# Patient Record
Sex: FEMALE | Race: WHITE | NOT HISPANIC OR LATINO | URBAN - METROPOLITAN AREA
[De-identification: names, ages, dates, MRNs, and addresses within clinical notes are randomized per-mention and may not be internally consistent; named-entity substitution may affect disease eponyms.]

---

## 2017-01-16 ENCOUNTER — IMPORTED ENCOUNTER (OUTPATIENT)
Dept: URBAN - METROPOLITAN AREA CLINIC 1 | Facility: CLINIC | Age: 72
End: 2017-01-16

## 2017-01-16 PROBLEM — H47.20: Noted: 2017-01-16

## 2017-01-16 PROBLEM — H04.123: Noted: 2017-01-16

## 2017-01-16 PROBLEM — H40.053: Noted: 2017-01-16

## 2017-01-16 PROBLEM — Z96.1: Noted: 2017-01-16

## 2017-01-16 PROBLEM — H16.143: Noted: 2017-01-16

## 2017-01-16 PROBLEM — H25.811: Noted: 2017-01-16

## 2017-01-16 PROCEDURE — 92015 DETERMINE REFRACTIVE STATE: CPT

## 2017-01-16 PROCEDURE — 92012 INTRM OPH EXAM EST PATIENT: CPT

## 2017-01-16 NOTE — PATIENT DISCUSSION
1.  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 Recommend very slow wean in future while following CRP. Cont Lumigan QHS OS. Steroids followed by Dr. Scott Gray currently on Prednisone 50mg PO QD. Monocular safety precautions. Patient should continue to f/u as scheduled with Dr. Scott Gray. 2.  Cataract OD: Observe for now without intervention. The patient was advised to contact us if any change or worsening of vision3. OHTN OU (0.8/0.75)- Slight increased in IOP OU but remains within normal Limits. Start Combigan OS only BID given recent [facility] changes (sample given.)  Patient to continue with Latanoprost OS QHS. 4.  MARYJO w/ PEK OU- (H/o Plugs LLs OU) Cont ATs TID OU Routinely. 5.  H/o PVD OD 6. Pseudophakia OS- H/o Yag OS7. Return for an appointment for a 10 in 1 month with Dr. Anderson Fernandez.

## 2017-02-06 ENCOUNTER — APPOINTMENT (OUTPATIENT)
Dept: MRI IMAGING | Age: 72
End: 2017-02-06
Attending: EMERGENCY MEDICINE
Payer: COMMERCIAL

## 2017-02-06 ENCOUNTER — APPOINTMENT (OUTPATIENT)
Dept: CT IMAGING | Age: 72
End: 2017-02-06
Attending: EMERGENCY MEDICINE
Payer: COMMERCIAL

## 2017-02-06 ENCOUNTER — HOSPITAL ENCOUNTER (EMERGENCY)
Age: 72
Discharge: HOME | End: 2017-02-06
Attending: EMERGENCY MEDICINE
Payer: COMMERCIAL

## 2017-02-06 ENCOUNTER — APPOINTMENT (OUTPATIENT)
Dept: GENERAL RADIOLOGY | Age: 72
End: 2017-02-06
Attending: EMERGENCY MEDICINE
Payer: COMMERCIAL

## 2017-02-06 VITALS
WEIGHT: 130 LBS | SYSTOLIC BLOOD PRESSURE: 131 MMHG | BODY MASS INDEX: 25.52 KG/M2 | OXYGEN SATURATION: 99 % | HEIGHT: 60 IN | TEMPERATURE: 97.7 F | DIASTOLIC BLOOD PRESSURE: 70 MMHG | HEART RATE: 97 BPM | RESPIRATION RATE: 24 BRPM

## 2017-02-06 DIAGNOSIS — R47.81 SLURRED SPEECH: Primary | ICD-10-CM

## 2017-02-06 LAB
ABO + RH BLD: NORMAL
ALBUMIN SERPL BCP-MCNC: 3.3 G/DL (ref 3.4–5)
ALBUMIN/GLOB SERPL: 1.2 {RATIO} (ref 0.8–1.7)
ALP SERPL-CCNC: 55 U/L (ref 45–117)
ALT SERPL-CCNC: 38 U/L (ref 13–56)
AMPHET UR QL SCN: NEGATIVE
ANION GAP BLD CALC-SCNC: 8 MMOL/L (ref 3–18)
APPEARANCE UR: CLEAR
ASPIRIN TEST, ASPIRN: 350 ARU
AST SERPL W P-5'-P-CCNC: 27 U/L (ref 15–37)
ATRIAL RATE: 75 BPM
BARBITURATES UR QL SCN: NEGATIVE
BENZODIAZ UR QL: NEGATIVE
BILIRUB SERPL-MCNC: 0.4 MG/DL (ref 0.2–1)
BILIRUB UR QL: NEGATIVE
BLOOD GROUP ANTIBODIES SERPL: NORMAL
BUN SERPL-MCNC: 16 MG/DL (ref 7–18)
BUN/CREAT SERPL: 28 (ref 12–20)
CALCIUM SERPL-MCNC: 8.6 MG/DL (ref 8.5–10.1)
CALCULATED P AXIS, ECG09: 77 DEGREES
CALCULATED R AXIS, ECG10: 38 DEGREES
CALCULATED T AXIS, ECG11: 86 DEGREES
CANNABINOIDS UR QL SCN: POSITIVE
CHLORIDE SERPL-SCNC: 100 MMOL/L (ref 100–108)
CK MB CFR SERPL CALC: 9.6 % (ref 0–4)
CK MB SERPL-MCNC: 4.3 NG/ML (ref 0.5–3.6)
CK SERPL-CCNC: 45 U/L (ref 26–192)
CO2 SERPL-SCNC: 29 MMOL/L (ref 21–32)
COCAINE UR QL SCN: NEGATIVE
COLOR UR: YELLOW
CREAT SERPL-MCNC: 0.58 MG/DL (ref 0.6–1.3)
DIAGNOSIS, 93000: NORMAL
ERYTHROCYTE [DISTWIDTH] IN BLOOD BY AUTOMATED COUNT: 18.3 % (ref 11.6–14.5)
FIBRINOGEN PPP-MCNC: 274 MG/DL (ref 210–451)
GLOBULIN SER CALC-MCNC: 2.8 G/DL (ref 2–4)
GLUCOSE BLD STRIP.AUTO-MCNC: 103 MG/DL (ref 70–110)
GLUCOSE SERPL-MCNC: 91 MG/DL (ref 74–99)
GLUCOSE UR STRIP.AUTO-MCNC: 100 MG/DL
HCT VFR BLD AUTO: 42.3 % (ref 35–45)
HDSCOM,HDSCOM: ABNORMAL
HGB BLD-MCNC: 13.9 G/DL (ref 12–16)
HGB UR QL STRIP: NEGATIVE
INR BLD: 1.1 (ref 0.9–1.2)
INR PPP: 1 (ref 0.8–1.2)
KETONES UR QL STRIP.AUTO: NEGATIVE MG/DL
LACTATE BLD-SCNC: 1.1 MMOL/L (ref 0.4–2)
LEUKOCYTE ESTERASE UR QL STRIP.AUTO: NEGATIVE
MCH RBC QN AUTO: 32.4 PG (ref 24–34)
MCHC RBC AUTO-ENTMCNC: 32.9 G/DL (ref 31–37)
MCV RBC AUTO: 98.6 FL (ref 74–97)
METHADONE UR QL: NEGATIVE
NITRITE UR QL STRIP.AUTO: NEGATIVE
OPIATES UR QL: NEGATIVE
P-R INTERVAL, ECG05: 120 MS
PCP UR QL: NEGATIVE
PH UR STRIP: 7 [PH] (ref 5–8)
PLATELET # BLD AUTO: 275 K/UL (ref 135–420)
PMV BLD AUTO: 11.1 FL (ref 9.2–11.8)
POTASSIUM SERPL-SCNC: 4 MMOL/L (ref 3.5–5.5)
PROT SERPL-MCNC: 6.1 G/DL (ref 6.4–8.2)
PROT UR STRIP-MCNC: NEGATIVE MG/DL
PROTHROMBIN TIME: 12.6 SEC (ref 11.5–15.2)
Q-T INTERVAL, ECG07: 422 MS
QRS DURATION, ECG06: 74 MS
QTC CALCULATION (BEZET), ECG08: 471 MS
RBC # BLD AUTO: 4.29 M/UL (ref 4.2–5.3)
SODIUM SERPL-SCNC: 137 MMOL/L (ref 136–145)
SP GR UR REFRACTOMETRY: 1.01 (ref 1–1.03)
SPECIMEN EXP DATE BLD: NORMAL
THROMBIN TIME: 16.8 SECS (ref 13.8–18.2)
TROPONIN I SERPL-MCNC: 0.03 NG/ML (ref 0–0.04)
UROBILINOGEN UR QL STRIP.AUTO: 0.2 EU/DL (ref 0.2–1)
VENTRICULAR RATE, ECG03: 75 BPM
WBC # BLD AUTO: 21.3 K/UL (ref 4.6–13.2)

## 2017-02-06 PROCEDURE — 99285 EMERGENCY DEPT VISIT HI MDM: CPT

## 2017-02-06 PROCEDURE — 83605 ASSAY OF LACTIC ACID: CPT

## 2017-02-06 PROCEDURE — 85670 THROMBIN TIME PLASMA: CPT | Performed by: EMERGENCY MEDICINE

## 2017-02-06 PROCEDURE — 74011250636 HC RX REV CODE- 250/636: Performed by: EMERGENCY MEDICINE

## 2017-02-06 PROCEDURE — 85610 PROTHROMBIN TIME: CPT

## 2017-02-06 PROCEDURE — 81003 URINALYSIS AUTO W/O SCOPE: CPT | Performed by: EMERGENCY MEDICINE

## 2017-02-06 PROCEDURE — 80307 DRUG TEST PRSMV CHEM ANLYZR: CPT | Performed by: EMERGENCY MEDICINE

## 2017-02-06 PROCEDURE — 70553 MRI BRAIN STEM W/O & W/DYE: CPT

## 2017-02-06 PROCEDURE — 77030011943

## 2017-02-06 PROCEDURE — 85576 BLOOD PLATELET AGGREGATION: CPT | Performed by: EMERGENCY MEDICINE

## 2017-02-06 PROCEDURE — 82962 GLUCOSE BLOOD TEST: CPT

## 2017-02-06 PROCEDURE — 74011636320 HC RX REV CODE- 636/320: Performed by: EMERGENCY MEDICINE

## 2017-02-06 PROCEDURE — 93005 ELECTROCARDIOGRAM TRACING: CPT

## 2017-02-06 PROCEDURE — 94762 N-INVAS EAR/PLS OXIMTRY CONT: CPT

## 2017-02-06 PROCEDURE — 70450 CT HEAD/BRAIN W/O DYE: CPT

## 2017-02-06 PROCEDURE — 74177 CT ABD & PELVIS W/CONTRAST: CPT

## 2017-02-06 PROCEDURE — 86900 BLOOD TYPING SEROLOGIC ABO: CPT | Performed by: EMERGENCY MEDICINE

## 2017-02-06 PROCEDURE — 80053 COMPREHEN METABOLIC PANEL: CPT | Performed by: EMERGENCY MEDICINE

## 2017-02-06 PROCEDURE — 82550 ASSAY OF CK (CPK): CPT | Performed by: EMERGENCY MEDICINE

## 2017-02-06 PROCEDURE — A9585 GADOBUTROL INJECTION: HCPCS | Performed by: EMERGENCY MEDICINE

## 2017-02-06 PROCEDURE — 85610 PROTHROMBIN TIME: CPT | Performed by: EMERGENCY MEDICINE

## 2017-02-06 PROCEDURE — 71010 XR CHEST PORT: CPT

## 2017-02-06 PROCEDURE — 85027 COMPLETE CBC AUTOMATED: CPT | Performed by: EMERGENCY MEDICINE

## 2017-02-06 PROCEDURE — 85384 FIBRINOGEN ACTIVITY: CPT | Performed by: EMERGENCY MEDICINE

## 2017-02-06 RX ADMIN — SODIUM CHLORIDE 1000 ML: 900 INJECTION, SOLUTION INTRAVENOUS at 12:47

## 2017-02-06 RX ADMIN — GADOBUTROL 7.5 ML: 604.72 INJECTION INTRAVENOUS at 18:25

## 2017-02-06 RX ADMIN — IOPAMIDOL 94 ML: 612 INJECTION, SOLUTION INTRAVENOUS at 13:34

## 2017-02-06 NOTE — ED NOTES
Lab called and states special blue top needs to be redrawn, per Dr Juan Coley, ok with lab not resulting athis time. Do not need to redraw.

## 2017-02-06 NOTE — ED TRIAGE NOTES
Pt arrives by EMS as a Code S Level 1 for slurred speech, right sided facial droop, and weakness. Pt states she became confused at 1000 today then fell. Last known normal time unknown. Pt with previous stroke and unknown deficits from prior stroke.

## 2017-02-06 NOTE — ED PROVIDER NOTES
HPI Comments: 11:30 AM Dian Berry is a 66 y.o. female with h/o HTN, COPD, CAD, and cerebral aneurysm who presents to ED via EMS for evaluation of AMS onset 1 hour ago. EMS report right sided facial droop, slurred speech, and no arm weakness. EMS also report hx of stroke early last year, but unsure of when exactly. Pt states she walks around with a walker and fell this morning. Pt currently states she feels, \"confused. \" When asked what is different, pt replied \"everything. \" No other concerns or symptoms at this time. PCP: Joss Mccarthy MD    The history is provided by the EMS personnel. 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      Social History Main Topics    Smoking status: Former Smoker     Types: Cigarettes     Quit date: 4/28/2013    Smokeless tobacco: Not on file    Alcohol use 1.0 oz/week     2 Shots of liquor per week    Drug use: No    Sexual activity: Yes     Partners: Male     Other Topics Concern    Not on file     Social History Narrative ALLERGIES: Sulfa (sulfonamide antibiotics) and Sulfamethoxazole-trimethoprim    Review of Systems   Unable to perform ROS: Mental status change       Vitals:    02/06/17 1300 02/06/17 1430 02/06/17 1500 02/06/17 1630   BP: 179/83 165/70 167/79 169/74   Pulse:  71 70 72   Resp:  18 17 23   Temp:       SpO2:  98% 98% 99%   Weight:       Height:                Physical Exam   Constitutional: She appears well-developed. No distress. Chronically ill-appearing, nad. Examination slightly limited due to patient effort. HENT:   Head: Normocephalic and atraumatic. Eyes: EOM are normal.   Neck: Normal range of motion. Cardiovascular: Normal rate. Pulmonary/Chest: Effort normal and breath sounds normal. No respiratory distress. Abdominal: Soft. There is no tenderness. Musculoskeletal: Normal range of motion. Mechanically stable   Neurological: She is alert. Potentially right sided facial droop. Generalized weakness noted. Slurred speech noted. Psychiatric: Her behavior is normal.   Nursing note and vitals reviewed. MDM  Number of Diagnoses or Management Options  Slurred speech:   Diagnosis management comments: 67 yo CF with PMHx HTN, CVA presents by EMS after 1-hour onset of confusion, right side facial droop. Pt difficult historian, also states she had fall from her walker. Examination significant for apparent right facial droop and slurred speech with no other apparent focal deficit, though examination somewhat limited by pt effort. Will initiate code S, evaluate for acute process. 1.  Code S order set  2. Symptom management  3. Re-evaluate    7:58 PM  Wbc is elevated, but pt also on high dose steroids for temporal arteritis/PMR. CT negative for acute process and work-up otherwise unremarkable. I spoke with Dr. oIna Fontenot previously about admission, and he recommended getting MRI, discharge if negative, states pt has tendency to have similar episodes.   Pt now does seem more awake, slurred speech resolved.  at bedside. Care transferred to Dr. Nam Vera at end of shift, MRI results pending.        Amount and/or Complexity of Data Reviewed  Clinical lab tests: ordered and reviewed  Tests in the radiology section of CPT®: ordered and reviewed  Discussion of test results with the performing providers: yes  Obtain history from someone other than the patient: yes  Review and summarize past medical records: yes  Discuss the patient with other providers: yes  Independent visualization of images, tracings, or specimens: yes    Risk of Complications, Morbidity, and/or Mortality  Presenting problems: high  Diagnostic procedures: high  Management options: high    Patient Progress  Patient progress: improved    ED Course       CRITICAL CARE (ASAP ONLY)  Performed by: Nitin Wilkes  Authorized by: Nitin Wilkes     Critical care provider statement:     Critical care time was exclusive of:  Separately billable procedures and treating other patients    Critical care was necessary to treat or prevent imminent or life-threatening deterioration of the following conditions:  CNS failure or compromise    Critical care was time spent personally by me on the following activities:  Ordering and performing treatments and interventions, ordering and review of laboratory studies, ordering and review of radiographic studies, pulse oximetry, re-evaluation of patient's condition, review of old charts, obtaining history from patient or surrogate, examination of patient, evaluation of patient's response to treatment, discussions with consultants and development of treatment plan with patient or surrogate  EKG  Date/Time: 2/6/2017 11:51 AM  Performed by: Rajendra Phillips by: Nitin Wilkes     ECG reviewed by ED Physician in the absence of a cardiologist: yes    Previous ECG:     Previous ECG:  Compared to current    Comparison ECG info:  11/15/14    Similarity:  No change  Interpretation: Interpretation: normal    Quality:     Tracing quality:  Limited by artifact  Rate:     ECG rate:  75    ECG rate assessment: normal    Rhythm:     Rhythm: sinus rhythm    Ectopy:     Ectopy: none    QRS:     QRS axis:  Normal    QRS intervals:  Normal  Conduction:     Conduction: normal    ST segments:     ST segments:  Normal  T waves:     T waves: normal          Vitals:  Patient Vitals for the past 12 hrs:   Temp Pulse Resp BP SpO2   02/06/17 1630 - 72 23 169/74 99 %   02/06/17 1500 - 70 17 167/79 98 %   02/06/17 1430 - 71 18 165/70 98 %   02/06/17 1300 - - - 179/83 -   02/06/17 1256 - 73 19 - 96 %   02/06/17 1240 - 73 19 161/79 99 %   02/06/17 1230 - 72 20 156/73 99 %   02/06/17 1220 - 72 21 163/76 99 %   02/06/17 1210 - 74 19 158/75 99 %   02/06/17 1200 - 72 23 155/80 99 %   02/06/17 1150 - 72 21 161/75 99 %   02/06/17 1146 97.7 °F (36.5 °C) 73 21 - 98 %   99% on RA, indicating adequate oxygenation. Medications ordered:   Medications   sodium chloride 0.9 % bolus infusion 1,000 mL (0 mL IntraVENous IV Completed 2/6/17 1317)   iopamidol (ISOVUE 300) 61 % contrast injection 100 mL (94 mL IntraVENous Given 2/6/17 1334)   gadobutrol (Gadavist) contrast solution 7.5 mL (7.5 mL IntraVENous Given 2/6/17 2325)         Lab findings:  Recent Results (from the past 12 hour(s))   CBC W/O DIFF    Collection Time: 02/06/17 11:30 AM   Result Value Ref Range    WBC 21.3 (H) 4.6 - 13.2 K/uL    RBC 4.29 4. 20 - 5.30 M/uL    HGB 13.9 12.0 - 16.0 g/dL    HCT 42.3 35.0 - 45.0 %    MCV 98.6 (H) 74.0 - 97.0 FL    MCH 32.4 24.0 - 34.0 PG    MCHC 32.9 31.0 - 37.0 g/dL    RDW 18.3 (H) 11.6 - 14.5 %    PLATELET 796 431 - 484 K/uL    MPV 11.1 9.2 - 11.6 FL   METABOLIC PANEL, COMPREHENSIVE    Collection Time: 02/06/17 11:30 AM   Result Value Ref Range    Sodium 137 136 - 145 mmol/L    Potassium 4.0 3.5 - 5.5 mmol/L    Chloride 100 100 - 108 mmol/L    CO2 29 21 - 32 mmol/L    Anion gap 8 3.0 - 18 mmol/L    Glucose 91 74 - 99 mg/dL BUN 16 7.0 - 18 MG/DL    Creatinine 0.58 (L) 0.6 - 1.3 MG/DL    BUN/Creatinine ratio 28 (H) 12 - 20      GFR est AA >60 >60 ml/min/1.73m2    GFR est non-AA >60 >60 ml/min/1.73m2    Calcium 8.6 8.5 - 10.1 MG/DL    Bilirubin, total 0.4 0.2 - 1.0 MG/DL    ALT (SGPT) 38 13 - 56 U/L    AST (SGOT) 27 15 - 37 U/L    Alk.  phosphatase 55 45 - 117 U/L    Protein, total 6.1 (L) 6.4 - 8.2 g/dL    Albumin 3.3 (L) 3.4 - 5.0 g/dL    Globulin 2.8 2.0 - 4.0 g/dL    A-G Ratio 1.2 0.8 - 1.7     PROTHROMBIN TIME + INR    Collection Time: 02/06/17 11:30 AM   Result Value Ref Range    Prothrombin time 12.6 11.5 - 15.2 sec    INR 1.0 0.8 - 1.2     THROMBIN TIME    Collection Time: 02/06/17 11:30 AM   Result Value Ref Range    Thrombin time 16.8 13.8 - 18.2 SECS   CARDIAC PANEL,(CK, CKMB & TROPONIN)    Collection Time: 02/06/17 11:30 AM   Result Value Ref Range    CK 45 26 - 192 U/L    CK - MB 4.3 (H) 0.5 - 3.6 ng/ml    CK-MB Index 9.6 (H) 0.0 - 4.0 %    Troponin-I, Qt. 0.03 0.0 - 0.045 NG/ML   FIBRINOGEN    Collection Time: 02/06/17 11:30 AM   Result Value Ref Range    Fibrinogen 274 210 - 451 mg/dL   TYPE & SCREEN    Collection Time: 02/06/17 11:30 AM   Result Value Ref Range    Crossmatch Expiration 02/09/2017     ABO/Rh(D) [place] POSITIVE     Antibody screen NEG    ASPIRIN TEST    Collection Time: 02/06/17 11:30 AM   Result Value Ref Range    Aspirin test 350 ARU   POC INR (AMB)    Collection Time: 02/06/17 11:31 AM   Result Value Ref Range    INR (POC) 1.1 <1.2     EKG, 12 LEAD, INITIAL    Collection Time: 02/06/17 11:47 AM   Result Value Ref Range    Ventricular Rate 75 BPM    Atrial Rate 75 BPM    P-R Interval 120 ms    QRS Duration 74 ms    Q-T Interval 422 ms    QTC Calculation (Bezet) 471 ms    Calculated P Axis 77 degrees    Calculated R Axis 38 degrees    Calculated T Axis 86 degrees    Diagnosis       Normal sinus rhythm with sinus arrhythmia  Nonspecific ST and T wave abnormality  Prolonged QT  Abnormal ECG  When compared with ECG of 15-NOV-2014 16:27,  premature atrial complexes are no longer present  Confirmed by Traci Amaro ([id number]) on 2/6/2017 7:29:19 PM     GLUCOSE, POC    Collection Time: 02/06/17 11:56 AM   Result Value Ref Range    Glucose (POC) 103 70 - 110 mg/dL   URINALYSIS W/ RFLX MICROSCOPIC    Collection Time: 02/06/17 12:03 PM   Result Value Ref Range    Color YELLOW      Appearance CLEAR      Specific gravity 1.014 1.005 - 1.030      pH (UA) 7.0 5.0 - 8.0      Protein NEGATIVE  NEG mg/dL    Glucose 100 (A) NEG mg/dL    Ketone NEGATIVE  NEG mg/dL    Bilirubin NEGATIVE  NEG      Blood NEGATIVE  NEG      Urobilinogen 0.2 0.2 - 1.0 EU/dL    Nitrites NEGATIVE  NEG      Leukocyte Esterase NEGATIVE  NEG     DRUG SCREEN, URINE    Collection Time: 02/06/17 12:03 PM   Result Value Ref Range    BENZODIAZEPINE NEGATIVE  NEG      BARBITURATES NEGATIVE  NEG      THC (TH-CANNABINOL) POSITIVE (A) NEG      OPIATES NEGATIVE  NEG      PCP(PHENCYCLIDINE) NEGATIVE  NEG      COCAINE NEGATIVE  NEG      AMPHETAMINE NEGATIVE  NEG      METHADONE NEGATIVE       HDSCOM (NOTE)    POC LACTIC ACID    Collection Time: 02/06/17  1:27 PM   Result Value Ref Range    Lactic Acid (POC) 1.1 0.4 - 2.0 mmol/L       X-Ray, CT or other radiology findings or impressions:  CT ABD PELV W CONT   Final Result   IMPRESSION:     1. No acute intra-abdominal process is identified. No free fluid or fluid  collection. 2. Stable small bilateral adrenal hypodensities too small accurately  characterize, likely adenomas given the stability. 3. Stable small renal hypodensities. The largest is located along the right  renal lower pole, with intermediate Hounsfield units, nonspecific and likely  complex cyst given stability. 4. Chronic healed right 10th rib fracture. L1 superior endplate compression  fracture likely chronic healed fracture although with slight progression in  height loss since 3/8/2016.      Interpreted by radiologist 14:10       XR CHEST PORT   Final Result IMPRESSION:     No acute pulmonary process identified. Interpreted by radiologist 13:50   CT HEAD WO CONT   Final Result   IMPRESSION:     1. No acute intracranial hemorrhage, mass effect, midline shift, or herniation.        2. Stable areas of encephalomalacia likely from chronic infarcts involving the  bilateral cerebellar hemispheres as well as bilateral occipital lobes medial  left parietal lobe. Stable irregular dystrophic appearing calcifications,  nonspecific and perhaps sequela of associated hemorrhage. Stable chronic basal  ganglia and left corona radiata infarcts.     3. Stable, moderate nonspecific white matter disease likely representing chronic  small vessel changes.     4. No definite CT evidence of acute cortical infarct is seen. Please note that  noncontrast head CT may be normal in early acute infarct.      These critical results on this CODE S patient were directly communicated to Dr. Vianney Brown at 11:45 AM 2/6/2017. Read back was performed. Orders:  Orders Placed This Encounter    XR CHEST PORT     Standing Status:   Standing     Number of Occurrences:   1     Order Specific Question:   Reason for Exam     Answer: Suspected Stroke    CT HEAD WO CONT     Standing Status:   Standing     Number of Occurrences:   1     Order Specific Question:   Transport     Answer:   BED [2]     Order Specific Question:   Reason for Exam     Answer: Suspected Stroke     Order Specific Question:   Is Patient Allergic to Contrast Dye? Answer:   Unknown    CT ABD PELV W CONT     Standing Status:   Standing     Number of Occurrences:   1     Order Specific Question:   Transport     Answer:   BED [2]     Order Specific Question:   Is Patient Allergic to Contrast Dye? Answer:   No     Order Specific Question:   Type of Contrast     Answer:   IV     Order Specific Question:   Does patient have history of Renal Disease?      Answer:   No     Order Specific Question:   Oral Contrast     Answer:   Per Radiologist Protocol    MRI BRAIN W WO CONT     Standing Status:   Standing     Number of Occurrences:   1     Order Specific Question:   Transport     Answer:   BED [2]     Order Specific Question:   Is Patient Allergic to Contrast Dye? Answer:   No     Order Specific Question:   Does patient have history of Renal Disease? Answer:   No    CBC W/O DIFF     Standing Status:   Standing     Number of Occurrences:   1    METABOLIC PANEL, COMPREHENSIVE     Standing Status:   Standing     Number of Occurrences:   1    PROTHROMBIN TIME     Standing Status:   Standing     Number of Occurrences:   1    THROMBIN TIME     Standing Status:   Standing     Number of Occurrences:   1    CARDIAC PANEL,(CK, CKMB & TROPONIN)     Standing Status:   Standing     Number of Occurrences:   1    FIBRINOGEN     Standing Status:   Standing     Number of Occurrences:   1    ASPIRIN TEST     Standing Status:   Standing     Number of Occurrences:   1    URINALYSIS W/ RFLX MICROSCOPIC     Standing Status:   Standing     Number of Occurrences:   1    DRUG SCREEN, URINE     Standing Status:   Standing     Number of Occurrences:   1    DIET NPO     Standing Status:   Standing     Number of Occurrences:   1    NURSING-MISCELLANEOUS: Obtain last known well or time of onset ONE TIME     Standing Status:   Standing     Number of Occurrences:   1     Order Specific Question:   Description of Order:     Answer:   Obtain last known well or time of onset    NURSING-MISCELLANEOUS: If unable to place IV with 2 tries in 5 minutes, notify MD immediately ONE TIME     Standing Status:   Standing     Number of Occurrences:   1     Order Specific Question:   Description of Order:     Answer: If unable to place IV with 2 tries in 5 minutes, notifpatel LERNER immediately   [facility] [address]     Standing Status:   Standing     Number of Occurrences:   1     Order Specific Question:   Type:      Answer:   Bedside    PULSE OXIMETRY CONTINUOUS Standing Status:   Standing     Number of Occurrences:   1    OXYGEN CANNULA     Titrate to keep oxygen saturation greater than 95%. Standing Status:   Standing     Number of Occurrences:   1     Order Specific Question:   Liters per minute:      Answer:   2     Order Specific Question:   Indications for O2 therapy     Answer:   OTHER     Comments:   Stroke    VITAL SIGNS     Every 5 minutes X 15 minutes, then every 15 minutes X 1 hour, then hourly and PRN     Standing Status:   Standing     Number of Occurrences:   1    MEASURE HEIGHT     Standing Status:   Standing     Number of Occurrences:   1    WEIGH PATIENT     Standing Status:   Standing     Number of Occurrences:   1    NEUROLOGIC STATUS ASSESSMENT     Every 15 minutes X 1 hour, then hourly and PRN     Standing Status:   Standing     Number of Occurrences:   1    NIH STROKE SCALE ASSESSMENT     Standing Status:   Standing     Number of Occurrences:   1    CRITICAL CARE (ASAP ONLY)     This order was created via procedure documentation     Standing Status:   Standing     Number of Occurrences:   1    EKG NOTEWRITER(ASAP ONLY)     This order was created via procedure documentation     Standing Status:   Standing     Number of Occurrences:   1    POC LACTIC ACID     Standing Status:   Standing     Number of Occurrences:   1    POC PT/INR     Standing Status:   Standing     Number of Occurrences:   1    POC GLUCOSE BEDSIDE     Standing Status:   Standing     Number of Occurrences:   1    POC URINE PREGNANCY TEST - FOR WOMEN OF CHILDBEARING AGE ONLY     For women of childbearing age only     Standing Status:   Standing     Number of Occurrences:   1    POC INR (AMB)     Standing Status:   Standing     Number of Occurrences:   1    GLUCOSE, POC     Standing Status:   Standing     Number of Occurrences:   1    POC LACTIC ACID     Standing Status:   Standing     Number of Occurrences:   1    EKG, 12 LEAD, INITIAL     Standing Status:   Standing Number of Occurrences:   1     Order Specific Question:   Reason for Exam:     Answer: Suspected Stroke    TYPE & SCREEN     ENTER SURGERY DATE IF FOR PRE-OP TESTING. Standing Status:   Standing     Number of Occurrences:   1     Order Specific Question:   Has patient been transfused or pregnant in the last 3 mos. ? Answer:   Unknown    DYSPHAGIA SCREENING     For Category C ONLY. Category A & B deferred until intervention has been determined. Standing Status:   Standing     Number of Occurrences:   1    INSERT PERIPHERAL IV 18 gauge. Right arm preferred. ONE TIME STAT     18 gauge. Right arm preferred. Standing Status:   Standing     Number of Occurrences:   1    sodium chloride 0.9 % bolus infusion 1,000 mL    iopamidol (ISOVUE 300) 61 % contrast injection 100 mL    gadobutrol (Gadavist) contrast solution 7.5 mL    IP CONSULT TO HOSPITALIST     Standing Status:   Standing     Number of Occurrences:   1     Order Specific Question:   Reason for Consult: Answer:   ALTERED MENTAL STATUS     Order Specific Question:   Did you call or speak to the consulting provider? Answer:   No     Order Specific Question:   Consult To     Answer: Gus       Progress notes, Consult notes, or additional Procedure notes:   11:20 AM Code S level I called. ETA 5 minutes. 11:31 AM Code S level II called. 11:45 AM Consult: I discussed care with Dr. Daniel Sandifer ([email or web address]). It was a standard discussion including patient history, chief complaint, available diagnostic results, and predicted treatment course. He will evaluate the patient. 12:03 PM I spoke with Dr. Daniel Sandifer. After evaluating the patient, he states pt is not a tPA candidate. He recommends considering infectious or metabolic process. 2:25 PM Consult: I discussed care with Dr. Librado Oro (PCP).  It was a standard discussion including patient history, chief complaint, available diagnostic results, and predicted treatment course. He recommends getting an MRI and, if the MRI is negative, to discharge patient home. He also states patient tends to have episodes similar to what she presented with today. 7:00 PM Care of the patient transferred to Dr. Edison Ocampo due to shift change. Patient is pending MRI. Disposition:  Diagnosis:   1. Slurred speech        Disposition: Pending    Follow-up Information     None           Patient's Medications   Start Taking    No medications on file   Continue Taking    AMLODIPINE (NORVASC) 2.5 MG TABLET    Take 1 tablet by mouth daily. ASPIRIN (ASPIRIN) 325 MG TABLET    Take 1 Tab by mouth daily. BIMATOPROST (LUMIGAN) 0.01 % OPHTHALMIC DROPS    Administer 1 Drop to left eye every evening. CLOPIDOGREL (PLAVIX) 75 MG TABLET    Take 1 Tab by mouth daily. FLUOXETINE (PROZAC) 20 MG CAPSULE    20 mg. FOLIC ACID PO    Take 1 Tab by mouth. LEVOTHYROXINE (SYNTHROID) 50 MCG TABLET    Take 1 Tab by mouth Daily (before breakfast). METHOTREXATE (RHEUMATREX) 2.5 MG TABLET    Take 5 mg by mouth every Wednesday. ONDANSETRON (ZOFRAN ODT) 4 MG DISINTEGRATING TABLET    Take 1 Tab by mouth every eight (8) hours as needed for Nausea. OXYBUTYNIN CHLORIDE XL (DITROPAN XL) 5 MG CR TABLET    Take 5 mg by mouth daily. PREDNISONE (DELTASONE) 1 MG TABLET    Take 4 mg by mouth daily (with breakfast). ROPINIROLE (REQUIP) 1 MG TABLET    Take 1 Tab by mouth two (2) times a day. SIMVASTATIN (ZOCOR) 20 MG TABLET    Take 1 Tab by mouth nightly. TRAMADOL (ULTRAM) 50 MG TABLET    Take 50 mg by mouth two (2) times a day. TRAMADOL (ULTRAM) 50 MG TABLET    Take 2 Tabs by mouth every six (6) hours as needed for Pain. Max Daily Amount: 400 mg.    These Medications have changed    No medications on file   Stop Taking    No medications on file       [id number] [facility] for and in the presence of Melina Avendano MD (02/06/17)      Physician Attestation  I personally performed the services described in this documentation, reviewed and edited the documentation which was dictated to the scribe in my presence, and it accurately records my words and actions.     Raoul Barbosa MD (02/06/17)      Signed by: Belkys Mendoza, February 06, 2017 at 7:59 PM                            3

## 2017-02-06 NOTE — ED NOTES
Dr Ruslan Shaffer met pt at door and after initial assessment, Code S level 2 upgrade called. Pt to CT scan.

## 2017-02-06 NOTE — ED NOTES
Per Dr Harmony Schafer, pt is not a TPA candidate and needs to be worked up for infection due to white count and no last known normal time.

## 2017-02-06 NOTE — ED NOTES
Pt sleeping on stretcher in position of comfort with easy non-labored respirations in NAD.   Pt waiting MRI

## 2017-02-07 NOTE — PROGRESS NOTES
Imaging results abnormal, but with no acute changes. No change in treatment plan necessary.       River Levin PA-C

## 2017-02-07 NOTE — ED NOTES
Purposeful rounding completed:    Side rails up x 2:  YES  Bed low and wheels and locked: YES  Call bell in reach: YES  Comfort addressed: YES    Toileting needs addressed: YES  Plan of care reviewed/updated with patient and or family members: YES  IV site assessed: YES  Pain assessed and addressed: YES, 0.

## 2017-02-07 NOTE — ED NOTES
Bedside shift change report given to Gertrude Guerra (oncoming nurse) by Roshni Sotomayor RN (offgoing nurse). Report included the following information SBAR, Kardex, ED Summary and MAR.

## 2017-02-07 NOTE — ED NOTES
Vitals:  Patient Vitals for the past 12 hrs:   Temp Pulse Resp BP SpO2   02/06/17 2046 - 80 22 - 96 %   02/06/17 2044 - - - 153/75 -   02/06/17 1630 - 72 23 169/74 99 %   02/06/17 1500 - 70 17 167/79 98 %   02/06/17 1430 - 71 18 165/70 98 %   02/06/17 1300 - - - 179/83 -   02/06/17 1256 - 73 19 - 96 %   02/06/17 1240 - 73 19 161/79 99 %   02/06/17 1230 - 72 20 156/73 99 %   02/06/17 1220 - 72 21 163/76 99 %   02/06/17 1210 - 74 19 158/75 99 %   02/06/17 1200 - 72 23 155/80 99 %   02/06/17 1150 - 72 21 161/75 99 %   02/06/17 1146 97.7 °F (36.5 °C) 73 21 - 98 %         Medications ordered:   Medications   sodium chloride 0.9 % bolus infusion 1,000 mL (0 mL IntraVENous IV Completed 2/6/17 1317)   iopamidol (ISOVUE 300) 61 % contrast injection 100 mL (94 mL IntraVENous Given 2/6/17 1334)   gadobutrol (Gadavist) contrast solution 7.5 mL (7.5 mL IntraVENous Given 2/6/17 1825)         Lab findings:  Recent Results (from the past 12 hour(s))   CBC W/O DIFF    Collection Time: 02/06/17 11:30 AM   Result Value Ref Range    WBC 21.3 (H) 4.6 - 13.2 K/uL    RBC 4.29 4. 20 - 5.30 M/uL    HGB 13.9 12.0 - 16.0 g/dL    HCT 42.3 35.0 - 45.0 %    MCV 98.6 (H) 74.0 - 97.0 FL    MCH 32.4 24.0 - 34.0 PG    MCHC 32.9 31.0 - 37.0 g/dL    RDW 18.3 (H) 11.6 - 14.5 %    PLATELET 156 951 - 885 K/uL    MPV 11.1 9.2 - 32.8 FL   METABOLIC PANEL, COMPREHENSIVE    Collection Time: 02/06/17 11:30 AM   Result Value Ref Range    Sodium 137 136 - 145 mmol/L    Potassium 4.0 3.5 - 5.5 mmol/L    Chloride 100 100 - 108 mmol/L    CO2 29 21 - 32 mmol/L    Anion gap 8 3.0 - 18 mmol/L    Glucose 91 74 - 99 mg/dL    BUN 16 7.0 - 18 MG/DL    Creatinine 0.58 (L) 0.6 - 1.3 MG/DL    BUN/Creatinine ratio 28 (H) 12 - 20      GFR est AA >60 >60 ml/min/1.73m2    GFR est non-AA >60 >60 ml/min/1.73m2    Calcium 8.6 8.5 - 10.1 MG/DL    Bilirubin, total 0.4 0.2 - 1.0 MG/DL    ALT (SGPT) 38 13 - 56 U/L    AST (SGOT) 27 15 - 37 U/L    Alk.  phosphatase 55 45 - 117 U/L Protein, total 6.1 (L) 6.4 - 8.2 g/dL    Albumin 3.3 (L) 3.4 - 5.0 g/dL    Globulin 2.8 2.0 - 4.0 g/dL    A-G Ratio 1.2 0.8 - 1.7     PROTHROMBIN TIME + INR    Collection Time: 02/06/17 11:30 AM   Result Value Ref Range    Prothrombin time 12.6 11.5 - 15.2 sec    INR 1.0 0.8 - 1.2     THROMBIN TIME    Collection Time: 02/06/17 11:30 AM   Result Value Ref Range    Thrombin time 16.8 13.8 - 18.2 SECS   CARDIAC PANEL,(CK, CKMB & TROPONIN)    Collection Time: 02/06/17 11:30 AM   Result Value Ref Range    CK 45 26 - 192 U/L    CK - MB 4.3 (H) 0.5 - 3.6 ng/ml    CK-MB Index 9.6 (H) 0.0 - 4.0 %    Troponin-I, Qt. 0.03 0.0 - 0.045 NG/ML   FIBRINOGEN    Collection Time: 02/06/17 11:30 AM   Result Value Ref Range    Fibrinogen 274 210 - 451 mg/dL   TYPE & SCREEN    Collection Time: 02/06/17 11:30 AM   Result Value Ref Range    Crossmatch Expiration 02/09/2017     ABO/Rh(D) Hannah Stai POSITIVE     Antibody screen NEG    ASPIRIN TEST    Collection Time: 02/06/17 11:30 AM   Result Value Ref Range    Aspirin test 350 ARU   POC INR (AMB)    Collection Time: 02/06/17 11:31 AM   Result Value Ref Range    INR (POC) 1.1 <1.2     EKG, 12 LEAD, INITIAL    Collection Time: 02/06/17 11:47 AM   Result Value Ref Range    Ventricular Rate 75 BPM    Atrial Rate 75 BPM    P-R Interval 120 ms    QRS Duration 74 ms    Q-T Interval 422 ms    QTC Calculation (Bezet) 471 ms    Calculated P Axis 77 degrees    Calculated R Axis 38 degrees    Calculated T Axis 86 degrees    Diagnosis       Normal sinus rhythm with sinus arrhythmia  Nonspecific ST and T wave abnormality  Prolonged QT  Abnormal ECG  When compared with ECG of 15-NOV-2014 16:27,  premature atrial complexes are no longer present  Confirmed by Miguel Hogue ([id number]) on 2/6/2017 7:29:19 PM     GLUCOSE, POC    Collection Time: 02/06/17 11:56 AM   Result Value Ref Range    Glucose (POC) 103 70 - 110 mg/dL   URINALYSIS W/ RFLX MICROSCOPIC    Collection Time: 02/06/17 12:03 PM   Result Value Ref Range Color YELLOW      Appearance CLEAR      Specific gravity 1.014 1.005 - 1.030      pH (UA) 7.0 5.0 - 8.0      Protein NEGATIVE  NEG mg/dL    Glucose 100 (A) NEG mg/dL    Ketone NEGATIVE  NEG mg/dL    Bilirubin NEGATIVE  NEG      Blood NEGATIVE  NEG      Urobilinogen 0.2 0.2 - 1.0 EU/dL    Nitrites NEGATIVE  NEG      Leukocyte Esterase NEGATIVE  NEG     DRUG SCREEN, URINE    Collection Time: 02/06/17 12:03 PM   Result Value Ref Range    BENZODIAZEPINE NEGATIVE  NEG      BARBITURATES NEGATIVE  NEG      THC (TH-CANNABINOL) POSITIVE (A) NEG      OPIATES NEGATIVE  NEG      PCP(PHENCYCLIDINE) NEGATIVE  NEG      COCAINE NEGATIVE  NEG      AMPHETAMINE NEGATIVE  NEG      METHADONE NEGATIVE       HDSCOM (NOTE)    POC LACTIC ACID    Collection Time: 02/06/17  1:27 PM   Result Value Ref Range    Lactic Acid (POC) 1.1 0.4 - 2.0 mmol/L       EKG interpretation by ED Physician:      X-Ray, CT or other radiology findings or impressions:  CT ABD PELV W CONT   Final Result      XR CHEST PORT   Final Result      CT HEAD WO CONT   Final Result      MRI BRAIN W WO CONT    (Results Pending)     There was a prelim report in pacs that the previous doctor was unaware of that posted about 1930  This is usually not how mri reports are generated after hours  No acute cva, mass, shift  Progress notes, Consult notes or additional Procedure notes:   T/o from Dr Derrick Etienne to f/u on mri results, and if neg can be d/c home per previous conversation he had with dr Penelope Menard  D/w  results and apologized for the delay and attempted to explain what happened  Reevaluation of patient:   stable    Disposition:  Diagnosis:   1. Slurred speech        Disposition: home      Follow-up Information     None            Patient's Medications   Start Taking    No medications on file   Continue Taking    AMLODIPINE (NORVASC) 2.5 MG TABLET    Take 1 tablet by mouth daily. ASPIRIN (ASPIRIN) 325 MG TABLET    Take 1 Tab by mouth daily.     BIMATOPROST (LUMIGAN) 0.01 % OPHTHALMIC DROPS    Administer 1 Drop to left eye every evening. CLOPIDOGREL (PLAVIX) 75 MG TABLET    Take 1 Tab by mouth daily. FLUOXETINE (PROZAC) 20 MG CAPSULE    20 mg. FOLIC ACID PO    Take 1 Tab by mouth. LEVOTHYROXINE (SYNTHROID) 50 MCG TABLET    Take 1 Tab by mouth Daily (before breakfast). METHOTREXATE (RHEUMATREX) 2.5 MG TABLET    Take 5 mg by mouth every Wednesday. ONDANSETRON (ZOFRAN ODT) 4 MG DISINTEGRATING TABLET    Take 1 Tab by mouth every eight (8) hours as needed for Nausea. OXYBUTYNIN CHLORIDE XL (DITROPAN XL) 5 MG CR TABLET    Take 5 mg by mouth daily. PREDNISONE (DELTASONE) 1 MG TABLET    Take 4 mg by mouth daily (with breakfast). ROPINIROLE (REQUIP) 1 MG TABLET    Take 1 Tab by mouth two (2) times a day. SIMVASTATIN (ZOCOR) 20 MG TABLET    Take 1 Tab by mouth nightly. TRAMADOL (ULTRAM) 50 MG TABLET    Take 50 mg by mouth two (2) times a day. TRAMADOL (ULTRAM) 50 MG TABLET    Take 2 Tabs by mouth every six (6) hours as needed for Pain. Max Daily Amount: 400 mg.    These Medications have changed    No medications on file   Stop Taking    No medications on file

## 2017-02-07 NOTE — ED NOTES
In report, patients code S had been cancelled per Cecille Walter. Verified with Dr. Nam Vera that patient no longer need stroke scales Q1h. Dr. Nam Vera verified no additional stroke scales done. I have reviewed discharge instructions with the patient and caregiver. The patient and caregiver verbalized understanding.

## 2017-02-15 ENCOUNTER — IMPORTED ENCOUNTER (OUTPATIENT)
Dept: URBAN - METROPOLITAN AREA CLINIC 1 | Facility: CLINIC | Age: 72
End: 2017-02-15

## 2017-02-15 PROBLEM — H47.20: Noted: 2017-02-15

## 2017-02-15 PROBLEM — H40.052: Noted: 2017-02-15

## 2017-02-15 PROBLEM — H25.11: Noted: 2017-02-15

## 2017-02-15 PROCEDURE — 92012 INTRM OPH EXAM EST PATIENT: CPT

## 2017-02-15 NOTE — PATIENT DISCUSSION
1.  Cataract OD: Observe for now without intervention. The patient was advised to contact us if any change or worsening of vision2. OHTN-Observe for change. TA better on combigan and lumign. Cont both for now. 3. Optic Atrophy OU  -- The patient has optic atrophy of both eyes which is the likely cause of his/her decreased vision. The condition was explained to the patient. Thought to be due to GCA. On prednisone 50mgm qd and followed by Dr Yousif Stein. Vision unchanged. 4. Return for an appointment in 1 month  with PMG for 10. with Dr. Twin Bañuelos.

## 2017-02-27 ENCOUNTER — HOSPITAL ENCOUNTER (OUTPATIENT)
Dept: GENERAL RADIOLOGY | Age: 72
Discharge: HOME | End: 2017-02-27
Payer: COMMERCIAL

## 2017-02-27 DIAGNOSIS — M13.0 UNSPECIFIED POLYARTHROPATHY OR POLYARTHRITIS, SITE UNSPECIFIED: ICD-10-CM

## 2017-02-27 PROCEDURE — 73552 X-RAY EXAM OF FEMUR 2/>: CPT

## 2017-02-27 PROCEDURE — 73502 X-RAY EXAM HIP UNI 2-3 VIEWS: CPT

## 2017-03-02 ENCOUNTER — APPOINTMENT (OUTPATIENT)
Dept: PHYSICAL THERAPY | Age: 72
End: 2017-03-02
Payer: COMMERCIAL

## 2017-03-02 ENCOUNTER — HOSPITAL ENCOUNTER (OUTPATIENT)
Dept: PHYSICAL THERAPY | Age: 72
Discharge: HOME | End: 2017-03-02
Payer: COMMERCIAL

## 2017-03-02 PROCEDURE — G8979 MOBILITY GOAL STATUS: HCPCS

## 2017-03-02 PROCEDURE — G8978 MOBILITY CURRENT STATUS: HCPCS

## 2017-03-02 PROCEDURE — 97162 PT EVAL MOD COMPLEX 30 MIN: CPT

## 2017-03-02 NOTE — PROGRESS NOTES
[facility] PHYSICAL THERAPY  [address] [place], [place] [address]darlene [facility], [address] - Phone: [phone]  Fax: [phone] / [address] [address] PHYSICAL THERAPY SERVICES  Patient Name: Hemalatha Leblanc : 1945   Medical   Diagnosis: Generalized muscle weakness [M62.81] Treatment Diagnosis: Generalized muscle weakness [M62.81]   Gait instability [R26.81]   Onset Date:      Referral Source: Tete Rainey MD Start of Care [facility]): 3/2/2017   Prior Hospitalization: See medical history Provider #: [id number]   Prior Level of Function: Independent with ADLs, ambulation prior to CVA   Comorbidities: Multiple CVA, depression, arthritis, thyroid disorder, HTN, visual impairment, hearing impairment, neck pain, back pain, remote C/S fracture, urinary urgency, restless leg syndrome, temporal arteritis, B LE vascular stents, hysterectomy, L RCR   Medications: Verified on Patient Summary List   The Plan of Care and following information is based on the information from the initial evaluation.   ===========================================================================================  Assessment / key information:  Patient is a 66 y.o. female who presents with complaints of weakness, imbalance and difficulty with ambulation, which began ~ 3 years ago s/p CVA and became worse ~ 1 month ago. Patient demonstrates decreased L UE strength and decreased B LE strength (L LE weaker than R LE). Additionally, patient demonstrates impaired vision, impaired balance and impaired functional mobility/gait. Tinetti = 6/28 with rolling walker, indicating high risk of falling. FOTO = 45/100, indicating severe functional limitation. Patient would benefit from skilled PT services to address these issues and improve function.   Thank you for this referral.  ==========================================================================================  Eval Complexity: History: HIGH Complexity :3+ comorbidities / personal factors will impact the outcome/ POC Exam:HIGH Complexity : 4+ Standardized tests and measures addressing body structure, function, activity limitation and / or participation in recreation  Presentation: MEDIUM Complexity : Evolving with changing characteristics  Clinical Decision Making:MEDIUM Complexity : FOTO score of 26-74Overall Complexity:MEDIUM      Problem List: pain affecting function, decrease strength, edema affecting function, impaired gait/ balance, decrease ADL/ functional abilitiies, decrease activity tolerance, decrease flexibility/ joint mobility and decrease transfer abilities   Treatment Plan may include any combination of the following: Therapeutic exercise, Therapeutic activities, Neuromuscular re-education, Physical agent/modality, Gait/balance training, Manual therapy, Patient education, Functional mobility training, Home safety training and Stair training  Patient / Family readiness to learn indicated by: asking questions, trying to perform skills and interest  Persons(s) to be included in education: patient (P) and family support person (FSP);list spouse  Barriers to Learning/Limitations: yes;  cognitive, sensory deficits-vision/hearing/speech, financial and other transportation  Measures taken:    Patient Goal (s): \"Regain strength and increase mobility. \"   Patient self reported health status: poor  Rehabilitation Potential: fair   Short Term Goals: To be accomplished in  2-3  weeks:  1. Patient will demonstrate compliance with HEP. 2. Patient will score greater than or equal to 8/28 on Tinetti to indicate increased balance. 3. Patient will demonstrate 3/5 L hip flexion strength to facilitate gait.  Long Term Goals: To be accomplished in  4-6  weeks:  1. Patient will demonstrate independence with HEP. 2. Patient will score greater than or equal to 10/28 on Tinetti to indicate increased balance.   3. Patient will score greater than or equal to 57/100 on FOTO to indicate increased function/decreased functional limitation. Frequency / Duration:   Patient to be seen  2-3  times per week for 4-6  weeks:  Patient / Caregiver education and instruction: self care and activity modification    Therapist Signature: Malu Wray PT Date: 9/9/4418   Certification Period: NA Time: 3:00 PM   ===========================================================================================  I certify that the above Physical Therapy Services are being furnished while the patient is under my care. I agree with the treatment plan and certify that this therapy is necessary. Physician Signature:        Date:       Time:     Please sign and return to [employer] or you may fax the signed copy to [phone]. Thank you.

## 2017-03-02 NOTE — PROGRESS NOTES
PHYSICAL THERAPY - DAILY TREATMENT NOTE    Patient Name: Micki Lobato        Date: 3/2/2017  : 1945   YES Patient  Verified  Visit #:   1     Insurance: Payor: Candy Diaz / Plan: [employer] HMO / Product Type: HMO /      In time: 1:45 Out time: 2:30   Total Treatment Time: 45     Medicare Time Tracking (below)   Total Timed Codes (min):  NA 1:1 Treatment Time:  NA     TREATMENT AREA =  CVA    SUBJECTIVE    Pain Level (on 0 to 10 scale):  5  / 10 L hip/thigh, L shoulder   Medication Changes/New allergies or changes in medical history, any new surgeries or procedures? NO    If yes, update Summary List   Subjective Functional Status/Changes:  []  No changes reported     Pt states that she would like to be able to walk again. Pt's spouse reports that she had a series of strokes beginning ~ 3 years ago. Pt's spouse reports that she had an episode ~ 1 month ago during which she almost passed out. Pt's spouse reports that she was taken to ER, and tests were inconclusive (CT/MRI head, CT abdomen/hip, bloodwork, urine test). Pt's spouse reports that she has been weaker since that time, and has had a couple of falls. Pt's spouse reports that she has bruises on L hip/leg. Pt's spouse reports that she was walking with RW prior to trip to ER, but has had to use w/c more since that time due to pain. Pt's spouse reports that pt had x-ray of hip and femur last week, which were negative. Pt reports that she has intermittent L hip/buttock/thigh pain and L shoulder/arm. Pt reports that strokes affected L side of body and vision. Pt's spouse reports that pt has no vision in R eye and limited vision L eye. Pt denies numbness/tingling. Pt reports intermittent dizziness/lightheadedness. Pt has RW, rollator walker, w/c, tub transfer bench, BSC over toilet. Pt lives with spouse in [facility] with 1st floor arrangements with 2 [address] with HR.          OBJECTIVE    Physical Therapy Evaluation - Neurologic    Posture: [] Poor    [x] Fair    [] Good    Describe: Protracted head/shoulders, increased thoracic kyphosis, decreased lumbar lordosis      Gait: [] Normal    [x] Abnormal    Device:   RW   Describe: Decreased step length/rodo, difficulty maneuvering RW; amb 30' with CG    ROM:                             AROM    PROM   Shoulder Left Right Left Right   Flex [facility] [place] WFL     Ext [facility] WFL     ABD [facility] [place]     [facility]     IR WFL WFL              AROM    PROM   Knee Left Right Left Right   Ext [facility] [place]     Flex [facility] WFL               AROM                           PROM  Hip Left Right Left Right   Flex [facility] WFL     Ext [facility] WFL     ABD [facility]     ER WFL WFL     IR WFL WFL                                              AROM      PROM   Ankle Left Right Left Right   [facility]     Flex [facility] WFL       Strength (MMT):  Shoulder L (1-5) R (1-5)   Shoulder Flexion 5 5   Shoulder Ext NT NT   Shoulder ABD 4 5   Shoulder ADD NT NT   Shoulder IR 4 4   Shoulder ER 4 4                                             Hip L (1-5) R (1-5)   Hip Flexion 3- 4   Hip Ext NT NT   Hip ABD NT NT   Hip ADD NT NT   Hip ER NT NT   Hip IR NT NT     Knee L (1-5) R (1-5)   Knee Flexion 4- 5   Knee Extension 4- 5   Ankle PF 4- 5   Ankle DF 4- 5   Other       Tone: WNL    Motor Control: Decreased speed and accuracy of L UE/LE movements    Sensation: Altered L UE/LE    Reflexes: [x] Not Tested   Left Right   Biceps (C5)     Brachioradiais (C6)     Triceps (C7)     Knee Jerk (L4)     Ankle Jerk (SI)       Balance/ Equilibrium:              Left            Right  Tracks Across Midline yes  yes   Reaches Across Midline yes yes         Sitting Balance: Static:  [] Good    [x] Fair    [] Poor     Dynamic:   [] Good    [x] Fair    [] Poor        Standing Balance: Static:   [] Good    [] Fair    [x] Poor     Dynamic:   [] Good    [] Fair    [x] Poor        Protective Extension:  [] Present    [] Delayed    [] Absent        Single Leg Stance:   NT (Unable to stand without UE support)      Functional Mobility      Bed Mobility:      Scooting: NT       Rolling: NT       Sit-Supine: NT      Transfers:       Sit-Stand: CG       Floor-Stand: NT      Gait:       Tandem: NT       Backwards: NT       Braiding: NT      Elevations:       Curbs: NT       Ramps: NT       Stairs: NT    Behavior: [x] Cooperative    [] Impulsive    [] Agitated    [] Perseverative    [] Confused   Oriented x: 3    Cognition: [x] One Step Commands   [] Multiple Commands   [] Displays Neglect [] R  [] L    Other:       Impaired Judgement: [] Y    [x] N      Impaired Vision:  [x] Y    [] N      Safety Awareness Deficits  [x] Y    [] N      Impaired Hearing  [x] Y    [] N      Able to Express Needs [x] Y    [] N    Optional Tests:       Dynamic Gait Index (24pt scale): Functional Gait Assessment (30pt scale):       Casiano Balance Scale (56pt scale):     Other test /comments: Tinetti = 6/28    5 min Patient Education:  NO  Reviewed HEP   []  Progressed/Changed HEP based on:   Discussed POC     Other Objective/Functional Measures:    See eval     Post Treatment Pain Level (on 0 to 10) scale:   5  / 10     ASSESSMENT    Assessment/Changes in Function:     Justification for Eval Code Complexity:  Patient History : HIGH - Multiple CVA, depression, arthritis, thyroid disorder, HTN, visual impairment, hearing impairment, neck pain, back pain, remote C/S fracture  Examination HIGH - See objective  Clinical Presentation: MODERATE  Clinical Decision Making : MODERATE FOTO 45/100    See plan of care     []  See Progress Note/Recertification   Patient will continue to benefit from skilled PT services: see plan of care   Progress toward goals / Updated goals:    See plan of care     PLAN    [x]  Upgrade activities as tolerated YES Continue plan of care   []  Discharge due to :    []  Other:      Therapist: Gabriele Aguila PT    Date: 3/2/2017 Time: 1:49 PM

## 2017-03-06 ENCOUNTER — HOSPITAL ENCOUNTER (OUTPATIENT)
Dept: PHYSICAL THERAPY | Age: 72
Discharge: HOME | End: 2017-03-06
Payer: COMMERCIAL

## 2017-03-06 PROCEDURE — 97110 THERAPEUTIC EXERCISES: CPT

## 2017-03-06 NOTE — PROGRESS NOTES
PHYSICAL THERAPY - DAILY TREATMENT NOTE    Patient Name: Izzy Simmons        Date: 3/6/2017  : 1945   YES Patient  Verified  Visit #:   2     Insurance: Payor: Zuly Truman / Plan: [employer] HMO / Product Type: HMO /      In time: 1:30 Out time: 2:00   Total Treatment Time: 30     Medicare Time Tracking (below)   Total Timed Codes (min):  NA 1:1 Treatment Time:  NA     TREATMENT AREA =  CVA    SUBJECTIVE    Pain Level (on 0 to 10 scale):  8  / 10   Medication Changes/New allergies or changes in medical history, any new surgeries or procedures? NO    If yes, update Summary List   Subjective Functional Status/Changes:  []  No changes reported     \"I overdid it this morning. \"          OBJECTIVE    30 min Therapeutic Exercise:  [x]  See flow sheet   Rationale:      increase ROM, increase strength, improve coordination, improve balance and increase proprioception to improve the patients ability to perform ADL's, gait, and functional mobility with increased safety and independence. Other Objective/Functional Measures:  Initiated several exercises per flowsheet. Patient requested to perform slide board transfer for [facility] to [facility] and performed standing transfer with RW from [facility] to [facility] at end of treatment. Post Treatment Pain Level (on 0 to 10) scale:    10     ASSESSMENT    Assessment/Changes in Function:     Patient required lying rest break after 2 seated (unsupported) exercises. Patient also reports discomfort in a hook lying position (on wedge) after 2 exercises.      []  See Progress Note/Recertification   Patient will continue to benefit from skilled PT services to modify and progress therapeutic interventions, address functional mobility deficits, address ROM deficits, address strength deficits, analyze and address soft tissue restrictions, analyze and cue movement patterns, analyze and modify body mechanics/ergonomics, assess and modify postural abnormalities, address imbalance/dizziness and instruct in home and community integration to attain remaining goals. Progress toward goals / Updated goals: · Short Term Goals: To be accomplished in 2-3 weeks:  1. Patient will demonstrate compliance with HEP. No HEP yet  2. Patient will score greater than or equal to 8/28 on Tinetti to indicate increased balance. Initiated several exercises per flowsheet   3. Patient will demonstrate 3/5 L hip flexion strength to facilitate gait. Began LE strengthening exercises.      PLAN    [x]  Upgrade activities as tolerated YES Continue plan of care   []  Discharge due to :    []  Other:      Therapist: Anthony Hugo PT    Date: 3/6/2017 Time: 3:49 PM     Future Appointments  Date Time Provider Melly Boothe   3/9/2017 11:30 AM Anthony Hugo PT [facility]   3/14/2017 4:30 PM [address] [place] [address]   3/16/2017 3:00 PM [address] [place] [address]   3/21/2017 1:00 PM Irene Quiroz [facility]   3/23/2017 11:00 AM Anthony Hugo PT [facility]   3/28/2017 1:00 PM Irene Quiroz [facility]   3/31/2017 1:30 PM [address] [place] [address]

## 2017-03-08 ENCOUNTER — APPOINTMENT (OUTPATIENT)
Dept: PHYSICAL THERAPY | Age: 72
End: 2017-03-08
Payer: COMMERCIAL

## 2017-03-09 ENCOUNTER — HOSPITAL ENCOUNTER (OUTPATIENT)
Dept: PHYSICAL THERAPY | Age: 72
Discharge: HOME | End: 2017-03-09
Payer: COMMERCIAL

## 2017-03-09 PROCEDURE — 97116 GAIT TRAINING THERAPY: CPT

## 2017-03-09 PROCEDURE — 97110 THERAPEUTIC EXERCISES: CPT

## 2017-03-09 NOTE — PROGRESS NOTES
PHYSICAL THERAPY - DAILY TREATMENT NOTE    Patient Name: Luis Fernando Santoro        Date: 3/9/2017  : 1945   YES Patient  Verified  Visit #:   3     Insurance: Payor: Nanda Roberts / Plan: [employer] HMO / Product Type: HMO /      In time: 11:35 Out time: 12:15   Total Treatment Time: 40     Medicare Time Tracking (below)   Total Timed Codes (min):  NA 1:1 Treatment Time:  NA     TREATMENT AREA =  CVA    SUBJECTIVE    Pain Level (on 0 to 10 scale):  9  / 10   Medication Changes/New allergies or changes in medical history, any new surgeries or procedures? NO    If yes, update Summary List   Subjective Functional Status/Changes:  []  No changes reported     \"Not too bad today. \"          OBJECTIVE    30 min Therapeutic Exercise:  [x]  See flow sheet   Rationale:      increase ROM and increase strength to improve the patients ability to perform ADL's, gait, and functional mobility with increased safety and independence. 10 min Gait Training: Ambulated with RW with CGA X 35'   Rationale:    Increase safety with household ambulation   min Patient Education:  YES  Reviewed HEP   []  Progressed/Changed HEP based on: Added HEP per handout in paper chart. Other Objective/Functional Measures:    EO stance (without UE support) 25\"     Post Treatment Pain Level (on 0 to 10) scale:   0  / 10     ASSESSMENT    Assessment/Changes in Function:     Tolerated exercises and gait with no c/o's pain.      []  See Progress Note/Recertification   Patient will continue to benefit from skilled PT services to modify and progress therapeutic interventions, address functional mobility deficits, address ROM deficits, address strength deficits, analyze and address soft tissue restrictions, analyze and cue movement patterns, analyze and modify body mechanics/ergonomics, assess and modify postural abnormalities, address imbalance/dizziness and instruct in home and community integration to attain remaining goals. Progress toward goals / Updated goals: · Short Term Goals: To be accomplished in 2-3 weeks:  1. Patient will demonstrate compliance with HEP. Initiated HEP  2. Patient will score greater than or equal to 8/28 on Tinetti to indicate increased balance. Initiated gait with RW   3. Patient will demonstrate 3/5 L hip flexion strength to facilitate gait. Continued LE strengthening exercises.      PLAN    [x]  Upgrade activities as tolerated YES Continue plan of care   []  Discharge due to :    []  Other:      Therapist: Hood Valderrama PT    Date: 3/9/2017 Time: 12:46 PM     Future Appointments  Date Time Provider Melly Boothe   3/14/2017 4:30 PM [address] [place] [address]   3/16/2017 3:00 PM [address] [place] [address]   3/21/2017 1:00 PM [facility]   3/23/2017 11:00 AM Hood Valderrama PT [facility]   3/28/2017 1:00 PM [facility]   3/31/2017 1:30 PM [address] [place] [address]

## 2017-03-14 ENCOUNTER — HOSPITAL ENCOUNTER (OUTPATIENT)
Dept: PHYSICAL THERAPY | Age: 72
Discharge: HOME | End: 2017-03-14
Payer: COMMERCIAL

## 2017-03-14 PROCEDURE — 97110 THERAPEUTIC EXERCISES: CPT

## 2017-03-14 PROCEDURE — 97116 GAIT TRAINING THERAPY: CPT

## 2017-03-14 NOTE — PROGRESS NOTES
PHYSICAL THERAPY - DAILY TREATMENT NOTE    Patient Name: Edgar Merino        Date: 3/14/2017  : 1945   YES Patient  Verified  Visit #:     Insurance: Payor: Adolfo Jeffrey / Plan: [employer] HMO / Product Type: HMO /      In time: 4:20 Out time: 5:00   Total Treatment Time: 40     Medicare Time Tracking (below)   Total Timed Codes (min):  na 1:1 Treatment Time:  na     TREATMENT AREA =  CVA    SUBJECTIVE    Pain Level (on 0 to 10 scale):  2-3  / 10   Medication Changes/New allergies or changes in medical history, any new surgeries or procedures? NO    If yes, update Summary List   Subjective Functional Status/Changes:  []  No changes reported     Pt c/o soreness in her legs. Pt states that she is more tired than usual today and pt's  reports that she had another appt earlier today. Pt's  reports that she new hearing aids. so she is hearing \"pretty good today. \"     OBJECTIVE    30 min Therapeutic Exercise:  [x]  See flow sheet   Rationale:     increase ROM, increase strength, improve coordination, improve balance and increase proprioception to promote increased functional mobility and increased activity tolerance with ADL's. 10 min Gait Trainin' X 2 with RW and CGA   Rationale:    improve coordination, improve balance, increase proprioception and improve gait pattern to increase safety and [place] with amb to promote increased functional mobility. min Patient Education:  YES  Reviewed HEP   []  Progressed/Changed HEP based on: Other Objective/Functional Measures:    VCing for stepping pattern and management of AD during gait training. Standing balance activity:   EO Stance 20\"   EO stance with horz HT's  EO stance with trunk rotations  Pt with difficulty dissociating head and trunk movements with standing balance ex. Added tap ups, pt required 1-2 UE A for balance.       Post Treatment Pain Level (on 0 to 10) scale:   2-3  / 10     ASSESSMENT    Assessment/Changes in Function:     Pt demo's imbalance with static and dynamic therapeutic activity. []  See Progress Note/Recertification   Patient will continue to benefit from skilled PT services to modify and progress therapeutic interventions, address functional mobility deficits, address ROM deficits, address strength deficits, analyze and address soft tissue restrictions, analyze and cue movement patterns, analyze and modify body mechanics/ergonomics, assess and modify postural abnormalities, address imbalance/dizziness and instruct in home and community integration to attain remaining goals. Progress toward goals / Updated goals: · Short Term Goals: To be accomplished in 2-3 weeks:  1. Patient will demonstrate compliance with HEP  Pt reports compliance with HEP  2. Patient will score greater than or equal to 8/28 on Tinetti to indicate increased balance. Pt amb 60' X 2 with RW and CGA  3. Patient will demonstrate 3/5 L hip flexion strength to facilitate gait.  Added tap ups      PLAN    []  Upgrade activities as tolerated YES Continue plan of care   []  Discharge due to :    []  Other:      Therapist: Caitlin Cochran PTA    Date: 3/14/2017 Time: 4:17 PM     Future Appointments  Date Time Provider Melly Boothe   3/14/2017 4:30 PM [address] [place] [address]   3/16/2017 3:00 PM [address] [place] [address]   3/21/2017 1:00 PM [facility]   3/23/2017 11:00 AM Cyn Eller, PT [facility]   3/28/2017 1:00 PM [facility]   3/31/2017 1:30 PM [address] [place] [address]

## 2017-03-16 ENCOUNTER — HOSPITAL ENCOUNTER (OUTPATIENT)
Dept: PHYSICAL THERAPY | Age: 72
Discharge: HOME | End: 2017-03-16
Payer: COMMERCIAL

## 2017-03-16 PROCEDURE — 97116 GAIT TRAINING THERAPY: CPT

## 2017-03-16 PROCEDURE — 97110 THERAPEUTIC EXERCISES: CPT

## 2017-03-16 NOTE — PROGRESS NOTES
PHYSICAL THERAPY - DAILY TREATMENT NOTE    Patient Name: Tino Varghese        Date: 3/16/2017  : 1945   YES Patient  Verified  Visit #:   Insurance: Payor: Jason Kendall / Plan: [employer] HMO / Product Type: HMO /      In time: 3:05 Out time: 3:35   Total Treatment Time: 30     Medicare Time Tracking (below)   Total Timed Codes (min):  na 1:1 Treatment Time:  na     TREATMENT AREA =  CVA    SUBJECTIVE    Pain Level (on 0 to 10 scale):  3  / 10   Medication Changes/New allergies or changes in medical history, any new surgeries or procedures? NO    If yes, update Summary List   Subjective Functional Status/Changes:  []  No changes reported     Pt states that she doesn't think she's getting sore from the exercises. OBJECTIVE    15 min Therapeutic Exercise:  [x]  See flow sheet   Rationale:   increase ROM, increase strength, improve coordination, improve balance and increase proprioception to promote increased functional mobility and increased activity tolerance with ADL's. 15 min Gait Trainin' X 2 with RW and CGA   30' with RW stepping over obstacles and CGA    Rationale:  improve coordination, improve balance, increase proprioception and improve gait pattern to increase safety and [place] with amb to promote increased functional mobility. min Patient Education:  YES  Reviewed HEP   []  Progressed/Changed HEP based on: Other Objective/Functional Measures:    Had pt perform sit<>stand from elevated mat, intermittent CGA for balance. Pt unsteady with stepping over obstacles (4\" blocks) requiring CGA for balance and VCing for safety awareness (getting close to obstacle and managing RW). Pt required frequent VCing for hand placement (W/C and RW) for transferring sit<>stand.       Post Treatment Pain Level (on 0 to 10) scale:   3   10     ASSESSMENT    Assessment/Changes in Function:     Pt demo's decreased safety awareness with obstacle negotiation and transfers. []  See Progress Note/Recertification   Patient will continue to benefit from skilled PT services to modify and progress therapeutic interventions, address functional mobility deficits, address ROM deficits, address strength deficits, analyze and address soft tissue restrictions, analyze and cue movement patterns, analyze and modify body mechanics/ergonomics, assess and modify postural abnormalities, address imbalance/dizziness and instruct in home and community integration to attain remaining goals. Progress toward goals / Updated goals:  Short Term Goals: To be accomplished in 2-3 weeks:  1. Patient will demonstrate compliance with HEP  Pt reports compliance with HEP  2. Patient will score greater than or equal to 8/28 on Tinetti to indicate increased balance. Pt amb 60' X 2 with RW and CGA  3. Patient will demonstrate 3/5 L hip flexion strength to facilitate gait.  Added tap ups      PLAN    []  Upgrade activities as tolerated YES Continue plan of care   []  Discharge due to :    []  Other:      Therapist: Leonel Bray PTA    Date: 3/16/2017 Time: 4:23 PM     Future Appointments  Date Time Provider Melly Boothe   3/21/2017 1:00 PM Malgorzata Dior [facility]   3/23/2017 11:00 AM Coleen Antony PT [facility]   3/28/2017 1:00 PM [address] [facility]   3/31/2017 1:30  [facility]

## 2017-03-17 ENCOUNTER — IMPORTED ENCOUNTER (OUTPATIENT)
Dept: URBAN - METROPOLITAN AREA CLINIC 1 | Facility: CLINIC | Age: 72
End: 2017-03-17

## 2017-03-17 PROBLEM — H47.20: Noted: 2017-03-17

## 2017-03-17 PROCEDURE — 92012 INTRM OPH EXAM EST PATIENT: CPT

## 2017-03-17 NOTE — PATIENT DISCUSSION
1.  Optic Atrophy OU secondary to [place] to wean po Prednisone and consider other meds in place of po Pred but discussed with patient's  to watch the CRP carefully with each wean in dosage to maintain it remains in normal range. Cont Combigan BID OS Cont Lumigan QHS OS. Steroids followed by Dr. Chato White currently on Prednisone 50mg PO QD. Monocular safety precautions. Patient should continue to f/u as scheduled with Dr. Chato White. 2.  Cataract OD: Observe for now without intervention. The patient was advised to contact us if any change or worsening of vision3. OHTN OU (0.8/0.75)- Slight increased in IOP OU despite compliance with meds per . Cont Combigan OS only BID Continue with Latanoprost OS QHS. 4.  MARYJO w/ PEK OU- (H/o Plugs LLs OU) Cont ATs TID OU Routinely. 5.  H/o PVD OD 6. Pseudophakia OS- H/o YAG Cap OSLetter to Dr. Chato Torres Return for an appointment in 3-4 months 10 with Dr. Gloria Nicholson.

## 2017-03-20 ENCOUNTER — HOSPITAL ENCOUNTER (OUTPATIENT)
Dept: GENERAL RADIOLOGY | Age: 72
Discharge: HOME | End: 2017-03-20
Payer: COMMERCIAL

## 2017-03-20 DIAGNOSIS — R05.9 COUGH: ICD-10-CM

## 2017-03-20 PROCEDURE — 71020 XR CHEST PA LAT: CPT

## 2017-03-21 ENCOUNTER — HOSPITAL ENCOUNTER (OUTPATIENT)
Dept: PHYSICAL THERAPY | Age: 72
End: 2017-03-21
Payer: COMMERCIAL

## 2017-03-21 ENCOUNTER — HOSPITAL ENCOUNTER (EMERGENCY)
Age: 72
Discharge: HOME | End: 2017-03-21
Attending: EMERGENCY MEDICINE | Admitting: EMERGENCY MEDICINE
Payer: COMMERCIAL

## 2017-03-21 VITALS
OXYGEN SATURATION: 97 % | TEMPERATURE: 99.3 F | DIASTOLIC BLOOD PRESSURE: 72 MMHG | HEART RATE: 81 BPM | BODY MASS INDEX: 21.48 KG/M2 | RESPIRATION RATE: 20 BRPM | WEIGHT: 110 LBS | SYSTOLIC BLOOD PRESSURE: 128 MMHG

## 2017-03-21 DIAGNOSIS — L03.113 CELLULITIS OF RIGHT ARM: Primary | ICD-10-CM

## 2017-03-21 LAB
ANION GAP BLD CALC-SCNC: 8 MMOL/L (ref 3–18)
APTT PPP: 26.5 SEC (ref 23–36.4)
BASOPHILS # BLD AUTO: 0 K/UL (ref 0–0.1)
BASOPHILS # BLD: 0 % (ref 0–3)
BUN SERPL-MCNC: 20 MG/DL (ref 7–18)
BUN/CREAT SERPL: 33 (ref 12–20)
CALCIUM SERPL-MCNC: 8.3 MG/DL (ref 8.5–10.1)
CHLORIDE SERPL-SCNC: 100 MMOL/L (ref 100–108)
CO2 SERPL-SCNC: 32 MMOL/L (ref 21–32)
CREAT SERPL-MCNC: 0.61 MG/DL (ref 0.6–1.3)
CRP SERPL-MCNC: 36.6 MG/DL (ref 0–0.3)
DIFFERENTIAL METHOD BLD: ABNORMAL
EOSINOPHIL # BLD: 0 K/UL (ref 0–0.4)
EOSINOPHIL NFR BLD: 0 % (ref 0–5)
ERYTHROCYTE [DISTWIDTH] IN BLOOD BY AUTOMATED COUNT: 18.4 % (ref 11.6–14.5)
GLUCOSE SERPL-MCNC: 108 MG/DL (ref 74–99)
HCT VFR BLD AUTO: 40.2 % (ref 35–45)
HGB BLD-MCNC: 12.6 G/DL (ref 12–16)
INR PPP: 1.1 (ref 0.8–1.2)
LACTATE BLD-SCNC: 1.5 MMOL/L (ref 0.4–2)
LYMPHOCYTES # BLD AUTO: 9 % (ref 20–51)
LYMPHOCYTES # BLD: 0.5 K/UL (ref 0.8–3.5)
MCH RBC QN AUTO: 33.3 PG (ref 24–34)
MCHC RBC AUTO-ENTMCNC: 31.3 G/DL (ref 31–37)
MCV RBC AUTO: 106.3 FL (ref 74–97)
MONOCYTES # BLD: 0.1 K/UL (ref 0–1)
MONOCYTES NFR BLD AUTO: 1 % (ref 2–9)
NEUTS BAND NFR BLD MANUAL: 17 % (ref 0–5)
NEUTS SEG # BLD: 3.7 K/UL (ref 1.8–8)
NEUTS SEG NFR BLD AUTO: 73 % (ref 42–75)
NRBC BLD-RTO: 2 PER 100 WBC
PLATELET # BLD AUTO: 283 K/UL (ref 135–420)
PMV BLD AUTO: 11.6 FL (ref 9.2–11.8)
POTASSIUM SERPL-SCNC: 3.7 MMOL/L (ref 3.5–5.5)
PROTHROMBIN TIME: 13.7 SEC (ref 11.5–15.2)
RBC # BLD AUTO: 3.78 M/UL (ref 4.2–5.3)
RBC MORPH BLD: ABNORMAL
SODIUM SERPL-SCNC: 140 MMOL/L (ref 136–145)
WBC # BLD AUTO: 5 K/UL (ref 4.6–13.2)
WBC MORPH BLD: ABNORMAL

## 2017-03-21 PROCEDURE — 86140 C-REACTIVE PROTEIN: CPT | Performed by: EMERGENCY MEDICINE

## 2017-03-21 PROCEDURE — 99285 EMERGENCY DEPT VISIT HI MDM: CPT

## 2017-03-21 PROCEDURE — 74011000258 HC RX REV CODE- 258: Performed by: EMERGENCY MEDICINE

## 2017-03-21 PROCEDURE — 80048 BASIC METABOLIC PNL TOTAL CA: CPT | Performed by: EMERGENCY MEDICINE

## 2017-03-21 PROCEDURE — 93931 UPPER EXTREMITY STUDY: CPT

## 2017-03-21 PROCEDURE — 74011250637 HC RX REV CODE- 250/637: Performed by: EMERGENCY MEDICINE

## 2017-03-21 PROCEDURE — 87040 BLOOD CULTURE FOR BACTERIA: CPT | Performed by: EMERGENCY MEDICINE

## 2017-03-21 PROCEDURE — 83605 ASSAY OF LACTIC ACID: CPT

## 2017-03-21 PROCEDURE — 85610 PROTHROMBIN TIME: CPT | Performed by: EMERGENCY MEDICINE

## 2017-03-21 PROCEDURE — 85025 COMPLETE CBC W/AUTO DIFF WBC: CPT | Performed by: EMERGENCY MEDICINE

## 2017-03-21 PROCEDURE — 85730 THROMBOPLASTIN TIME PARTIAL: CPT | Performed by: EMERGENCY MEDICINE

## 2017-03-21 PROCEDURE — 96365 THER/PROPH/DIAG IV INF INIT: CPT

## 2017-03-21 PROCEDURE — 93971 EXTREMITY STUDY: CPT

## 2017-03-21 PROCEDURE — 93005 ELECTROCARDIOGRAM TRACING: CPT

## 2017-03-21 PROCEDURE — 74011000250 HC RX REV CODE- 250: Performed by: EMERGENCY MEDICINE

## 2017-03-21 RX ORDER — MELOXICAM 7.5 MG/1
TABLET ORAL DAILY
COMMUNITY
End: 2017-08-30

## 2017-03-21 RX ORDER — LEVOFLOXACIN 500 MG/1
500 TABLET, FILM COATED ORAL
Status: COMPLETED | OUTPATIENT
Start: 2017-03-21 | End: 2017-03-21

## 2017-03-21 RX ORDER — LORAZEPAM 1 MG/1
1 TABLET ORAL
Status: COMPLETED | OUTPATIENT
Start: 2017-03-21 | End: 2017-03-21

## 2017-03-21 RX ORDER — CLONAZEPAM 1 MG/1
0.25 TABLET ORAL
COMMUNITY
End: 2017-08-30

## 2017-03-21 RX ORDER — CLINDAMYCIN HYDROCHLORIDE 150 MG/1
300 CAPSULE ORAL 3 TIMES DAILY
Qty: 42 CAP | Refills: 0 | Status: SHIPPED | OUTPATIENT
Start: 2017-03-21 | End: 2017-03-31

## 2017-03-21 RX ORDER — HYDROCODONE BITARTRATE AND ACETAMINOPHEN 5; 325 MG/1; MG/1
1 TABLET ORAL
Status: COMPLETED | OUTPATIENT
Start: 2017-03-21 | End: 2017-03-21

## 2017-03-21 RX ORDER — LEVOFLOXACIN 500 MG/1
TABLET, FILM COATED ORAL DAILY
COMMUNITY
End: 2017-03-31

## 2017-03-21 RX ORDER — CLINDAMYCIN HYDROCHLORIDE 150 MG/1
300 CAPSULE ORAL
Status: DISCONTINUED | OUTPATIENT
Start: 2017-03-21 | End: 2017-03-21

## 2017-03-21 RX ADMIN — HYDROCODONE BITARTRATE AND ACETAMINOPHEN 1 TABLET: 5; 325 TABLET ORAL at 12:52

## 2017-03-21 RX ADMIN — CLINDAMYCIN PHOSPHATE 600 MG: 150 INJECTION, SOLUTION, CONCENTRATE INTRAVENOUS at 15:45

## 2017-03-21 RX ADMIN — LORAZEPAM 1 MG: 1 TABLET ORAL at 16:13

## 2017-03-21 RX ADMIN — LEVOFLOXACIN 500 MG: 500 TABLET, FILM COATED ORAL at 15:44

## 2017-03-21 NOTE — ED NOTES
I performed a brief evaluation, including history and physical, of the patient here in triage and I have determined that pt will need further treatment and evaluation from the main side ER physician. I have placed initial orders to help in expediting patients care.      March 21, 2017 at 11:30 AM - Kate Sherman, DO

## 2017-03-21 NOTE — DISCHARGE INSTRUCTIONS

## 2017-03-21 NOTE — PROCEDURES
[facility]  *** FINAL REPORT ***    Name: Nicola Zhang  MRN: [id number]    Outpatient  : 28 Aug 1945  HIS Order #: [id number]  [id number] [facility] Visit #: [id number]  Date: 21 Mar 2017    TYPE OF TEST: Peripheral Venous Testing    REASON FOR TEST  Pain in limb, Limb swelling    Right Arm:-  Deep venous thrombosis:           No  Superficial venous thrombosis:    No      INTERPRETATION/FINDINGS  Duplex images were obtained using 2-D gray scale, color flow, and  spectral Doppler analysis. Right arm :  1. Deep vein(s) visualized include the internal jugular, subclavian,  axillary, brachial, radial and ulnar veins. 2. No evidence of deep venous thrombosis detected in the veins  visualized. 3. No evidence of deep vein thrombosis in the contralateral subclavian   vein. 4. Superficial vein(s) visualized include the basilic (upper arm),  basilic (forearm), cephalic (upper arm) and cephalic (forearm) veins. 5. No evidence of superficial thrombosis detected. ADDITIONAL COMMENTS  Wet reading given to Dr. Nazanin Lorenzo at 1:45 p.m. I have personally reviewed the data relevant to the interpretation of  this  study. TECHNOLOGIST: Toma Carias  Signed: 2017 01:55 PM    PHYSICIAN: Katie Garcia MD  Signed: 2017 04:34 PM

## 2017-03-21 NOTE — PROCEDURES
[facility]  *** FINAL REPORT ***    Name: Noemi Barbour  MRN: [id number]    Outpatient  : 28 Aug 1945  HIS Order #: [id number]  [id number] [facility] Visit #: [id number]  Date: 21 Mar 2017    TYPE OF TEST: Extremity Arterial Duplex    REASON FOR TEST  Limb pain                            Right                     Left  Artery               PSV   Finding             PSV   Finding  ------------------  -----  ---------------    -----  ---------------  Subclavian:          87.0  Normal  Axillary:            83.0  Normal  Brachial:           149.0  Normal  Radial:              83.0  Normal  Ulnar:               51.0  Normal    Digit pressures:      R:        L:  Wrist/brachial index: R:        L:      INTERPRETATION/FINDINGS  Duplex images were obtained using 2-D gray scale, color flow, and  spectral Doppler analysis. Right arm :  1. Arteries visualized include subclavian, axillary, brachial, radial  and ulnar arteries. 2. No significant occlusive disease or stenosis in the arteries  examined. 3. Patient would not tolerate blood pressures due to pain,  wrist/brachial index was not obtained. 4. Left arm asymptomatic. ADDITIONAL COMMENTS  Wet reading given to Dr. Sandy Wall at 1:45 p.m. I have personally reviewed the data relevant to the interpretation of  this  study. TECHNOLOGIST: Sheri Torres.  Nathalie Peterson  Signed: 2017 02:12 PM    PHYSICIAN: Estee Queen MD  Signed: 2017 04:33 PM

## 2017-03-21 NOTE — ED PROVIDER NOTES
HPI Comments: 11:51 AM Kassie Castellon is a 66 y.o. female with a history of HTN, COPD, giant cell arteritis, CAD, and stroke with residual R-sided weakness, who presents to the ED for evaluation of R arm swelling and redness. The patient's  states that she has also had a fever of 99. He states that the patient used to smoke, but quit 3 years ago. He states that she doesn't use her R arm very much. He notes that she takes plavix once daily and aspirin once nightly. The patient denies any CP. There are no other concerns at this time. Patient is a 66 y.o. female presenting with arm pain. The history is provided by the patient. Arm Pain           Past Medical History:   Diagnosis Date    Bipolar 1 disorder ([facility]     Cerebral aneurysm 12/11/2015    RPCoA, RAChA,, RMCA bifurcation, and RM2      Cervical spine fracture ([facility] [address] 08/20/2014    C2 and C3    COPD     Coronary artery disease     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      Social History Main Topics    Smoking status: Former Smoker     Types: Cigarettes     Quit date: 4/28/2013    Smokeless tobacco: Not on file    Alcohol use 1.0 oz/week     2 Shots of liquor per week    Drug use: No    Sexual activity: Yes     Partners: Male     Other Topics Concern    Not on file     Social History Narrative         ALLERGIES: Sulfa (sulfonamide antibiotics) and Sulfamethoxazole-trimethoprim    Review of Systems   Constitutional: Positive for fever (99). Negative for chills and fatigue. HENT: Negative. Negative for sore throat. Eyes: Negative. Respiratory: Negative for cough and shortness of breath. Cardiovascular: Negative for chest pain and palpitations. Gastrointestinal: Negative for abdominal pain, nausea and vomiting. Genitourinary: Negative for dysuria. Musculoskeletal: Positive for joint swelling (R arm swelling). Skin: Positive for color change (R arm redness). Neurological: Positive for weakness (chronic, right-sided). Negative for dizziness, light-headedness and headaches. Psychiatric/Behavioral: Negative. All other systems reviewed and are negative. Vitals:    03/21/17 1246 03/21/17 1249 03/21/17 1351 03/21/17 1405   BP: 138/76  128/72    Pulse:    81   Resp:    20   Temp:       SpO2:  (!) 85%  97%   Weight:                Physical Exam   Constitutional: She is oriented to person, place, and time. She appears well-developed and well-nourished. No distress. HENT:   Head: Normocephalic and atraumatic. Mouth/Throat: Oropharynx is clear and moist.   Eyes: Conjunctivae and EOM are normal. Pupils are equal, round, and reactive to light. No scleral icterus. Neck: Normal range of motion. Neck supple. Cardiovascular: Normal rate, regular rhythm, normal heart sounds and intact distal pulses. No murmur heard. Pulmonary/Chest: Effort normal and breath sounds normal. No respiratory distress. Mild increase in respiratory rate. Abdominal: Soft. Bowel sounds are normal. She exhibits no distension. There is no tenderness. Musculoskeletal: She exhibits no edema. Lymphadenopathy:     She has no cervical adenopathy. Neurological: She is alert and oriented to person, place, and time.  Coordination normal.   Patient has expressive aphasia. Skin: Skin is warm and dry. No rash noted. Poor capillary refill bilateral fingers. Fingers of the bilateral hands are cyanotic. Lower extremities are cyanotic. There is significant swelling of the R arm from the elbow down. Psychiatric: She has a normal mood and affect. Her behavior is normal.   Nursing note and vitals reviewed. MDM  Number of Diagnoses or Management Options  Cellulitis of right arm:   Diagnosis management comments: Steroid dependent giant cell arteritis and peripheral vasc disease CVa with R arm weakness now increased edema and cyanosis to R arm with increased redness along medial aspect R forearm pt able to flex and extend along wrist and elbow. on asa at night plavix in am took regular doses     Labs reviewed lact neg wbc normal PVL neg for occlusion    Ekg; nsr rate 85 no acute st changes            Amount and/or Complexity of Data Reviewed  Clinical lab tests: ordered and reviewed  Tests in the radiology section of CPT®: ordered and reviewed  Independent visualization of images, tracings, or specimens: yes    Risk of Complications, Morbidity, and/or Mortality  Presenting problems: high  Diagnostic procedures: moderate  Management options: moderate      ED Course       Procedures        Lab findings:  Recent Results (from the past 12 hour(s))   CBC WITH AUTOMATED DIFF    Collection Time: 03/21/17 11:45 AM   Result Value Ref Range    WBC 5.0 4.6 - 13.2 K/uL    RBC 3.78 (L) 4.20 - 5.30 M/uL    HGB 12.6 12.0 - 16.0 g/dL    HCT 40.2 35.0 - 45.0 %    .3 (H) 74.0 - 97.0 FL    MCH 33.3 24.0 - 34.0 PG    MCHC 31.3 31.0 - 37.0 g/dL    RDW 18.4 (H) 11.6 - 14.5 %    PLATELET 993 220 - 455 K/uL    MPV 11.6 9.2 - 11.8 FL    NEUTROPHILS 73 42 - 75 %    BAND NEUTROPHILS 17 (H) 0 - 5 %    LYMPHOCYTES 9 (L) 20 - 51 %    MONOCYTES 1 (L) 2 - 9 %    EOSINOPHILS 0 0 - 5 %    BASOPHILS 0 0 - 3 %    NRBC 2.0 (H) 0  WBC    ABS. NEUTROPHILS 3.7 1.8 - 8.0 K/UL    ABS. LYMPHOCYTES 0.5 (L) 0.8 - 3.5 K/UL    ABS. MONOCYTES 0.1 0 - 1.0 K/UL    ABS. EOSINOPHILS 0.0 0.0 - 0.4 K/UL    ABS.  BASOPHILS 0.0 0.0 - 0.1 K/UL    RBC COMMENTS ANISOCYTOSIS  1+        RBC COMMENTS POLYCHROMASIA  1+        RBC COMMENTS MACROCYTOSIS  2+        WBC COMMENTS TOXIC GRANULATION      DF MANUAL     METABOLIC PANEL, BASIC    Collection Time: 03/21/17 11:45 AM   Result Value Ref Range    Sodium 140 136 - 145 mmol/L    Potassium 3.7 3.5 - 5.5 mmol/L    Chloride 100 100 - 108 mmol/L    CO2 32 21 - 32 mmol/L    Anion gap 8 3.0 - 18 mmol/L    Glucose 108 (H) 74 - 99 mg/dL    BUN 20 (H) 7.0 - 18 MG/DL    Creatinine 0.61 0.6 - 1.3 MG/DL    BUN/Creatinine ratio 33 (H) 12 - 20      GFR est AA >60 >60 ml/min/1.73m2    GFR est non-AA >60 >60 ml/min/1.73m2    Calcium 8.3 (L) 8.5 - 10.1 MG/DL   C REACTIVE PROTEIN, QT    Collection Time: 03/21/17 11:45 AM   Result Value Ref Range    C-Reactive protein 36.6 (H) 0 - 0.3 mg/dL   PROTHROMBIN TIME + INR    Collection Time: 03/21/17 11:45 AM   Result Value Ref Range    Prothrombin time 13.7 11.5 - 15.2 sec    INR 1.1 0.8 - 1.2     PTT    Collection Time: 03/21/17 11:45 AM   Result Value Ref Range    aPTT 26.5 23.0 - 36.4 SEC   EKG, 12 LEAD, INITIAL    Collection Time: 03/21/17 12:51 PM   Result Value Ref Range    Ventricular Rate 85 BPM    Atrial Rate 85 BPM    P-R Interval 116 ms    QRS Duration 78 ms    Q-T Interval 430 ms    QTC Calculation (Bezet) 511 ms    Calculated P Axis 73 degrees    Calculated R Axis 57 degrees    Calculated T Axis 91 degrees    Diagnosis       Normal sinus rhythm  Nonspecific ST and T wave abnormality  Abnormal ECG  When compared with ECG of 06-FEB-2017 11:47,  ST now depressed in Inferior leads  T wave inversion more evident in Anterior leads     POC LACTIC ACID    Collection Time: 03/21/17  2:05 PM   Result Value Ref Range    Lactic Acid (POC) 1.5 0.4 - 2.0 mmol/L       X-Ray, CT or other radiology findings or impressions:  DUPLEX UPPER EXT ARTERY RIGHT   INTERPRETATION/FINDINGS  Duplex images were obtained using 2-D gray scale, color flow, and  spectral Doppler analysis. Right arm :  1. Arteries visualized include subclavian, axillary, brachial, radial  and ulnar arteries. 2. No significant occlusive disease or stenosis in the arteries  examined. 3. Patient would not tolerate blood pressures due to pain,  wrist/brachial index was not obtained. 4. Left arm asymptomatic. DUPLEX UPPER EXT VENOUS RIGHT   INTERPRETATION/FINDINGS  Duplex images were obtained using 2-D gray scale, color flow, and  spectral Doppler analysis. Right arm :  1. Deep vein(s) visualized include the internal jugular, subclavian,  axillary, brachial, radial and ulnar veins. 2. No evidence of deep venous thrombosis detected in the veins  visualized. 3. No evidence of deep vein thrombosis in the contralateral subclavian  vein. 4. Superficial vein(s) visualized include the basilic (upper arm),  basilic (forearm), cephalic (upper arm) and cephalic (forearm) veins. 5. No evidence of superficial thrombosis detected. Orders  Orders Placed This Encounter    CULTURE, BLOOD     Standing Status:   Standing     Number of Occurrences:   1    CULTURE, BLOOD     Standing Status:   Standing     Number of Occurrences:   1    DUPLEX UPPER EXT VENOUS RIGHT     Standing Status:   Standing     Number of Occurrences:   1     Order Specific Question:   Transport     Answer:   Stretcher [[id number]]     Order Specific Question:   Where do you want this procedure performed? Answer:   [facility]ascular Lab    DUPLEX UPPER EXT ARTERY RIGHT     Standing Status:   Standing     Number of Occurrences:   1     Order Specific Question:   Transport     Answer:   Stretcher [[id number]]     Order Specific Question:   Where do you want this procedure performed?      Answer:   Vascular Lab    CBC WITH AUTOMATED DIFF     Standing Status:   Standing     Number of Occurrences:   1    METABOLIC PANEL, BASIC Standing Status:   Standing     Number of Occurrences:   1    C REACTIVE PROTEIN, QT     Standing Status:   Standing     Number of Occurrences:   1    PT     Standing Status:   Standing     Number of Occurrences:   1    PTT     Standing Status:   Standing     Number of Occurrences:   1    POC LACTIC ACID     Standing Status:   Standing     Number of Occurrences:   1    Consult PCP     Standing Status:   Standing     Number of Occurrences:   1     Order Specific Question:   Reason for Consult: Answer:   possible cellulitis r arm     Order Specific Question:   Did you call or speak to the consulting provider? Answer:   No     Order Specific Question:   Consult To     Answer:   Dr Dominic Ridley:   Schedule When? Answer:   TODAY    POC LACTIC ACID     Standing Status:   Standing     Number of Occurrences:   1    EKG, 12 LEAD, INITIAL     Standing Status:   Standing     Number of Occurrences:   1     Order Specific Question:   Reason for Exam:     Answer:   cyanotic R arm    levoFLOXacin (LEVAQUIN) 500 mg tablet     Sig: Take  by mouth daily.  meloxicam (MOBIC) 7.5 mg tablet     Sig: Take  by mouth daily.  clonazePAM (KLONOPIN) 1 mg tablet     Sig: Take  by mouth two (2) times a day.     HYDROcodone-acetaminophen (NORCO) 5-325 mg per tablet 1 Tab    DISCONTD: piperacillin-tazobactam (ZOSYN) 3.375 g in 0.9% sodium chloride (MBP/ADV) 100 mL MBP     Order Specific Question:   Antibiotic Indications     Answer:   Skin and Soft Tissue Infection    DISCONTD: vancomycin (VANCOCIN) 1,000 mg in 0.9% sodium chloride (MBP/ADV) 250 mL adv     Order Specific Question:   Antibiotic Indications     Answer:   Skin and Soft Tissue Infection    DISCONTD: clindamycin (CLEOCIN) capsule 300 mg     Order Specific Question:   Antibiotic Indications     Answer:   Skin and Soft Tissue Infection    clindamycin phosphate (CLEOCIN) 600 mg in 0.9% sodium chloride (MBP/ADV) 100 mL ADV     Order Specific Question:   Antibiotic Indications     Answer:   Skin and Soft Tissue Infection    levoFLOXacin (LEVAQUIN) tablet 500 mg     Order Specific Question:   Antibiotic Indications     Answer:   Skin and Soft Tissue Infection             Disposition:  Diagnosis:   1. Cellulitis of right arm        Disposition: home    Follow-up Information     None           Patient's Medications   Start Taking    No medications on file   Continue Taking    AMLODIPINE (NORVASC) 2.5 MG TABLET    Take 1 tablet by mouth daily. ASPIRIN (ASPIRIN) 325 MG TABLET    Take 1 Tab by mouth daily. BIMATOPROST (LUMIGAN) 0.01 % OPHTHALMIC DROPS    Administer 1 Drop to left eye every evening. CLONAZEPAM (KLONOPIN) 1 MG TABLET    Take  by mouth two (2) times a day. CLOPIDOGREL (PLAVIX) 75 MG TABLET    Take 1 Tab by mouth daily. FLUOXETINE (PROZAC) 20 MG CAPSULE    20 mg. FOLIC ACID PO    Take 1 Tab by mouth. LEVOFLOXACIN (LEVAQUIN) 500 MG TABLET    Take  by mouth daily. LEVOTHYROXINE (SYNTHROID) 50 MCG TABLET    Take 1 Tab by mouth Daily (before breakfast). MELOXICAM (MOBIC) 7.5 MG TABLET    Take  by mouth daily. METHOTREXATE (RHEUMATREX) 2.5 MG TABLET    Take 5 mg by mouth every Wednesday. ONDANSETRON (ZOFRAN ODT) 4 MG DISINTEGRATING TABLET    Take 1 Tab by mouth every eight (8) hours as needed for Nausea. PREDNISONE (DELTASONE) 1 MG TABLET    Take 4 mg by mouth daily (with breakfast). ROPINIROLE (REQUIP) 1 MG TABLET    Take 1 Tab by mouth two (2) times a day. SIMVASTATIN (ZOCOR) 20 MG TABLET    Take 1 Tab by mouth nightly. TRAMADOL (ULTRAM) 50 MG TABLET    Take 50 mg by mouth two (2) times a day. TRAMADOL (ULTRAM) 50 MG TABLET    Take 2 Tabs by mouth every six (6) hours as needed for Pain. Max Daily Amount: 400 mg. These Medications have changed    No medications on file   Stop Taking    OXYBUTYNIN CHLORIDE XL (DITROPAN XL) 5 MG CR TABLET    Take 5 mg by mouth daily.          Scribe Attestation: Gael Bowers acting as a scribe for and in the presence of Dr. Annel Devine MD     Signed by: Belkys Jimenez, March 21, 2017 at 2:43 PM     Provider Attestation:   I personally performed the services described in the documentation, reviewed the documentation, as recorded by the scribe in my presence, and it accurately and completely records my words and actions.      Reviewed and signed by:  Dr. Annel Devine MD

## 2017-03-22 LAB
ATRIAL RATE: 85 BPM
CALCULATED P AXIS, ECG09: 73 DEGREES
CALCULATED R AXIS, ECG10: 57 DEGREES
CALCULATED T AXIS, ECG11: 91 DEGREES
DIAGNOSIS, 93000: NORMAL
P-R INTERVAL, ECG05: 116 MS
Q-T INTERVAL, ECG07: 430 MS
QRS DURATION, ECG06: 78 MS
QTC CALCULATION (BEZET), ECG08: 511 MS
VENTRICULAR RATE, ECG03: 85 BPM

## 2017-03-23 ENCOUNTER — APPOINTMENT (OUTPATIENT)
Dept: PHYSICAL THERAPY | Age: 72
End: 2017-03-23
Payer: COMMERCIAL

## 2017-03-24 ENCOUNTER — HOSPITAL ENCOUNTER (OUTPATIENT)
Dept: GENERAL RADIOLOGY | Age: 72
Discharge: HOME | End: 2017-03-24
Payer: COMMERCIAL
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
## 2017-03-24 PROCEDURE — 73080 X-RAY EXAM OF ELBOW: CPT

## 2017-03-26 ENCOUNTER — APPOINTMENT (OUTPATIENT)
Dept: ULTRASOUND IMAGING | Age: 72
DRG: 602 | End: 2017-03-26
Attending: INTERNAL MEDICINE
Payer: COMMERCIAL

## 2017-03-26 ENCOUNTER — APPOINTMENT (OUTPATIENT)
Dept: GENERAL RADIOLOGY | Age: 72
DRG: 602 | End: 2017-03-26
Attending: EMERGENCY MEDICINE
Payer: COMMERCIAL

## 2017-03-26 ENCOUNTER — HOSPITAL ENCOUNTER (INPATIENT)
Age: 72
LOS: 5 days | Discharge: NURSING, REHAB OR LONG-TERM CARE | DRG: 602 | End: 2017-03-31
Attending: EMERGENCY MEDICINE | Admitting: INTERNAL MEDICINE
Payer: COMMERCIAL

## 2017-03-26 ENCOUNTER — APPOINTMENT (OUTPATIENT)
Dept: CT IMAGING | Age: 72
DRG: 602 | End: 2017-03-26
Attending: EMERGENCY MEDICINE
Payer: COMMERCIAL

## 2017-03-26 DIAGNOSIS — E86.0 DEHYDRATION: ICD-10-CM

## 2017-03-26 DIAGNOSIS — G93.40 ENCEPHALOPATHY ACUTE: ICD-10-CM

## 2017-03-26 DIAGNOSIS — R04.2 HEMOPTYSIS: ICD-10-CM

## 2017-03-26 DIAGNOSIS — J44.1 ACUTE EXACERBATION OF CHRONIC OBSTRUCTIVE PULMONARY DISEASE (COPD) (HCC): ICD-10-CM

## 2017-03-26 DIAGNOSIS — L03.113 RIGHT ARM CELLULITIS: Primary | ICD-10-CM

## 2017-03-26 PROBLEM — J44.9 COPD (CHRONIC OBSTRUCTIVE PULMONARY DISEASE) (HCC): Status: ACTIVE | Noted: 2017-03-26

## 2017-03-26 PROBLEM — R65.10 SIRS (SYSTEMIC INFLAMMATORY RESPONSE SYNDROME) (HCC): Status: ACTIVE | Noted: 2017-03-26

## 2017-03-26 LAB
ANION GAP BLD CALC-SCNC: 8 MMOL/L (ref 3–18)
APPEARANCE UR: CLEAR
APTT PPP: 25.4 SEC (ref 23–36.4)
BASOPHILS # BLD AUTO: 0 K/UL (ref 0–0.06)
BASOPHILS # BLD: 0 % (ref 0–2)
BILIRUB UR QL: NEGATIVE
BNP SERPL-MCNC: 1978 PG/ML (ref 0–900)
BUN SERPL-MCNC: 21 MG/DL (ref 7–18)
BUN/CREAT SERPL: 28 (ref 12–20)
CALCIUM SERPL-MCNC: 8.1 MG/DL (ref 8.5–10.1)
CHLORIDE SERPL-SCNC: 104 MMOL/L (ref 100–108)
CO2 SERPL-SCNC: 28 MMOL/L (ref 21–32)
COLOR UR: YELLOW
CREAT SERPL-MCNC: 0.75 MG/DL (ref 0.6–1.3)
DIFFERENTIAL METHOD BLD: ABNORMAL
EOSINOPHIL # BLD: 0 K/UL (ref 0–0.4)
EOSINOPHIL NFR BLD: 0 % (ref 0–5)
ERYTHROCYTE [DISTWIDTH] IN BLOOD BY AUTOMATED COUNT: 18.4 % (ref 11.6–14.5)
GLUCOSE SERPL-MCNC: 187 MG/DL (ref 74–99)
GLUCOSE UR STRIP.AUTO-MCNC: NEGATIVE MG/DL
HCT VFR BLD AUTO: 34.6 % (ref 35–45)
HGB BLD-MCNC: 11.2 G/DL (ref 12–16)
HGB UR QL STRIP: NEGATIVE
INR PPP: 1.1 (ref 0.8–1.2)
KETONES UR QL STRIP.AUTO: NEGATIVE MG/DL
LACTATE BLD-SCNC: 1.4 MMOL/L (ref 0.4–2)
LACTATE BLD-SCNC: 2.1 MMOL/L (ref 0.4–2)
LEUKOCYTE ESTERASE UR QL STRIP.AUTO: NEGATIVE
LYMPHOCYTES # BLD AUTO: 9 % (ref 21–52)
LYMPHOCYTES # BLD: 0.5 K/UL (ref 0.9–3.6)
MCH RBC QN AUTO: 33.1 PG (ref 24–34)
MCHC RBC AUTO-ENTMCNC: 32.4 G/DL (ref 31–37)
MCV RBC AUTO: 102.4 FL (ref 74–97)
MONOCYTES # BLD: 0 K/UL (ref 0.05–1.2)
MONOCYTES NFR BLD AUTO: 0 % (ref 3–10)
NEUTS SEG # BLD: 5.2 K/UL (ref 1.8–8)
NEUTS SEG NFR BLD AUTO: 91 % (ref 40–73)
NITRITE UR QL STRIP.AUTO: NEGATIVE
PH UR STRIP: 7 [PH] (ref 5–8)
PLATELET # BLD AUTO: 401 K/UL (ref 135–420)
PMV BLD AUTO: 11 FL (ref 9.2–11.8)
POTASSIUM SERPL-SCNC: 3.5 MMOL/L (ref 3.5–5.5)
PROT UR STRIP-MCNC: NEGATIVE MG/DL
PROTHROMBIN TIME: 13.7 SEC (ref 11.5–15.2)
RBC # BLD AUTO: 3.38 M/UL (ref 4.2–5.3)
SODIUM SERPL-SCNC: 140 MMOL/L (ref 136–145)
SP GR UR REFRACTOMETRY: 1.02 (ref 1–1.03)
UROBILINOGEN UR QL STRIP.AUTO: 0.2 EU/DL (ref 0.2–1)
WBC # BLD AUTO: 5.7 K/UL (ref 4.6–13.2)

## 2017-03-26 PROCEDURE — 74011000250 HC RX REV CODE- 250: Performed by: EMERGENCY MEDICINE

## 2017-03-26 PROCEDURE — 99285 EMERGENCY DEPT VISIT HI MDM: CPT

## 2017-03-26 PROCEDURE — 74011250637 HC RX REV CODE- 250/637: Performed by: INTERNAL MEDICINE

## 2017-03-26 PROCEDURE — 93005 ELECTROCARDIOGRAM TRACING: CPT

## 2017-03-26 PROCEDURE — 71010 XR CHEST PORT: CPT

## 2017-03-26 PROCEDURE — 65270000029 HC RM PRIVATE

## 2017-03-26 PROCEDURE — 85025 COMPLETE CBC W/AUTO DIFF WBC: CPT | Performed by: EMERGENCY MEDICINE

## 2017-03-26 PROCEDURE — 83605 ASSAY OF LACTIC ACID: CPT

## 2017-03-26 PROCEDURE — 96361 HYDRATE IV INFUSION ADD-ON: CPT

## 2017-03-26 PROCEDURE — 87086 URINE CULTURE/COLONY COUNT: CPT | Performed by: EMERGENCY MEDICINE

## 2017-03-26 PROCEDURE — 74011000250 HC RX REV CODE- 250: Performed by: INTERNAL MEDICINE

## 2017-03-26 PROCEDURE — 81003 URINALYSIS AUTO W/O SCOPE: CPT | Performed by: EMERGENCY MEDICINE

## 2017-03-26 PROCEDURE — 85610 PROTHROMBIN TIME: CPT | Performed by: EMERGENCY MEDICINE

## 2017-03-26 PROCEDURE — 83880 ASSAY OF NATRIURETIC PEPTIDE: CPT | Performed by: EMERGENCY MEDICINE

## 2017-03-26 PROCEDURE — 77030013140 HC MSK NEB VYRM -A

## 2017-03-26 PROCEDURE — 77030011943

## 2017-03-26 PROCEDURE — 51701 INSERT BLADDER CATHETER: CPT

## 2017-03-26 PROCEDURE — 80048 BASIC METABOLIC PNL TOTAL CA: CPT | Performed by: EMERGENCY MEDICINE

## 2017-03-26 PROCEDURE — 70450 CT HEAD/BRAIN W/O DYE: CPT

## 2017-03-26 PROCEDURE — 77010033678 HC OXYGEN DAILY

## 2017-03-26 PROCEDURE — 85730 THROMBOPLASTIN TIME PARTIAL: CPT | Performed by: EMERGENCY MEDICINE

## 2017-03-26 PROCEDURE — 74011000258 HC RX REV CODE- 258: Performed by: EMERGENCY MEDICINE

## 2017-03-26 PROCEDURE — 96365 THER/PROPH/DIAG IV INF INIT: CPT

## 2017-03-26 PROCEDURE — 94640 AIRWAY INHALATION TREATMENT: CPT

## 2017-03-26 PROCEDURE — 74011250636 HC RX REV CODE- 250/636: Performed by: INTERNAL MEDICINE

## 2017-03-26 PROCEDURE — 74011000258 HC RX REV CODE- 258: Performed by: INTERNAL MEDICINE

## 2017-03-26 PROCEDURE — 74011250636 HC RX REV CODE- 250/636: Performed by: EMERGENCY MEDICINE

## 2017-03-26 PROCEDURE — 76882 US LMTD JT/FCL EVL NVASC XTR: CPT

## 2017-03-26 RX ORDER — TRAMADOL HYDROCHLORIDE 50 MG/1
100 TABLET ORAL
Status: DISCONTINUED | OUTPATIENT
Start: 2017-03-26 | End: 2017-03-31 | Stop reason: HOSPADM

## 2017-03-26 RX ORDER — ASPIRIN 325 MG
325 TABLET ORAL DAILY
Status: DISCONTINUED | OUTPATIENT
Start: 2017-03-27 | End: 2017-03-31 | Stop reason: HOSPADM

## 2017-03-26 RX ORDER — CLONAZEPAM 0.5 MG/1
0.5 TABLET ORAL 2 TIMES DAILY
Status: DISCONTINUED | OUTPATIENT
Start: 2017-03-26 | End: 2017-03-31 | Stop reason: HOSPADM

## 2017-03-26 RX ORDER — SODIUM CHLORIDE 9 MG/ML
75 INJECTION, SOLUTION INTRAVENOUS CONTINUOUS
Status: DISCONTINUED | OUTPATIENT
Start: 2017-03-26 | End: 2017-03-27

## 2017-03-26 RX ORDER — GUAIFENESIN 600 MG/1
600 TABLET, EXTENDED RELEASE ORAL EVERY 12 HOURS
Status: DISCONTINUED | OUTPATIENT
Start: 2017-03-26 | End: 2017-03-31 | Stop reason: HOSPADM

## 2017-03-26 RX ORDER — PANTOPRAZOLE SODIUM 40 MG/1
40 TABLET, DELAYED RELEASE ORAL
Status: DISCONTINUED | OUTPATIENT
Start: 2017-03-26 | End: 2017-03-31 | Stop reason: HOSPADM

## 2017-03-26 RX ORDER — HEPARIN SODIUM 5000 [USP'U]/ML
5000 INJECTION, SOLUTION INTRAVENOUS; SUBCUTANEOUS EVERY 12 HOURS
Status: DISCONTINUED | OUTPATIENT
Start: 2017-03-26 | End: 2017-03-31 | Stop reason: HOSPADM

## 2017-03-26 RX ORDER — ROPINIROLE 0.25 MG/1
1 TABLET, FILM COATED ORAL 2 TIMES DAILY
Status: DISCONTINUED | OUTPATIENT
Start: 2017-03-26 | End: 2017-03-28

## 2017-03-26 RX ORDER — IPRATROPIUM BROMIDE AND ALBUTEROL SULFATE 2.5; .5 MG/3ML; MG/3ML
3 SOLUTION RESPIRATORY (INHALATION)
Status: DISCONTINUED | OUTPATIENT
Start: 2017-03-26 | End: 2017-03-31 | Stop reason: HOSPADM

## 2017-03-26 RX ORDER — LEVOFLOXACIN 5 MG/ML
500 INJECTION, SOLUTION INTRAVENOUS EVERY 24 HOURS
Status: DISCONTINUED | OUTPATIENT
Start: 2017-03-26 | End: 2017-03-28 | Stop reason: CLARIF

## 2017-03-26 RX ORDER — FLUOXETINE HYDROCHLORIDE 20 MG/1
20 CAPSULE ORAL DAILY
Status: DISCONTINUED | OUTPATIENT
Start: 2017-03-27 | End: 2017-03-31 | Stop reason: HOSPADM

## 2017-03-26 RX ORDER — AMLODIPINE BESYLATE 5 MG/1
2.5 TABLET ORAL DAILY
Status: DISCONTINUED | OUTPATIENT
Start: 2017-03-27 | End: 2017-03-31 | Stop reason: HOSPADM

## 2017-03-26 RX ORDER — PREDNISONE 20 MG/1
40 TABLET ORAL
Status: DISCONTINUED | OUTPATIENT
Start: 2017-03-27 | End: 2017-03-31 | Stop reason: HOSPADM

## 2017-03-26 RX ORDER — FLUCONAZOLE 100 MG/1
200 TABLET ORAL DAILY
Status: DISCONTINUED | OUTPATIENT
Start: 2017-03-27 | End: 2017-03-31 | Stop reason: HOSPADM

## 2017-03-26 RX ORDER — ONDANSETRON 4 MG/1
4 TABLET, ORALLY DISINTEGRATING ORAL
Status: DISCONTINUED | OUTPATIENT
Start: 2017-03-26 | End: 2017-03-31 | Stop reason: HOSPADM

## 2017-03-26 RX ORDER — LEVOTHYROXINE SODIUM 50 UG/1
50 TABLET ORAL
Status: DISCONTINUED | OUTPATIENT
Start: 2017-03-27 | End: 2017-03-31 | Stop reason: HOSPADM

## 2017-03-26 RX ORDER — IPRATROPIUM BROMIDE AND ALBUTEROL SULFATE 2.5; .5 MG/3ML; MG/3ML
3 SOLUTION RESPIRATORY (INHALATION)
Status: COMPLETED | OUTPATIENT
Start: 2017-03-26 | End: 2017-03-26

## 2017-03-26 RX ORDER — FLUCONAZOLE 200 MG/1
200 TABLET ORAL DAILY
COMMUNITY
End: 2017-04-26

## 2017-03-26 RX ORDER — SIMVASTATIN 20 MG/1
20 TABLET, FILM COATED ORAL
Status: DISCONTINUED | OUTPATIENT
Start: 2017-03-26 | End: 2017-03-31 | Stop reason: HOSPADM

## 2017-03-26 RX ORDER — FOLIC ACID 1 MG/1
TABLET ORAL DAILY
Status: DISCONTINUED | OUTPATIENT
Start: 2017-03-27 | End: 2017-03-31 | Stop reason: HOSPADM

## 2017-03-26 RX ORDER — SODIUM CHLORIDE 0.9 % (FLUSH) 0.9 %
5-10 SYRINGE (ML) INJECTION AS NEEDED
Status: DISCONTINUED | OUTPATIENT
Start: 2017-03-26 | End: 2017-03-31 | Stop reason: HOSPADM

## 2017-03-26 RX ORDER — CLOPIDOGREL BISULFATE 75 MG/1
75 TABLET ORAL DAILY
Status: DISCONTINUED | OUTPATIENT
Start: 2017-03-27 | End: 2017-03-31 | Stop reason: HOSPADM

## 2017-03-26 RX ORDER — MIRTAZAPINE 15 MG/1
15 TABLET, FILM COATED ORAL
Status: DISCONTINUED | OUTPATIENT
Start: 2017-03-26 | End: 2017-03-31 | Stop reason: HOSPADM

## 2017-03-26 RX ORDER — MIRTAZAPINE 15 MG/1
30 TABLET, FILM COATED ORAL
COMMUNITY
End: 2017-05-26

## 2017-03-26 RX ADMIN — HEPARIN SODIUM 5000 UNITS: 5000 INJECTION, SOLUTION INTRAVENOUS; SUBCUTANEOUS at 20:04

## 2017-03-26 RX ADMIN — PANTOPRAZOLE SODIUM 40 MG: 40 TABLET, DELAYED RELEASE ORAL at 08:35

## 2017-03-26 RX ADMIN — IPRATROPIUM BROMIDE AND ALBUTEROL SULFATE 3 ML: .5; 3 SOLUTION RESPIRATORY (INHALATION) at 16:00

## 2017-03-26 RX ADMIN — SODIUM CHLORIDE 1000 MG: 900 INJECTION, SOLUTION INTRAVENOUS at 05:57

## 2017-03-26 RX ADMIN — SIMVASTATIN 20 MG: 10 TABLET, FILM COATED ORAL at 21:35

## 2017-03-26 RX ADMIN — MIRTAZAPINE 15 MG: 15 TABLET, FILM COATED ORAL at 21:35

## 2017-03-26 RX ADMIN — TRAMADOL HYDROCHLORIDE 100 MG: 50 TABLET, FILM COATED ORAL at 21:52

## 2017-03-26 RX ADMIN — SODIUM CHLORIDE 75 ML/HR: 900 INJECTION, SOLUTION INTRAVENOUS at 08:24

## 2017-03-26 RX ADMIN — IPRATROPIUM BROMIDE AND ALBUTEROL SULFATE 3 ML: .5; 3 SOLUTION RESPIRATORY (INHALATION) at 20:05

## 2017-03-26 RX ADMIN — CEFTRIAXONE SODIUM 1 G: 1 INJECTION, POWDER, FOR SOLUTION INTRAMUSCULAR; INTRAVENOUS at 05:09

## 2017-03-26 RX ADMIN — ROPINIROLE 1 MG: 0.25 TABLET, FILM COATED ORAL at 21:35

## 2017-03-26 RX ADMIN — LEVOFLOXACIN 500 MG: 5 INJECTION, SOLUTION INTRAVENOUS at 08:39

## 2017-03-26 RX ADMIN — CLONAZEPAM 0.5 MG: 0.5 TABLET ORAL at 21:36

## 2017-03-26 RX ADMIN — HEPARIN SODIUM 5000 UNITS: 5000 INJECTION, SOLUTION INTRAVENOUS; SUBCUTANEOUS at 08:35

## 2017-03-26 RX ADMIN — IPRATROPIUM BROMIDE AND ALBUTEROL SULFATE 3 ML: .5; 3 SOLUTION RESPIRATORY (INHALATION) at 04:30

## 2017-03-26 RX ADMIN — SODIUM CHLORIDE 500 MG: 900 INJECTION, SOLUTION INTRAVENOUS at 13:13

## 2017-03-26 RX ADMIN — SODIUM CHLORIDE 75 ML/HR: 900 INJECTION, SOLUTION INTRAVENOUS at 20:00

## 2017-03-26 RX ADMIN — BIMATOPROST 1 DROP: 0.1 SOLUTION/ DROPS OPHTHALMIC at 20:05

## 2017-03-26 RX ADMIN — SODIUM CHLORIDE 1497 ML: 900 INJECTION, SOLUTION INTRAVENOUS at 04:40

## 2017-03-26 NOTE — ED NOTES
Assume care of patient, patient alert but confused at times,  at bedside, patient on monitor, ST noted.   Purposeful rounding completed:    Side rails up x 2:  YES  Bed in low position and wheels locked: YES  Call bell within reach: YES  Comfort addressed: YES    Toileting needs addressed: YES  Plan of care reviewed/updated with patient and or family members: YES  IV site assessed: YES  Pain assessed and addressed: YES, 0

## 2017-03-26 NOTE — ED TRIAGE NOTES
According to patients  the patient has had increased confusion all day today and a harsh cough.   Patient started sneezing and coughing blood tonight

## 2017-03-26 NOTE — ED NOTES
Side rails up x 2:  YES  Bed in low position and wheels locked: YES  Call bell within reach: YES  Comfort addressed: YES    Toileting needs addressed: YES  Plan of care reviewed/updated with patient and or family members: YES  IV site assessed: YES  Pain assessed and addressed: YES, 0

## 2017-03-26 NOTE — PROGRESS NOTES
[facility] Dosing Services     Pharmacy dosing Vancomycin IV empirically for treatment of Upper Respiratory infection      Load 1.5 gm x 1     Day of Therapy: 0    Other Antibiotics: ceftriaxone, Levaquin   Labs:   Bun/Serum Creatinine - 21 mg/dl / 0.75 mg/dl  CrCl - 48 ml/min  WBC - 5.7    Cultures:    Pending    Plan: Goal trough 15-20. Load 1.5 gm x 1, continue 1 gm IV q 24h. Ordered Vanco-trough on 3/29. Pharmacy to follow and will make changes to dose and/or frequency based on clinical status. Thanks for the consult.

## 2017-03-26 NOTE — ED PROVIDER NOTES
HPI Comments: 4:34 AM Catha Babinski is a 66 y.o. female w/ hx of HTN, COPD, and CAD who presents to the ED, via EMS, for evaluation of hemoptysis. Pt has had confusion all day onset this AM. Pt was recently diagnosed w/ pneumonia. Pt was initially stating in the low 80s on room air, per EMS. Pt was not responding to questions and did not know her name, per EMS; but, has improved upon ED arrival. Pt was afebrile upon ED arrival. Pt has a hx of 3 strokes and is currently taking Plavix. Pt c/o R arm pain. Pt was seen by Dr. Caitlyn Menendez for arm and had an XR. Pt is currently taking Levaquin for Bronchitis. Pt's  denies hematochezia. Pt denies fall, and diarrhea. Pt has no other sx or complaints. 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      Social History Main Topics    Smoking status: Former Smoker     Types: Cigarettes     Quit date: 4/28/2013    Smokeless tobacco: Not on file    Alcohol use 1.0 oz/week     2 Shots of liquor per week  Drug use: No    Sexual activity: Yes     Partners: Male     Other Topics Concern    Not on file     Social History Narrative         ALLERGIES: Sulfa (sulfonamide antibiotics) and Sulfamethoxazole-trimethoprim    Review of Systems   Unable to perform ROS: Acuity of condition       Vitals:    03/26/17 0515 03/26/17 0530 03/26/17 0548 03/26/17 0600   BP: (!) 171/101 185/78 185/79 (!) 167/99   Pulse: 97 98 91 92   Resp: 20 15 22 22   Temp:       SpO2: 99% 100% 100% 99%   Weight:                Physical Exam   Constitutional: She appears well-developed and well-nourished. No distress. HENT:   Head: Normocephalic and atraumatic. Mouth/Throat: Oropharynx is clear and moist.   Dry blood around mouth   Eyes: Conjunctivae are normal. Pupils are equal, round, and reactive to light. No scleral icterus. Neck: Normal range of motion. Neck supple. Cardiovascular: Intact distal pulses. Capillary refill < 3 seconds  Symmetric DPs   Pulmonary/Chest: Effort normal and breath sounds normal. No respiratory distress. She has no wheezes. Active raspy cough  Fine crackles     Abdominal: Soft. Bowel sounds are normal. She exhibits no distension. There is no tenderness. Musculoskeletal: Normal range of motion. She exhibits no edema. Lymphadenopathy:     She has no cervical adenopathy. Neurological:   Pt is following commands  Pt is oriented to name and place; does not know date or year   Skin: Skin is warm and dry. She is not diaphoretic. There is erythema. Poor skin turgor  Bruising for bilateral extremities  Multiple bandaids on shins  Heeling scabs on lower extremities  Multiple bruises on upper extremities   Has circumferential redness of right forearm consistent with cellulitis     Psychiatric:   confusion   Nursing note and vitals reviewed.        MDM  Number of Diagnoses or Management Options  Acute exacerbation of chronic obstructive pulmonary disease (COPD) ([facility] [address]):   Dehydration:   Encephalopathy acute: Hemoptysis:   Right arm cellulitis:   Diagnosis management comments: ddx copd, PNA, other infectious, cardiac, metabolic, dehydration, encephalopathy, CVA    Labs, neb, CT head, UA, IVF    LA 2.1  WBC wnl  Bun 20 Cr wnl    Worsening right arm cellulitis, encephalopathy    Admit    Discussed results and plan with pt and  in agreement.             Amount and/or Complexity of Data Reviewed  Clinical lab tests: ordered and reviewed  Tests in the radiology section of CPT®: ordered and reviewed  Tests in the medicine section of CPT®: ordered and reviewed  Discussion of test results with the performing providers: yes  Review and summarize past medical records: yes  Discuss the patient with other providers: yes  Independent visualization of images, tracings, or specimens: yes    Risk of Complications, Morbidity, and/or Mortality  Presenting problems: high    Patient Progress  Patient progress: stable    ED Course       Procedures        Vitals:  Patient Vitals for the past 12 hrs:   Temp Pulse Resp BP SpO2   03/26/17 0600 - 92 22 (!) 167/99 99 %   03/26/17 0548 - 91 22 185/79 100 %   03/26/17 0530 - 98 15 185/78 100 %   03/26/17 0515 - 97 20 (!) 171/101 99 %   03/26/17 0445 - - - (!) 158/96 -   03/26/17 0440 - 91 24 - 100 %   03/26/17 0430 - 90 18 157/85 100 %   03/26/17 0417 98.7 °F (37.1 °C) - - - -   03/26/17 0400 - 94 22 (!) 177/94 100 %       Medications ordered:   Medications   sodium chloride (NS) flush 5-10 mL (not administered)   vancomycin (VANCOCIN) 1,000 mg in 0.9% sodium chloride (MBP/ADV) 250 mL adv (1,000 mg IntraVENous New Bag 3/26/17 0557)   albuterol-ipratropium (DUO-NEB) 2.5 MG-0.5 MG/3 ML (3 mL Nebulization Given 3/26/17 0430)   sodium chloride 0.9 % bolus infusion 1,497 mL (0 mL/kg × 49.9 kg IntraVENous IV Completed 3/26/17 0540)   cefTRIAXone (ROCEPHIN) 1 g in 0.9% sodium chloride (MBP/ADV) 50 mL MBP (0 g IntraVENous IV Completed 3/26/17 2119)         Lab findings:  Recent Results (from the past 12 hour(s))   CBC WITH AUTOMATED DIFF    Collection Time: 03/26/17  4:05 AM   Result Value Ref Range    WBC 5.7 4.6 - 13.2 K/uL    RBC 3.38 (L) 4.20 - 5.30 M/uL    HGB 11.2 (L) 12.0 - 16.0 g/dL    HCT 34.6 (L) 35.0 - 45.0 %    .4 (H) 74.0 - 97.0 FL    MCH 33.1 24.0 - 34.0 PG    MCHC 32.4 31.0 - 37.0 g/dL    RDW 18.4 (H) 11.6 - 14.5 %    PLATELET 301 001 - 881 K/uL    MPV 11.0 9.2 - 11.8 FL    NEUTROPHILS 91 (H) 40 - 73 %    LYMPHOCYTES 9 (L) 21 - 52 %    MONOCYTES 0 (L) 3 - 10 %    EOSINOPHILS 0 0 - 5 %    BASOPHILS 0 0 - 2 %    ABS. NEUTROPHILS 5.2 1.8 - 8.0 K/UL    ABS. LYMPHOCYTES 0.5 (L) 0.9 - 3.6 K/UL    ABS. MONOCYTES 0.0 (L) 0.05 - 1.2 K/UL    ABS. EOSINOPHILS 0.0 0.0 - 0.4 K/UL    ABS.  BASOPHILS 0.0 0.0 - 0.06 K/UL    DF AUTOMATED     METABOLIC PANEL, BASIC    Collection Time: 03/26/17  4:05 AM   Result Value Ref Range    Sodium 140 136 - 145 mmol/L    Potassium 3.5 3.5 - 5.5 mmol/L    Chloride 104 100 - 108 mmol/L    CO2 28 21 - 32 mmol/L    Anion gap 8 3.0 - 18 mmol/L    Glucose 187 (H) 74 - 99 mg/dL    BUN 21 (H) 7.0 - 18 MG/DL    Creatinine 0.75 0.6 - 1.3 MG/DL    BUN/Creatinine ratio 28 (H) 12 - 20      GFR est AA >60 >60 ml/min/1.73m2    GFR est non-AA >60 >60 ml/min/1.73m2    Calcium 8.1 (L) 8.5 - 10.1 MG/DL   PRO-BNP    Collection Time: 03/26/17  4:05 AM   Result Value Ref Range    NT pro-BNP 1978 (H) 0 - 900 PG/ML   PROTHROMBIN TIME + INR    Collection Time: 03/26/17  4:05 AM   Result Value Ref Range    Prothrombin time 13.7 11.5 - 15.2 sec    INR 1.1 0.8 - 1.2     PTT    Collection Time: 03/26/17  4:05 AM   Result Value Ref Range    aPTT 25.4 23.0 - 36.4 SEC   POC LACTIC ACID    Collection Time: 03/26/17  4:13 AM   Result Value Ref Range    Lactic Acid (POC) 2.1 (HH) 0.4 - 2.0 mmol/L   EKG, 12 LEAD, INITIAL    Collection Time: 03/26/17  5:17 AM   Result Value Ref Range    Ventricular Rate 94 BPM    Atrial Rate 94 BPM    P-R Interval 122 ms    QRS Duration 76 ms    Q-T Interval 386 ms QTC Calculation (Bezet) 482 ms    Calculated P Axis 73 degrees    Calculated R Axis 35 degrees    Calculated T Axis 91 degrees    Diagnosis       Sinus rhythm with premature atrial complexes  Minimal voltage criteria for LVH, may be normal variant  Nonspecific T wave abnormality  Abnormal ECG  When compared with ECG of 21-MAR-2017 12:51,  premature atrial complexes are now present  ST no longer depressed in Inferior leads  T wave inversion no longer evident in Anterior leads     URINALYSIS W/ RFLX MICROSCOPIC    Collection Time: 03/26/17  5:35 AM   Result Value Ref Range    Color YELLOW      Appearance CLEAR      Specific gravity 1.020 1.005 - 1.030      pH (UA) 7.0 5.0 - 8.0      Protein NEGATIVE  NEG mg/dL    Glucose NEGATIVE  NEG mg/dL    Ketone NEGATIVE  NEG mg/dL    Bilirubin NEGATIVE  NEG      Blood NEGATIVE  NEG      Urobilinogen 0.2 0.2 - 1.0 EU/dL    Nitrites NEGATIVE  NEG      Leukocyte Esterase NEGATIVE  NEG         EKG interpretation by ED Physician:  5:17 AM sinus rythm at rate of 94; few PACs; normal axis; normal QRS duration; some artifact; no ST elevation; nonspecific T wave change      X-Ray, CT or other radiology findings or impressions:  XR CHEST PORT     No acute process; similar to 3/20/2017  \As interpreted by Roman Hdz DO    CT HEAD WO CONT    No acute intracranial abnormalities; no hemorrhage; no midline shift; no hydrocephalus  Encephalomalacia and dystrophic calcifications involving the bilateral occipital lobes, cerebellar hemispheres, and medial left parietal lobe  Moderate burden of ischemic white matter disease and cerebral atrophy   As interpreted by RAD       Progress notes, Consult notes or additional Procedure notes:   5:24 AM, 3/26/2017   Consult:  Discussed care with Dr. Thomasina Sacks. Standard discussion; including history of patients chief complaint, available diagnostic results, and treatment course.  Accepts admission; requests admission to medical floor     Reevaluation of patient:   I have reassessed the patient. No new complaint. Still not oriented to place or time. Disposition:  Diagnosis:   1. Right arm cellulitis    2. Hemoptysis    3. Encephalopathy acute    4. Dehydration    5. Acute exacerbation of chronic obstructive pulmonary disease (COPD) ([facility])        Disposition:    Follow-up Information     None           Patient's Medications   Start Taking    No medications on file   Continue Taking    AMLODIPINE (NORVASC) 2.5 MG TABLET    Take 1 tablet by mouth daily. ASPIRIN (ASPIRIN) 325 MG TABLET    Take 1 Tab by mouth daily. BIMATOPROST (LUMIGAN) 0.01 % OPHTHALMIC DROPS    Administer 1 Drop to left eye every evening. CLINDAMYCIN (CLEOCIN) 150 MG CAPSULE    Take 2 Caps by mouth three (3) times daily for 7 days. CLONAZEPAM (KLONOPIN) 1 MG TABLET    Take  by mouth two (2) times a day. CLOPIDOGREL (PLAVIX) 75 MG TABLET    Take 1 Tab by mouth daily. FLUCONAZOLE (DIFLUCAN) 200 MG TABLET    Take 200 mg by mouth daily. FDA advises cautious prescribing of oral fluconazole in pregnancy. FLUOXETINE (PROZAC) 20 MG CAPSULE    20 mg. FOLIC ACID PO    Take 1 Tab by mouth. LEVOFLOXACIN (LEVAQUIN) 500 MG TABLET    Take  by mouth daily. LEVOTHYROXINE (SYNTHROID) 50 MCG TABLET    Take 1 Tab by mouth Daily (before breakfast). MELOXICAM (MOBIC) 7.5 MG TABLET    Take  by mouth daily. METHOTREXATE (RHEUMATREX) 2.5 MG TABLET    Take 5 mg by mouth every Wednesday. MIRTAZAPINE (REMERON) 15 MG TABLET    Take 15 mg by mouth nightly. ONDANSETRON (ZOFRAN ODT) 4 MG DISINTEGRATING TABLET    Take 1 Tab by mouth every eight (8) hours as needed for Nausea. PREDNISONE (DELTASONE) 1 MG TABLET    Take 40 mg by mouth daily. ROPINIROLE (REQUIP) 1 MG TABLET    Take 1 Tab by mouth two (2) times a day. SIMVASTATIN (ZOCOR) 20 MG TABLET    Take 1 Tab by mouth nightly. TRAMADOL (ULTRAM) 50 MG TABLET    Take 50 mg by mouth two (2) times a day.     TRAMADOL (ULTRAM) 50 MG TABLET    Take 2 Tabs by mouth every six (6) hours as needed for Pain. Max Daily Amount: 400 mg. These Medications have changed    No medications on file   Stop Taking    No medications on file       Scribe Attestation:   I, Gabriel Lei am scribing for and in the presence of Yoli Glass DO on this day 03/26/17 at 4:34 AM   jorge Rogers    Provider Attestation:  I personally performed the services described in the documentation, reviewed the documentation, as recorded by the scribe in my presence, and it accurately and completely records my words and actions.   Yoli Glass DO. 4:34 AM      Signed by: Jorge Rogers, 4:34 AM

## 2017-03-26 NOTE — ED NOTES
Bedside shift change report given to Jagjit Blackmon RN (oncoming nurse) by Kait Alexandra RN (offgoing nurse). Report included the following information SBAR.

## 2017-03-26 NOTE — Clinical Note
Status[de-identified] Inpatient [101] Type of Bed: Medical [8] Inpatient Hospitalization Certified Necessary for the Following Reasons: 3. Patient receiving treatment that can only be provided in an inpatient setting (further clarification in H&P documentation) Admitting Diagnosis: Right arm cellulitis [0250908] Admitting Diagnosis: Acute encephalopathy [0355848] Admitting Diagnosis: Hemoptysis [0660087] Admitting Diagnosis: COPD (chronic obstructive pulmonary disease) ([facility] [[id number]] Admitting Diagnosis: Dehydration [276.51. ICD-9-CM] Admitting Diagnosis: SIRS (systemic inflammatory response syndrome) ([facility] [7495564] Admitting Physician: Claire Hasnen Attending Physician: Claire Hansen Estimated Length of Stay: > or = to 2 Midnights Discharge Plan[de-identified] Home with Office Follow-up

## 2017-03-26 NOTE — ED NOTES
The Sepsis Screening has been completed on arrival in the Emergency Department. Vital signs:  Patient Vitals for the past 4 hrs:   Temp Pulse Resp BP SpO2   03/26/17 0417 98.7 °F (37.1 °C) - - - -   03/26/17 0400 - 94 22 (!) 177/94 100 %       MAP (Monitor): 114    =monitored (data validate)       =calculated (manual entry)    Patient meets 2 or more SIRS criteria with suspected infection? YES    IF ANSWER IS YES, INITIATE NURSE DRIVEN SEPSIS ORDERS OR REQUEST SEPSIS BUNDLE ORDER BY PROVIDER.      SIRS Criteria is achieved when two or more of the following are present:    Temperature < 96.8°F (36°C) or > 100.9°F (38.3°C)   Heart Rate > 90 beats per minute   Respiratory Rate > 20 beats per minute

## 2017-03-27 ENCOUNTER — APPOINTMENT (OUTPATIENT)
Dept: ULTRASOUND IMAGING | Age: 72
DRG: 602 | End: 2017-03-27
Attending: INTERNAL MEDICINE
Payer: COMMERCIAL

## 2017-03-27 PROBLEM — M70.21 OLECRANON BURSITIS OF RIGHT ELBOW: Status: ACTIVE | Noted: 2017-03-27

## 2017-03-27 PROBLEM — R65.10 SIRS (SYSTEMIC INFLAMMATORY RESPONSE SYNDROME) (HCC): Status: RESOLVED | Noted: 2017-03-26 | Resolved: 2017-03-27

## 2017-03-27 PROBLEM — E87.5 HYPERKALEMIA: Status: ACTIVE | Noted: 2017-03-27

## 2017-03-27 PROBLEM — J20.9 ACUTE BRONCHITIS: Status: ACTIVE | Noted: 2017-03-27

## 2017-03-27 LAB
ANION GAP BLD CALC-SCNC: 12 MMOL/L (ref 3–18)
ATRIAL RATE: 94 BPM
BACTERIA SPEC CULT: NORMAL
BACTERIA SPEC CULT: NORMAL
BUN SERPL-MCNC: 10 MG/DL (ref 7–18)
BUN/CREAT SERPL: 28 (ref 12–20)
CALCIUM SERPL-MCNC: 7.3 MG/DL (ref 8.5–10.1)
CALCULATED P AXIS, ECG09: 73 DEGREES
CALCULATED R AXIS, ECG10: 35 DEGREES
CALCULATED T AXIS, ECG11: 91 DEGREES
CHLORIDE SERPL-SCNC: 110 MMOL/L (ref 100–108)
CO2 SERPL-SCNC: 20 MMOL/L (ref 21–32)
CREAT SERPL-MCNC: 0.36 MG/DL (ref 0.6–1.3)
DIAGNOSIS, 93000: NORMAL
GLUCOSE SERPL-MCNC: 73 MG/DL (ref 74–99)
P-R INTERVAL, ECG05: 122 MS
POTASSIUM SERPL-SCNC: 3.1 MMOL/L (ref 3.5–5.5)
Q-T INTERVAL, ECG07: 386 MS
QRS DURATION, ECG06: 76 MS
QTC CALCULATION (BEZET), ECG08: 482 MS
SERVICE CMNT-IMP: NORMAL
SERVICE CMNT-IMP: NORMAL
SODIUM SERPL-SCNC: 142 MMOL/L (ref 136–145)
VENTRICULAR RATE, ECG03: 94 BPM

## 2017-03-27 PROCEDURE — 87077 CULTURE AEROBIC IDENTIFY: CPT | Performed by: INTERNAL MEDICINE

## 2017-03-27 PROCEDURE — 65270000029 HC RM PRIVATE

## 2017-03-27 PROCEDURE — 0M933ZX DRAINAGE OF RIGHT ELBOW BURSA AND LIGAMENT, PERCUTANEOUS APPROACH, DIAGNOSTIC: ICD-10-PCS | Performed by: RADIOLOGY

## 2017-03-27 PROCEDURE — 74011000250 HC RX REV CODE- 250: Performed by: INTERNAL MEDICINE

## 2017-03-27 PROCEDURE — 87186 SC STD MICRODIL/AGAR DIL: CPT | Performed by: INTERNAL MEDICINE

## 2017-03-27 PROCEDURE — 74011636637 HC RX REV CODE- 636/637: Performed by: INTERNAL MEDICINE

## 2017-03-27 PROCEDURE — 74011000250 HC RX REV CODE- 250: Performed by: RADIOLOGY

## 2017-03-27 PROCEDURE — 87070 CULTURE OTHR SPECIMN AEROBIC: CPT | Performed by: INTERNAL MEDICINE

## 2017-03-27 PROCEDURE — 76942 ECHO GUIDE FOR BIOPSY: CPT

## 2017-03-27 PROCEDURE — 94640 AIRWAY INHALATION TREATMENT: CPT

## 2017-03-27 PROCEDURE — 74011250637 HC RX REV CODE- 250/637: Performed by: INTERNAL MEDICINE

## 2017-03-27 PROCEDURE — 36415 COLL VENOUS BLD VENIPUNCTURE: CPT | Performed by: INTERNAL MEDICINE

## 2017-03-27 PROCEDURE — 77010033678 HC OXYGEN DAILY

## 2017-03-27 PROCEDURE — 74011250636 HC RX REV CODE- 250/636: Performed by: INTERNAL MEDICINE

## 2017-03-27 PROCEDURE — 80048 BASIC METABOLIC PNL TOTAL CA: CPT | Performed by: INTERNAL MEDICINE

## 2017-03-27 RX ORDER — HYDRALAZINE HYDROCHLORIDE 20 MG/ML
10 INJECTION INTRAMUSCULAR; INTRAVENOUS
Status: DISCONTINUED | OUTPATIENT
Start: 2017-03-27 | End: 2017-03-31 | Stop reason: HOSPADM

## 2017-03-27 RX ORDER — POTASSIUM CHLORIDE 20MEQ/15ML
40 LIQUID (ML) ORAL
Status: DISPENSED | OUTPATIENT
Start: 2017-03-27 | End: 2017-03-28

## 2017-03-27 RX ORDER — AMLODIPINE BESYLATE 2.5 MG/1
2.5 TABLET ORAL DAILY
Status: COMPLETED | OUTPATIENT
Start: 2017-03-27 | End: 2017-03-27

## 2017-03-27 RX ORDER — LIDOCAINE HYDROCHLORIDE 10 MG/ML
9 INJECTION INFILTRATION; PERINEURAL
Status: COMPLETED | OUTPATIENT
Start: 2017-03-27 | End: 2017-03-27

## 2017-03-27 RX ADMIN — HEPARIN SODIUM 5000 UNITS: 5000 INJECTION, SOLUTION INTRAVENOUS; SUBCUTANEOUS at 21:20

## 2017-03-27 RX ADMIN — CLONAZEPAM 0.5 MG: 0.5 TABLET ORAL at 13:18

## 2017-03-27 RX ADMIN — POTASSIUM CHLORIDE 40 MEQ: 1.5 SOLUTION ORAL at 13:16

## 2017-03-27 RX ADMIN — FLUCONAZOLE 200 MG: 100 TABLET ORAL at 13:18

## 2017-03-27 RX ADMIN — LEVOFLOXACIN 500 MG: 5 INJECTION, SOLUTION INTRAVENOUS at 13:16

## 2017-03-27 RX ADMIN — LIDOCAINE HYDROCHLORIDE 1 ML: 10 INJECTION, SOLUTION INFILTRATION; PERINEURAL at 14:18

## 2017-03-27 RX ADMIN — ROPINIROLE 1 MG: 0.25 TABLET, FILM COATED ORAL at 21:20

## 2017-03-27 RX ADMIN — BIMATOPROST 1 DROP: 0.1 SOLUTION/ DROPS OPHTHALMIC at 18:00

## 2017-03-27 RX ADMIN — SIMVASTATIN 20 MG: 10 TABLET, FILM COATED ORAL at 21:20

## 2017-03-27 RX ADMIN — AMLODIPINE BESYLATE 2.5 MG: 2.5 TABLET ORAL at 04:00

## 2017-03-27 RX ADMIN — MIRTAZAPINE 15 MG: 15 TABLET, FILM COATED ORAL at 21:20

## 2017-03-27 RX ADMIN — POTASSIUM CHLORIDE 40 MEQ: 1.5 SOLUTION ORAL at 18:38

## 2017-03-27 RX ADMIN — FOLIC ACID 1 MG: 1 TABLET ORAL at 13:17

## 2017-03-27 RX ADMIN — AMLODIPINE BESYLATE 2.5 MG: 2.5 TABLET ORAL at 13:17

## 2017-03-27 RX ADMIN — TRAMADOL HYDROCHLORIDE 100 MG: 50 TABLET, FILM COATED ORAL at 15:13

## 2017-03-27 RX ADMIN — GUAIFENESIN 600 MG: 600 TABLET, EXTENDED RELEASE ORAL at 21:20

## 2017-03-27 RX ADMIN — PREDNISONE 40 MG: 20 TABLET ORAL at 13:17

## 2017-03-27 RX ADMIN — SODIUM CHLORIDE 1000 MG: 900 INJECTION, SOLUTION INTRAVENOUS at 04:30

## 2017-03-27 RX ADMIN — PANTOPRAZOLE SODIUM 40 MG: 40 TABLET, DELAYED RELEASE ORAL at 08:07

## 2017-03-27 RX ADMIN — IPRATROPIUM BROMIDE AND ALBUTEROL SULFATE 3 ML: .5; 3 SOLUTION RESPIRATORY (INHALATION) at 12:05

## 2017-03-27 RX ADMIN — ROPINIROLE 1 MG: 0.25 TABLET, FILM COATED ORAL at 13:17

## 2017-03-27 RX ADMIN — IPRATROPIUM BROMIDE AND ALBUTEROL SULFATE 3 ML: .5; 3 SOLUTION RESPIRATORY (INHALATION) at 20:55

## 2017-03-27 RX ADMIN — LEVOTHYROXINE SODIUM 50 MCG: 50 TABLET ORAL at 08:06

## 2017-03-27 RX ADMIN — CLONAZEPAM 0.5 MG: 0.5 TABLET ORAL at 18:38

## 2017-03-27 NOTE — PROGRESS NOTES
conducted an initial consultation and Spiritual Assessment for [facility], who is a 66 y.o.,female. Patients Primary Language is: [place]. According to the patients EMR Adventism Affiliation is: Lexi Grace. 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 12/11/2015    Trauma, blunt 12/11/2015    Platelet dysfunction due to drugs 12/11/2015    Weakness 11/17/2014    Stroke ([facility] [address] 11/15/2014    Giant cell arteritis (HCC) 11/15/2014    RLS (restless legs syndrome) 11/15/2014    Hypothyroid 11/15/2014    Drug-induced hepatic toxicity 04/24/2014    CVA (cerebrovascular accident) ([facility] [address] 04/23/2014    HTN (hypertension) 04/23/2014    Hypercholesteremia 04/23/2014    Thyroid nodule 04/23/2014    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 02/17/2014    Disorder of arteries and arterioles ([facility] [address] 01/28/2010        The  provided the following Interventions:  Initiated a relationship of care and support with patient in room 2222 at around 1035 this morning. Listened empathically as patient though very weak and in pain talked about being here. Family added to her conversation in that she has been here many times in the past.  Patient was not feeling well at this visit so I did not stay. Provided information about Spiritual Care Services. Offered prayer and assurance of continued prayers on patients behalf.        The following outcomes were achieved:  Patient shared limited information about both their medical narrative and spiritual journey/beliefs. Patient processed feeling about current hospitalization. Patient expressed gratitude for pastoral care visit. Assessment:  Patient does not have any Yazidi/cultural needs that will affect patients preferences in health care. There are no further spiritual or Yazidi issues which require Spiritual Care Services interventions at this time. Plan:  Chaplains will continue to follow and will provide pastoral care on an as needed/requested basis    . Jannetta Seip   Spiritual Care   [phone]

## 2017-03-27 NOTE — PROGRESS NOTES
Received pt via stretcher no report received. Pt is alert to self a dn place. She has mumbled speech and is incoherent at times. She is not able at his time to follow verbal commands. .  Noted hematoma to left side of her head. multiple bruises in various stages of healing. Pt has fragile thin skin and no open areas on her sacrum or coccyx. Pt has redness to right arm and IV # 20 NS @ 75 ml/hr infusing into left AC. She is incontinent bladder and bowel. Monitoring ongoing. 0330 Pt VS  continue to elevate. Noted hematoma to left forehead. Dr. Alpesh Taylor called he stated he is aware of her hematoma. He is aware of her MEWS score of 3. Order received for pt to receive now dose of Norvasc.      0400 Called ICU nurse Lisa Carreon and [phone] [phone] for further assessment of pt. Her oxygen saturation 84%  On room air  Her lung sounds bilaterally rales and wet. Placed on Oxygen at 2. Litters and her sat continues to be  84 %. Pt labs and BNP were reviewed 1900 on 3/26 Call to MD to obtain additional orders.  stated to Hep lock IV and hang the vancomycin and hung up the phone before  This writer was unable to explain the X-ray and BNP results to physician. 0500 PT O2 sat 100% on 2L via NC. Pt incontinent of urine X 1 Monitoring onoiing.

## 2017-03-27 NOTE — PROGRESS NOTES
Attempted to aspirate fluid from right olecranon bursa under ultrasound. Today the material in the bursa appears solid. Only a scant, if any, fluid recovered and sent to lab.

## 2017-03-27 NOTE — H&P
[place]    Name:  Vivek Mtz  MR#:  [id number]  :  1945  Account #:  [de-identified]  Date of Adm:  2017      CHIEF COMPLAINT: Altered mental status and right arm redness and  swelling and pain. HISTORY OF PRESENT ILLNESS: This is an unfortunate, 68-year-old  white female with history of severe atherosclerotic cerebrovascular  disease with multiple prior cerebrovascular accidents with residual left-  sided weakness, gait disorder, ataxia. The patient also has temporal  arteritis, biopsy-proven, on high-dose steroids per Dr. Trevor Abreu. 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 The  patient has had multiple falls over time. She is currently mostly in a  wheelchair, able to transfer and take a few steps with assistance. The  patient was seen as an outpatient a few days ago with acute  bronchitis, treated with Levaquin, etc. Also, she was seen a day or 2  later with right elbow and forearm swelling, felt to be possibly cellulitis,  and clindamycin was added to Levaquin. The patient was seen in the  office the day after her ER visit for her forearm abnormality, and she  was progressing satisfactorily. The patient's  brought her to the ER late last night because of  alteration of mental status. He says she has been agitated, at times  hallucinating. She sometimes gets disoriented. They also noted a lack  of significant improvement of her right elbow and forearm problem. He  reports no fever or chills. He is unaware of any falls. The patient has  had x-ray of the right elbow recently, 2 days prior to admission, and  that showed no skeletal abnormalities. The patient is up in bed, verbal, oriented to place and person, not to  date. She is coughing, no significant sputum production. She does have  some wheezing audible. PAST MEDICAL HISTORY  1. Hypertensive cardiovascular disease. 2. Hypercholesterolemia.   3. Hypothyroidism. 4. Severe atherosclerotic cerebrovascular disease with multiple CVAs  and residual left-sided weakness, gait disorder, ataxia. 5. Right eye blindness. 6. Temporal arteritis. 7. Old cervical spine fracture. 8. Restless leg syndrome. 9. Thyroid nodule, equivocal biopsy in the past, followed by Dr. Po Butler. 10. Osteoporosis. 11. Osteoarthritis. 12. Depression. PAST SURGICAL HISTORY: Hysterectomy, left breast biopsy, thyroid  biopsy, temporal artery biopsy, and lower extremity revascularization. ALLERGIES: SHE IS ALLERGIC SULFA. CURRENT MEDICATIONS: As provided by the . 1. Norvasc 2.5 mg daily. 2. Aspirin 325 mg daily. 3. Lumigan eyedrops 1 drop left eye every evening. 4. Clindamycin 300 mg 3 times daily. 5. Klonopin 1 mg twice a day. 6. Plavix 75 mg daily. 7. Diflucan 200 mg daily for oral thrush. 8. Prozac 20 mg daily. 9. Folic acid 1 daily. 10. Levaquin 500 mg daily. 11. Synthroid 50 mcg daily. 12. Mobic 7.5 mg daily. 13. Methotrexate 5 mg every Wednesday with Dr. Anthony Kang. 14. Remeron 15 mg at bedtime. 15. Zofran 4 mg every 8 hours as needed. 16. Prednisone 40 mg daily. 17. Requip 1 mg twice a day. 18. Zocor 20 mg daily. 19. Tramadol 50 mg every 6 hours as needed for pain. SOCIAL HISTORY: The patient is , lives with her . She  is a former smoker and social drinker. They have 2 children. CODE STATUS: SHE IS A FULL CODE. REVIEW OF SYSTEMS: No fever, no chills. Again, no documented  history of fall or trauma to the right elbow or forearm. The patient denies abdominal pain, nausea, vomiting, or diarrhea. No  dysuria, polyuria, or hematuria. No focal neurologic symptoms new  from her baseline. No chest pain. She does have some blood-tinged  sputum at times, according to her . PHYSICAL EXAMINATION  GENERAL: Chronically ill-appearing white female in no acute distress.   VITAL SIGNS: Temperature 98.7, pulse currently 89, blood pressure  169/80, respirations 21. HEENT: Atraumatic, normocephalic. Sclerae are anicteric. Mucous  membranes slightly dry. NECK: No venous distention, adenopathy or thyromegaly. LUNGS: Diffuse bilateral inspiratory and expiratory rhonchi. HEART: Regular rhythm. No rubs or gallops appreciated. ABDOMEN: Soft, nontender, nondistended. Bowel sounds present. EXTREMITIES: Lower extremity with multiple scattered ecchymotic  areas. Left upper extremity again with ecchymotic area on the forearm. On the right elbow, there is some fluctuance at the olecranon area. There is swelling circumferentially around the elbow and the medial  aspect. There is no definite fluctuance otherwise. There is erythema  and mild tenderness to the forearm. ANCILLARY STUDIES: CBC: WBC 5.7, hemoglobin 11.2, hematocrit  34.6, .4. Urinalysis negative. Chemistry: BMP with slightly  elevated blood sugar 187, BUN 21, creatinine 0.75. BNP 1978. Elbow  x-ray negative as above. Ultrasound of the elbow and forearm area  showing fluid collection of the olecranon process, and another fluid  collection almost the same size distally, reported to be along the  lateral forearm, but clinically maybe medial question-christina. Distal  vascular intact. IMPRESSION  1. Altered mental status. Doubt sepsis. 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 Effect of steroids is a possibility as  well. Acute illness could have aggravated all of the above. 2. Acute bronchitis with bronchospasm. 3. Acute cellulitis, right elbow and forearm. 4. Olecranon bursitis, right elbow, with fluid collection, hematoma  versus infected fluid. 5. Fluid collection, right forearm. 6. Temporal arteritis, on high-dose steroids. 7. As per past medical history. PLAN: Will admit the patient. Hold Methotrexate. Continue Levaquin  and add vancomycin.  Fluid aspiration by IR to rule out abscess or  infection. Inhaled bronchodilator, oxygen, etc. Resume usual steroid  dose, consult with Rheumatology, Dr. Whit Kumar, to see if this could be  decreased further, etc. Resume usual medications. DVT prophylaxis. Add inhaled bronchodilator, as well as Mucinex. Further management  accordingly.         Palmira Shirley MD    NT / FS  D:  03/26/2017   15:19  T:  03/26/2017   21:23  Job #:  [id number]

## 2017-03-27 NOTE — ED NOTES
Received care of patient from [address]. Pt resting comfortably on the stretcher in no apparent distress. Denies any needs at this time.

## 2017-03-27 NOTE — PROGRESS NOTES
General Internal Medicine/Geriatrics    Patient: Laurence Garcia MRN: [id number]  CSN: [id number]    YOB: 1945  Age: 66 y.o. Sex: female    DOA: 3/26/2017 LOS:  LOS: 1 day                    Subjective:     Agitated during nite, better now  No CP  Still coughing  Rt.  Arm swelling same    Review of Systems:  Eyes: negative  Ears, Nose, Mouth, Throat, and Face: negative  Respiratory: negative for dyspnea on exertion, + cough  Cardiovascular: negative for chest pain  Gastrointestinal: negative for nausea, vomiting and diarrhea  Genitourinary:negative for dysuria and hematuria  Integument/Breast: negative  Hematologic/Lymphatic: negative for bleeding  Musculoskeletal:negative for arthralgias  Neurological: negative for weakness  Behavioral/Psychiatric: negative for behavior problems  Endocrine: negative for temperature intolerance  Allergic/Immunologic: negative for hay fever    Objective:     Physical Exam:  Patient Vitals for the past 24 hrs:   Temp Pulse Resp BP SpO2   03/27/17 0514 98 °F (36.7 °C) 81 18 (!) 180/100 100 %   03/27/17 0454 99 °F (37.2 °C) 88 18 180/84 (!) 84 %   03/27/17 0406 98.2 °F (36.8 °C) (!) 102 18 172/70 (!) 84 %   03/27/17 0401 - - - - (!) 84 %   03/27/17 0400 - 88 - 169/84 -   03/27/17 0126 98.4 °F (36.9 °C) 86 18 169/87 99 %   03/27/17 0040 - 88 30 (!) 144/125 99 %   03/27/17 0030 - 87 21 (!) 148/99 100 %   03/27/17 0001 - 88 26 (!) 140/123 98 %   03/26/17 2330 - 84 20 143/71 100 %   03/26/17 2200 - 91 22 139/68 99 %   03/26/17 2150 - 92 23 - 100 %   03/26/17 2148 - - - 146/71 -   03/26/17 1830 - (!) 102 22 - 99 %   03/26/17 1829 - (!) 102 25 - 100 %   03/26/17 1828 - (!) 104 22 - 100 %   03/26/17 1827 - 98 21 - 100 %   03/26/17 1826 - 98 21 - 99 %   03/26/17 1825 - (!) 102 23 - 100 %   03/26/17 1824 - 99 25 - 99 %   03/26/17 1438 - 89 21 - 100 %   03/26/17 1437 - 93 22 - 100 %   03/26/17 1436 - 92 27 - 100 %   03/26/17 1158 - 93 23 - 100 %   03/26/17 1015 - (!) 112 21 169/80 99 %   03/26/17 0945 - (!) 102 24 (!) 148/96 100 %   03/26/17 0933 - 93 17 - 100 %   03/26/17 0930 - 96 21 159/76 -   03/26/17 0915 - 90 22 131/81 100 %   03/26/17 0900 - 95 19 150/75 100 %     AF, BP elevated  HEENT: wnl  NECK:  No venous distention or adenopathy   HEART: RRR, no rubs or gallops  LUNGS: shannan. Rhonchi, coarse rales  ABDOMEN: soft with active BS. No tenderness, no distention, no organomegaly  EXT: Rt elbow and forearm swelling, erythema, and tenderness same  SKIN: Normal turgor, no breaks, multiple ecchymotic areas  NEURO: Awake, alert, non focal    Intake and Output:  Current Shift:     Last three shifts:  03/25 1901 - 03/27 0700  In: 192.5 [I.V.:192.5]  Out: 400 [Urine:400]    Recent Results (from the past 24 hour(s))   METABOLIC PANEL, BASIC    Collection Time: 03/27/17  3:35 AM   Result Value Ref Range    Sodium 142 136 - 145 mmol/L    Potassium 3.1 (L) 3.5 - 5.5 mmol/L    Chloride 110 (H) 100 - 108 mmol/L    CO2 20 (L) 21 - 32 mmol/L    Anion gap 12 3.0 - 18 mmol/L    Glucose 73 (L) 74 - 99 mg/dL    BUN 10 7.0 - 18 MG/DL    Creatinine 0.36 (L) 0.6 - 1.3 MG/DL    BUN/Creatinine ratio 28 (H) 12 - 20      GFR est AA >60 >60 ml/min/1.73m2    GFR est non-AA >60 >60 ml/min/1.73m2    Calcium 7.3 (L) 8.5 - 10.1 MG/DL       Us Ext 5656 [facility]    Result Date: 3/26/2017  EXAM: Ultrasound right upper extremity, nonvascular INDICATION: Right arm cellulitis, evaluate for abscess COMPARISON: None. _______________ FINDINGS: Targeted grayscale and color Doppler imaging was obtained through the right elbow and forearm, at the patient's area of pain and swelling. Images demonstrate an enlarged olecranon bursa measuring 3.1 x 2.4 x 0.7 cm with low-level homogeneous echogenic debris. Adjacent to this, and extending along the lateral forearm, is a similar appearing fluid collection measuring 3.3 x 1.3 x 1.7 cm.  The homogeneous appearance of the fluid collection as well as the lack of thick peripheral wall is more suggestive of a hematoma as opposed to an abscess. There is surrounding edema throughout the soft tissues. _______________     IMPRESSION: Sonographic findings are most suggestive of hemorrhagic olecranon bursitis with soft tissue hematoma extending along the lateral forearm. No definite organized heterogeneous fluid collection that would suggest an abscess. Correlate for any history of recent trauma.        Current Facility-Administered Medications   Medication Dose Route Frequency    potassium chloride (KAON 10%) 20 mEq/15 mL oral liquid 40 mEq  40 mEq Oral TID WITH MEALS    sodium chloride (NS) flush 5-10 mL  5-10 mL IntraVENous PRN    predniSONE (DELTASONE) tablet 40 mg  40 mg Oral DAILY WITH BREAKFAST    pantoprazole (PROTONIX) tablet 40 mg  40 mg Oral ACB    heparin (porcine) injection 5,000 Units  5,000 Units SubCUTAneous Q12H    amLODIPine (NORVASC) tablet 2.5 mg  2.5 mg Oral DAILY    aspirin (ASPIRIN) tablet 325 mg  325 mg Oral DAILY    bimatoprost (LUMIGAN) 0.01 % ophthalmic drops 1 Drop  1 Drop Left Eye QPM    clopidogrel (PLAVIX) tablet 75 mg  75 mg Oral DAILY    fluconazole (DIFLUCAN) tablet 200 mg  200 mg Oral DAILY    FLUoxetine (PROzac) capsule 20 mg  20 mg Oral DAILY    folic acid (FOLVITE) tablet   Oral DAILY    levothyroxine (SYNTHROID) tablet 50 mcg  50 mcg Oral ACB    mirtazapine (REMERON) tablet 15 mg  15 mg Oral QHS    ondansetron (ZOFRAN ODT) tablet 4 mg  4 mg Oral Q8H PRN    rOPINIRole (REQUIP) tablet 1 mg  1 mg Oral BID    simvastatin (ZOCOR) tablet 20 mg  20 mg Oral QHS    traMADol (ULTRAM) tablet 100 mg  100 mg Oral Q6H PRN    levoFLOXacin (LEVAQUIN) 500 mg in D5W IVPB  500 mg IntraVENous Q24H    vancomycin (VANCOCIN) 1,000 mg in 0.9% sodium chloride (MBP/ADV) 250 mL adv  1,000 mg IntraVENous Q24H    [START ON 3/29/2017] VANCOMYCIN INFORMATION NOTE   Other ONCE    albuterol-ipratropium (DUO-NEB) 2.5 MG-0.5 MG/3 ML  3 mL Nebulization QID RT    guaiFENesin SR (MUCINEX) tablet 600 mg  600 mg Oral Q12H    clonazePAM (KlonoPIN) tablet 0.5 mg  0.5 mg Oral BID       Lab Results   Component Value Date/Time    Glucose 73 03/27/2017 03:35 AM    Glucose 187 03/26/2017 04:05 AM    Glucose 108 03/21/2017 11:45 AM    Glucose 91 02/06/2017 11:30 AM    Glucose 93 09/26/2016 03:33 PM        Assessment     Active Problems:    Dehydration (3/26/2017)      COPD (chronic obstructive pulmonary disease) (HCC) (3/26/2017)      Hemoptysis (3/26/2017)      Right arm cellulitis (3/26/2017)      Acute encephalopathy (3/26/2017)      Olecranon bursitis of right elbow (3/27/2017)      Acute bronchitis (3/27/2017)      Hyperkalemia (3/27/2017)        Plan       Aspiration of olecranon bursa fluid  Continue Abx  Replace K  Monitor labs  BP control( permissive per prior Neurosurgery rec.   Continue other meds      Siomara White MD  3/27/2017, 8:06 AM

## 2017-03-27 NOTE — PROGRESS NOTES
Patient has designated her  to participate in his/her discharge plan and to receive any needed information.      Name:   Obed April  spouse      [address], [address] [place] [address]   March 27, 2017     Address:  Phone number:

## 2017-03-27 NOTE — PROGRESS NOTES
[facility]   Discharge Planning/ Assessment    Reasons for Intervention: Interviewed patient, she agrees to share her discharge information with her , see below. She uses a wheel chair to admission and see Dr Emily Caldwell for her primary care needs. She states she has oxygen for home use but cannot recall the supplier. She has [employer] coverage. Her discharge plan is to return home with home care as indicated.      High Risk Criteria  [x] Yes  []No   Physician Referral  [] Yes  [x]No        Date    Nursing Referral  [] Yes  [x]No        Date    Patient/Family Request  [] Yes  [x]No        Date       Resources:    Medicare  [] Yes  [x]No   Medicaid  [] Yes  [x]No   No Resources  [] Yes  [x]No   Private Insurance  [x] Yes  []No    Name/Phone Number    Other  [] Yes  [x]No        (i.e. Workman's Comp)         Prior Services:    Prior Services  [] Yes  [x]No   Home Health  [] Yes  [x]No   [address]  [] Yes  [x]No        Number of [address] Program  [] Yes  [x]No       Meals on Wheels  [] Yes  [x]No   Office on Aging  [] Yes  [x]No   Transportation Services  [] Yes  [x]No   Nursing Home  [] Yes  [x]No        Nursing Home Name    [address]  [] Yes  [x]No        [address] Name    Other       Information Source:      Information obtained from  [x] Patient  [] Parent   [] [address]  [] Child  [] Spouse   [] Significant Other/Partner   [] Friend      [] EMS    [] Nursing Home Chart          [x] Other: chart   Chart Review  [x] Yes  []No     Family/Support System:    Patient lives with  [] Alone    [x] Spouse   [] Significant Other  [] Children  [] Caretaker   [] Parent  [] Sibling     [] Other       Other Support System:    Is the patient responsible for care of others  [] Yes  [x]No   Information of person caring for patient on  discharge spouse   Managers financial affairs independently  [x] Yes  []No   If no, explain: Status Prior to Admission:    Mental Status  [x] Awake  [x] Alert  [] Oriented  [] Quiet/Calm [] Lethargic/Sedated   [] Disoriented  [] Restless/Anxious  [] Combative   Personal Care  [] Dependent  [] Independent Personal Care  [] Requires Assistance   Meal Preparation Ability  [] Independent   [] Standby Assistance   [] Minimal Assistance   [x] Moderate Assistance  [] Maximum Assistance     [] Total Assistance   Chores  [] Independent with Chores   [] N/A Nursing Home Resident   [x] Requires Assistance   Bowel/Bladder  [] Continent  [] Catheter  [x] Incontinent  [] Ostomy Self-Care    [] Urine Diversion Self-Care  [] Maximum Assistance     [] Total Assistance   Number of Persons needed for assistance    DME at home  [] Yusef Monsalve  [] Carson Monsalve   [] Commode    [] Bathroom/Grab Bars  [] Hospital Bed  [] Nebulizer  [] Oxygen           [] Raised Toilet Seat  [] Shower Chair  [] Side Rails for Bed   [] Tub Transfer Bench   [x] Walker, Rolling  [] Vick Gilliam      [x] Other:wheelchair   Vendor      Treatment Presently Receiving:    Current Treatments  [] Chemotherapy  [] Dialysis  [] Insulin  [x] IVAB [x] IVF   [] O2  [] PCA   [] PT   [] RT   [] Tube Feedings   [] Wound Care     Psychosocial Evaluation:    Verbalized Knowledge of Disease Process  [x] Patient  [x]Family   Coping with Disease Process  [x] Patient  [x]Family   Requires Further Counseling Coping with Disease Process  [] Patient  []Family     Identified Projected Needs:    Home Health Aid  [] Yes  [x]No   Transportation  [] Yes  [x]No   Education  [] Yes  [x]No        Specific Education     Financial Counseling  [] Yes  [x]No   Inability to Care for Self/Will Require 24 hour care  [] Yes  [x]No   Pain Management  [] Yes  [x]No   Home Infusion Therapy  [] Yes  [x]No   Oxygen Therapy  [] Yes  [x]No   DME  [] Yes  [x]No   Long Term Care Placement  [] Yes  [x]No   Rehab  [] Yes  [x]No   Physical Therapy  [x] Yes  []No   Needs Anticipated At This Time  [x] Yes  []No     Intra-Hospital Referral:    [address]  [x] Yes  []No     [] Yes  [x]No   Patient Representative  [] Yes  [x]No   Staff for Teaching Needs  [] Yes  [x]No   Specialty Teaching Needs     Diabetic Educator  [] Yes  [x]No   Referral for Diabetic Educator Needed  [] Yes  [x]No  If Yes, place order for Nutritionist or Diabetic Consult     Tentative Discharge Plan:    Home with No Services  [] Yes  [x]No   Home with Home Health Follow-up  [x] Yes  [x]No        If Yes, specify type Nursing/therapy   [address] [place] [address]  [] Yes  [x]No        If Yes, specify type    Meals on Wheels  [] Yes  [x]No   Office of Aging  [] Yes  [x]No   NHP  [] Yes  [x]No   Return to the Nursing Home  [] Yes  [x]No   Rehab Therapy  [] Yes  [x]No   Acute Rehab  [] Yes  [x]No   Subacute Rehab  [] Yes  [x]No   Private Care  [] Yes  [x]No   Substance Abuse Referral  [] Yes  [x]No   Transportation  [] Yes  [x]No   Chore Service  [] Yes  [x]No   Inpatient Hospice  [] Yes  [x]No   OP RT  [] Yes  [x] No   OP Hemo  [] Yes  [x] No   OP PT  [] Yes  [x]No   Support Group  [] Yes  [x]No   Reach to Recovery  [] Yes  [x]No   OP Oncology Clinic  [] Yes  [x]No   Clinic Appointment  [] Yes  [x]No   DME  [] Yes  [x]No   Comments    Name of D/C Planner or  Given to Patient or Family Mukesh Stafford RN   Phone Number [phone]   Date March 27, 2017   Time 810 am   If you are discharged home, whom do you designate to participate in your discharge plan and receive any information needed?      Enter name of [id number]  spouse        Phone # of pablito         Address of pablito [address]        Updated March 27, 2017        Patient refused to designate any           individual

## 2017-03-27 NOTE — ROUTINE PROCESS
Bedside and Verbal shift change report given to [address] (oncoming nurse) by Sandip Porter RN (offgoing nurse). Report included the following information SBAR, Kardex, Intake/Output, MAR and Recent Results.

## 2017-03-27 NOTE — PROGRESS NOTES
Radiology procedure order noted, approved by radiologist for [address] [facility] [address]. Primary RN aware. Attempted to obtain informed consent from spouse, left message for him to call radiology nursing.

## 2017-03-28 ENCOUNTER — HOSPITAL ENCOUNTER (OUTPATIENT)
Dept: PHYSICAL THERAPY | Age: 72
End: 2017-03-28
Payer: COMMERCIAL

## 2017-03-28 LAB
ANION GAP BLD CALC-SCNC: 9 MMOL/L (ref 3–18)
BACTERIA SPEC CULT: NORMAL
BASOPHILS # BLD AUTO: 0 K/UL (ref 0–0.1)
BASOPHILS # BLD: 0 % (ref 0–3)
BUN SERPL-MCNC: 10 MG/DL (ref 7–18)
BUN/CREAT SERPL: 21 (ref 12–20)
CALCIUM SERPL-MCNC: 7.5 MG/DL (ref 8.5–10.1)
CHLORIDE SERPL-SCNC: 109 MMOL/L (ref 100–108)
CO2 SERPL-SCNC: 21 MMOL/L (ref 21–32)
CREAT SERPL-MCNC: 0.47 MG/DL (ref 0.6–1.3)
DIFFERENTIAL METHOD BLD: ABNORMAL
EOSINOPHIL # BLD: 0 K/UL (ref 0–0.4)
EOSINOPHIL NFR BLD: 0 % (ref 0–5)
ERYTHROCYTE [DISTWIDTH] IN BLOOD BY AUTOMATED COUNT: 18.8 % (ref 11.6–14.5)
GLUCOSE SERPL-MCNC: 211 MG/DL (ref 74–99)
HCT VFR BLD AUTO: 33.1 % (ref 35–45)
HGB BLD-MCNC: 10.6 G/DL (ref 12–16)
LYMPHOCYTES # BLD AUTO: 5 % (ref 20–51)
LYMPHOCYTES # BLD: 0.2 K/UL (ref 0.8–3.5)
MCH RBC QN AUTO: 32.7 PG (ref 24–34)
MCHC RBC AUTO-ENTMCNC: 32 G/DL (ref 31–37)
MCV RBC AUTO: 102.2 FL (ref 74–97)
METAMYELOCYTES NFR BLD MANUAL: 1 %
MONOCYTES # BLD: 0 K/UL (ref 0–1)
MONOCYTES NFR BLD AUTO: 1 % (ref 2–9)
NEUTS SEG # BLD: 3.8 K/UL (ref 1.8–8)
NEUTS SEG NFR BLD AUTO: 93 % (ref 42–75)
PLATELET # BLD AUTO: 262 K/UL (ref 135–420)
PMV BLD AUTO: 10.5 FL (ref 9.2–11.8)
POTASSIUM SERPL-SCNC: 4.7 MMOL/L (ref 3.5–5.5)
RBC # BLD AUTO: 3.24 M/UL (ref 4.2–5.3)
RBC MORPH BLD: ABNORMAL
SERVICE CMNT-IMP: NORMAL
SODIUM SERPL-SCNC: 139 MMOL/L (ref 136–145)
WBC # BLD AUTO: 4.1 K/UL (ref 4.6–13.2)

## 2017-03-28 PROCEDURE — 65270000029 HC RM PRIVATE

## 2017-03-28 PROCEDURE — 36415 COLL VENOUS BLD VENIPUNCTURE: CPT | Performed by: INTERNAL MEDICINE

## 2017-03-28 PROCEDURE — 94760 N-INVAS EAR/PLS OXIMETRY 1: CPT

## 2017-03-28 PROCEDURE — 94640 AIRWAY INHALATION TREATMENT: CPT

## 2017-03-28 PROCEDURE — 74011636637 HC RX REV CODE- 636/637: Performed by: INTERNAL MEDICINE

## 2017-03-28 PROCEDURE — 80048 BASIC METABOLIC PNL TOTAL CA: CPT | Performed by: INTERNAL MEDICINE

## 2017-03-28 PROCEDURE — 77010033678 HC OXYGEN DAILY

## 2017-03-28 PROCEDURE — 85025 COMPLETE CBC W/AUTO DIFF WBC: CPT | Performed by: INTERNAL MEDICINE

## 2017-03-28 PROCEDURE — 74011000250 HC RX REV CODE- 250: Performed by: INTERNAL MEDICINE

## 2017-03-28 PROCEDURE — 74011250636 HC RX REV CODE- 250/636: Performed by: INTERNAL MEDICINE

## 2017-03-28 PROCEDURE — 74011250637 HC RX REV CODE- 250/637: Performed by: INTERNAL MEDICINE

## 2017-03-28 RX ORDER — ROPINIROLE 1 MG/1
1 TABLET, FILM COATED ORAL DAILY
Status: DISCONTINUED | OUTPATIENT
Start: 2017-03-29 | End: 2017-03-31 | Stop reason: HOSPADM

## 2017-03-28 RX ORDER — ROPINIROLE 1 MG/1
2 TABLET, FILM COATED ORAL
Status: DISCONTINUED | OUTPATIENT
Start: 2017-03-28 | End: 2017-03-31 | Stop reason: HOSPADM

## 2017-03-28 RX ORDER — LEVOFLOXACIN 250 MG/1
500 TABLET ORAL EVERY 24 HOURS
Status: DISCONTINUED | OUTPATIENT
Start: 2017-03-29 | End: 2017-03-29

## 2017-03-28 RX ADMIN — PREDNISONE 40 MG: 20 TABLET ORAL at 10:49

## 2017-03-28 RX ADMIN — IPRATROPIUM BROMIDE AND ALBUTEROL SULFATE 3 ML: .5; 3 SOLUTION RESPIRATORY (INHALATION) at 07:33

## 2017-03-28 RX ADMIN — CLONAZEPAM 0.5 MG: 0.5 TABLET ORAL at 17:32

## 2017-03-28 RX ADMIN — TRAMADOL HYDROCHLORIDE 100 MG: 50 TABLET, FILM COATED ORAL at 23:39

## 2017-03-28 RX ADMIN — HEPARIN SODIUM 5000 UNITS: 5000 INJECTION, SOLUTION INTRAVENOUS; SUBCUTANEOUS at 10:50

## 2017-03-28 RX ADMIN — GUAIFENESIN 600 MG: 600 TABLET, EXTENDED RELEASE ORAL at 21:54

## 2017-03-28 RX ADMIN — CLOPIDOGREL BISULFATE 75 MG: 75 TABLET, FILM COATED ORAL at 10:50

## 2017-03-28 RX ADMIN — FLUOXETINE 20 MG: 20 CAPSULE ORAL at 10:49

## 2017-03-28 RX ADMIN — CLONAZEPAM 0.5 MG: 0.5 TABLET ORAL at 10:49

## 2017-03-28 RX ADMIN — FLUCONAZOLE 200 MG: 100 TABLET ORAL at 10:50

## 2017-03-28 RX ADMIN — SODIUM CHLORIDE 1000 MG: 900 INJECTION, SOLUTION INTRAVENOUS at 03:00

## 2017-03-28 RX ADMIN — ASPIRIN 325 MG ORAL TABLET 325 MG: 325 PILL ORAL at 10:49

## 2017-03-28 RX ADMIN — LEVOFLOXACIN 500 MG: 5 INJECTION, SOLUTION INTRAVENOUS at 10:49

## 2017-03-28 RX ADMIN — ROPINIROLE HYDROCHLORIDE 2 MG: 1 TABLET, FILM COATED ORAL at 21:55

## 2017-03-28 RX ADMIN — IPRATROPIUM BROMIDE AND ALBUTEROL SULFATE 3 ML: .5; 3 SOLUTION RESPIRATORY (INHALATION) at 16:20

## 2017-03-28 RX ADMIN — IPRATROPIUM BROMIDE AND ALBUTEROL SULFATE 3 ML: .5; 3 SOLUTION RESPIRATORY (INHALATION) at 11:45

## 2017-03-28 RX ADMIN — LEVOTHYROXINE SODIUM 50 MCG: 50 TABLET ORAL at 06:38

## 2017-03-28 RX ADMIN — HEPARIN SODIUM 5000 UNITS: 5000 INJECTION, SOLUTION INTRAVENOUS; SUBCUTANEOUS at 21:55

## 2017-03-28 RX ADMIN — TRAMADOL HYDROCHLORIDE 100 MG: 50 TABLET, FILM COATED ORAL at 17:32

## 2017-03-28 RX ADMIN — SIMVASTATIN 20 MG: 10 TABLET, FILM COATED ORAL at 21:54

## 2017-03-28 RX ADMIN — AMLODIPINE BESYLATE 2.5 MG: 2.5 TABLET ORAL at 10:49

## 2017-03-28 RX ADMIN — FOLIC ACID 1 MG: 1 TABLET ORAL at 10:50

## 2017-03-28 RX ADMIN — MIRTAZAPINE 15 MG: 15 TABLET, FILM COATED ORAL at 21:54

## 2017-03-28 RX ADMIN — PANTOPRAZOLE SODIUM 40 MG: 40 TABLET, DELAYED RELEASE ORAL at 06:38

## 2017-03-28 NOTE — PROGRESS NOTES
[facility]: This patient meets P & T approved criteria for conversion from IV to oral therapy for the following medication:     Levofloxacin 500 mg IV q24h was discontinued and Levofloxacin 500 mg PO q24h was ordered. If the patient no longer meets all criteria for oral therapy, the pharmacist will switch back to IV therapy. Thanks.

## 2017-03-28 NOTE — PROGRESS NOTES
1920 Received shift report from Xavier Pope RN. Pt alert and oriented x2 (person, place). Pt has bruising bilaterally (LE & UE). Pt's skin is very thin. Pt is [place] (hearing aides at home) and blind. Pt has no complaints at this time. Bed in lowest position, call bell within reach, will continue to monitor. 2045 Pt's sister, Miya Acosta, called to talk about her concerns . States the pt is home all day by herself with no help. She constantly falls out of bed and is both [facility] and blind. Sister would like to be involved in discharge consultation for the pt.     2130 Pt's IV is infiltrated. Discontinued IV. Did not attempt new IV due to condition of pt's skin and unable to locate vein. Will let Nurse Supervisor know. Pt's  at bedside. He provided incontinent briefs that he would like us to use. Pt is confused, but states she has no pain. Crackles heard in all lung fields. Bed in lowest position, call bell within reach, will continue to monitor. 0000 New IV started on pt by Sarah Garcia, Nurse Supervisor. 0400 Pt sleeping with no complaints. Bed in lowest position, call bell within reach, will continue to monitor. 0630 Pt sleeping with no complaints. Bed in lowest position, call bell within reach, will continue to monitor. Bedside and Verbal shift change report given to Xavier Pope RN (oncoming nurse) by Jorge Ospina RN (offgoing nurse). Report included the following information SBAR, Kardex, Intake/Output, MAR and Recent Results.

## 2017-03-28 NOTE — ROUTINE PROCESS
Bedside and Verbal shift change report given to Ernestine Hoff (oncoming nurse) by Robert Mclean (offgoing nurse). Report included the following information SBAR, Kardex, Intake/Output and MAR.

## 2017-03-28 NOTE — ROUTINE PROCESS
Bedside and Verbal shift change report given to Anastasia Carnes RN (oncoming nurse) by Jigna Kate RN (offgoing nurse). Report included the following information SBAR, Kardex, Intake/Output and MAR.     [id number]: Assessment completed. Patient has periods of confusion, patient trying to get out bed, this nurse assessed patient altered mental status, patient alert and orient to person and situation only. Patient has bruising to upper and lower extremities. Patient is a high fall risk. Spoke to patients , he will be coming to sit with patient  momentarily. 1317: Patient tolerated all medications with out difficulty. Patient off unit to radiology. 1430: Patient returned to unit, has no complaints of pain or discomfort. 1513: Patient has complaints of pain and discomfort. administered PRN tramadol for pain relief.

## 2017-03-28 NOTE — CONSULTS
Nuha Brambila. 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 Pt,  and MDs are considering alternative immune suppression such as Actemra or Cytoxan to reduce the need for prednisone. She has become confused and developed a right arm cellulitis the latter of which is resolving. She remains somewhat confused this morning, though she seems oriented to person and place. No other pain. 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      Social History Main Topics    Smoking status: Former Smoker     Types: Cigarettes     Quit date: 4/28/2013    Smokeless tobacco: Not on file    Alcohol use 1.0 oz/week     2 Shots of liquor per week    Drug use: No    Sexual activity: Yes     Partners: Male     Other Topics Concern    Not on file     Social History Narrative       The patient does not complain of Raynaud's, alopecia, new change in vision, jaw claudication, scalp sensitivity, headache, heartburn, palpitation, diarrhea, dysuria, urgency, frequency, urethral discharge, constipation, fever, chills, sweats, cough, rash, or mucosal redness, dryness, or soreness. ALLERGIES:    Allergies   Allergen Reactions    Sulfa (Sulfonamide Antibiotics) Hives    Sulfamethoxazole-Trimethoprim Rash       MEDICATIONS:    Prior to Admission medications    Medication Sig Start Date End Date Taking? Authorizing Provider   fluconazole (DIFLUCAN) 200 mg tablet Take 200 mg by mouth daily. FDA advises cautious prescribing of oral fluconazole in pregnancy. Yes Rosalva Powell MD   mirtazapine (REMERON) 15 mg tablet Take 15 mg by mouth nightly. Yes Rosalva Powell MD   levoFLOXacin (LEVAQUIN) 500 mg tablet Take  by mouth daily. Yes Rosalva Powell MD   meloxicam (MOBIC) 7.5 mg tablet Take  by mouth daily. Yes Rosalva Powell MD   clonazePAM (KLONOPIN) 1 mg tablet Take  by mouth two (2) times a day. Yes Rosalva Powell MD   clindamycin (CLEOCIN) 150 mg capsule Take 2 Caps by mouth three (3) times daily for 7 days. 3/21/17 3/28/17 Yes Susana Millard MD   traMADol [facility]) 50 mg tablet Take 2 Tabs by mouth every six (6) hours as needed for Pain. Max Daily Amount: 400 mg. 9/18/16  Yes Deana Brand MD   methotrexate [facility]) 2.5 mg tablet Take 5 mg by mouth every Wednesday. Yes    predniSONE (DELTASONE) 1 mg tablet Take 40 mg by mouth daily. Yes Historical Provider   FOLIC ACID PO Take 1 Tab by mouth. Yes Historical Provider   amLODIPine (NORVASC) 2.5 mg tablet Take 1 tablet by mouth daily.  11/19/14  Yes Marielena Kelly MD   FLUoxetine (PROZAC) 20 mg capsule 20 mg. Yes Historical Provider   simvastatin (ZOCOR) 20 mg tablet Take 1 Tab by mouth nightly. 5/21/14  Yes Carlee Coelho MD   clopidogrel (PLAVIX) 75 mg tablet Take 1 Tab by mouth daily. 3/27/14  Yes Carlee Coelho MD   aspirin (ASPIRIN) 325 mg tablet Take 1 Tab by mouth daily. 2/24/14  Yes Carlee Coelho MD   rOPINIRole (REQUIP) 1 mg tablet Take 1 Tab by mouth two (2) times a day. Patient taking differently: Take 2 mg by mouth two (2) times a day. 2/24/14  Yes Carlee Coelho MD   ondansetron (ZOFRAN ODT) 4 mg disintegrating tablet Take 1 Tab by mouth every eight (8) hours as needed for Nausea. 9/26/16   Pooja Castillo, DO   bimatoprost (LUMIGAN) 0.01 % ophthalmic drops Administer 1 Drop to left eye every evening. Historical Provider   traMADol (ULTRAM) 50 mg tablet Take 50 mg by mouth two (2) times a day. 4/6/09   Historical Provider   levothyroxine (SYNTHROID) 50 mcg tablet Take 1 Tab by mouth Daily (before breakfast). 3/27/14   Carlee Coelho MD     @[facility]@    Labs: Results:   Chemistry Recent Labs      03/28/17 0335 03/27/17 0335  03/26/17   0405   GLU  211*  73*  187*   NA  139  142  140   K  4.7  3.1*  3.5   CL  109*  110*  104   CO2  21  20*  28   BUN  10  10  21*   CREA  0.47*  0.36*  0.75   CA  7.5*  7.3*  8.1*   AGAP  9  12  8   BUCR  21*  28*  28*    Estimated Creatinine Clearance: 76.6 mL/min (based on Cr of 0.47). CBC w/Diff Recent Labs      03/28/17 0335 03/26/17   0405   WBC  4.1*  5.7   RBC  3.24*  3.38*   HGB  10.6*  11.2*   HCT  33.1*  34.6*   PLT  262  401   GRANS  93*  91*   LYMPH  5*  9*   EOS  0  0      Cardiac Enzymes No results for input(s): CPK, CKND1, GONZALO in the last 72 hours. No lab exists for component: CKRMB, TROIP, PBNP   Coagulation Recent Labs      03/26/17   0405   PTP  13.7   INR  1.1   APTT  25.4       Limited Lipid Panel No results for input(s): CHOL, LDLC, VLDL, TGL, CHHD in the last 72 hours.     No lab exists for component: HDLC   Full Lipid Panel No results for input(s): CHOL, CHOLPOCT, CHOLX, CHLST, CHOLV, [id number], HDL, LDL, NLDLCT, DLDL, LDLC, DLDLP, [id number], VLDLC, VLDL, TGL, TGLX, TRIGL, [id number], TRIGP, TGLPOCT, [id number], CHHD, CHHDX in the last 72 hours. No lab exists for component: [id number], [id number], [id number], CHOLP, HDLPOCT, [id number], NHDLCT, [id number], HDLC, HDLP, LDLPOCT, [id number], [id number]   BMP Recent Labs      17   03317   03317   0405   NA  139  142  140   K  4.7  3.1*  3.5   CL  109*  110*  104   CO2  21  20*  28   AGAP  9  12  8   GLU  211*  73*  187*   BUN  10  10  21*   CREA  0.47*  0.36*  0.75   GFRAA  >60  >60  >60   GFRNA  >60  >60  >60      CMP Recent Labs      17   0335  17   0335  17   0405   NA  139  142  140   K  4.7  3.1*  3.5   CL  109*  110*  104   CO2  21  20*  28   AGAP  9  12  8   GLU  211*  73*  187*   BUN  10  10  21*   CREA  0.47*  0.36*  0.75   GFRAA  >60  >60  >60   GFRNA  >60  >60  >60   CA  7.5*  7.3*  8.1*      BNP No lab exists for component: PBNP   Liver Enzymes No results for input(s): TP, ALB, TBIL, AP, SGOT, GPT in the last 72 hours. No lab exists for component: DBIL   Thyroid Studies No results for input(s): T4, T3U, TSH, TSHEXT in the last 72 hours. No lab exists for component: T3RU     Digoxin No results for input(s): DIG in the last 72 hours. Microbiology Results Recent Labs      17   1415  17   0535   CULT  PENDING  NO GROWTH 1 DAY          REVIEW OF SYSTEMS:  There was no other complaint about the skin, head, ears, eyes, nose, mouth, cardiovascular system, pulmonary system, GI system,  system, GYN system, neurologic system, or musculoskeletal system.     PHYSICAL EXAM:    Visit Vitals    /76 (BP 1 Location: Left arm)    Pulse 81    Temp 97.4 °F (36.3 °C)    Resp 17    Ht 5' (1.524 m)    Wt 49.9 kg (110 lb)    SpO2 90%    BMI 21.48 kg/m2     Temp (24hrs), Av.8 °F (36.6 °C), Min:97.3 °F (36.3 °C), Max:98.3 °F (36.8 °C)        HEENT: Eyes are PERRLA and EOMI. Disks are flat without hemorrhage or exudate. There is no arteriolar narrowing or AV nicking. Oral and nasal mucosa is unremarkable as are the external auditory meati. There is no adenopathy in the neck. There is no JVD or HJR. LUNGS are clear to auscultation and percussion in anterior fields with minimal cooperation. There is no use of secondary muscles of respiration, diaphragmatic breathing or intercostal muscle retraction. The chest is symmetric. COR: RRR without GMRT. Pulses are present bilaterally in the radials, ulnars, posterior tibials and dorsal pedals. There is no clubbing, cyanosis or edema. ABDOMEN: Soft and bowel sounds are active and there is no hepatosplenomegaly. There is no distention. NEUROLOGICAL: Cranial nerves 2-12 are intact. Reflexes are symmetric in the biceps, patella and ankles. Motor is 5/5 in all major muscle groups. Sensory is grossly symmetric. The patient is oriented times four. The affect is appropriate. The station and gait are normal.    MUSCULOSKELETAL:  Head and neck are symmetric with full range of motion with no pain or tenderness or weakness on inspection, palpation, range of motion, stability and strength exams. The right arm has a peeling area of hyperpigmentation. Otherwise, the right upper extremity is normal on inspection, palpation, range of motion, stability and strength. The left upper extremity is normal on inspection, palpation, range of motion, stability and strength. The right lower extremity is normal on inspection, palpation, range of motion, stability and strength. The left lower extremity is normal on inspection, palpation, range of motion, stability and strength. SKIN: Multiple bruises on the lower extremities about the ankles. There is no rash, periungual telangectasia, subcutaneous nodule, skin ulceration, onycholysis or nail pitting.     ASSESSMENT:   1) Confusion stable to clearing. 2) GCA active  3) Acute cellulitis  4) Long term cortisone with bruising, cellulitis, osteporosis    RECOMMENDATIONS:    1) ESR and CRP if not ordered. 2) Consider outpatient Actemra or Cytoxan (IV or PO).   PO cytoxan may be the simplest.  Following    _____________________________  Brain MD Jackie  3/28/2017  7:25 AM

## 2017-03-29 LAB
ANION GAP BLD CALC-SCNC: 9 MMOL/L (ref 3–18)
BUN SERPL-MCNC: 18 MG/DL (ref 7–18)
BUN/CREAT SERPL: 22 (ref 12–20)
CALCIUM SERPL-MCNC: 8.5 MG/DL (ref 8.5–10.1)
CHLORIDE SERPL-SCNC: 109 MMOL/L (ref 100–108)
CO2 SERPL-SCNC: 22 MMOL/L (ref 21–32)
CREAT SERPL-MCNC: 0.83 MG/DL (ref 0.6–1.3)
DATE LAST DOSE: ABNORMAL
GLUCOSE SERPL-MCNC: 153 MG/DL (ref 74–99)
POTASSIUM SERPL-SCNC: 4.4 MMOL/L (ref 3.5–5.5)
REPORTED DOSE,DOSE: ABNORMAL UNITS
REPORTED DOSE/TIME,TMG: 400
SODIUM SERPL-SCNC: 140 MMOL/L (ref 136–145)
VANCOMYCIN TROUGH SERPL-MCNC: 9.4 UG/ML (ref 10–20)

## 2017-03-29 PROCEDURE — 97530 THERAPEUTIC ACTIVITIES: CPT

## 2017-03-29 PROCEDURE — 74011000250 HC RX REV CODE- 250: Performed by: INTERNAL MEDICINE

## 2017-03-29 PROCEDURE — 80048 BASIC METABOLIC PNL TOTAL CA: CPT | Performed by: INTERNAL MEDICINE

## 2017-03-29 PROCEDURE — 36415 COLL VENOUS BLD VENIPUNCTURE: CPT | Performed by: INTERNAL MEDICINE

## 2017-03-29 PROCEDURE — 74011636637 HC RX REV CODE- 636/637: Performed by: INTERNAL MEDICINE

## 2017-03-29 PROCEDURE — 65270000029 HC RM PRIVATE

## 2017-03-29 PROCEDURE — 94640 AIRWAY INHALATION TREATMENT: CPT

## 2017-03-29 PROCEDURE — 77010033678 HC OXYGEN DAILY

## 2017-03-29 PROCEDURE — 74011250636 HC RX REV CODE- 250/636: Performed by: INTERNAL MEDICINE

## 2017-03-29 PROCEDURE — 74011250637 HC RX REV CODE- 250/637: Performed by: INTERNAL MEDICINE

## 2017-03-29 PROCEDURE — 97162 PT EVAL MOD COMPLEX 30 MIN: CPT

## 2017-03-29 PROCEDURE — 77030020186 HC BOOT HL PROTCT SAGE -B

## 2017-03-29 PROCEDURE — 80202 ASSAY OF VANCOMYCIN: CPT | Performed by: INTERNAL MEDICINE

## 2017-03-29 RX ORDER — LEVOFLOXACIN 750 MG/1
750 TABLET ORAL
Status: DISCONTINUED | OUTPATIENT
Start: 2017-03-30 | End: 2017-03-31 | Stop reason: HOSPADM

## 2017-03-29 RX ADMIN — FLUOXETINE 20 MG: 20 CAPSULE ORAL at 10:43

## 2017-03-29 RX ADMIN — BIMATOPROST 1 DROP: 0.1 SOLUTION/ DROPS OPHTHALMIC at 18:00

## 2017-03-29 RX ADMIN — Medication 10 ML: at 05:14

## 2017-03-29 RX ADMIN — HYDROCORTISONE 5 ML: 10 TABLET ORAL at 14:05

## 2017-03-29 RX ADMIN — PREDNISONE 40 MG: 20 TABLET ORAL at 10:43

## 2017-03-29 RX ADMIN — ASPIRIN 325 MG ORAL TABLET 325 MG: 325 PILL ORAL at 10:43

## 2017-03-29 RX ADMIN — PANTOPRAZOLE SODIUM 40 MG: 40 TABLET, DELAYED RELEASE ORAL at 06:49

## 2017-03-29 RX ADMIN — SODIUM CHLORIDE 1000 MG: 900 INJECTION, SOLUTION INTRAVENOUS at 05:13

## 2017-03-29 RX ADMIN — GUAIFENESIN 600 MG: 600 TABLET, EXTENDED RELEASE ORAL at 21:58

## 2017-03-29 RX ADMIN — SIMVASTATIN 20 MG: 10 TABLET, FILM COATED ORAL at 21:58

## 2017-03-29 RX ADMIN — HEPARIN SODIUM 5000 UNITS: 5000 INJECTION, SOLUTION INTRAVENOUS; SUBCUTANEOUS at 21:59

## 2017-03-29 RX ADMIN — IPRATROPIUM BROMIDE AND ALBUTEROL SULFATE 3 ML: .5; 3 SOLUTION RESPIRATORY (INHALATION) at 22:28

## 2017-03-29 RX ADMIN — HYDROCORTISONE 5 ML: 10 TABLET ORAL at 15:45

## 2017-03-29 RX ADMIN — ROPINIROLE HYDROCHLORIDE 2 MG: 1 TABLET, FILM COATED ORAL at 21:59

## 2017-03-29 RX ADMIN — FOLIC ACID 1 MG: 1 TABLET ORAL at 10:43

## 2017-03-29 RX ADMIN — CLONAZEPAM 0.5 MG: 0.5 TABLET ORAL at 10:43

## 2017-03-29 RX ADMIN — IPRATROPIUM BROMIDE AND ALBUTEROL SULFATE 3 ML: .5; 3 SOLUTION RESPIRATORY (INHALATION) at 10:05

## 2017-03-29 RX ADMIN — MIRTAZAPINE 15 MG: 15 TABLET, FILM COATED ORAL at 21:58

## 2017-03-29 RX ADMIN — IPRATROPIUM BROMIDE AND ALBUTEROL SULFATE 3 ML: .5; 3 SOLUTION RESPIRATORY (INHALATION) at 15:42

## 2017-03-29 RX ADMIN — HYDROCORTISONE 5 ML: 10 TABLET ORAL at 21:59

## 2017-03-29 RX ADMIN — TRAMADOL HYDROCHLORIDE 100 MG: 50 TABLET, FILM COATED ORAL at 06:49

## 2017-03-29 RX ADMIN — CLONAZEPAM 0.5 MG: 0.5 TABLET ORAL at 18:00

## 2017-03-29 RX ADMIN — AMLODIPINE BESYLATE 2.5 MG: 2.5 TABLET ORAL at 10:43

## 2017-03-29 RX ADMIN — CLOPIDOGREL BISULFATE 75 MG: 75 TABLET, FILM COATED ORAL at 10:43

## 2017-03-29 RX ADMIN — LEVOFLOXACIN 500 MG: 250 TABLET, FILM COATED ORAL at 10:43

## 2017-03-29 RX ADMIN — FLUCONAZOLE 200 MG: 100 TABLET ORAL at 10:43

## 2017-03-29 RX ADMIN — HEPARIN SODIUM 5000 UNITS: 5000 INJECTION, SOLUTION INTRAVENOUS; SUBCUTANEOUS at 10:44

## 2017-03-29 RX ADMIN — LEVOTHYROXINE SODIUM 50 MCG: 50 TABLET ORAL at 06:49

## 2017-03-29 NOTE — WOUND CARE
General Progress Note    Patient: Mady Murray MRN: [id number] LOS: 3     YOB: 1945  Age: 66 y.o. Sex: female        Admit Date: 3/26/2017      Subjective:   Patient has no complaint of pain with wound care, she slept intermittently during assessment and assisted with turning. Report to [facility]; orders received, Dr. Lele Crow. Objective:     Vitals  Patient Vitals for the past 8 hrs:   BP Temp Pulse Resp SpO2   03/29/17 1024 148/81 97.7 °F (36.5 °C) 94 18 -       I/O Current Shift       I/O Last Three Shifts  03/27 1901 - 03/29 0700  In: 510 [P.O.:260; I.V.:250]  Out: -                 Assessment:     Wound Presentation:         03/29/17 1214   Wound Leg Right; Anterior;Proximal   Date First Assessed/Time First Assessed: 03/28/17 2154   POA: Yes  Wound Type: Skin tear  Location: Leg  Orientation: Right; Anterior;Proximal   DRESSING STATUS Removed   DRESSING TYPE Gauze wrap (marifer); Xeroform   Non-Pressure Ulcer Partial thickness (epider/derm)   Wound Length (cm) 1.5 cm   Wound Width (cm) 2 cm   Wound Depth (cm) 0.1   Wound Surface area (cm^3) 0.3 cm^2   Condition of Base Pink   Condition of Edges Open   Tissue Type Pink   Tissue Type Percent Pink 100   Drainage Amount  Scant   Drainage Color Serosanguinous   Wound Odor None   Periwound Skin Condition Intact; Ecchymosis   Cleansing and Cleansing Agents  Dermal wound cleanser   Dressing Type Applied Xeroform;Gauze wrap (marifer)   Procedure Tolerated Well   Wound Leg Left; Anterior   Date First Assessed/Time First Assessed: 03/28/17 2154   POA: Yes  Wound Type: Skin tear  Location: Leg  Orientation: Left; Anterior   DRESSING STATUS Removed   DRESSING TYPE Gauze wrap (marifer); Xeroform   Non-Pressure Ulcer Partial thickness (epider/derm)   Wound Length (cm) 0.3 cm   Wound Width (cm) 0.3 cm   Wound Depth (cm) 0.1   Wound Surface area (cm^3) 0.01 cm^2   Condition of Base Granulation   Condition of Edges Open   Tissue Type Red   Tissue Type Percent Red 100   Drainage Amount  Scant   Drainage Color Serous   Wound Odor None   Periwound Skin Condition Ecchymosis; Intact   Cleansing and Cleansing Agents  Dermal wound cleanser   Dressing Type Applied Silicone  (Mepilex)   Procedure Tolerated Well   Wound Leg Right; Anterior;Distal   Date First Assessed: 03/28/17   POA: Yes  Wound Type: Skin tear  Location: Leg  Orientation: Right; Anterior;Distal   DRESSING STATUS Removed   DRESSING TYPE Gauze wrap (marifer); Xeroform   Non-Pressure Ulcer Partial thickness (epider/derm)   Wound Length (cm) 7.7 cm   Wound Width (cm) 0.9 cm   Wound Depth (cm) 0.2   Wound Surface area (cm^3) 1.39 cm^2   Condition of Base Granulation   Condition of Edges Open   Tissue Type Red   Tissue Type Percent Red 100   Drainage Amount  Small    Drainage Color Sanguinous   Wound Odor None   Periwound Skin Condition Intact; Ecchymosis   Cleansing and Cleansing Agents  Dermal wound cleanser   Dressing Type Applied Xeroform;Gauze wrap (marifer)   Procedure Tolerated Well                 Active Problems:    Dehydration (3/26/2017)      COPD (chronic obstructive pulmonary disease) ([facility] (3/26/2017)      Hemoptysis (3/26/2017)      Right arm cellulitis (3/26/2017)      Acute encephalopathy (3/26/2017)      Olecranon bursitis of right elbow (3/27/2017)      Acute bronchitis (3/27/2017)      Hyperkalemia (3/27/2017)          Patient turned to Right side; bed rails up x3, bed low and locked, lights dimmed for comfort, and call bell within patient's reach. Patient verbalized understanding to all instructions provided but needs additional instruction  . Plan:    Nursing/:  Please order Vers[employer] P500 rental bed. Call [facility] at [phone] to obtain PO #, provide [facility] Rental # [id number]. Then Call [facility] at [phone] to order rental bed (notify [facility] of PO # obtained). Linen instructions: One flat sheet and one Covidien Pad.   Continue to turn patient every 2 hours and float heels at all times with Prevalon boots. When therapy no longer required, please call [phone] to return rental bed to [facility].     Nursing to Perform Daily:  Wound Care/Dressing change to Right Leg: Cleanse wound with normal saline or dermal wound cleanser, apply Xeroform to wound base,  cover with Roll Gauze    Nursing to perform Every 3 Days  Wound Care/Dressing change to Left Leg:  Cleanse wound with normal saline or dermal wound cleanser,  cover with Mepilex Silicone Border    Apply Convatec Moisturizer Cream to Bilateral arms daily    Aster Chanel RN, [facility]

## 2017-03-29 NOTE — PROGRESS NOTES
4683 Received bedside report from [address] [place] [address] Road, RN, updated white board, call bell is within reach. 0830 Patient is awake visiting with family member at this time. Family member states he will assist her with feeding at this time. Call bell is within reach. 1035 Received a critical lab value from Susanne Santamaria. Rare Staph Aureus found in bursa fluid sample. Dr. Alpesh Taylor paged at this time. 1200 Dr. Alpesh Taylor has not return the page in regards. 1500 Called and given report to Deshawn Quevedo RN as patient is moving up to  [facility] at this time. [phone] Dr. Alpesh Taylor paged again about the critical lab value. 1800 No return call from Dr. Alpesh Taylor about the lab value.

## 2017-03-29 NOTE — ROUTINE PROCESS
Report received by Chucky Grimm RN, IV patent, patient showing no signs of pain or discomfort. 1049:  Assessment completed. Repositioned patient to left side, patient has no complaints of pain. 1145: Patient sister visited patient to give AM care. 1620: patient complaining of pain and discomfort, will administer PRN tramadol for pain. 1732: Administered PRN tramadol for pain relief.

## 2017-03-29 NOTE — ROUTINE PROCESS
Bedside and Verbal shift change report given to [employer] (oncoming nurse) by Anjana Ruano RN (offgoing nurse). Report included the following information SBAR, Kardex, Intake/Output and MAR.

## 2017-03-29 NOTE — PROGRESS NOTES
Interviewed the patirnt spouse. He stated the patient  Uses a walker at home and has gotten progressively weaker since January. He agrees to matching to rehab and provided a [place] number of [phone]. Patient has camryn matched and accepted to [facility]. Awaiting PT evaulation.   Brian Bee

## 2017-03-29 NOTE — PROGRESS NOTES
I have called and left a message for the patient spouse, awaiting a return call. I have requested Pt and OT evaluation in the event the patient require rehab on discharge. I have placed in e discharge.  Bonnie Falcon RN

## 2017-03-29 NOTE — ROUTINE PROCESS
Bedside and Verbal shift change report given to Luisana GUEVARA by Soto Queen RN. Report included the following information SBAR, Kardex, Intake/Output and MAR.

## 2017-03-29 NOTE — PROGRESS NOTES
1918  Received bedside verbal shift report. Pt lying in bed resting quietly. piv # 22 to left lower arm intact. 2lnc in place. Call bell within reach, side rails up x 3, bed low and locked. No complaints offered, pt instructed to call for assistance. 0020  Reassessment completed, no changes noted, pt resting call bell within reach, bed low and locked. 0510  Pt transferred to specialty bed    0710 Bedside and Verbal shift change report given to 4500 Maycol Gonzales  (oncoming nurse) by Farooq Tinsley rn  (offgoing nurse). Report included the following information SBAR, Kardex, Intake/Output, MAR and Recent Results.

## 2017-03-29 NOTE — PROGRESS NOTES
[facility] Dosing Services     Pharmacy dosing ABX empirically for treatment cellulitis, upper resp. infection     Day of Therapy: 3    Labs:   Bun/Serum Creatinine - 18 mg/dl / 0.83 mg/dl  CrCl - 43.4 ml/min  WBC - 4.1  Max temp: - 98. Vancomycin Trough: 9.4 mcg/ml (24.5 hrs post dose)    Cultures:   pending    Plan:   Vancomycin:   Goal trough 15-20. Keep daily dose of 1 gm but change to 0.5 gm IV q12h to raise the trough. Check Vanco-trough on 3/31. Levaquin:  Adjusted to 750 mg PO q48h / renal protocol. Pharmacy to follow and will make changes to dose and/or frequency based on clinical status. Thanks for the consult.

## 2017-03-29 NOTE — PROGRESS NOTES
General Internal Medicine/Geriatrics    Patient: Don Bach MRN: [id number]  CSN: [id number]    YOB: 1945  Age: 66 y.o. Sex: female    DOA: 3/26/2017 LOS:  LOS: 3 days                    Subjective:     Seen and examined 3/28 AM<   at bedside  No new c/o  Rt. Arm swelling, pain , better  Cough less    Review of Systems:  Eyes: negative  Ears, Nose, Mouth, Throat, and Face: negative  Respiratory: negative for dyspnea on exertion, + cough  Cardiovascular: negative for chest pain  Gastrointestinal: negative for nausea, vomiting and diarrhea  Genitourinary:negative for dysuria and hematuria  Integument/Breast: negative  Hematologic/Lymphatic: negative for bleeding  Musculoskeletal:negative for arthralgias  Neurological: negative for weakness  Behavioral/Psychiatric: negative for behavior problems  Endocrine: negative for temperature intolerance  Allergic/Immunologic: negative for hay fever    Objective:     Physical Exam:  Patient Vitals for the past 24 hrs:   Temp Pulse Resp BP SpO2   03/29/17 0333 97.3 °F (36.3 °C) 75 18 137/73 97 %   03/28/17 2059 98.3 °F (36.8 °C) 97 19 136/78 99 %   03/28/17 1853 97.4 °F (36.3 °C) (!) 102 18 133/82 97 %   03/28/17 1621 - - - - 90 %   03/28/17 0932 97.4 °F (36.3 °C) (!) 109 18 - 92 %     AF, BP better  HEENT: wnl  NECK:  No venous distention or adenopathy   HEART: RRR, no rubs or gallops  LUNGS: shannan. Rhonchi, coarse rales  ABDOMEN: soft with active BS.  No tenderness, no distention, no organomegaly  EXT: Rt elbow and forearm swelling, erythema, and tenderness some better  SKIN: Normal turgor, no breaks, multiple ecchymotic areas  NEURO: Awake, alert, non focal    Intake and Output:  Current Shift:     Last three shifts:  03/27 1901 - 03/29 0700  In: 510 [P.O.:260; I.V.:250]  Out: -     Recent Results (from the past 24 hour(s))   METABOLIC PANEL, BASIC    Collection Time: 03/29/17  3:30 AM   Result Value Ref Range    Sodium 140 136 - 145 mmol/L Potassium 4.4 3.5 - 5.5 mmol/L    Chloride 109 (H) 100 - 108 mmol/L    CO2 22 21 - 32 mmol/L    Anion gap 9 3.0 - 18 mmol/L    Glucose 153 (H) 74 - 99 mg/dL    BUN 18 7.0 - 18 MG/DL    Creatinine 0.83 0.6 - 1.3 MG/DL    BUN/Creatinine ratio 22 (H) 12 - 20      GFR est AA >60 >60 ml/min/1.73m2    GFR est non-AA >60 >60 ml/min/1.73m2    Calcium 8.5 8.5 - 10.1 MG/DL   VANCOMYCIN, TROUGH    Collection Time: 03/29/17  3:30 AM   Result Value Ref Range    Vancomycin,trough 9.4 (L) 10.0 - 20.0 ug/mL    Reported dose date: [id number]      Reported dose time: 400      Reported dose: 1GM UNITS       No results found.     Current Facility-Administered Medications   Medication Dose Route Frequency    levoFLOXacin (LEVAQUIN) tablet 500 mg  500 mg Oral Q24H    rOPINIRole (REQUIP) tablet 1 mg  1 mg Oral DAILY    rOPINIRole (REQUIP) tablet 2 mg  2 mg Oral QHS    hydrALAZINE (APRESOLINE) 20 mg/mL injection 10 mg  10 mg IntraVENous Q6H PRN    influenza vaccine 2016-17 (36mos+)(PF) (FLUZONE/FLUARIX/FLULAVAL QUAD) injection 0.5 mL  0.5 mL IntraMUSCular PRIOR TO DISCHARGE    sodium chloride (NS) flush 5-10 mL  5-10 mL IntraVENous PRN    predniSONE (DELTASONE) tablet 40 mg  40 mg Oral DAILY WITH BREAKFAST    pantoprazole (PROTONIX) tablet 40 mg  40 mg Oral ACB    heparin (porcine) injection 5,000 Units  5,000 Units SubCUTAneous Q12H    amLODIPine (NORVASC) tablet 2.5 mg  2.5 mg Oral DAILY    aspirin (ASPIRIN) tablet 325 mg  325 mg Oral DAILY    bimatoprost (LUMIGAN) 0.01 % ophthalmic drops 1 Drop  1 Drop Left Eye QPM    clopidogrel (PLAVIX) tablet 75 mg  75 mg Oral DAILY    fluconazole (DIFLUCAN) tablet 200 mg  200 mg Oral DAILY    FLUoxetine (PROzac) capsule 20 mg  20 mg Oral DAILY    folic acid (FOLVITE) tablet   Oral DAILY    levothyroxine (SYNTHROID) tablet 50 mcg  50 mcg Oral ACB    mirtazapine (REMERON) tablet 15 mg  15 mg Oral QHS    ondansetron (ZOFRAN ODT) tablet 4 mg  4 mg Oral Q8H PRN    simvastatin (ZOCOR) tablet 20 mg  20 mg Oral QHS    traMADol (ULTRAM) tablet 100 mg  100 mg Oral Q6H PRN    vancomycin (VANCOCIN) 1,000 mg in 0.9% sodium chloride (MBP/ADV) 250 mL adv  1,000 mg IntraVENous Q24H    albuterol-ipratropium (DUO-NEB) 2.5 MG-0.5 MG/3 ML  3 mL Nebulization QID RT    guaiFENesin SR (MUCINEX) tablet 600 mg  600 mg Oral Q12H    clonazePAM (KlonoPIN) tablet 0.5 mg  0.5 mg Oral BID       Lab Results   Component Value Date/Time    Glucose 153 03/29/2017 03:30 AM    Glucose 211 03/28/2017 03:35 AM    Glucose 73 03/27/2017 03:35 AM    Glucose 187 03/26/2017 04:05 AM    Glucose 108 03/21/2017 11:45 AM        Assessment     Active Problems:    Dehydration (3/26/2017)      COPD (chronic obstructive pulmonary disease) ([facility] [address] (3/26/2017)      Hemoptysis (3/26/2017)      Right arm cellulitis (3/26/2017)      Acute encephalopathy (3/26/2017)      Olecranon bursitis of right elbow (3/27/2017)      Acute bronchitis (3/27/2017)      Hyperkalemia (3/27/2017)        Plan     Appreciate rheumatology input  Aspiration of olecranon bursa , no sig. fluid  Continue Abx  Replaced K  Monitor labs  BP control( permissive per prior Neurosurgery rec.   Continue other meds      Dorothy Hodgkin, MD   3/28

## 2017-03-29 NOTE — PROGRESS NOTES
General Internal Medicine/Geriatrics    Patient: Shea Castillo MRN: [id number]  CSN: [id number]    YOB: 1945  Age: 66 y.o. Sex: female    DOA: 3/26/2017 LOS:  LOS: 3 days                    Subjective:     Awake, alert   at bedside  C/o dry/sore mouth  Cough better  Rt. Arm better    Review of Systems:  Eyes: negative  Ears, Nose, Mouth, Throat, and Face: as above  Respiratory: negative for dyspnea on exertion, + cough  Cardiovascular: negative for chest pain  Gastrointestinal: negative for nausea, vomiting and diarrhea  Genitourinary:negative for dysuria and hematuria  Integument/Breast: negative  Hematologic/Lymphatic: negative for bleeding  Musculoskeletal:negative for arthralgias  Neurological: negative for weakness  Behavioral/Psychiatric: negative for behavior problems  Endocrine: negative for temperature intolerance  Allergic/Immunologic: negative for hay fever    Objective:     Physical Exam:  Patient Vitals for the past 24 hrs:   Temp Pulse Resp BP SpO2   03/29/17 0333 97.3 °F (36.3 °C) 75 18 137/73 97 %   03/28/17 2059 98.3 °F (36.8 °C) 97 19 136/78 99 %   03/28/17 1853 97.4 °F (36.3 °C) (!) 102 18 133/82 97 %   03/28/17 1621 - - - - 90 %   03/28/17 0932 97.4 °F (36.3 °C) (!) 109 18 - 92 %     AF, BP better  HEENT: oral thrush, dry mm  NECK:  No venous distention or adenopathy   HEART: RRR, no rubs or gallops  LUNGS: shannan. Rhonchi, coarse rales  ABDOMEN: soft with active BS.  No tenderness, no distention, no organomegaly  EXT: Rt elbow and forearm swelling, erythema, and tenderness  Better, residual erythema and redness medial aspect  SKIN: Normal turgor, no breaks, multiple ecchymotic areas  NEURO: Awake, alert, no new focal deficits    Intake and Output:  Current Shift:     Last three shifts:  03/27 1901 - 03/29 0700  In: 510 [P.O.:260; I.V.:250]  Out: -     Recent Results (from the past 24 hour(s))   METABOLIC PANEL, BASIC    Collection Time: 03/29/17  3:30 AM   Result Value Ref Range    Sodium 140 136 - 145 mmol/L    Potassium 4.4 3.5 - 5.5 mmol/L    Chloride 109 (H) 100 - 108 mmol/L    CO2 22 21 - 32 mmol/L    Anion gap 9 3.0 - 18 mmol/L    Glucose 153 (H) 74 - 99 mg/dL    BUN 18 7.0 - 18 MG/DL    Creatinine 0.83 0.6 - 1.3 MG/DL    BUN/Creatinine ratio 22 (H) 12 - 20      GFR est AA >60 >60 ml/min/1.73m2    GFR est non-AA >60 >60 ml/min/1.73m2    Calcium 8.5 8.5 - 10.1 MG/DL   VANCOMYCIN, TROUGH    Collection Time: 03/29/17  3:30 AM   Result Value Ref Range    Vancomycin,trough 9.4 (L) 10.0 - 20.0 ug/mL    Reported dose date: [id number]      Reported dose time: 400      Reported dose: 1GM UNITS       No results found.     Current Facility-Administered Medications   Medication Dose Route Frequency    levoFLOXacin (LEVAQUIN) tablet 500 mg  500 mg Oral Q24H    rOPINIRole (REQUIP) tablet 1 mg  1 mg Oral DAILY    rOPINIRole (REQUIP) tablet 2 mg  2 mg Oral QHS    hydrALAZINE (APRESOLINE) 20 mg/mL injection 10 mg  10 mg IntraVENous Q6H PRN    influenza vaccine 2016-17 (36mos+)(PF) (FLUZONE/FLUARIX/FLULAVAL QUAD) injection 0.5 mL  0.5 mL IntraMUSCular PRIOR TO DISCHARGE    sodium chloride (NS) flush 5-10 mL  5-10 mL IntraVENous PRN    predniSONE (DELTASONE) tablet 40 mg  40 mg Oral DAILY WITH BREAKFAST    pantoprazole (PROTONIX) tablet 40 mg  40 mg Oral ACB    heparin (porcine) injection 5,000 Units  5,000 Units SubCUTAneous Q12H    amLODIPine (NORVASC) tablet 2.5 mg  2.5 mg Oral DAILY    aspirin (ASPIRIN) tablet 325 mg  325 mg Oral DAILY    bimatoprost (LUMIGAN) 0.01 % ophthalmic drops 1 Drop  1 Drop Left Eye QPM    clopidogrel (PLAVIX) tablet 75 mg  75 mg Oral DAILY    fluconazole (DIFLUCAN) tablet 200 mg  200 mg Oral DAILY    FLUoxetine (PROzac) capsule 20 mg  20 mg Oral DAILY    folic acid (FOLVITE) tablet   Oral DAILY    levothyroxine (SYNTHROID) tablet 50 mcg  50 mcg Oral ACB    mirtazapine (REMERON) tablet 15 mg  15 mg Oral QHS    ondansetron (ZOFRAN ODT) tablet 4 mg  4 mg Oral Q8H PRN    simvastatin (ZOCOR) tablet 20 mg  20 mg Oral QHS    traMADol (ULTRAM) tablet 100 mg  100 mg Oral Q6H PRN    vancomycin (VANCOCIN) 1,000 mg in 0.9% sodium chloride (MBP/ADV) 250 mL adv  1,000 mg IntraVENous Q24H    albuterol-ipratropium (DUO-NEB) 2.5 MG-0.5 MG/3 ML  3 mL Nebulization QID RT    guaiFENesin SR (MUCINEX) tablet 600 mg  600 mg Oral Q12H    clonazePAM (KlonoPIN) tablet 0.5 mg  0.5 mg Oral BID       Lab Results   Component Value Date/Time    Glucose 153 03/29/2017 03:30 AM    Glucose 211 03/28/2017 03:35 AM    Glucose 73 03/27/2017 03:35 AM    Glucose 187 03/26/2017 04:05 AM    Glucose 108 03/21/2017 11:45 AM        Assessment     Active Problems:    Dehydration (3/26/2017)      COPD (chronic obstructive pulmonary disease) ([facility] (3/26/2017)      Hemoptysis (3/26/2017)      Right arm cellulitis (3/26/2017)      Acute encephalopathy (3/26/2017)      Olecranon bursitis of right elbow (3/27/2017)      Acute bronchitis (3/27/2017)      Hyperkalemia (3/27/2017)        Plan     PT eval/ tx  Mouth wash., + Diflucan  Appreciate rheumatology input  Aspiration of olecranon bursa , no sig. fluid  Continue Abx  Replaced K  Monitor labs  BP control( permissive per prior Neurosurgery rec.   Continue other meds      Queenie Chavez MD   3/29

## 2017-03-29 NOTE — PROGRESS NOTES
Problem: Cellulitis Care Plan (Adult)  Goal: *Skin integrity maintained  Outcome: Progressing Towards Goal  Wound care consult ordered

## 2017-03-29 NOTE — PROGRESS NOTES
1630 -- TRANSFER - IN REPORT:    Verbal report received from Yovani (name) on Alexi White  being received from 2200 (unit) for routine progression of care      Report consisted of patients Situation, Background, Assessment and   Recommendations(SBAR). Information from the following report(s) SBAR, Procedure Summary and MAR was reviewed with the receiving nurse. Opportunity for questions and clarification was provided. Assessment completed upon patients arrival to unit and care assumed. Pt is positive for STAPHYLOCOCCUS AUREUS. Transferring RN did not contact MD. Call MD awaiting return call. 1700 -- Return call from MD, pt being treated with Vanco which address Staph. 1712 -- Spouse Brett Stubbs provided RN with updated med list, he is concerned she is missing eyedrops. Copy was placed in chart. He also approved pt's sister Elizabeth Lucas to receive status updates on pt. 1819 -- Pulse ox best results when using toes or forehead. Pt has briusing to hands which makes readings difficult. 1900 -- Bedside and Verbal shift change report given to [address] [place] [address] (oncoming nurse) by Caitlyn Crooks RN (offgoing nurse) Report included the following information SBAR, Procedure Summary, Intake/Output, MAR, Recent Results. Pt up in bed, no indication of pain or acute distress. Bed locked and lowered, siderails up x3. Call bell at bedside.

## 2017-03-29 NOTE — PROGRESS NOTES
Problem: Mobility Impaired (Adult and Pediatric)  Goal: *Acute Goals and Plan of Care (Insert Text)  Physical Therapy Goals  Initiated 3/29/2017 and to be accomplished within 7 day(s)  1. Patient will move from supine to sit and sit to supine , scoot up and down and roll side to side in bed with supervision/set-up. 2. Patient will transfer from bed to chair and chair to bed with minimal assistance/contact guard assist using the least restrictive device. 3. Patient will perform sit to stand with minimal assistance/contact guard assist.  4. Patient will ambulate with moderate assistance for 25 feet with the least restrictive device. 5. Patient will ascend/descend 2 stairs with handrail(s) with moderate assistance to egress home . Outcome: Progressing Towards Goal  PHYSICAL THERAPY EVALUATION     Patient: Shea Castillo (66 y.o. female)  Date: 3/29/2017  Primary Diagnosis: Right arm cellulitis  Acute encephalopathy  Hemoptysis  COPD (chronic obstructive pulmonary disease) ([facility])  Dehydration  SIRS (systemic inflammatory response syndrome) ([facility])  Right arm cellulitis        Precautions:   Fall (oxygen )      PROBLEM LIST:  Patient presents with the following problems:   Bed Mobility, Transfers, Gait, Strength, Balance, Stairs, Precautions and Skin Integrity/Hygiene  ASSESSMENT :   Patient requires between maximal assistance and minimal assistance  for bed mobility, transfers and ambulation. patient ambulated for 8 feet without oxygen and finger pulsox stated oxygen was 79% but fingers blue and cold prior to treatment just had breathing treatment and O2 was 88% with oxygen when placed back in bed. Pain reported 5/10 all over and education on safety and plan of care needs reinforcement. Left in bed on oxygen tank in new room since no flow meter in place at 2l/min nursing informed , Latia,  Patient will benefit from skilled intervention to address the above impairments.   Patients rehabilitation potential is considered to be Fair  Factors which may influence rehabilitation potential include:   [ ]         None noted  [ ]         Mental ability/status  [X]         Medical condition  [ ]         Home/family situation and support systems  [ ]         Safety awareness  [ ]         Pain tolerance/management  [ ]         Other:        PLAN :  Recommendations and Planned Interventions:  [X]           Bed Mobility Training             [X]    Neuromuscular Re-Education  [X]           Transfer Training                   [ ]    Orthotic/Prosthetic Training  [X]           Gait Training                          [ ]    Modalities  [X]           Therapeutic Exercises          [ ]    Edema Management/Control  [X]           Therapeutic Activities            [X]    Patient and Family Training/Education  [ ]           Other (comment):     Frequency/Duration: Patient will be followed by physical therapy 3-5times a week to address goals.   Discharge Recommendations: Rehab and Skilled Nursing Facility  Further Equipment Recommendations for Discharge: tbd prior to going home has rw and bsc       SUBJECTIVE:   Patient stated .      OBJECTIVE DATA SUMMARY:       Past Medical History:   Diagnosis Date    Bipolar 1 disorder ([facility]      Cerebral aneurysm 12/11/2015     RPCoA, RAChA,, RMCA bifurcation, and RM2      Cervical spine fracture ([facility] 08/20/2014     C2 and C3    COPD      Coronary artery disease      Giant cell arteritis ([facility] 11/15/2014    [place]      Hyperlipidemia      Hypertension      Hypothyroidism      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 The severity rating is based on the [employer] Balance Scale1/5   [facility] Standing Balance Scale1/5  0: Pt performs 25% or less of standing activity (Max assist) CN, 100% impaired. 1: Pt supports self with upper extremities but requires therapist assistance. Pt performs 25-50% of effort (Mod assist) CM, 80% to <100% impaired. 1+: Pt supports self with upper extremities but requires therapist assistance. Pt performs >50% effort. (Min assist). CL, 60% to <80% impaired. 2: Pt supports self independently with both upper extremities (walker, crutches, parallel bars). CL, 60% to <80% impaired. 2+: Pt support self independently with 1 upper extremity (cane, crutch, 1 parallel bar). CK, 40% to <60% impaired. 3: Pt stands without upper extremity support for up to 30 seconds. CK, 40% to <60% impaired. 3+: Pt stands without upper extremity support for 30 seconds or greater. CJ, 20% to <40% impaired. 4: Pt independently moves and returns center of gravity 1-2 inches in one plane. CJ, 20% to <40% impaired. 4+: Pt independently moves and returns center of gravity 1-2 inches in multiple planes. CI, 1% to <20% impaired. 5: Pt independently moves and returns center of gravity in all planes greater than 2 inches. CH, 0% impaired.         Eval Complexity: History: HIGH Complexity :3+ comorbidities / personal factors will impact the outcome/ POC Exam:MEDIUM Complexity : 3 Standardized tests and measures addressing body structure, function, activity limitation and / or participation in recreation  Presentation: MEDIUM Complexity : Evolving with changing characteristics  Clinical Decision Making:Medium Complexity [facility] Standing Balance Scale1/5 Overall Complexity:MEDIUM     Prior Level of Function/Home Situation: moved with rw  With supervision in home  [address]: Private residence  # Steps to Enter: 2  One/Two Story Residence: One story  Living Alone: No  Support Systems: Spouse/Significant Other/Partner  Patient Expects to be Discharged to[de-identified] Private residence  Current DME Used/Available at Home: Joni Altes, rolling, Wheelchair, Commode, bedside  Critical Behavior:  Neurologic State: Alert  Orientation Level: Oriented to person;Oriented to place  Cognition: Follows commands  Safety/Judgement: Fall prevention  Psychosocial  Patient Behaviors: Calm; Cooperative  Family  Behaviors: Cooperative;Calm; Appropriate for situation  Purposeful Interaction: Yes  Pt Identified Daily Priority: Clinical issues (comment)  [employer] Process: Attend basic human needs;Create healing environment; Teaching/learning  Caring Interventions: Reassure  Reassure: Caring rounds  Therapeutic Modalities: Intentional therapeutic touch  Skin Condition/Temp: Fragile  Family  Behaviors: Cooperative;Calm; Appropriate for situation  Skin Integrity: Cracked; Abrasion  Skin Integumentary  Skin Color: Ecchymosis (comment)  Skin Condition/Temp: Fragile  Skin Integrity: Cracked; Abrasion  Turgor: Epidermis thin w/ loss of subcut tissue  Hair Growth: Present  Varicosities: Absent  Wound Leg Right; Anterior;Proximal-Periwound Skin Condition: Intact; Ecchymosis  Wound Leg Left; Anterior-Periwound Skin Condition: Ecchymosis; Intact  Wound Leg Right; Anterior;Distal-Periwound Skin Condition: Intact; Ecchymosis  Strength:    Strength: Generally decreased, functional (3-/5 both legs and UE's )  Tone & Sensation:   Tone: Normal  Range Of Motion:  AROM: Generally decreased, functional (left more limited than right  at end ranges  le's)  PROM: Generally decreased, functional  Functional Mobility:  Bed Mobility:  Rolling: Moderate assistance; Additional time;Assist x1;Assist x2  Supine to Sit: Moderate assistance; Additional time;Assist x1;Assist x2  Sit to Supine: Moderate assistance;Minimum assistance; Additional time;Assist x1  Transfers:  Sit to Stand: Moderate assistance;Maximum assistance; Additional time;Assist x2  Stand to Sit: Moderate assistance;Maximum assistance; Additional time;Assist x2  Stand Pivot Transfers: Maximum assistance     Balance:   Sitting: Impaired  Sitting - Static: Good (unsupported); Fair (occasional)  Sitting - Dynamic: Fair (occasional)  Standing: Impaired  Standing - Static: Poor; Fair  Standing - Dynamic : Poor  Ambulation/Gait Training:  Distance (ft): 8 Feet (ft)  Assistive Device: Walker, rolling  Ambulation - Level of Assistance: Moderate assistance; Additional time;Assist x2;Maximum assistance  Gait Description (WDL): Exceptions to WDL  Gait Abnormalities: Decreased step clearance;Shuffling gait; Path deviations  Base of Support: Center of gravity altered;Narrowed  Speed/Kristel: Delayed;Pace decreased (<100 feet/min); Slow  Step Length: Left shortened;Right shortened  Interventions: Safety awareness training;Verbal cues; Tactile cues; Visual/Demos  Pain:  Pre treatment pain level:5  Post treatment pain level:5  Pain Scale 1: Numeric (0 - 10)  Activity Tolerance:   Fair minus  Please refer to the flowsheet for vital signs taken during this treatment. After treatment:   [ ]         Patient left in no apparent distress sitting up in chair  [X]         Patient left in no apparent distress in bed  [X]         Call bell left within reach  [ ]         Nursing notified  [ ]         Caregiver present  [ ]         Bed alarm activated      COMMUNICATION/EDUCATION:   [X]         Fall prevention education was provided and the patient/caregiver indicated understanding. [X]         Patient/family have participated as able in goal setting and plan of care. [X]         Patient/family agree to work toward stated goals and plan of care. [ ]         Patient understands intent and goals of therapy, but is neutral about his/her participation.   [ ] Patient is unable to participate in goal setting and plan of care. Patient educated on the role of physical therapy during the acute stay  and the importance of mobility.  VU.needs reinforcement       Thank you for this referral.  Lenora Lu, PT   Time Calculation: 30 mins

## 2017-03-30 LAB
BACTERIA SPEC CULT: ABNORMAL
BACTERIA SPEC CULT: ABNORMAL
GRAM STN SPEC: ABNORMAL
GRAM STN SPEC: ABNORMAL
SERVICE CMNT-IMP: ABNORMAL

## 2017-03-30 PROCEDURE — 74011000250 HC RX REV CODE- 250: Performed by: INTERNAL MEDICINE

## 2017-03-30 PROCEDURE — 74011000258 HC RX REV CODE- 258: Performed by: INTERNAL MEDICINE

## 2017-03-30 PROCEDURE — 65270000029 HC RM PRIVATE

## 2017-03-30 PROCEDURE — 74011250636 HC RX REV CODE- 250/636: Performed by: INTERNAL MEDICINE

## 2017-03-30 PROCEDURE — 74011636637 HC RX REV CODE- 636/637: Performed by: INTERNAL MEDICINE

## 2017-03-30 PROCEDURE — 94640 AIRWAY INHALATION TREATMENT: CPT

## 2017-03-30 PROCEDURE — 74011250637 HC RX REV CODE- 250/637: Performed by: INTERNAL MEDICINE

## 2017-03-30 PROCEDURE — 97166 OT EVAL MOD COMPLEX 45 MIN: CPT

## 2017-03-30 RX ADMIN — SODIUM CHLORIDE 500 MG: 900 INJECTION, SOLUTION INTRAVENOUS at 11:57

## 2017-03-30 RX ADMIN — IPRATROPIUM BROMIDE AND ALBUTEROL SULFATE 3 ML: .5; 3 SOLUTION RESPIRATORY (INHALATION) at 12:43

## 2017-03-30 RX ADMIN — HYDROCORTISONE 5 ML: 10 TABLET ORAL at 21:59

## 2017-03-30 RX ADMIN — BIMATOPROST 1 DROP: 0.1 SOLUTION/ DROPS OPHTHALMIC at 18:19

## 2017-03-30 RX ADMIN — HEPARIN SODIUM 5000 UNITS: 5000 INJECTION, SOLUTION INTRAVENOUS; SUBCUTANEOUS at 09:14

## 2017-03-30 RX ADMIN — HYDROCORTISONE 5 ML: 10 TABLET ORAL at 09:14

## 2017-03-30 RX ADMIN — CLONAZEPAM 0.5 MG: 0.5 TABLET ORAL at 09:01

## 2017-03-30 RX ADMIN — LEVOFLOXACIN 750 MG: 750 TABLET, FILM COATED ORAL at 09:03

## 2017-03-30 RX ADMIN — SODIUM CHLORIDE 500 MG: 900 INJECTION, SOLUTION INTRAVENOUS at 00:24

## 2017-03-30 RX ADMIN — FLUCONAZOLE 200 MG: 100 TABLET ORAL at 09:01

## 2017-03-30 RX ADMIN — IPRATROPIUM BROMIDE AND ALBUTEROL SULFATE 3 ML: .5; 3 SOLUTION RESPIRATORY (INHALATION) at 22:33

## 2017-03-30 RX ADMIN — AMLODIPINE BESYLATE 2.5 MG: 2.5 TABLET ORAL at 09:01

## 2017-03-30 RX ADMIN — HYDROCORTISONE 5 ML: 10 TABLET ORAL at 18:17

## 2017-03-30 RX ADMIN — ROPINIROLE HYDROCHLORIDE 2 MG: 1 TABLET, FILM COATED ORAL at 21:52

## 2017-03-30 RX ADMIN — FLUOXETINE 20 MG: 20 CAPSULE ORAL at 09:02

## 2017-03-30 RX ADMIN — MIRTAZAPINE 15 MG: 15 TABLET, FILM COATED ORAL at 21:52

## 2017-03-30 RX ADMIN — HEPARIN SODIUM 5000 UNITS: 5000 INJECTION, SOLUTION INTRAVENOUS; SUBCUTANEOUS at 21:52

## 2017-03-30 RX ADMIN — SIMVASTATIN 20 MG: 10 TABLET, FILM COATED ORAL at 21:52

## 2017-03-30 RX ADMIN — ROPINIROLE HYDROCHLORIDE 1 MG: 1 TABLET, FILM COATED ORAL at 09:02

## 2017-03-30 RX ADMIN — GUAIFENESIN 600 MG: 600 TABLET, EXTENDED RELEASE ORAL at 21:52

## 2017-03-30 RX ADMIN — FOLIC ACID 1 MG: 1 TABLET ORAL at 09:01

## 2017-03-30 RX ADMIN — LEVOTHYROXINE SODIUM 50 MCG: 50 TABLET ORAL at 09:01

## 2017-03-30 RX ADMIN — ASPIRIN 325 MG ORAL TABLET 325 MG: 325 PILL ORAL at 09:01

## 2017-03-30 RX ADMIN — GUAIFENESIN 600 MG: 600 TABLET, EXTENDED RELEASE ORAL at 09:02

## 2017-03-30 RX ADMIN — PANTOPRAZOLE SODIUM 40 MG: 40 TABLET, DELAYED RELEASE ORAL at 09:02

## 2017-03-30 RX ADMIN — IPRATROPIUM BROMIDE AND ALBUTEROL SULFATE 3 ML: .5; 3 SOLUTION RESPIRATORY (INHALATION) at 08:07

## 2017-03-30 RX ADMIN — CLOPIDOGREL BISULFATE 75 MG: 75 TABLET, FILM COATED ORAL at 09:01

## 2017-03-30 RX ADMIN — CLONAZEPAM 0.5 MG: 0.5 TABLET ORAL at 18:18

## 2017-03-30 RX ADMIN — SODIUM CHLORIDE 500 MG: 900 INJECTION, SOLUTION INTRAVENOUS at 22:02

## 2017-03-30 RX ADMIN — IPRATROPIUM BROMIDE AND ALBUTEROL SULFATE 3 ML: .5; 3 SOLUTION RESPIRATORY (INHALATION) at 16:12

## 2017-03-30 RX ADMIN — TRAMADOL HYDROCHLORIDE 100 MG: 50 TABLET, FILM COATED ORAL at 09:00

## 2017-03-30 RX ADMIN — PREDNISONE 40 MG: 20 TABLET ORAL at 09:01

## 2017-03-30 NOTE — PROGRESS NOTES
Continues to have mild improvement over yesterday in speech and mentation. Moves all 4 extremities. Not in condition at this time for Actemra.

## 2017-03-30 NOTE — PROGRESS NOTES
Reviewed. Slight improvement in mentation and speech. Moves all 4 extremities. Wound right shin. Shallow and dressed.

## 2017-03-30 NOTE — PROGRESS NOTES
General Internal Medicine/Geriatrics    Patient: Heavenly Colón MRN: [id number]  CSN: [id number]    YOB: 1945  Age: 66 y.o. Sex: female    DOA: 3/26/2017 LOS:  LOS: 4 days                    Subjective:     Awake, alert   at bedside   mouth better  Cough better  Rt. Arm better    Review of Systems:  Eyes: negative  Ears, Nose, Mouth, Throat, and Face: as above  Respiratory: negative for dyspnea on exertion, + cough  Cardiovascular: negative for chest pain  Gastrointestinal: negative for nausea, vomiting and diarrhea  Genitourinary:negative for dysuria and hematuria  Integument/Breast: negative  Hematologic/Lymphatic: negative for bleeding  Musculoskeletal:negative for arthralgias  Neurological: negative for weakness  Behavioral/Psychiatric: negative for behavior problems  Endocrine: negative for temperature intolerance  Allergic/Immunologic: negative for hay fever    Objective:     Physical Exam:  Patient Vitals for the past 24 hrs:   Temp Pulse Resp BP SpO2   03/30/17 0627 98.4 °F (36.9 °C) 83 18 114/58 94 %   03/30/17 0201 98.7 °F (37.1 °C) 81 18 107/66 95 %   03/29/17 2228 - - - - 97 %   03/29/17 2202 99.1 °F (37.3 °C) - 18 (!) 162/91 99 %   03/29/17 1747 98 °F (36.7 °C) 100 17 145/74 99 %   03/29/17 1513 97.5 °F (36.4 °C) 96 18 123/74 92 %   03/29/17 1024 97.7 °F (36.5 °C) 94 18 148/81 -     AF, BP improved  HEENT: oral thrush, dry mm  NECK:  No venous distention or adenopathy   HEART: RRR, no rubs or gallops  LUNGS: shannan. Rhonchi, coarse rales  ABDOMEN: soft with active BS.  No tenderness, no distention, no organomegaly  EXT: Rt elbow and forearm swelling, erythema, and tenderness much  Better, residual erythema and redness medial aspect improving  SKIN: Normal turgor, no breaks, multiple ecchymotic areas  NEURO: Awake, alert, no new focal deficits    Intake and Output:  Current Shift:     Last three shifts:  03/28 1901 - 03/30 0700  In: 700 [P.O.:700]  Out: 0     No results found for this or any previous visit (from the past 24 hour(s)). No results found.     Current Facility-Administered Medications   Medication Dose Route Frequency    klack's mouthwash oral suspension (COMPOUNDED)  5 mL Oral TID    vancomycin (VANCOCIN) 500 mg in 0.9% sodium chloride (MBP/ADV) 100 mL ADV  500 mg IntraVENous Q12H    [START ON 3/31/2017] VANCOMYCIN INFORMATION NOTE   Other ONCE    levoFLOXacin (LEVAQUIN) tablet 750 mg  750 mg Oral Q48H    rOPINIRole (REQUIP) tablet 1 mg  1 mg Oral DAILY    rOPINIRole (REQUIP) tablet 2 mg  2 mg Oral QHS    hydrALAZINE (APRESOLINE) 20 mg/mL injection 10 mg  10 mg IntraVENous Q6H PRN    influenza vaccine 2016-17 (36mos+)(PF) (FLUZONE/FLUARIX/FLULAVAL QUAD) injection 0.5 mL  0.5 mL IntraMUSCular PRIOR TO DISCHARGE    sodium chloride (NS) flush 5-10 mL  5-10 mL IntraVENous PRN    predniSONE (DELTASONE) tablet 40 mg  40 mg Oral DAILY WITH BREAKFAST    pantoprazole (PROTONIX) tablet 40 mg  40 mg Oral ACB    heparin (porcine) injection 5,000 Units  5,000 Units SubCUTAneous Q12H    amLODIPine (NORVASC) tablet 2.5 mg  2.5 mg Oral DAILY    aspirin (ASPIRIN) tablet 325 mg  325 mg Oral DAILY    bimatoprost (LUMIGAN) 0.01 % ophthalmic drops 1 Drop  1 Drop Left Eye QPM    clopidogrel (PLAVIX) tablet 75 mg  75 mg Oral DAILY    fluconazole (DIFLUCAN) tablet 200 mg  200 mg Oral DAILY    FLUoxetine (PROzac) capsule 20 mg  20 mg Oral DAILY    folic acid (FOLVITE) tablet   Oral DAILY    levothyroxine (SYNTHROID) tablet 50 mcg  50 mcg Oral ACB    mirtazapine (REMERON) tablet 15 mg  15 mg Oral QHS    ondansetron (ZOFRAN ODT) tablet 4 mg  4 mg Oral Q8H PRN    simvastatin (ZOCOR) tablet 20 mg  20 mg Oral QHS    traMADol (ULTRAM) tablet 100 mg  100 mg Oral Q6H PRN    albuterol-ipratropium (DUO-NEB) 2.5 MG-0.5 MG/3 ML  3 mL Nebulization QID RT    guaiFENesin ER (MUCINEX) tablet 600 mg  600 mg Oral Q12H    clonazePAM (KlonoPIN) tablet 0.5 mg  0.5 mg Oral BID       Lab Results   Component Value Date/Time    Glucose 153 03/29/2017 03:30 AM    Glucose 211 03/28/2017 03:35 AM    Glucose 73 03/27/2017 03:35 AM    Glucose 187 03/26/2017 04:05 AM    Glucose 108 03/21/2017 11:45 AM        Assessment     Active Problems:    Dehydration (3/26/2017)      COPD (chronic obstructive pulmonary disease) ([facility]) (3/26/2017)      Hemoptysis (3/26/2017)      Right arm cellulitis (3/26/2017)      Acute encephalopathy (3/26/2017)      Olecranon bursitis of right elbow (3/27/2017)      Acute bronchitis (3/27/2017)      Hyperkalemia (3/27/2017)        Plan     C&S with staph, ?? On Vanc  PT, ? SNF  Mouth wash., + Diflucan  Appreciate rheumatology input  Aspiration of olecranon bursa , no sig. fluid  Continue Abx  Replaced K  Monitor labs  BP control( permissive per prior Neurosurgery rec.   Continue other meds      Marielena Kelly MD   3/29
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
## 2017-03-30 NOTE — PROGRESS NOTES
Speech Therapy Screening:  Services are not indicated at this time. An InBasket screening referral was triggered for speech therapy based on results obtained during the nursing admission assessment. The patients chart was reviewed and the patient is not appropriate for a skilled therapy evaluation at this time. Please consult speech therapy if any therapy needs arise. Thank you.     Nette Ram, SLP

## 2017-03-30 NOTE — MANAGEMENT PLAN
Discharge Plan    Spoke with  in pt room. Pt did not contribute to planning, but nods in agreement. Discharge plan is SNF rehab. 1st choice is [facility], 2nd choice [facility]. Reviewed in [address]. [facility] has accepted and [facility] reviewing. Notes updated.   states Dr Caitlyn Menendez notified tentative discharge of tomorrow or Saturday      Glory Ponce RN BSN  Outcomes Manager  Pager # [phone]

## 2017-03-30 NOTE — PROGRESS NOTES
Talked with  earlier. He still wants [facility] as 1st choice SNF. [facility] pending Authorization. Called Alicia Broussard [phone] and MARIIA. Possible DC tomorrow.

## 2017-03-30 NOTE — ROUTINE PROCESS
0800 Patient had incontinent episode. Incontinent care done. Call bell and telephone placed within reach. 0107 AM meds given. Tramadol given for generalized pain. 1230 Patient assisted with meals. Denies any pain. 1630 Patient resting in the bed comfortably. Denies any pain. 1920 Bedside and Verbal shift change report given to Vilma Turner RN (oncoming nurse) by Donnis Simmonds RN (offgoing nurse). Report included the following information SBAR, Kardex, Intake/Output, MAR and Recent Results.

## 2017-03-31 ENCOUNTER — APPOINTMENT (OUTPATIENT)
Dept: PHYSICAL THERAPY | Age: 72
End: 2017-03-31
Payer: COMMERCIAL

## 2017-03-31 ENCOUNTER — HOSPITAL ENCOUNTER (INPATIENT)
Age: 72
LOS: 38 days | Discharge: HOME | End: 2017-05-08
Attending: INTERNAL MEDICINE | Admitting: INTERNAL MEDICINE

## 2017-03-31 VITALS
TEMPERATURE: 98.1 F | RESPIRATION RATE: 18 BRPM | SYSTOLIC BLOOD PRESSURE: 129 MMHG | DIASTOLIC BLOOD PRESSURE: 82 MMHG | OXYGEN SATURATION: 95 % | WEIGHT: 106.1 LBS | HEIGHT: 60 IN | HEART RATE: 81 BPM | BODY MASS INDEX: 20.83 KG/M2

## 2017-03-31 LAB
ANION GAP BLD CALC-SCNC: 7 MMOL/L (ref 3–18)
BUN SERPL-MCNC: 20 MG/DL (ref 7–18)
BUN/CREAT SERPL: 33 (ref 12–20)
CALCIUM SERPL-MCNC: 8.1 MG/DL (ref 8.5–10.1)
CHLORIDE SERPL-SCNC: 108 MMOL/L (ref 100–108)
CO2 SERPL-SCNC: 27 MMOL/L (ref 21–32)
CREAT SERPL-MCNC: 0.61 MG/DL (ref 0.6–1.3)
DATE LAST DOSE: NORMAL
GLUCOSE SERPL-MCNC: 124 MG/DL (ref 74–99)
POTASSIUM SERPL-SCNC: 3.8 MMOL/L (ref 3.5–5.5)
REPORTED DOSE,DOSE: NORMAL UNITS
REPORTED DOSE/TIME,TMG: 2300
SODIUM SERPL-SCNC: 142 MMOL/L (ref 136–145)
VANCOMYCIN TROUGH SERPL-MCNC: 13.2 UG/ML (ref 10–20)

## 2017-03-31 PROCEDURE — 74011000250 HC RX REV CODE- 250: Performed by: INTERNAL MEDICINE

## 2017-03-31 PROCEDURE — 94640 AIRWAY INHALATION TREATMENT: CPT

## 2017-03-31 PROCEDURE — 80048 BASIC METABOLIC PNL TOTAL CA: CPT | Performed by: INTERNAL MEDICINE

## 2017-03-31 PROCEDURE — 74011250637 HC RX REV CODE- 250/637: Performed by: INTERNAL MEDICINE

## 2017-03-31 PROCEDURE — 80202 ASSAY OF VANCOMYCIN: CPT | Performed by: INTERNAL MEDICINE

## 2017-03-31 PROCEDURE — 97530 THERAPEUTIC ACTIVITIES: CPT

## 2017-03-31 PROCEDURE — 74011636637 HC RX REV CODE- 636/637: Performed by: INTERNAL MEDICINE

## 2017-03-31 PROCEDURE — 36415 COLL VENOUS BLD VENIPUNCTURE: CPT | Performed by: INTERNAL MEDICINE

## 2017-03-31 PROCEDURE — 74011250636 HC RX REV CODE- 250/636: Performed by: INTERNAL MEDICINE

## 2017-03-31 PROCEDURE — 74011000258 HC RX REV CODE- 258: Performed by: INTERNAL MEDICINE

## 2017-03-31 RX ORDER — FLUCONAZOLE 100 MG/1
200 TABLET ORAL DAILY
Status: COMPLETED | OUTPATIENT
Start: 2017-04-01 | End: 2017-04-05

## 2017-03-31 RX ORDER — ONDANSETRON 4 MG/1
4 TABLET, ORALLY DISINTEGRATING ORAL
Status: DISCONTINUED | OUTPATIENT
Start: 2017-03-31 | End: 2017-05-08 | Stop reason: HOSPADM

## 2017-03-31 RX ORDER — PANTOPRAZOLE SODIUM 40 MG/1
40 TABLET, DELAYED RELEASE ORAL
Qty: 30 TAB | Refills: 0 | Status: SHIPPED
Start: 2017-03-31 | End: 2017-08-30

## 2017-03-31 RX ORDER — SIMVASTATIN 20 MG/1
20 TABLET, FILM COATED ORAL
Status: DISCONTINUED | OUTPATIENT
Start: 2017-03-31 | End: 2017-05-08 | Stop reason: HOSPADM

## 2017-03-31 RX ORDER — FLUOXETINE HYDROCHLORIDE 20 MG/1
20 CAPSULE ORAL DAILY
Status: DISCONTINUED | OUTPATIENT
Start: 2017-04-01 | End: 2017-05-08 | Stop reason: HOSPADM

## 2017-03-31 RX ORDER — PREDNISONE 20 MG/1
40 TABLET ORAL DAILY
Status: DISCONTINUED | OUTPATIENT
Start: 2017-04-01 | End: 2017-04-11

## 2017-03-31 RX ORDER — PANTOPRAZOLE SODIUM 40 MG/1
40 TABLET, DELAYED RELEASE ORAL
Status: DISCONTINUED | OUTPATIENT
Start: 2017-04-01 | End: 2017-05-08 | Stop reason: HOSPADM

## 2017-03-31 RX ORDER — MELOXICAM 7.5 MG/1
7.5 TABLET ORAL DAILY
Status: DISCONTINUED | OUTPATIENT
Start: 2017-04-01 | End: 2017-05-08 | Stop reason: HOSPADM

## 2017-03-31 RX ORDER — TRAMADOL HYDROCHLORIDE 50 MG/1
100 TABLET ORAL
Status: DISCONTINUED | OUTPATIENT
Start: 2017-03-31 | End: 2017-05-08 | Stop reason: HOSPADM

## 2017-03-31 RX ORDER — ROPINIROLE 1 MG/1
2 TABLET, FILM COATED ORAL
Status: DISCONTINUED | OUTPATIENT
Start: 2017-03-31 | End: 2017-05-08 | Stop reason: HOSPADM

## 2017-03-31 RX ORDER — MIRTAZAPINE 15 MG/1
15 TABLET, FILM COATED ORAL
Status: DISCONTINUED | OUTPATIENT
Start: 2017-03-31 | End: 2017-05-08 | Stop reason: HOSPADM

## 2017-03-31 RX ORDER — FOLIC ACID 1 MG/1
1 TABLET ORAL DAILY
Status: DISCONTINUED | OUTPATIENT
Start: 2017-04-01 | End: 2017-05-08 | Stop reason: HOSPADM

## 2017-03-31 RX ORDER — AMLODIPINE BESYLATE 2.5 MG/1
2.5 TABLET ORAL DAILY
Status: DISCONTINUED | OUTPATIENT
Start: 2017-04-01 | End: 2017-05-08 | Stop reason: HOSPADM

## 2017-03-31 RX ORDER — IPRATROPIUM BROMIDE AND ALBUTEROL SULFATE 2.5; .5 MG/3ML; MG/3ML
3 SOLUTION RESPIRATORY (INHALATION)
Status: DISCONTINUED | OUTPATIENT
Start: 2017-03-31 | End: 2017-05-08 | Stop reason: HOSPADM

## 2017-03-31 RX ORDER — CLOPIDOGREL BISULFATE 75 MG/1
75 TABLET ORAL DAILY
Status: DISCONTINUED | OUTPATIENT
Start: 2017-04-01 | End: 2017-05-08 | Stop reason: HOSPADM

## 2017-03-31 RX ORDER — ASPIRIN 325 MG
325 TABLET ORAL DAILY
Status: DISCONTINUED | OUTPATIENT
Start: 2017-04-01 | End: 2017-05-08 | Stop reason: HOSPADM

## 2017-03-31 RX ORDER — IPRATROPIUM BROMIDE AND ALBUTEROL SULFATE 2.5; .5 MG/3ML; MG/3ML
3 SOLUTION RESPIRATORY (INHALATION)
Qty: 30 NEBULE | Refills: 1 | Status: SHIPPED | OUTPATIENT
Start: 2017-03-31 | End: 2017-08-30

## 2017-03-31 RX ORDER — LEVOTHYROXINE SODIUM 50 UG/1
50 TABLET ORAL
Status: DISCONTINUED | OUTPATIENT
Start: 2017-04-01 | End: 2017-05-08 | Stop reason: HOSPADM

## 2017-03-31 RX ORDER — ROPINIROLE 1 MG/1
1 TABLET, FILM COATED ORAL DAILY
Status: DISCONTINUED | OUTPATIENT
Start: 2017-04-01 | End: 2017-04-08

## 2017-03-31 RX ORDER — CLONAZEPAM 0.5 MG/1
1 TABLET ORAL 2 TIMES DAILY
Status: DISCONTINUED | OUTPATIENT
Start: 2017-04-01 | End: 2017-05-08 | Stop reason: HOSPADM

## 2017-03-31 RX ORDER — LEVOFLOXACIN 750 MG/1
750 TABLET ORAL
Status: COMPLETED | OUTPATIENT
Start: 2017-04-01 | End: 2017-04-05

## 2017-03-31 RX ADMIN — SIMVASTATIN 20 MG: 20 TABLET, FILM COATED ORAL at 21:32

## 2017-03-31 RX ADMIN — HYDROCORTISONE 5 ML: 10 TABLET ORAL at 22:00

## 2017-03-31 RX ADMIN — PREDNISONE 40 MG: 20 TABLET ORAL at 09:21

## 2017-03-31 RX ADMIN — CLOPIDOGREL BISULFATE 75 MG: 75 TABLET, FILM COATED ORAL at 09:21

## 2017-03-31 RX ADMIN — PANTOPRAZOLE SODIUM 40 MG: 40 TABLET, DELAYED RELEASE ORAL at 09:21

## 2017-03-31 RX ADMIN — CLONAZEPAM 0.5 MG: 0.5 TABLET ORAL at 09:21

## 2017-03-31 RX ADMIN — ASPIRIN 325 MG ORAL TABLET 325 MG: 325 PILL ORAL at 09:22

## 2017-03-31 RX ADMIN — GUAIFENESIN 600 MG: 600 TABLET, EXTENDED RELEASE ORAL at 09:22

## 2017-03-31 RX ADMIN — IPRATROPIUM BROMIDE AND ALBUTEROL SULFATE 3 ML: .5; 3 SOLUTION RESPIRATORY (INHALATION) at 13:26

## 2017-03-31 RX ADMIN — BIMATOPROST 1 DROP: 0.1 SOLUTION/ DROPS OPHTHALMIC at 17:37

## 2017-03-31 RX ADMIN — IPRATROPIUM BROMIDE AND ALBUTEROL SULFATE 3 ML: .5; 3 SOLUTION RESPIRATORY (INHALATION) at 07:48

## 2017-03-31 RX ADMIN — FLUCONAZOLE 200 MG: 100 TABLET ORAL at 09:22

## 2017-03-31 RX ADMIN — HYDROCORTISONE 5 ML: 10 TABLET ORAL at 16:00

## 2017-03-31 RX ADMIN — SODIUM CHLORIDE 500 MG: 900 INJECTION, SOLUTION INTRAVENOUS at 13:27

## 2017-03-31 RX ADMIN — AMLODIPINE BESYLATE 2.5 MG: 2.5 TABLET ORAL at 09:22

## 2017-03-31 RX ADMIN — ROPINIROLE HYDROCHLORIDE 1 MG: 1 TABLET, FILM COATED ORAL at 09:21

## 2017-03-31 RX ADMIN — ROPINIROLE HYDROCHLORIDE 2 MG: 1 TABLET, FILM COATED ORAL at 21:31

## 2017-03-31 RX ADMIN — TRAMADOL HYDROCHLORIDE 100 MG: 50 TABLET, FILM COATED ORAL at 21:32

## 2017-03-31 RX ADMIN — HEPARIN SODIUM 5000 UNITS: 5000 INJECTION, SOLUTION INTRAVENOUS; SUBCUTANEOUS at 09:34

## 2017-03-31 RX ADMIN — CLONAZEPAM 0.5 MG: 0.5 TABLET ORAL at 17:36

## 2017-03-31 RX ADMIN — MIRTAZAPINE 15 MG: 15 TABLET, FILM COATED ORAL at 21:32

## 2017-03-31 RX ADMIN — FLUOXETINE 20 MG: 20 CAPSULE ORAL at 09:22

## 2017-03-31 RX ADMIN — BIMATOPROST 1 DROP: 0.1 SOLUTION/ DROPS OPHTHALMIC at 21:30

## 2017-03-31 RX ADMIN — HYDROCORTISONE 5 ML: 10 TABLET ORAL at 09:33

## 2017-03-31 RX ADMIN — LEVOTHYROXINE SODIUM 50 MCG: 50 TABLET ORAL at 09:22

## 2017-03-31 RX ADMIN — FOLIC ACID 1 MG: 1 TABLET ORAL at 09:21

## 2017-03-31 RX ADMIN — IPRATROPIUM BROMIDE AND ALBUTEROL SULFATE 3 ML: .5; 3 SOLUTION RESPIRATORY (INHALATION) at 15:07

## 2017-03-31 NOTE — MANAGEMENT PLAN
Notified that [facility] did obtain auth from pt insurance for SNF rehab.  Pt can go today if MD deems medically ready to discharge    Denise Atwood RN BSN  Outcomes Manager  Pager # [phone]

## 2017-03-31 NOTE — ROUTINE PROCESS
Care Management Interventions  PCP Verified by CM: Yes  Palliative Care Consult (Criteria: CHF and RRAT>21): No  Reason for No Palliative Care Consult: Patient declined palliative services at this time  Mode of Transport at Discharge:  Other (see comment) (in hospital transport)  Transition of Care Consult (CM Consult): SNF  Physical Therapy Consult: Yes  Occupational Therapy Consult: Yes  Current Support Network: Lives with Spouse, Own Home  Confirm Follow Up Transport: Family  Plan discussed with Pt/Family/Caregiver: Yes  Freedom of Choice Offered: Yes  Discharge Location  Discharge Placement: Skilled nursing facility ([facility])

## 2017-03-31 NOTE — PROGRESS NOTES
[facility] Dosing Services for Vancomycin    Indication: Upper Respiratory Infection, Cellulitis  Day of Therapy:  5  Goal Level(s): 15-20 mcg/ml  Today's Trough: 13.2 mcg/ml about 12 hours after the previous dose - Below Goal     Temp: 97.3 °F (36.3 °C)    Serum Creatinine:   Lab Results   Component Value Date/Time    Creatinine 0.61 03/31/2017 05:15 AM     Creatinine Clearance:  Estimated Creatinine Clearance: 59 mL/min (based on Cr of 0.61). BUN:    Lab Results   Component Value Date/Time    BUN 20 03/31/2017 05:15 AM      WBC / C&S:    Lab Results   Component Value Date/Time    WBC 4.1 03/28/2017 03:35 AM    Culture result: RARE  STAPHYLOCOCCUS AUREUS   03/27/2017 02:15 PM    Culture result:  03/27/2017 02:15 PM     Staph Aureus called to [employer] [employer] [address] on 03/29/17 at 1035 by Dori Torres       Other Current Antibiotics:    Current Antimicrobial Therapy (168h ago through future)      Ordered     Start Stop      03/31/17 1503  VANCOMYCIN INFORMATION NOTE  Other,   ONCE      04/02/17 1030 04/02/17 2259    03/31/17 1458  vancomycin (VANCOCIN) 750 mg in 0.9% sodium chloride (MBP/ADV) 250 mL ADV  750 mg,   IntraVENous,   EVERY 12 HOURS      03/31/17 2300 --    03/31/17 1237  lidocaine 2 % soln 50 mL with hydrocortisone 10 mg tab 30 mg, nystatin 100,000 unit/mL susp 3,000,000 Units, diphenhydrAMINE 12.5 mg/5 mL elix 150 mg oral suspension  5 mL,   Oral,   3 TIMES DAILY      03/31/17 0000 --    03/31/17 1237  vancomycin 500 mg 500 mg IVPB  500 mg,   IntraVENous,   EVERY 12 HOURS      03/31/17 0000 --    03/29/17 1504  levoFLOXacin (LEVAQUIN) tablet 750 mg  750 mg,   Oral,   EVERY 48 HOURS      03/30/17 0900 --    03/29/17 0843  klack's mouthwash oral suspension (COMPOUNDED)  5 mL,   Oral,   3 TIMES DAILY      03/29/17 1000 --    03/26/17 1341  fluconazole (DIFLUCAN) tablet 200 mg  200 mg,   Oral,   DAILY     Comments:  OP SIG:Take 200 mg by mouth daily.  FDA advises cautious prescribing of oral fluconazole in pregnancy. 03/27/17 0900 --              The vancomycin regimen has been increased to 750 mg IV every 12 hours, starting tonight. A vancomycin serum trough has been ordered for Sunday morning, 2 April before the fourth new dose. Pharmacy will follow daily and make changes as needed.     Brian Cintron, PHARMD, Pharmacist  3/31/2017 3:04 PM

## 2017-03-31 NOTE — ROUTINE PROCESS
Bedside, Verbal and Written shift change report given to GENARO Murillo (oncoming nurse) by [employer] (offgoing nurse). Report included the following information SBAR, Kardex, Intake/Output and Recent Results. Assumed care of pt laying in bed, a/o x 2, following commands denies pain, 2L NC, non telemetry, dressing to right shin. No family present at this time. 2200  Scheduled med's given, family at bedside. Pt tolerated well, questions answered. 0200  Pt restless, pulling at IV and gown, IV wrapped with gauze to mask. 0600  Pt sleeping, appears asymptomatic.      0715  Bedside, Verbal and Written shift change report given to PAOLA Erickson (oncoming nurse) by GENARO Murillo (offgoing nurse). Report included the following information SBAR, Kardex, Intake/Output and Recent Results.

## 2017-03-31 NOTE — PROGRESS NOTES
1100 Bedside and Verbal shift change report given to Natalya Ennis RN (oncoming nurse) by Larry Burrows RN  (offgoing nurse). Report included the following information SBAR, Procedure Summary, Intake/Output, MAR and Recent Results. Pt resting comfortably in bed, NAD, no c/o pain. Bed locked and lowered, siderails up x3. Call bell at bedside,will continue to monitor. 1326 Pt sitting up in bed eating lunch, scheduled meds provided. Pt needs met at this time, will continue to monitor. 1544 Bedside and Verbal shift change report given to Esau Donovan (oncoming nurse) by Natalya Ennis RN (offgoing nurse). Report included the following information SBAR, Procedure Summary, Intake/Output, MAR and Recent Results.

## 2017-03-31 NOTE — IP AVS SNAPSHOT
[address] Patient: Rafia Jacob MRN: [id number] CSF:[id number] You are allergic to the following Allergen Reactions Sulfa (Sulfonamide Antibiotics) Hives Sulfamethoxazole-Trimethoprim Rash Recent Documentation Height Weight BMI OB Status Smoking Status 1.524 m 48.3 kg 20.78 kg/m2 Hysterectomy Former Smoker Emergency Contacts Name Discharge Info Relation Home Work Mobile Stephan Marroquin DISCHARGE CAREGIVER [3] Spouse [3]   [phone] Katelyn Carroll  Sister [23]   [phone] About your hospitalization You were admitted on:  March 31, 2017 You last received care in the:  [address] You were discharged on: May 8, 2017 Unit phone number:  [phone] Why you were hospitalized Your primary diagnosis was:  Not on File Your diagnoses also included: Altered Mental State, Herpes Labialis, Cellulitis Of Arm, Right Providers Seen During Your Hospitalizations Provider Role Specialty Primary office phone Basilia Wise MD Attending Provider Geriatric Medicine [phone] Your Primary Care Physician (PCP) Primary Care Physician Office Phone Office Fax Justin Orta [phone] Follow-up Information Follow up With Details Comments Contact Info Basilia Wise MD   [address] Internal Medicine [facility] [id number] [phone] Current Discharge Medication List  
  
CONTINUE these medications which have CHANGED Dose & Instructions Dispensing Information Comments Morning Noon Evening Bedtime  
 predniSONE 10 mg tablet Commonly known as:  Emilia Gaines What changed:   
- medication strength 
- how much to take Your last dose was:     
   
Your next dose is:    
   
   
 Dose:  30 mg  
 Take 3 Tabs by mouth daily. Quantity:  90 Tab Refills:  0  
     
   
   
   
  
 rOPINIRole 2 mg tablet Commonly known as:  Jamie Scott What changed:   
- medication strength 
- how much to take - when to take this Your last dose was: Your next dose is:    
   
   
 Dose:  2 mg Take 1 Tab by mouth nightly. Quantity:  60 Tab Refills:  0  
     
   
   
   
  
 traMADol 50 mg tablet Commonly known as:  ULTRAM  
What changed:  how much to take Your last dose was: Your next dose is:    
   
   
 Dose:  50 mg Take 1 Tab by mouth every six (6) hours as needed for Pain. Max Daily Amount: 200 mg. Quantity:  12 Tab Refills:  0 CONTINUE these medications which have NOT CHANGED Dose & Instructions Dispensing Information Comments Morning Noon Evening Bedtime  
 albuterol-ipratropium 2.5 mg-0.5 mg/3 ml Nebu Commonly known as:  Emi Heck Your last dose was: Your next dose is:    
   
   
 Dose:  3 mL  
3 mL by Nebulization route every six (6) hours as needed. Quantity:  30 Nebule Refills:  1  
     
   
   
   
  
 amLODIPine 2.5 mg tablet Commonly known as:  Brittany Juaerz Your last dose was: Your next dose is:    
   
   
 Dose:  2.5 mg Take 1 tablet by mouth daily. Quantity:  90 tablet Refills:  0  
     
   
   
   
  
 aspirin 325 mg tablet Commonly known as:  ASPIRIN Your last dose was: Your next dose is:    
   
   
 Dose:  325 mg Take 1 Tab by mouth daily. Quantity:  30 Tab Refills:  0  
     
   
   
   
  
 bimatoprost 0.01 % ophthalmic drops Commonly known as:  LUMIGAN Your last dose was: Your next dose is:    
   
   
 Dose:  1 Drop Administer 1 Drop to left eye every evening. Refills:  0  
     
   
   
   
  
 clonazePAM 1 mg tablet Commonly known as:  Joe Ivory Your last dose was: Your next dose is: Take  by mouth two (2) times a day. Refills:  0  
     
   
   
   
  
 clopidogrel 75 mg Tab Commonly known as:  PLAVIX Your last dose was: Your next dose is:    
   
   
 Dose:  75 mg Take 1 Tab by mouth daily. Quantity:  30 Tab Refills:  5 FLUoxetine 20 mg capsule Commonly known as:  PROzac Your last dose was: Your next dose is:    
   
   
 Dose:  20 mg  
20 mg. Refills:  0  
     
   
   
   
  
 FOLIC ACID PO Your last dose was: Your next dose is:    
   
   
 Dose:  1 Tab Take 1 Tab by mouth. Refills:  0  
     
   
   
   
  
 levothyroxine 50 mcg tablet Commonly known as:  synthroid Your last dose was: Your next dose is:    
   
   
 Dose:  50 mcg Take 1 Tab by mouth Daily (before breakfast). Quantity:  30 Tab Refills:  5  
     
   
   
   
  
 meloxicam 7.5 mg tablet Commonly known as:  MOBIC Your last dose was: Your next dose is: Take  by mouth daily. Refills:  0  
     
   
   
   
  
 mirtazapine 15 mg tablet Commonly known as:  Carlus Inch Your last dose was: Your next dose is:    
   
   
 Dose:  15 mg Take 15 mg by mouth nightly. Refills:  0  
     
   
   
   
  
 ondansetron 4 mg disintegrating tablet Commonly known as:  ZOFRAN ODT Your last dose was: Your next dose is:    
   
   
 Dose:  4 mg Take 1 Tab by mouth every eight (8) hours as needed for Nausea. Quantity:  20 Tab Refills:  0  
     
   
   
   
  
 pantoprazole 40 mg tablet Commonly known as:  PROTONIX Your last dose was: Your next dose is:    
   
   
 Dose:  40 mg Take 1 Tab by mouth Daily (before breakfast). Quantity:  30 Tab Refills:  0  
     
   
   
   
  
 simvastatin 20 mg tablet Commonly known as:  ZOCOR Your last dose was: Your next dose is:    
   
   
 Dose:  20 mg Take 1 Tab by mouth nightly. Quantity:  30 Tab Refills:  0 STOP taking these medications   
 fluconazole 200 mg tablet Commonly known as:  DIFLUCAN  
   
  
 lidocaine 2 % soln 50 mL with hydrocortisone 10 mg tab 30 mg, nystatin 100,000 unit/mL susp 3,000,000 Units, diphenhydrAMINE 12.5 mg/5 mL elix 150 mg oral suspension  
   
  
 vancomycin 500 mg 500 mg IVPB Where to Get Your Medications Information on where to get these meds will be given to you by the nurse or doctor. ! Ask your nurse or doctor about these medications  
  predniSONE 10 mg tablet  
 rOPINIRole 2 mg tablet  
 traMADol 50 mg tablet Discharge Instructions Altered Mental Status: Care Instructions Your Care Instructions Altered mental status is a change in how well your brain is working. As a result, you may be confused, be less alert than usual, or act in odd ways. This may include seeing or hearing things that aren't really there (hallucinations). A mental status change has many possible causes. For example, it may be the result of an infection, an imbalance of chemicals in the body, or a chronic disease such as diabetes or COPD. It can also be caused by things such as a head injury, taking certain medicines, or using alcohol or drugs. The doctor may do tests to look for the cause. These tests may include urine tests, blood tests, and imaging tests such as a CT scan. Sometimes a clear cause isn't found. But tests can help the doctor rule out a serious cause of your symptoms. A change in mental status can be scary. But mental status will often return to normal when the cause is treated. So it is important to get any follow-up testing or treatment the doctor has suggested. The doctor has checked you carefully, but problems can develop later. If you notice any problems or new symptoms, get medical treatment right away. Follow-up care is a key part of your treatment and safety.  Be sure to make and go to all appointments, and call your doctor if you are having problems. It's also a good idea to know your test results and keep a list of the medicines you take. How can you care for yourself at home? · Be safe with medicines. Take your medicines exactly as prescribed. Call your doctor if you think you are having a problem with your medicine. · Have another adult stay with you until you are better. This can help keep you safe. Ask that person to watch for signs that your mental status is getting worse. When should you call for help? Call 911 anytime you think you may need emergency care. For example, call if: 
· You passed out (lost consciousness). Call your doctor now or seek immediate medical care if: 
· Your mental status is getting worse. · You have new symptoms, such as a fever, chills, or shortness of breath. · You do not feel safe. Watch closely for changes in your health, and be sure to contact your doctor if: 
· You do not get better as expected. Where can you learn more? Go to http://emanuel-williams.info/. Enter J005 in the search box to learn more about \"Altered Mental Status: Care Instructions. \" Current as of: October 14, 2016 Content Version: 11.2 © 5337-5979 "[employer]orporated. Care instructions adapted under license by [employer] (which disclaims liability or warranty for this information). If you have questions about a medical condition or this instruction, always ask your healthcare professional. [place] [address] any warranty or liability for your use of this information. Discharge Orders None [facility] Announcement We are excited to announce that we are making your provider's discharge notes available to you in [employer]. You will see these notes when they are completed and signed by the physician that discharged you from your recent hospital stay.   If you have any questions or concerns about any information you see in [employer], please call the Health Information Department where you were seen or reach out to your Primary Care Provider for more information about your plan of care. Introducing [facility]! Mark Adams introduces [employer] patient portal. Now you can access parts of your medical record, email your doctor's office, and request medication refills online. 1. In your internet browser, go to [email or web address] 2. Click on the First Time User? Click Here link in the Sign In box. You will see the New Member Sign Up page. 3. Enter your [employer] Access Code exactly as it appears below. You will not need to use this code after youve completed the sign-up process. If you do not sign up before the expiration date, you must request a new code. · [employer] Access Code: [email or web address] Expires: 8/2/2017  9:00 PM 
 
4. Enter the last four digits of your Social Security Number (xxxx) and Date of Birth (mm/dd/yyyy) as indicated and click Submit. You will be taken to the next sign-up page. 5. Create a [employer] ID. This will be your [employer] login ID and cannot be changed, so think of one that is secure and easy to remember. 6. Create a [employer] password. You can change your password at any time. 7. Enter your Password Reset Question and Answer. This can be used at a later time if you forget your password. 8. Enter your e-mail address. You will receive e-mail notification when new information is available in [address]. 9. Click Sign Up. You can now view and download portions of your medical record. 10. Click the Download Summary menu link to download a portable copy of your medical information. If you have questions, please visit the Frequently Asked Questions section of the [employer] website. Remember, [employer] is NOT to be used for urgent needs. For medical emergencies, dial 911. Now available from your iPhone and Android! General Information Please provide this summary of care documentation to your next provider. Patient Signature:  ____________________________________________________________ Date:  ____________________________________________________________  
  
Leia Ernst Provider Signature:  ____________________________________________________________ Date:  ____________________________________________________________

## 2017-03-31 NOTE — PROGRESS NOTES
Problem: Self Care Deficits Care Plan (Adult)  Goal: *Acute Goals and Plan of Care (Insert Text)  Occupational Therapy Goals  Initiated 3/30/2017 within 7 day(s). 1. Patient will perform grooming tasks at EOB with supervision for safety to increase endurance for self care tasks. 2. Patient will perform functional task in standing for 5 minutes with modified independence to increase activity tolerance/endurance for ADLs. 3. Patient will perform lower body dressing with minimal assistance/contact guard assist.  4. Patient will perform toilet transfers with modified independence. 5. Patient will perform all aspects of toileting with modified independence. 6. Patient will participate in upper extremity therapeutic exercise/activities with minimal assistance/contact guard assist for 8 minutes to increase/maintain strength/function of BUEs for ADLs. 7. Patient will utilize energy conservation techniques during functional activities with minimal verbal cues. Outcome: Progressing Towards Goal  OCCUPATIONAL THERAPY TREATMENT     Patient: Blayne Chacon (66 y.o. female)  Date: 3/31/2017  Diagnosis: Right arm cellulitis  Acute encephalopathy  Hemoptysis  COPD (chronic obstructive pulmonary disease) ([facility])  Dehydration  SIRS (systemic inflammatory response syndrome) ([facility])  Right arm cellulitis <principal problem not specified>       Precautions: Fall, Other (comment) (oxygen)  Chart, occupational therapy assessment, plan of care, and goals were reviewed. ASSESSMENT:  Pt sleeping upon entry, requiring max verbal/tactile cues to arouse. Pt moves BUE w/assist and requires assist for thoroughness w/simple ADL grooming tasks, washing face and hands. [facility], reports pt up all night.   EDUCATION Pt educated on importance of UE TherEx and encouraged to preform throughout the day  Progression toward goals:  [ ]          Improving appropriately and progressing toward goals  [X]          Improving slowly and progressing toward goals  [ ]          Not making progress toward goals and plan of care will be adjusted       PLAN:  Patient continues to benefit from skilled intervention to address the above impairments. Continue treatment per established plan of care. Discharge Recommendations:  Rafael Gilmore  Further Equipment Recommendations for Discharge:   TBD as pt progresses       SUBJECTIVE:   Patient stated Zulma.  when asked her name      OBJECTIVE DATA SUMMARY:         Cognitive/Behavioral Status:  Neurologic State: Lethargic  Orientation Level: Oriented to person  Cognition: Decreased attention/concentration, Decreased command following  Safety/Judgement: Fall prevention  Functional Mobility and Transfers for ADLs:  ADL Intervention:  Grooming  Washing Face: Minimum assistance  Washing Hands: Minimum assistance     Therapeutic Exercises:   AAROM > AROM BUE shoulder flexion/extension 5x     Pain:  Pre Treatment:0  Post Treatment:0     Activity Tolerance:    Poor     Please refer to the flowsheet for vital signs taken during this treatment.   After treatment:   [ ]  Patient left in no apparent distress sitting up in chair  [X]  Patient left in no apparent distress in bed  [X]  Call bell left within reach  [X]  Nursing notified, CNA, Onetha Railing  [ ]  Caregiver present  [ ]  Bed alarm activated     NATASHA Shah  Time Calculation: 12 mins

## 2017-03-31 NOTE — IP AVS SNAPSHOT
Current Discharge Medication List  
  
CONTINUE these medications which have CHANGED Dose & Instructions Dispensing Information Comments Morning Noon Evening Bedtime  
 predniSONE 10 mg tablet Commonly known as:  Nayla Duane What changed:   
- medication strength 
- how much to take Your last dose was: Your next dose is:    
   
   
 Dose:  30 mg Take 3 Tabs by mouth daily. Quantity:  90 Tab Refills:  0  
     
   
   
   
  
 rOPINIRole 2 mg tablet Commonly known as:  Perry Kocher What changed:   
- medication strength 
- how much to take - when to take this Your last dose was: Your next dose is:    
   
   
 Dose:  2 mg Take 1 Tab by mouth nightly. Quantity:  60 Tab Refills:  0  
     
   
   
   
  
 traMADol 50 mg tablet Commonly known as:  ULTRAM  
What changed:  how much to take Your last dose was: Your next dose is:    
   
   
 Dose:  50 mg Take 1 Tab by mouth every six (6) hours as needed for Pain. Max Daily Amount: 200 mg. Quantity:  12 Tab Refills:  0 CONTINUE these medications which have NOT CHANGED Dose & Instructions Dispensing Information Comments Morning Noon Evening Bedtime  
 albuterol-ipratropium 2.5 mg-0.5 mg/3 ml Nebu Commonly known as:  Katarina Overall Your last dose was: Your next dose is:    
   
   
 Dose:  3 mL  
3 mL by Nebulization route every six (6) hours as needed. Quantity:  30 Nebule Refills:  1  
     
   
   
   
  
 amLODIPine 2.5 mg tablet Commonly known as:  Lloyd Alcaraza Your last dose was: Your next dose is:    
   
   
 Dose:  2.5 mg Take 1 tablet by mouth daily. Quantity:  90 tablet Refills:  0  
     
   
   
   
  
 aspirin 325 mg tablet Commonly known as:  ASPIRIN Your last dose was: Your next dose is:    
   
   
 Dose:  325 mg Take 1 Tab by mouth daily. Quantity:  30 Tab Refills:  0 bimatoprost 0.01 % ophthalmic drops Commonly known as:  LUMIGAN Your last dose was: Your next dose is:    
   
   
 Dose:  1 Drop Administer 1 Drop to left eye every evening. Refills:  0  
     
   
   
   
  
 clonazePAM 1 mg tablet Commonly known as:  Mu Pillai Your last dose was: Your next dose is: Take  by mouth two (2) times a day. Refills:  0  
     
   
   
   
  
 clopidogrel 75 mg Tab Commonly known as:  PLAVIX Your last dose was: Your next dose is:    
   
   
 Dose:  75 mg Take 1 Tab by mouth daily. Quantity:  30 Tab Refills:  5 FLUoxetine 20 mg capsule Commonly known as:  PROzac Your last dose was: Your next dose is:    
   
   
 Dose:  20 mg  
20 mg. Refills:  0  
     
   
   
   
  
 FOLIC ACID PO Your last dose was: Your next dose is:    
   
   
 Dose:  1 Tab Take 1 Tab by mouth. Refills:  0  
     
   
   
   
  
 levothyroxine 50 mcg tablet Commonly known as:  synthroid Your last dose was: Your next dose is:    
   
   
 Dose:  50 mcg Take 1 Tab by mouth Daily (before breakfast). Quantity:  30 Tab Refills:  5  
     
   
   
   
  
 meloxicam 7.5 mg tablet Commonly known as:  MOBIC Your last dose was: Your next dose is: Take  by mouth daily. Refills:  0  
     
   
   
   
  
 mirtazapine 15 mg tablet Commonly known as:  Treva Sung Your last dose was: Your next dose is:    
   
   
 Dose:  15 mg Take 15 mg by mouth nightly. Refills:  0  
     
   
   
   
  
 ondansetron 4 mg disintegrating tablet Commonly known as:  ZOFRAN ODT Your last dose was: Your next dose is:    
   
   
 Dose:  4 mg Take 1 Tab by mouth every eight (8) hours as needed for Nausea. Quantity:  20 Tab Refills:  0  
     
   
   
   
  
 pantoprazole 40 mg tablet Commonly known as:  PROTONIX Your last dose was: Your next dose is:    
   
   
 Dose:  40 mg Take 1 Tab by mouth Daily (before breakfast). Quantity:  30 Tab Refills:  0  
     
   
   
   
  
 simvastatin 20 mg tablet Commonly known as:  ZOCOR Your last dose was: Your next dose is:    
   
   
 Dose:  20 mg Take 1 Tab by mouth nightly. Quantity:  30 Tab Refills:  0 STOP taking these medications   
 fluconazole 200 mg tablet Commonly known as:  DIFLUCAN  
   
  
 lidocaine 2 % soln 50 mL with hydrocortisone 10 mg tab 30 mg, nystatin 100,000 unit/mL susp 3,000,000 Units, diphenhydrAMINE 12.5 mg/5 mL elix 150 mg oral suspension  
   
  
 vancomycin 500 mg 500 mg IVPB Where to Get Your Medications Information on where to get these meds will be given to you by the nurse or doctor. ! Ask your nurse or doctor about these medications  
  predniSONE 10 mg tablet  
 rOPINIRole 2 mg tablet  
 traMADol 50 mg tablet

## 2017-03-31 NOTE — ROUTINE PROCESS
Bedside and Verbal shift change report rec'd by  Nadia Mcwilliams RN . Report included the following information SBAR, Intake/Output, MAR and Recent Results and POC. V/S WNL. Pt Is awake and alert to self. No discomfort noted. Lung sounds are clear on room air. Redness noted to rt upper arm with no drainage. Left sided weakness residual to past CVA. 22g LFA flushed and patent no redness or swelling noted. Report called to [facility] by Padmini Woods awaiting available bed at [facility].  1814 Attempted to call report to [facility], no answer  1830 Telephone report given to [address] [place] [address]. Report included the following information SBAR, Intake/Output, MAR  and Recent Results. .   1900 Transferred to [facility] via bed,

## 2017-03-31 NOTE — DISCHARGE SUMMARY
[address] [facility] SUMMARY    Name:  Piyush Mendez  MR#:  [id number]  :  1945  Account #:  [de-identified]  Date of Adm:  2017  Date of Transfer:  2017      DISCHARGE DIAGNOSES  1. Acute right forearm cellulitis. 2. Infected acute right olecranon bursitis. 3. Hyperkalemia, corrected. 4. Acute bronchitis with bronchospasm. 5. Hemoptysis due to above. 6. Dehydration - corrected. 7. Chronic obstructive pulmonary disease. 8. Metabolic encephalopathy, improved. 9. Hypertensive cardiovascular disease. 10. Hypercholesterolemia. 11. Hypothyroidism. 12. Severe atherosclerotic cerebrovascular disease with multiple  cerebrovascular accidents in the past with residual left-sided  weakness, gait disorder, ataxia. 13. Recent cognitive decline. 14. 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 15. Giant cell arthritis on high-dose steroids. 16. Old cervical spine fracture. 17. Restless leg syndrome. 18. Thyroid nodule, status post equivocal biopsy by Dr. Tariq Bennett. 19. Osteoporosis. 20. Osteoarthritis. 21. Depression. 25. Post multiple surgeries including hysterectomy, left breast biopsy,  thyroid biopsy, temporal artery biopsy, lower extremity  revascularization. 23. ALLERGIC TO SULFA. DISCHARGE MEDICATIONS  1. DuoNeb unit dose every 6 hours as needed. 2. Klack's solution 5 mL swish and spit 3 times daily for 7 more days. 3. Protonix 40 mg daily. 4. Vancomycin 500 mg IV every 12 hours for 6 more doses. 5. Requip 1 mg twice daily for restless leg syndrome. 6. Tramadol 50 mg twice daily as needed for pain. 7. Norvasc 2.5 mg daily. 8. Aspirin 325 mg daily. 9. Lumigan eyedrops one drop left eye every evening. 10. Klonopin 1 mg twice daily. 11. Plavix 75 mg daily. 12. Diflucan 200 mg daily for 5 more days. 13. Prozac 20 mg daily. 14. Folic acid 1 mg daily. 15. Synthroid 50 mcg daily. 16. Mobic 7.5 mg daily. 17. Remeron 15 mg at bedtime.   18. Zofran 4 mg every 8 hours as needed. 19. Prednisone 40 mg daily. 20. Zocor 20 mg daily. DISPOSITION: To the transitional care unit for continued IV antibiotic  therapy, medical monitoring and physical rehabilitation. REASON FOR ADMISSION: A 59-year-old, white female with the  above problems admitted with persistent acute right forearm cellulitis  not responding to outpatient therapy with ultrasound findings of  olecranon bursitis and another collection in the forearm, in addition to  acute bronchitis with bronchospasm, hemoptysis, not responding to  outpatient therapy as well. The patient also had some alteration of  mental status, etc.    For details, see admission history and physical.    The patient was admitted and antibiotics were changed to vancomycin  and Levaquin to cover for possible MRSA infection of the forearm, as  well as respiratory infection. Ultrasound of the forearm showed  collection at the olecranon bursa and forearm as above. Attempts at  aspiration of the olecranon bursa by Interventional Radiology revealed  no significant fluids. Material obtained was sent for culture and that  came back with methicillin-sensitive Staphylococcus aureus. However,  reliability of this culture is questionable since the patient did not  improve on clindamycin as outpatient. It was felt prudent to continue  vancomycin for 3 more days. The patient's respiratory status improved,  as well as her mental status. Her p.o. intake improved. She was seen  by Physical Therapy and felt to benefit from further rehabilitation. She  had high potassium on admission that was corrected with hydration,  etc. Her p.o. intake is improving and she is progressing with physical  therapy satisfactorily. The patient was put back on her usual  medications, remained stable from the hemodynamic and cardiac  standpoint and neurologic standpoint. She was seen by Dr. Chao Duffy regarding her giant cell arteritis and high-dose prednisone.  He  recommended consideration of prednisone or steroid sparing  medications after stabilization. Her methotrexate is currently on hold  due to infection. The patient is felt to have achieved maximum benefit of hospital stay,  and she will be discharged to [facility] for continued IV antibiotics,  monitoring and physical rehabilitation.         MD CHIDI Ferguson / ENZO  D:  03/31/2017   12:52  T:  03/31/2017   13:15  Job #:  [id number]

## 2017-03-31 NOTE — PROGRESS NOTES
NUTRITION follow-up/Plan of care    RECOMMENDATIONS:     1. Regular diet  2. Ensure TID  3. Monitor weight, labs and PO intake  4. RD to follow    GOALS:     1. Ongoing: PO intake meets >75% of protein/calorie needs by 4/5  2. Ongoing: Maintain weight (+/- 1-2 lb) by 4/5    ASSESSMENT:      Weight status is classified as normal per BMI of 20.7. However, patient is at nutrition risk due to BMI below 23 with patient above 72years of age. PO intake is poor. Continue Ensure TID for additional calories/protein. Labs noted. Nutrition recommendations listed. RD to follow. Nutrition Risk:  []   High [x]  Moderate [] Low    SUBJECTIVE/OBJECTIVE:     Patient with AMS ans right arm cellulitis. She has h/o COPD, CVAs with residual left sided weakness. UBW around 110 lb. No known food allergies. Patient with poor appetite. Observed 25% intake of breakfast meal. Patient drinking Ensure supplements when she receives them and prefers strawberry flavor. Encouraged adequate intake. Noted plans for discharge to [facility] this afternoon. Will monitor. Information Obtained From:   [x] Chart Review  [x] Patient  [x] Family/Caregiver  [] Nurse/Physician   [] Patient Rounds/Interdisciplinary Meeting    Diet: Regular diet  Patient Vitals for the past 100 hrs:   % Diet Eaten   03/31/17 1052 25 %   03/30/17 1843 100 %   03/30/17 1329 50 %   03/30/17 1104 50 %   03/29/17 1746 25 %     Medications: [x] Reviewed   Encounter Diagnoses     ICD-10-CM ICD-9-CM   1. Right arm cellulitis L03.113 682.3   2. Hemoptysis R04.2 786.30   3. Encephalopathy acute G93.40 348.30   4. Dehydration E86.0 276.51   5.  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    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    Menopause     Psychiatric disorder bipolar    Respiratory abnormalities     Restless leg syndrome     Thyroid nodule 4/23/2014    Abnormal biopsy       Labs:  Lab Results   Component Value Date/Time    Sodium 142 03/31/2017 05:15 AM    Potassium 3.8 03/31/2017 05:15 AM    Chloride 108 03/31/2017 05:15 AM    CO2 27 03/31/2017 05:15 AM    Anion gap 7 03/31/2017 05:15 AM    Glucose 124 03/31/2017 05:15 AM    BUN 20 03/31/2017 05:15 AM    Creatinine 0.61 03/31/2017 05:15 AM    Calcium 8.1 03/31/2017 05:15 AM    Albumin 3.3 02/06/2017 11:30 AM     Anthropometrics: BMI (calculated): 20.7 Last 3 Recorded Weights in this Encounter    03/26/17 0422 03/30/17 0541   Weight: 49.9 kg (110 lb) 48.1 kg (106 lb 1.6 oz)    Ht Readings from Last 1 Encounters:   03/26/17 5' (1.524 m)     [x]  Weight Loss  []  Weight Gain  []  Weight Stable   []  New wt n/a on record     Estimated Nutrition Needs:   1400 Kcals/day  Protein (g): 60 g    Nutrition Problems Identified:   [x] Suboptimal PO intake   [] Food Allergies  [] Difficulty chewing/swallowing/poor dentition  [] Constipation/Diarrhea   [] Nausea/Vomiting   [] None  [] Other:     Plan:   [] Therapeutic Diet  [x]  Obtained/adjusted food preferences/tolerances and/or snacks options   [x]  Supplements added   [] Occupational therapy following for feeding techniques  []  HS snack added   []  Modify diet texture   []  Modify diet for food allergies   []  Assist with menu selection   [x]  Monitor PO intake on meal rounds   [x]  Continue inpatient monitoring and intervention   []  Participated in discharge planning/Interdisciplinary rounds/Team meetings   []  Other:     Education Needs:   [] Not appropriate for teaching at this time due to:   [x] Identified and addressed    Nutrition Monitoring and Evaluation:   [x] Continue inpatient monitoring and interventions    [] Other:     Edwina Soni

## 2017-04-01 PROCEDURE — 74011000258 HC RX REV CODE- 258: Performed by: INTERNAL MEDICINE

## 2017-04-01 PROCEDURE — 74011250636 HC RX REV CODE- 250/636: Performed by: INTERNAL MEDICINE

## 2017-04-01 PROCEDURE — 74011636637 HC RX REV CODE- 636/637: Performed by: INTERNAL MEDICINE

## 2017-04-01 PROCEDURE — 74011250637 HC RX REV CODE- 250/637: Performed by: INTERNAL MEDICINE

## 2017-04-01 RX ADMIN — SODIUM CHLORIDE 500 MG: 900 INJECTION, SOLUTION INTRAVENOUS at 01:00

## 2017-04-01 RX ADMIN — MELOXICAM 7.5 MG: 7.5 TABLET ORAL at 09:35

## 2017-04-01 RX ADMIN — PREDNISONE 40 MG: 20 TABLET ORAL at 09:02

## 2017-04-01 RX ADMIN — TRAMADOL HYDROCHLORIDE 100 MG: 50 TABLET, FILM COATED ORAL at 17:57

## 2017-04-01 RX ADMIN — HYDROCORTISONE 5 ML: 10 TABLET ORAL at 22:00

## 2017-04-01 RX ADMIN — CLOPIDOGREL BISULFATE 75 MG: 75 TABLET, FILM COATED ORAL at 09:02

## 2017-04-01 RX ADMIN — SODIUM CHLORIDE 500 MG: 900 INJECTION, SOLUTION INTRAVENOUS at 14:35

## 2017-04-01 RX ADMIN — ROPINIROLE HYDROCHLORIDE 1 MG: 1 TABLET, FILM COATED ORAL at 09:03

## 2017-04-01 RX ADMIN — BIMATOPROST 1 DROP: 0.1 SOLUTION/ DROPS OPHTHALMIC at 20:26

## 2017-04-01 RX ADMIN — LEVOFLOXACIN 750 MG: 750 TABLET, FILM COATED ORAL at 09:03

## 2017-04-01 RX ADMIN — HYDROCORTISONE 5 ML: 10 TABLET ORAL at 16:00

## 2017-04-01 RX ADMIN — AMLODIPINE BESYLATE 2.5 MG: 2.5 TABLET ORAL at 09:03

## 2017-04-01 RX ADMIN — LEVOTHYROXINE SODIUM 50 MCG: 50 TABLET ORAL at 09:02

## 2017-04-01 RX ADMIN — TRAMADOL HYDROCHLORIDE 100 MG: 50 TABLET, FILM COATED ORAL at 12:54

## 2017-04-01 RX ADMIN — HYDROCORTISONE 5 ML: 10 TABLET ORAL at 09:00

## 2017-04-01 RX ADMIN — FLUCONAZOLE 200 MG: 100 TABLET ORAL at 09:03

## 2017-04-01 RX ADMIN — CLONAZEPAM 1 MG: 0.5 TABLET ORAL at 09:03

## 2017-04-01 RX ADMIN — MIRTAZAPINE 15 MG: 15 TABLET, FILM COATED ORAL at 21:19

## 2017-04-01 RX ADMIN — FLUOXETINE HYDROCHLORIDE 20 MG: 20 CAPSULE ORAL at 09:03

## 2017-04-01 RX ADMIN — PANTOPRAZOLE SODIUM 40 MG: 40 TABLET, DELAYED RELEASE ORAL at 09:03

## 2017-04-01 RX ADMIN — FOLIC ACID 1 MG: 1 TABLET ORAL at 09:03

## 2017-04-01 RX ADMIN — SIMVASTATIN 20 MG: 20 TABLET, FILM COATED ORAL at 21:19

## 2017-04-01 RX ADMIN — ROPINIROLE HYDROCHLORIDE 2 MG: 1 TABLET, FILM COATED ORAL at 21:19

## 2017-04-01 RX ADMIN — CLONAZEPAM 1 MG: 0.5 TABLET ORAL at 17:58

## 2017-04-01 RX ADMIN — ASPIRIN 325 MG ORAL TABLET 325 MG: 325 PILL ORAL at 09:02

## 2017-04-01 NOTE — ROUTINE PROCESS
Resident arrived on unit at 1900 resident made comfortable in bed  present, oriented to call bell and room, skin assessment completed, multiple bruising noted to bilateral arms  And lower extremities right shin has laceration measuring 8 cm. Dressing reapplied. Sacral area intact.

## 2017-04-01 NOTE — ROUTINE PROCESS
Post Fall Documentation      Miguel Hansen witnessed/unwitnessed fall occurred on 04*/01/2017 (Date) at [phone] (Time). The answers to the following questions summarize the fall:     · In the patient's own words,:  · What was he/she doing when he/she fell? Staff assiting from wheelchair to bed. · What are his/her complaints?wanted to go back to bed. · Nurse:  · Document observation, treatment, conversation, follow-up, and patient response.  at bedside, nursing supervisor and MD aware.  present at time of assisted fall. · What was the patient's condition when found (i.e., pain, symptoms, cuts, bruises)? Sleepy     · What specific complaints did the patient have? No complaints voiced during incident. · What did the staff do when patient was found (i.e., vital signs, returned to bed with fall alarm, side rails up)? Side rails in place, fall sign and armband in place, Call bell and personal belongs  Within reach. · Which physician was notified?  Dr. Mcpherson Poster, LPN

## 2017-04-01 NOTE — PROGRESS NOTES
Nutrition initial assessment/  Plan of care      RECOMMENDATIONS:     1. Regular diet  2. Ensure TID  3. Monitor weight and PO intake  4. RD to follow     GOALS:     1. PO intake meets >75% of protein/calorie needs by 4/8  2. Weight Maintenance (+/- 1-2 lb by 4/8)    ASSESSMENT:     Weight status is classified as normal per BMI of 21.6. However, patient is at nutrition risk due to BMI below 23 with patient above 70years of age. PO intake is fair. Added Ensure TID for additional calories/protein. Labs noted. Nutrition recommendations listed. RD to follow. Nutrition Diagnoses:   Inadequate oral intake related to poor appetite as evidenced by less than 50% intake of meals. Nutrition Risk:  [] High  [x] Moderate []  Low    SUBJECTIVE/OBJECTIVE:      Transferred from [address] [place] [address] to [facility] on 3/31/17. Patient with AMS ans right arm cellulitis. She has h/o COPD, CVAs with residual left sided weakness. UBW around 110 lb. No known food allergies. She drinks Ensure daily at home and prefers strawberry flavor. Will monitor. Information Obtained from:    [x] Chart Review   [x] Patient   [] Family/Caregiver   [] Nurse/Physician   [] Interdisciplinary Meeting/Rounds    Diet: Regular diet  Medications: [x] Reviewed    Allergies: [x] Reviewed   Patient Active Problem List   Diagnosis Code    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 I63.9    CVA (cerebrovascular accident) ([facility] I63.9    HTN (hypertension) I10    Hypercholesteremia E78.00    Thyroid nodule E04.1    Drug-induced hepatic toxicity T50.901A, K71.6    Disorder of arteries and arterioles (HCC) I77.9    Stroke (HCC) I63.9    Giant cell arteritis (HCC) M31.6    RLS (restless legs syndrome) G25.81    Hypothyroid E03.9    Weakness R53.1    Cervical spine fracture (HCC) S12. 9XXA    Cerebral aneurysm J61.2    LICA cavernous severe stenosis with chronic infarct I65.22    Trauma, blunt T14.90    Platelet dysfunction due to drugs D69.59    Dehydration E86.0    COPD (chronic obstructive pulmonary disease) ([facility]) J44.9    Hemoptysis R04.2    Right arm cellulitis L03. 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    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    Menopause     Psychiatric disorder bipolar    Respiratory abnormalities     Restless leg syndrome     Thyroid nodule 4/23/2014    Abnormal biopsy        Labs:    Lab Results   Component Value Date/Time    Sodium 142 03/31/2017 05:15 AM    Potassium 3.8 03/31/2017 05:15 AM    Chloride 108 03/31/2017 05:15 AM    CO2 27 03/31/2017 05:15 AM    Anion gap 7 03/31/2017 05:15 AM    Glucose 124 03/31/2017 05:15 AM    BUN 20 03/31/2017 05:15 AM    Creatinine 0.61 03/31/2017 05:15 AM    Calcium 8.1 03/31/2017 05:15 AM    Albumin 3.3 02/06/2017 11:30 AM     Anthropometrics: BMI (calculated): 21.6  Last 3 Recorded Weights in this Encounter    03/31/17 2018   Weight: 50.1 kg (110 lb 8 oz)      Ht Readings from Last 1 Encounters:   03/31/17 5' (1.524 m)     No data found. IBW: 100 lb %IBW: 110% UBW: 110 lb %UBW: 100%   [] Weight Loss [] Weight Gain [x] Weight Stable    Estimated Nutrition Needs: [x] MSJ  [] Other:  Calories: 3959-5156 Kcal Based on:   [x] Actual BW    Protein:   55-65 g Based on:   [x] Actual BW    Fluid:       8073-8175 ml Based on:   [x] Actual BW      [x] No Cultural, Jew or ethnic dietary need identified.     [] Cultural, Jew and ethnic food preferences identified and addressed     Wt Status:  [x] Normal (18.6 - 24.9) [] Underweight (<18.5) [] Overweight (25 - 29.9) [] Mild Obesity (30 - 34.9)  [] Moderate Obesity (35 - 39.9) [] Morbid Obesity (40+)     Nutrition Problems Identified:   [x] Suboptimal PO intake   [] Food Allergies  [] Difficulty chewing/swallowing/poor dentition  [] Constipation/Diarrhea   [] Nausea/Vomiting   [] None  [] Other:     Plan:   [] Therapeutic Diet  [x]  Obtained/adjusted food preferences/tolerances and/or snacks options   [x]  Supplements added   [] Occupational therapy following for feeding techniques  []  HS snack added   []  Modify diet texture   []  Modify diet for food allergies   []  Educate patient   []  Assist with menu selection   [x]  Monitor PO intake on meal rounds   [x]  Continue inpatient monitoring and intervention   [x]  Participate in discharge planning/Interdisciplinary rounds/Team meetings   []  Other:     Education Needs:   [] Not appropriate for teaching at this time due to:   [x] Identified and addressed    Nutrition Monitoring and Evaluation:  [x] Continue ongoing monitoring and intervention  [] Other    [employer]

## 2017-04-01 NOTE — ROUTINE PROCESS
TRANSFER - IN REPORT:    Verbal report received from ESVIN Carver RN(name) on Mitchell Corcoran  being received from 3000(unit) for routine progression of care      Report consisted of patients Situation, Background, Assessment and   Recommendations(SBAR). Information from the following report(s) SBAR, Kardex and MAR was reviewed with the receiving nurse. Opportunity for questions and clarification was provided. Assessment completed upon patients arrival to unit and care assumed.

## 2017-04-01 NOTE — PROGRESS NOTES
Problem: Mobility Impaired (Adult and Pediatric)  Goal: *Acute Goals and Plan of Care (Insert Text)  PHYSICAL THERAPY STG GOALS :  Initiated 4/1/2017 and to be accomplished within 1-2 Weeks    1. Patient will move from supine to sit and sit to supine in bed with minimal assistance/contact guard assist.   2. Patient will transfer from bed to chair and chair to bed with minimum assistance using 2WW.  3. Patient will perform sit to stand with minimum assistance with Fair balance and safety awareness. 4. Patient will ambulate with minimum assistance for 10 feet with 2WW on level surfaces with 1 turn. 5. Patient will ascend/descend 1 step with bilateral handrail(s) withminimal assistance to allow for safe home access/exit. 6. Patient will improve standardized test score for [facility] Standing Balance Scale to 1+    PHYSICAL THERAPY LTG GOALS :  Initiated 4/1/2017 and to be accomplished within 2-4 Weeks    1. Patient will move from supine to sit and sit to supine in bed with supervision/set-up. 2. Patient will transfer from bed to chair and chair to bed with supervision/set-up using 2WW.  3. Patient will perform sit to stand with supervision/set-up with Good balance and safety awareness. 4. Patient will ambulate with supervision/set-up for 50 feet with CGA on level surfaces and be able to maneuver through narrow spaces and obstacles without loss of balance. 5. Patient will ascend/descend 2 steps with bilateral handrail(s) with contact guard assist to allow for safe home access/exit. 6. Patient will improve standardized test score for [facility] Balance Scale to 2    Physical Therapist: Ti Mccullough PT on 4/1/2017    [facility]   PHYSICAL THERAPY EVALUATION     Patient:  Don Bach (66 y.o. female)                Start of Care Date: 4/1/2017   Referred by : Birdie Skaggs MD                                Precautions: Fall, Skin              History:  Patient was [facility]17 with a Dx of cellulitis    History of present problem reveals HIGH Complexity :3+ comorbidities / personal factors will impact the outcome/ POC .     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    Menopause      Psychiatric disorder bipolar    Respiratory abnormalities      Restless leg syndrome      Thyroid nodule 4/23/2014     Abnormal biopsy       Past Surgical History:   Procedure Laterality Date    HX CORONARY STENT PLACEMENT            Cognitive Status:  Mental Status  Orientation Level: Oriented to person  Cognition: Follows commands  Barriers to Learning/Limitations: yes;  sensory deficits-vision/hearing/speech and altered mental status (i.e.Sedation, Confusion)  Compensate with: visual, verbal, tactile, kinesthetic cues/model  Prior Level of Function/Home Situation and Assitance recieved:  Home Situation  Home Environment: Private residence  # Steps to Enter: 2  Rails to Enter: Yes  Hand Rails : Right  One/Two Story Residence: Two story, live on 1st floor  Living Alone: No  Support Systems: Spouse/Significant Other/Partner  Patient Expects to be Discharged to[de-identified] Private residence  Current DME Used/Available at Home: Jan Calderon, Jamil Tucker, rollator, [address] [facility] chair, Commode, bedside, Wheelchair  Level of Assistance received at Home:   Prior to this current hospitalization, the patient was mod (I) with functional transfers and household ambulation using 2WW, mod (I) with BADL's  Justification for Evaluation complexity:  Patient is referred to Skilled Physical Therapy services due to a decline in functional mobility and required an overall HIGH  complexity evaluation examination. Exam:HIGH Complexity : 4+ Standardized tests and measures addressing body structure, function, activity limitation and / or participation in recreation    Clinical Presentation: MEDIUM Complexity : Evolving with changing characteristics       OBJECTIVE DATA SUMMARY:      Vital Signs:  Resting BP:  BP: 152/75  HR: Pulse (Heart Rate): 93    Pain:     Gross Assessment:  Gross Assessment  AROM: Generally decreased, functional (BLE)  Strength: Generally decreased, functional (BLE)             Bed Mobility:  Bed Mobility  Supine to Sit: Moderate assistance  Scooting: Minimum assistance  Balance:  Balance  Sitting - Static: Poor (constant support)  Sitting - Dynamic: Poor (constant support)  Standing - Static: Poor  Standing - Dynamic : None  Transfers:  Sit to Stand: Maximum assistance   SPT (bed<->wheelchair): maximum assist   Gait: not assessed due to safety concerns      Assessment:   Clinical Decision Making: Of High Complexity using standardized patient assessment instrument and /or measurable assessement of functional outcome of [facility] Standing Balance Scale: 0  0: Pt performs 25% or less of standing activity (Max assist) CN, 100% impaired. 1: Pt supports self with upper extremities but requires therapist assistance. Pt performs 25-50% of effort (Mod assist) CM, 80% to <100% impaired. 1+: Pt supports self with upper extremities but requires therapist assistance. Pt performs >50% effort. (Min assist). CL, 60% to <80% impaired. 2: Pt supports self independently with both upper extremities (walker, crutches, parallel bars). CL, 60% to <80% impaired. 2+: Pt support self independently with 1 upper extremity (cane, crutch, 1 parallel bar). CK, 40% to <60% impaired.   3: Pt stands without upper extremity support for up to 30 seconds. CK, 40% to <60% impaired. 3+: Pt stands without upper extremity support for 30 seconds or greater. CJ, 20% to <40% impaired. 4: Pt independently moves and returns center of gravity 1-2 inches in one plane. CJ, 20% to <40% impaired. 4+: Pt independently moves and returns center of gravity 1-2 inches in multiple planes. CI, 1% to <20% impaired. 5: Pt independently moves and returns center of gravity in all planes greater than 2 inches. CH, 0% impaired.(enter standardized test result). Positioning needs/Additional Comments :  Patient found supine in bed, spouse at bedside. Patient appears lethargic, but able to respond to simple questions and follow simple commands. Patient was able to perform bed mobility tasks, SPT bed<->wheelchair with max A + additional time to complete task. Patient presented with lean to R while seated/standing, needs cuing/manual assist to maintain upright posture. Left patient seated in wheelchair, spouse present, verbalized desire for patient to stay up in chair for lunch; instructed patient/patient's spouse to ask for staff assist for transfers, verbalized understanding. Informed nurse re: patient in wheelchair, status of transfer. TREATMENT PLAN: Rehab Potential : Good  Skilled Physical Therapy Services may include:   [X]           Bed Mobility Training             [X]    Neuromuscular Re-Education  [X]           Transfer Training                   [ ]    Orthotic/Prosthetic Training  [X]           Gait Training                          [ ]    Modalities  [X]           Therapeutic Procedures        [X]    Manual Therapy  [X]           Therapeutic Activities            [X]    Patient and Family Training/Education  [ ]           Other (comment):      Frequency/Duration: Patient will be followed by physical therapy for 1-2 times a day for a minimum of 3 days per week for 4 weeks to address goals.   Discharge Recommendations: Home Health  Patient's expected Discharge Location:  [X]Private Residence  [ ] care home/ILF  [ [facility]  [ ]Other:       COMMUNICATION/EDUCATION:  Patient/Family Agreement with Treatment Plan :      [ ]         The PT evaluation/POC has been reviewed with PT assistant. [ ]         Fall prevention education was provided and the patient/caregiver indicated understanding. [ ]         Patient/family have participated as able in goal setting and plan of care and Patient/family agree to work toward stated goals and plan of care. [ ]         Patient is unable to participate in goal setting and plan of care. Therapist/SW will contact family/POA. Treatment session:  Overall Complexity: MEDIUM Evaluation:  15 mins./ Treatment:  20 mins.   Physical Therapist:   Sameer Bermeo, PT       4/1/2017   Thank you for this referral.

## 2017-04-02 LAB — GLUCOSE BLD STRIP.AUTO-MCNC: 99 MG/DL (ref 70–110)

## 2017-04-02 PROCEDURE — 74011250637 HC RX REV CODE- 250/637: Performed by: INTERNAL MEDICINE

## 2017-04-02 PROCEDURE — 74011000258 HC RX REV CODE- 258: Performed by: INTERNAL MEDICINE

## 2017-04-02 PROCEDURE — 82962 GLUCOSE BLOOD TEST: CPT

## 2017-04-02 PROCEDURE — 74011636637 HC RX REV CODE- 636/637: Performed by: INTERNAL MEDICINE

## 2017-04-02 PROCEDURE — 74011250636 HC RX REV CODE- 250/636: Performed by: INTERNAL MEDICINE

## 2017-04-02 RX ADMIN — CLONAZEPAM 1 MG: 0.5 TABLET ORAL at 18:38

## 2017-04-02 RX ADMIN — HYDROCORTISONE 5 ML: 10 TABLET ORAL at 16:00

## 2017-04-02 RX ADMIN — CLONAZEPAM 1 MG: 0.5 TABLET ORAL at 09:25

## 2017-04-02 RX ADMIN — ASPIRIN 325 MG ORAL TABLET 325 MG: 325 PILL ORAL at 09:31

## 2017-04-02 RX ADMIN — ROPINIROLE HYDROCHLORIDE 2 MG: 1 TABLET, FILM COATED ORAL at 21:27

## 2017-04-02 RX ADMIN — FLUCONAZOLE 200 MG: 100 TABLET ORAL at 09:35

## 2017-04-02 RX ADMIN — LEVOTHYROXINE SODIUM 50 MCG: 50 TABLET ORAL at 06:36

## 2017-04-02 RX ADMIN — AMLODIPINE BESYLATE 2.5 MG: 2.5 TABLET ORAL at 09:31

## 2017-04-02 RX ADMIN — SIMVASTATIN 20 MG: 20 TABLET, FILM COATED ORAL at 21:28

## 2017-04-02 RX ADMIN — SODIUM CHLORIDE 500 MG: 900 INJECTION, SOLUTION INTRAVENOUS at 13:41

## 2017-04-02 RX ADMIN — HYDROCORTISONE 5 ML: 10 TABLET ORAL at 22:00

## 2017-04-02 RX ADMIN — FOLIC ACID 1 MG: 1 TABLET ORAL at 09:35

## 2017-04-02 RX ADMIN — MELOXICAM 7.5 MG: 7.5 TABLET ORAL at 09:25

## 2017-04-02 RX ADMIN — PREDNISONE 40 MG: 20 TABLET ORAL at 09:35

## 2017-04-02 RX ADMIN — TRAMADOL HYDROCHLORIDE 100 MG: 50 TABLET, FILM COATED ORAL at 09:25

## 2017-04-02 RX ADMIN — MIRTAZAPINE 15 MG: 15 TABLET, FILM COATED ORAL at 21:27

## 2017-04-02 RX ADMIN — PANTOPRAZOLE SODIUM 40 MG: 40 TABLET, DELAYED RELEASE ORAL at 09:35

## 2017-04-02 RX ADMIN — TRAMADOL HYDROCHLORIDE 100 MG: 50 TABLET, FILM COATED ORAL at 00:21

## 2017-04-02 RX ADMIN — BIMATOPROST 1 DROP: 0.1 SOLUTION/ DROPS OPHTHALMIC at 18:39

## 2017-04-02 RX ADMIN — ROPINIROLE HYDROCHLORIDE 1 MG: 1 TABLET, FILM COATED ORAL at 09:25

## 2017-04-02 RX ADMIN — CLOPIDOGREL BISULFATE 75 MG: 75 TABLET, FILM COATED ORAL at 09:25

## 2017-04-02 RX ADMIN — HYDROCORTISONE 5 ML: 10 TABLET ORAL at 09:00

## 2017-04-02 RX ADMIN — FLUOXETINE HYDROCHLORIDE 20 MG: 20 CAPSULE ORAL at 09:31

## 2017-04-02 RX ADMIN — SODIUM CHLORIDE 500 MG: 900 INJECTION, SOLUTION INTRAVENOUS at 00:16

## 2017-04-02 NOTE — H&P
[address]    Name:  Christo Thompson  MR#:  [id number]  :  1945  Account #:  [de-identified]  Date of Adm:  2017      HISTORY OF PRESENT ILLNESS: This is a 66-year-old white female  with multiple prior cerebrovascular accidents, who was admitted with  persistent right forearm cellulitis. Failed to respond to outpatient  antibiotics, acute bronchitis with hemoptysis and altered mental status. After treatment, the patient was seen by Physical Therapy, felt to need  further rehabilitation. Also, she needs to continue her vancomycin  course. The patient has improved and was admitted for continued  monitoring, treatment and rehabilitation. PAST MEDICAL HISTORY:  1. COPD. 2. Hypertension. 3. Hypercholesterolemia. 4. Hypothyroidism. 5. Severe atherosclerotic cerebrovascular disease with multiple CVAs  and residual left-sided weakness, gait disorder and ataxia. 6. Right eye blindness/vascular. 7. Joint arthritis on high-dose steroids. 8. Cognitive decline. 9. Old cervical spine fracture. 10. Restless leg syndrome. 11. Thyroid nodule, status post equivocal biopsy by Dr. Jaci Mc. 12. Osteoporosis. 13. Osteoarthritis. 14. Depression. ALLERGIES: SULFA. PAST SURGICAL HISTORY: Hysterectomy, left breast biopsy, thyroid  biopsy, temporal artery biopsy, lower extremity revascularization. MEDICATIONS:  1. DuoNeb. 2. Klack's solution. 3. Protonix. 4. Vancomycin. 5. Requip. 6. Tramadol. 7. Norvasc. 8. Aspirin. 9. Lumigan. 10. Klonopin. 11. Plavix. 12. Diflucan. 13. Prozac. 14. Folic acid. 15. Synthroid. 16. Mobic. 17. Remeron. 18. Zofran. 19. Prednisone. 20. Zocor. SOCIAL HISTORY: She is  and lives with her . She is  a [employer]. She is a former smoker and social drinker. FAMILY HISTORY: Positive for hypertension. REVIEW OF SYSTEMS  Has not had any fever or chills, chest pain, abdominal pain, nausea,  vomiting, diarrhea.  No dysuria, polyuria, hematuria. No new focal  neurologic symptoms. The rest is as per history of the present illness. PHYSICAL EXAMINATION  GENERAL: Ill-appearing female in no acute distress. Generally  improved. VITAL SIGNS: Stable. Blood pressure 134/56, afebrile. HEENT: Atraumatic, normocephalic. Mucous membranes moist.  NECK: No adenopathy or thyromegaly. LUNGS: Clear to auscultation. HEART: Regular rhythm. No rubs or gallops are appreciated. ABDOMEN: Soft, nontender, nondistended. Bowel sounds present. EXTREMITIES: No cyanosis or edema. Right upper extremity cellulitis,  much improved. IMPRESSION:  1. Forearm cellulitis. 2. Acute bronchitis. 3. Deconditioning. 4. Hypertension. 5. Giant cell arteritis. 6. As per past medical history. PLAN: Continue same medications. Finish vancomycin, Diflucan and  Klack's solution for oral thrush. Continue same prednisone, followed by  Dr. Evan Raines and physical rehabilitation. Further management accordingly.         Sandy Silveira MD    NT / CD  D:  04/02/2017   08:52  T:  04/02/2017   09:37  Job #:  [id number]

## 2017-04-03 PROCEDURE — 74011636637 HC RX REV CODE- 636/637: Performed by: INTERNAL MEDICINE

## 2017-04-03 PROCEDURE — 74011000258 HC RX REV CODE- 258: Performed by: INTERNAL MEDICINE

## 2017-04-03 PROCEDURE — 74011250636 HC RX REV CODE- 250/636: Performed by: INTERNAL MEDICINE

## 2017-04-03 PROCEDURE — 74011250637 HC RX REV CODE- 250/637: Performed by: INTERNAL MEDICINE

## 2017-04-03 RX ORDER — ACETAMINOPHEN 325 MG/1
650 TABLET ORAL
Status: DISCONTINUED | OUTPATIENT
Start: 2017-04-03 | End: 2017-05-08 | Stop reason: HOSPADM

## 2017-04-03 RX ADMIN — CLOPIDOGREL BISULFATE 75 MG: 75 TABLET, FILM COATED ORAL at 08:53

## 2017-04-03 RX ADMIN — PREDNISONE 40 MG: 20 TABLET ORAL at 08:53

## 2017-04-03 RX ADMIN — SIMVASTATIN 20 MG: 20 TABLET, FILM COATED ORAL at 21:13

## 2017-04-03 RX ADMIN — HYDROCORTISONE 5 ML: 10 TABLET ORAL at 09:00

## 2017-04-03 RX ADMIN — PANTOPRAZOLE SODIUM 40 MG: 40 TABLET, DELAYED RELEASE ORAL at 06:41

## 2017-04-03 RX ADMIN — ROPINIROLE HYDROCHLORIDE 1 MG: 1 TABLET, FILM COATED ORAL at 08:53

## 2017-04-03 RX ADMIN — ACETAMINOPHEN 650 MG: 325 TABLET, FILM COATED ORAL at 21:14

## 2017-04-03 RX ADMIN — AMLODIPINE BESYLATE 2.5 MG: 2.5 TABLET ORAL at 08:55

## 2017-04-03 RX ADMIN — FLUCONAZOLE 200 MG: 100 TABLET ORAL at 08:54

## 2017-04-03 RX ADMIN — ASPIRIN 325 MG ORAL TABLET 325 MG: 325 PILL ORAL at 08:53

## 2017-04-03 RX ADMIN — HYDROCORTISONE 5 ML: 10 TABLET ORAL at 21:14

## 2017-04-03 RX ADMIN — LEVOTHYROXINE SODIUM 50 MCG: 50 TABLET ORAL at 06:41

## 2017-04-03 RX ADMIN — MELOXICAM 7.5 MG: 7.5 TABLET ORAL at 08:54

## 2017-04-03 RX ADMIN — BIMATOPROST 1 DROP: 0.1 SOLUTION/ DROPS OPHTHALMIC at 17:41

## 2017-04-03 RX ADMIN — HYDROCORTISONE 5 ML: 10 TABLET ORAL at 16:30

## 2017-04-03 RX ADMIN — MIRTAZAPINE 15 MG: 15 TABLET, FILM COATED ORAL at 21:13

## 2017-04-03 RX ADMIN — FLUOXETINE HYDROCHLORIDE 20 MG: 20 CAPSULE ORAL at 08:54

## 2017-04-03 RX ADMIN — CLONAZEPAM 1 MG: 0.5 TABLET ORAL at 08:54

## 2017-04-03 RX ADMIN — LEVOFLOXACIN 750 MG: 750 TABLET, FILM COATED ORAL at 08:54

## 2017-04-03 RX ADMIN — FOLIC ACID 1 MG: 1 TABLET ORAL at 08:54

## 2017-04-03 RX ADMIN — TRAMADOL HYDROCHLORIDE 100 MG: 50 TABLET, FILM COATED ORAL at 08:54

## 2017-04-03 RX ADMIN — CLONAZEPAM 1 MG: 0.5 TABLET ORAL at 17:13

## 2017-04-03 RX ADMIN — SODIUM CHLORIDE 500 MG: 900 INJECTION, SOLUTION INTRAVENOUS at 00:05

## 2017-04-03 RX ADMIN — SODIUM CHLORIDE 500 MG: 900 INJECTION, SOLUTION INTRAVENOUS at 13:19

## 2017-04-03 RX ADMIN — ROPINIROLE HYDROCHLORIDE 2 MG: 1 TABLET, FILM COATED ORAL at 21:13

## 2017-04-03 NOTE — ROUTINE PROCESS
Bedside and verbal shift report given to A Stephanie LPN (oncoming nurse) by Azra Carias LPN (off going nurse) Report included SBAR, Kardex, Mars and recent results.

## 2017-04-03 NOTE — PROGRESS NOTES
Pt was lying in bed upon approach. Pt alert and oriented to self. With max prompting pt was able to responded to activity interview using non-verbal cues. Pt ID she enjoys listening to music. Pt was not responding to any other activity interests mentioned. Pt stated \"I want to be in my old room\". Pt educated on leisure cabinet and unit activities. Maintain current leisure interests. Will attempt to speak with care giver for more activity interests.

## 2017-04-03 NOTE — PROGRESS NOTES
Problem: Mobility Impaired (Adult and Pediatric)  Goal: *Acute Goals and Plan of Care (Insert Text)  PHYSICAL THERAPY STG GOALS :  Initiated 4/1/2017 and to be accomplished within 1-2 Weeks    1. Patient will move from supine to sit and sit to supine in bed with minimal assistance/contact guard assist.   2. Patient will transfer from bed to chair and chair to bed with minimum assistance using 2WW.  3. Patient will perform sit to stand with minimum assistance with Fair balance and safety awareness. 4. Patient will ambulate with minimum assistance for 10 feet with 2WW on level surfaces with 1 turn. 5. Patient will ascend/descend 1 step with bilateral handrail(s) withminimal assistance to allow for safe home access/exit. 6. Patient will improve standardized test score for [facility] Standing Balance Scale to 1+    PHYSICAL THERAPY LTG GOALS :  Initiated 4/1/2017 and to be accomplished within 2-4 Weeks    1. Patient will move from supine to sit and sit to supine in bed with supervision/set-up. 2. Patient will transfer from bed to chair and chair to bed with supervision/set-up using 2WW.  3. Patient will perform sit to stand with supervision/set-up with Good balance and safety awareness. 4. Patient will ambulate with supervision/set-up for 50 feet with CGA on level surfaces and be able to maneuver through narrow spaces and obstacles without loss of balance. 5. Patient will ascend/descend 2 steps with bilateral handrail(s) with contact guard assist to allow for safe home access/exit. 6. Patient will improve standardized test score for [facility] Balance Scale to 2    Physical Therapist: Ismael Ivory PT on 4/1/2017    [facility]   PHYSICAL THERAPY DAILY TREATMENT NOTE        Patient:  Rafia Jacob (49 y.o. female)               Date: 4/3/2017    Physician: Basilia Wise MD  Primary Diagnosis: cellulitis          Treatment Diagnosis  Treatment Diagnosis: weakness  Treatment Diagnosis 2: difficulty with ambulation  Precautions: Fall, Skin  Vital Signs  Vital Signs  Temp: 97.6 °F (36.4 °C)  Temp Source: Oral  Pulse (Heart Rate): 99  Heart Rate Source: Monitor  Cardiac Rhythm: Normal sinus rhythm  Resp Rate: 18  O2 Sat (%): 96 %  Level of Consciousness: Alert  BP: 171/80  MAP (Monitor): 72  BP 1 Method: Automatic  BP 1 Location: Left arm  BP Patient Position: At rest  MEWS Score: 1     Cognitive Status:     Pain  Pain Screen  Pain Scale 1: Numeric (0 - 10)  Pain Intensity 1: 0  Patient Stated Pain Goal: 0  Pain Reassessment 1: Yes  Bed Mobility Training     Balance     Transfer Training        Gait Training                       Wheelchair Mobility/Mgmt      Therapeutic Exercise:    SpO2 assessed on finger from 86% to 88% at rest in bed with heart rate of 90-94 bpm. Assessed on big toe at 90%. Educated on need for improved saturation on room air. Attempted pursed lip breathing, pt unable with extensive verbal, visual cueing. TE of ankle pumps to address circulation. Patient/Caregiver Education:   Pt /Caregiver Education on safety and fall prevention to reduce fall risk. Benefits on participation in PT services. ASSESSMENT:  Patient continues to benefit from Skilled PT services to improve strength, endurance, mobility, gait, balance. Progression toward goals:  [ ]      Improving appropriately and progressing toward goals  [X]      Improving slowly and progressing toward goals  [X]      Not making progress toward goals and plan of care will be adjusted      Treatment session: 33 minutes.   Therapist:   Jaylin Oconnell PT,          4/3/2017

## 2017-04-03 NOTE — PROGRESS NOTES
Problem: Self Care Deficits Care Plan (Adult)  Goal: *Acute Goals and Plan of Care (Insert Text)  OCCUPATIONAL THERAPY SHORT TERM GOALS   Initiated 4/3/2017 and to be accomplished within 2 Week(s)    1. Patient will perform Upper body ADLs with/without adaptive equipment with minimal assistance/contact guard assist.  2. Patient will perform Lower body ADLs with/without adaptive equipment with moderate assistance. 3. Patient will perform toileting task with moderate assistance with Fair safety to reduce falls risk. 4. Patient will perform functional transfers with Rolling Walker and moderate assistance. 5. Patient will perform standing static/dynamic balance activities for improved ADL/IADL function with moderate assistance x2 and Fair balance and safety awarenes. 6. Patient will improve Barthel index scores to atleast 50/100 to improve functional mobility. 7. Patient will improve standardized test score for [place] Sitting Balance Scale to 2     OCCUPATIONAL THERAPY LONG TERM GOALS   Initiated 4/3/2017 and to be accomplished within 3-4 Week(s)    1. Patient will perform Upper body ADLs with/without adaptive equipment with supervision/set-up. 2. Patient will perform Lower body ADLs with/without adaptive equipment with minimal assistance/contact guard assist .  3. Patient will perform toileting task with minimal assistance/contact guard assist with Fair safety to reduce falls risk. 4. Patient will perform functional transfers with Db Ashing and minimal assistance/contact guard assist and Fair balance and safety awareness. 5. Patient will perform standing static/dynamic activity for improved ADL/IADL function with minimal assistance/contact guard assist an and Fair balance and safety awareness. 6. Patient will improve Barthel index score to 65/100 to improve independence with mobility. 7. Patient will improve standardized test score for [place] Sitting Balance to 3.      Therapist: MARIPOSA Us 4/3/2017   [facility]   OCCUPATIONAL THERAPY EVALUATION        Patient: Shea Castillo (66 y.o. female)            Start of Care Date: 4/3/2017                         Onset Date:  3/26/2017  Referred by : Rhea Choi MD                               Primary Diagnosis: cellulitis            Treatment Diagnosis  Treatment Diagnosis: weakness  Treatment Diagnosis 2: difficulty with ambulation     Precautions: Fall, Skin  Eval Complexity: History: HIGH Complexity : Extensive review of history including physical, cognitive and psychosocial history . History of present problem reveals HIGH Complexity :3+ comorbidities / personal factors will impact the outcome/ POC . 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    Menopause      Psychiatric disorder bipolar    Respiratory abnormalities      Restless leg syndrome      Thyroid nodule 4/23/2014     Abnormal biopsy       Past Surgical History:   Procedure Laterality Date    HX CORONARY STENT PLACEMENT          Cognitive Status:  Mental Status  Neurologic State: Confused  Orientation Level: Oriented to person  Cognition: Decreased command following  Perception: Cues to maintain midline in sitting; Tactile;Verbal  Perseveration: No perseveration noted  Safety/Judgement: Fall prevention  Barriers to Learning/Limitations: yes;  physical and altered mental status (i.e.Sedation, Confusion)  Compensate with: visual, verbal, tactile, kinesthetic cues/model  Justification for Evaluation complexity:   HIGH Complexity : Patient presents with comorbidities that affect occupational performance. Signifigant modification of tasks or assistance (eg, physical or verbal) with assessment (s) is necessary to enable patient to complete evaluation . Patient is referred to [address],[facility] due to a functional decline in strength, endurance and balance for carryover of activity. Prior Level of Function/Home Situation:   Home Situation  Home Environment: Private residence  # Steps to Enter: 2  Rails to Enter: Yes  Hand Rails : Right  One/Two Story Residence: Two story, live on 1st floor  Living Alone: No  Support Systems: Spouse/Significant Other/Partner  Patient Expects to be Discharged to[de-identified] Private residence  Current DME Used/Available at Home: Best Colon, Jan Lakhani, Best [employer], rollator, [address] [facility] chair, Commode, bedside, Wheelchair  Level of Assistance received at Home: Prior to this current hospitalization, the patient unable to provide appropriate history due to confusion. Per charts, pt was requiring increased amount of assistance with BADLs at home. Pt lives with  and plans to return home upon d/c. OBJECTIVE DATA SUMMARY:      Vital Signs:  Resting BP:  BP: 171/80  HR: Pulse (Heart Rate): 99     Pain:  Pain Screen  Pain Scale 1: Numeric (0 - 10)  Pain Intensity 1: 0  Patient Stated Pain Goal: 0  Pain Reassessment 1: Yes  Auditory:  Auditory  Auditory Impairment: Hard of hearing, bilateral  Examination:   HIGH Complexity : 5 or more performance deficits relating to physical, cognitive , or psychosocial skils that result in activity limitations and / or participation restrictions; Tone and Sensation:  Tone: Normal  Sensation: Intact  Visual Perceptual:  Vision  Tracking: Able to track stimulus in all quadrants w/o difficulty (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)    Coordination:  Coordination  Fine Motor Skills-Upper: Left Intact; Right Intact  Gross Motor Skills-Upper: Left Intact; Right Intact  Coordination: Generally decreased, functional  Fine Motor Skills-Upper: Left Intact; Right Intact    Gross Motor Skills-Upper: Left Intact; Right Intact  Bed Mobility:  Bed Mobility  Rolling: Minimum assistance; Additional time  Supine to Sit: Moderate assistance; Additional time;Assist x1  Sit to Supine: Minimum assistance; Additional time  Scooting: Moderate assistance;Assist x2  Rolling: Minimum assistance; Additional time  Transfers:  Functional Transfers  Sit to Stand: Maximum assistance;Assist x1;Total assistance  Stand to Sit: Maximum assistance  Balance:  Balance  Sitting: With support  Sitting - Static: Fair (occasional)  Sitting - Dynamic: Fair (occasional) (-)  Standing: Impaired;Pull to stand; With support  Standing - Static: Poor;Constant support  Standing - Dynamic : None  Gross Assesment:  Gross Assessment  AROM: Generally decreased, functional  PROM: Generally decreased, functional  Strength: Generally decreased, functional  Coordination: Generally decreased, functional  Tone: Normal  Sensation: Intact  ADL self care:  Basic ADL  Feeding: Setup  Oral Facial Hygiene/Grooming: Contact guard assistance  Bathing: Moderate assistance  Upper Body Dressing: Minimum assistance  Lower Body Dressing: Maximum assistance  Toileting: Maximum assistance      ASSESSMENT:   A clinical decision making of HIGH  complexity was conducted, which includes an analysis of the Occupational profile, standardized assessments, data and treatment options.                                             THE BARTHEL INDEX      ACTIVITY    SCORE   FEEDING  0=unable  5=needs help cutting,spreading butter,etc., or modified diet  10= independent    5   BATHING  0=dependent  5=independent (or in shower    0   GROOMING  0=needs help  5=independent face/hair/teeth/shaving (implements provided)    0   DRESSING  0=dependent  5=needs help but can do about half unaided  10=independent(including buttons, zips,laces etc.)    0 BOWELS  0=incontinent  5=occasional accident  10=continent    5   BLADDER  0=incontinent, or catheterized and unable to manage alone  5=occasional accident  10=continent    5   TOILET USE  0=dependent  5=needs some help, but can do something alone  10=independent (on and off, dressing, wiping)    0   TRANSFER (BED TO CHAIR AND BACK)  0=unable, no sitting balance  5=major help(one or two people,physical), can sit  10=minor help(verbal or physical)  15=independent    5   MOBILITY (ON LEVEL SURFACES)  0=immobile or <50 yards  5=wheelchair independent,including corners,>50 yards  10=walkes with help of one person (verbal or physical) >50 yards  15=independent(but may use any aid; for example, stick) >50 yards    0   STAIRS  0=unable  5=needs help (verbal, physical, carrying aid)  10=independent    0              TOTAL:             20/100      Additional comments:  Monitor for O2 saturation with activity. Pt was found to be desaturating at 87%  at end of session with minimal exertion. RN incharge was notified of the value. TREATMENT PLAN : Rehab Potential : Fair  Skilled Occupational Therapy Services may include:   [X] Self Care/Home Mgmt                    [ ]Cognitive Perceptual Re-training                              [X] Adaptive Equip Training                 [X]Therapeutic Exercise                  [ ]Sensory Integration                           [ ]Community Work Integration  [X]Therapeutic Activities                     [ ]Other/modalities  [X]Neuromuscular Re-education         [ ] Wheelchair Mgmt/propulsion    [X]Patient/Family/Staff Instruction      :  [ ]Orthotics/Prosthetic Fitting & training      Frequency/Duration: Patient will be followed by Occupational therapy for 1-2 times a day for a minimum of 3 days per week for 4 weeks to address goals. .  Discharge Recommendations: [facility] and To Be Determined  Patient expected Discharge Location: [X]Private Residence  [ ] PARAM [ Lukas Sibley  [ ] [address]       COMMUNICATION/EDUCATION:  Patient/Family Agreement with Treatment Plan :      [X]       OT Evaluation/POC has been reviewed with the KAUR. [X]        Fall prevention education was provided and the patient/caregiver indicated understanding  [ ]        Patient/family have participated as able in goal setting and plan of care. Patient/family agree to work toward stated goals and plan of care. [ ]        Patient is unable to participate in goal setting and plan of care. Therapist will contact SW/POA. Treatment session:   Overall Complexity:HIGH  Evaluation:  20mins. Treatment :   15 mins.      Occupational Therapist:   [employer] [address].          4/3/2017   Thank You for this referral.

## 2017-04-03 NOTE — ROUTINE PROCESS
Bedside and verbal shift report given to Augusto Parham RN (oncoming nurse) by Alessio Shaw LPN (off going nurse) Report included SBAR, Kardex, Mars and recent results.

## 2017-04-03 NOTE — PROGRESS NOTES
Bedside shift change report given to KASH Tesfaye(oncoming nurse) by Lucila Jeans (offgoing nurse). Report included the following information SBAR, Kardex, Intake/Output and MAR.

## 2017-04-04 PROCEDURE — 74011636637 HC RX REV CODE- 636/637: Performed by: INTERNAL MEDICINE

## 2017-04-04 PROCEDURE — 74011250637 HC RX REV CODE- 250/637: Performed by: INTERNAL MEDICINE

## 2017-04-04 RX ADMIN — CLONAZEPAM 1 MG: 0.5 TABLET ORAL at 18:14

## 2017-04-04 RX ADMIN — MIRTAZAPINE 15 MG: 15 TABLET, FILM COATED ORAL at 21:33

## 2017-04-04 RX ADMIN — PANTOPRAZOLE SODIUM 40 MG: 40 TABLET, DELAYED RELEASE ORAL at 09:00

## 2017-04-04 RX ADMIN — AMLODIPINE BESYLATE 2.5 MG: 2.5 TABLET ORAL at 09:00

## 2017-04-04 RX ADMIN — HYDROCORTISONE 5 ML: 10 TABLET ORAL at 21:40

## 2017-04-04 RX ADMIN — SIMVASTATIN 20 MG: 20 TABLET, FILM COATED ORAL at 21:33

## 2017-04-04 RX ADMIN — PREDNISONE 40 MG: 20 TABLET ORAL at 09:00

## 2017-04-04 RX ADMIN — ROPINIROLE HYDROCHLORIDE 1 MG: 1 TABLET, FILM COATED ORAL at 09:00

## 2017-04-04 RX ADMIN — BIMATOPROST 1 DROP: 0.1 SOLUTION/ DROPS OPHTHALMIC at 18:12

## 2017-04-04 RX ADMIN — LEVOTHYROXINE SODIUM 50 MCG: 50 TABLET ORAL at 09:01

## 2017-04-04 RX ADMIN — FLUOXETINE HYDROCHLORIDE 20 MG: 20 CAPSULE ORAL at 08:59

## 2017-04-04 RX ADMIN — ROPINIROLE HYDROCHLORIDE 2 MG: 1 TABLET, FILM COATED ORAL at 21:33

## 2017-04-04 RX ADMIN — HYDROCORTISONE 5 ML: 10 TABLET ORAL at 09:00

## 2017-04-04 RX ADMIN — FOLIC ACID 1 MG: 1 TABLET ORAL at 09:00

## 2017-04-04 RX ADMIN — CLONAZEPAM 1 MG: 0.5 TABLET ORAL at 09:00

## 2017-04-04 RX ADMIN — MELOXICAM 7.5 MG: 7.5 TABLET ORAL at 09:01

## 2017-04-04 RX ADMIN — CLOPIDOGREL BISULFATE 75 MG: 75 TABLET, FILM COATED ORAL at 08:59

## 2017-04-04 RX ADMIN — HYDROCORTISONE 5 ML: 10 TABLET ORAL at 16:00

## 2017-04-04 RX ADMIN — FLUCONAZOLE 200 MG: 100 TABLET ORAL at 09:00

## 2017-04-04 RX ADMIN — ASPIRIN 325 MG ORAL TABLET 325 MG: 325 PILL ORAL at 08:59

## 2017-04-04 NOTE — ROUTINE PROCESS
Bedside and Verbal shift change report given to arti corral lpn (oncoming nurse) by victor manuel Castillo lpn (offgoing nurse). Report included the following information SBAR, Kardex and MAR.  Hourly rounds

## 2017-04-04 NOTE — PROGRESS NOTES
Problem: Motor Speech Impaired (Adult)  Goal: *Acute Goals and Plan of Care (Insert Text)    Dysphagia Present:yes  Aspiration: at risk     Recommendations:  Diet: mech-soft/thin liquid  Meds: per pt preference  Aspiration Precautions  Oral Care TID  Other: needs assistance    Formal bedside swallow evaluation completed this am. Pt currently on mechanical-soft diet in SNF. OM examination revealed structures grossly intact; strength generally weakened. Evaluation completed with mech-soft/thin liquid lunch tray. Exhibited delayed mastication with mech-soft; delayed and decreased laryngeal elevation to palpation across all trials. However, no aspiration s/s noted during meal. Recommend continuation of mech-soft/thin liquid diet; pt MUST be assisted with all meals secondary to decreased strength/endurance. No skilled dysphagia intervention warranted at this time. Cognitive-linguistic evaluation completed with subtests from [facility]) as well as parts of [facility] Cognitive Assessment [facility]) as pt is legally blind and unable to perform visuospatial/executive and picture naming tasks. Deficits identified as follows: temporal/environmental organization; linguistic organization; and delayed recall. Further, pt presents with severe dysarthria characterized by labored low intensity speech, and blended word boundaries. Skilled speech therapy intervention warranted to facilitate expressive and receptive communication to allow pt to express basic wants/needs, and participate in her medical care. STGs: 1-2 weeks  Pt will:  1. Utilize compensatory strategies (decrease rate, overarticulate, increase intensity) to increase intelligibility to 90% at conversation level with min visual/verbal cues  2. Complete articulatory agility tasks (reading-conversation) with min visual/verbal cues  3. Name greater than/equal to 7 items from a concrete linguistic categories with min verbal cues  4.  Increase temporal/environmental orientation with 80% acc, min verbal cues. LTGs: 2 weeks  1. Pt will demonstrate increase in cognitive-linguistic skills for participate in medical care, min A.  2. Pt will exhibit effective expressive communication for expressing basic wants, needs, and health status, min A. Outcome: Progressing Towards Goal     [facility] SPEECH LANGUAGE PATHOLOGY   DYSPHAGIA EVALUATION        Patient: Ho Baer (66 y.o. female)          Start of Care Date: 4/4/2017       Referred by : Queenie Chavez MD                      Attending Physician: Queenie Chavez MD  Primary Diagnosis: cellulitis       Treatment Diagnosis  Treatment Diagnosis: oropharyngeal dysphagia  Treatment Diagnosis 2: dysarthria      Precautions: Fall, Skin aspiration  Medical History:   Past Medical History:   Diagnosis Date    [facility]      Cerebral aneurysm 12/11/2015     RPCoA, RAChA,, RMCA bifurcation, and RM2      Cervical spine fracture ([facility] 08/20/2014     C2 and C3    COPD      Coronary artery disease      Giant cell arteritis ([facility] 11/15/2014    [place]      Hyperlipidemia      Hypertension      Hypothyroidism      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 Prior Level of Function/Home Situation/Diet:  Prior to this hospitalization, the patient lived in a private residence.    Home Situation  Home Environment: Private residence  # Steps to Enter: 2  Rails to Enter: Yes  Hand Rails : Right  One/Two Story Residence: Two story, live on 1st floor  Living Alone: No  Support Systems: Spouse/Significant Other/Partner  Patient Expects to be Discharged to[de-identified] Private residence  Current DME Used/Available at Home: Star Aidan, [place], Star Aidan, rollator, [address] [facility] chair, Commode, bedside, Wheelchair. OBJECTIVE DATA SUMMARY:      Current Diet:   mech-soft/thin liquid diet     Cognitive and Communication Status:  Mental Status  Neurologic State: Alert  Orientation Level: Oriented to person, Oriented to place  Cognition: Follows commands  Perception: Cues to attend left visual field  Perseveration: No perseveration noted  Safety/Judgement: Fall prevention  Pain:  Pain Scale 1: Numeric (0 - 10)  Pain Intensity 1: 0     Oral Assessment:  Oral Assessment  Labial: Decreased rate, Decreased seal  Dentition: Limited  Oral Hygiene: fair  Lingual: Decreased rate, Decreased strength  Velum: No impairment  Mandible: No impairment  P.O. Trials:  Patient Position: HOB45  Vocal quality prior to P.O.:  Low volume  Consistency Presented: Thin liquid, Mechanical soft  How Presented: Self-fed/presented, SLP-fed/presented, Straw     Bolus Acceptance: No impairment  Bolus Formation/Control: Impaired  Type of Impairment: Delayed, Mastication  Propulsion: Delayed (# of seconds)  Oral Residue: None  Initiation of Swallow: Delayed (# of seconds)  Laryngeal Elevation: Decreased  Aspiration Signs/Symptoms: None  Pharyngeal Phase Characteristics: No impairment, issues, or problems   Effective Modifications: Small sips and bites  Cues for Modifications: Minimal     Oral Phase Severity: Mild  Pharyngeal Phase Severity : Mild     Findings and Recommendations:See above  Findings and Recommendations  Evaluation Type: Eval O/P Swallow  Assessment Method(s): Observation;Palpation  Patient Position: HOB45  Vocal Quality: Low volume  Consistency Presented:  Thin liquid;Mechanical soft  How Presented: Self-fed/presented;SLP-fed/presented;Straw  Bolus Acceptance: No impairment  Bolus Formation/Control: Impaired  Type of Impairment: Delayed;Mastication  Propulsion: Delayed (# of seconds)  Oral Residue: None  Oral Phase Severity: Mild  Pharyngeal Phase Severity : Mild  Initiation of Swallow: Delayed (# of seconds)  Laryngeal Elevation: Decreased  Aspiration Signs/Symptoms: None     TREATMENT PLAN:  Rehab Potential : Good   Skilled speech therapy may include:  [ ] Skilled meal assessment                                               [ ] Diet texture analysis                                              [ ] Oral-motor/laryngeal strengthening exercises  [ ] Compensatory swallow techniques    [ ] Patient/caregiver education        [X]Other:Skilled treatment is not warranted at this time     Frequency/Duration: Treatment not warranted at this time  Discharge Recommendations: [address]   [X]         Aspiration precautions; compensatory swallow techniques  [X]         Patient/family have participated as able in POC/goal setting  [ ]         Patient/family agree to work toward stated goals and plan of care. [ ]         Patient understands intent/goals of therapy, but is neutral about participation. [ ]         Patient is unable to participate in goal setting and plan of care; will contact                       family/POA. Evaluation: 15 mins. Speech-Language Pathologist:     Enzo Hampton            4/4/2017   SLP Intern     Thank you for this referral.              [facility] SLP SPEECH-LANGUAGE EVALUATION        Patient:  Laurence Garcia (66 y.o. female)      Start of Care Date: 4/4/2017    Referred by : Leopoldo Fend, MD                  Attending Physician: Leopoldo Fend, MD  Primary Diagnosis: cellulitis                 Treatment Diagnosis  Treatment Diagnosis: oropharyngeal dysphagia  Treatment Diagnosis 2: dysarthria     Precautions: Fall, Skin aspiration     Medical History:   Past Medical History:   Diagnosis Date    [facility]      Cerebral aneurysm 12/11/2015     RPCoA, RAChA,, RMCA bifurcation, and RM2      [facility]2014     C2 and C3    COPD      Coronary artery disease      [facility] 11/15/2014    [place]      Hyperlipidemia      Hypertension      Hypothyroidism      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 Dysarthria c/b labored speech, low intensity, low volume, and blended word boundaries.   Prior Level of Function/Home Situation:  Prior to this hospitalization this patient was living in private residence   [address]: Private residence  # Steps to Enter: 2  Rails to Enter: Yes  [employer] : Right  One/Two Story Residence: Two story, live on 1st floor  Living Alone: No  Support Systems: Spouse/Significant Other/Partner  Patient Expects to be Discharged to[de-identified] Private residence  Current DME Used/Available at Home: Jan Degroot, viktoriya Degroot, [address] [facility] chair, Commode, bedside, Wheelchair       OBJECTIVE DATA SUMMARY:   Mental Status:  Neurologic State: Alert  Orientation Level: Oriented to person, Oriented to place  Cognition: Follows commands  Perception: Cues to attend left visual field  Perseveration: No perseveration noted  Safety/Judgement: Fall prevention  Pain:  Pain Scale 1: Numeric (0 - 10)  Pain Intensity 1: 0     Pain Screen  Pain Scale 1: Numeric (0 - 10)  Pain Intensity 1: 0  Patient Stated Pain Goal: 0  Pain Reassessment 1: Yes  Vital Signs:  Resting BP:  BP: 133/73  HR: Pulse (Heart Rate): 88   Respiratory Rate:Resp Rate: 16  Motor Speech:  Oral-Motor Structure/Motor Speech  Labial: Decreased rate;Decreased seal  Dentition: Limited  Oral Hygiene: fair  Lingual: Decreased rate;Decreased strength  Velum: No impairment  Mandible: No impairment  Dysarthric Characteristics: Decreased rate;Decreased prosody; Decreased breath support;Blended word boundaries  Intelligibility: Impaired  Conversation Intelligibility (%): 60 %  Language Comprehension and Expression:  Auditory Comprehension  Auditory Impairment: No   Verbal Expression  Primary Mode of Expression: Verbal  Repetition: No impairment  Repetition cueing type: Verbal  Repetition cueing amount: Minimal  Conversation: Dysarthric  Overall Impairment: Severe  Cueing type: Repetition;Verbal;Multi modality  Cueing amount: Moderate      Voice:   Vocal Quality: Low volume   Voice Evaluation  Vocal Quality: Low volume        TREATMENT PLAN:    Rehab Potential : Good   Skilled Speech-Language Therapy Services may include:  [X] Receptive-expressive communication tasks  [X] Cognitive-linguistic tasks  [ ] Oral-motor/laryngeal strengthening exercises  [X] Development of cognitive Skills                                    [X] Patient/caregiver education                 Frequency/Duration: Patient will be followed by Speech Language Pathology for 1-2 times a day for 5-6 days per week to address goals. Discharge Recommendations: Skilled Nursing Facility      COMMUNICATION/EDUCATION:   [X]         Aspiration precautions; compensatory swallow techniques  [X]         Patient/family have participated as able in POC/goal setting  [ ]         Patient/family agree to work toward stated goals and plan of care.        [ ]         Patient understands intent/goals of therapy, but is neutral about participation. [ ]         Patient is unable to participate in goal setting and plan of care; will contact      Evaluation: 15 mins. Speech Language Pathologist:  Renzo Obando               4/4/2017   SLP Intern    [address] [address] [facility], [place]  Office: [phone]  Pager: [phone]       Thank you for this referral.

## 2017-04-04 NOTE — PROGRESS NOTES
Nutrition follow up-TCC/  Plan of care      RECOMMENDATIONS:     1. Regular diet  2. Ensure TID  3. Monitor weight and PO intake  4. RD to follow     GOALS:     1. Met/Ongoing: PO intake meets >75% of protein/calorie needs by 4/11  2. Met/Ongoing: Weight Maintenance (+/- 1-2 lb by 4/11)    ASSESSMENT:     Weight status is classified as normal per BMI of 21.5. However, patient is at nutrition risk due to BMI below 23 with patient above 70years of age. Improved PO intake. Continue Ensure TID for additional calories/protein. Weight stable. Labs noted. Nutrition recommendations listed. RD to follow. Nutrition Risk:  [] High  [x] Moderate []  Low    SUBJECTIVE/OBJECTIVE:      Transferred from  Cardiac to [facility] on 3/31/17. Patient with AMS ans right arm cellulitis. She has h/o COPD, CVAs with residual left sided weakness. UBW around 110 lb. No known food allergies. She drinks Ensure daily at home and prefers strawberry flavor. Patient with improved appetite. Observed 90% intake of breakfast meal and 100% of Ensure supplement. Will monitor. Information Obtained from:    [x] Chart Review   [x] Patient   [] Family/Caregiver   [] Nurse/Physician   [] Interdisciplinary Meeting/Rounds    Diet: Regular diet  Medications: [x] Reviewed    Allergies: [x] Reviewed   Patient Active Problem List   Diagnosis Code    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 I63.9    CVA (cerebrovascular accident) ([facility] [address] I63.9    HTN (hypertension) I10    Hypercholesteremia E78.00    Thyroid nodule E04.1    Drug-induced hepatic toxicity T50.901A, K71.6    Disorder of arteries and arterioles (HCC) I77.9    Stroke (HCC) I63.9    Giant cell arteritis (HCC) M31.6    RLS (restless legs syndrome) G25.81    Hypothyroid E03.9    Weakness R53.1    Cervical spine fracture (HCC) S12. 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 113    Acute encephalopathy G93.40    Olecranon bursitis of right elbow M70.21    Acute bronchitis J20.9    Hyperkalemia E87.5     Past Medical History:   Diagnosis Date    Bipolar 1 disorder ([facility]     Cerebral aneurysm 12/11/2015    RPCoA, RAChA,, RMCA bifurcation, and RM2      Cervical spine fracture ([facility] 08/20/2014    C2 and C3    COPD     Coronary artery disease     Giant cell arteritis ([facility] 11/15/2014    Andreafski     Hyperlipidemia     Hypertension     Hypothyroidism     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    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    Menopause     Psychiatric disorder bipolar    Respiratory abnormalities     Restless leg syndrome     Thyroid nodule 4/23/2014    Abnormal biopsy        Labs:    Lab Results   Component Value Date/Time    Sodium 142 03/31/2017 05:15 AM    Potassium 3.8 03/31/2017 05:15 AM    Chloride 108 03/31/2017 05:15 AM    CO2 27 03/31/2017 05:15 AM    Anion gap 7 03/31/2017 05:15 AM    Glucose 124 03/31/2017 05:15 AM    BUN 20 03/31/2017 05:15 AM    Creatinine 0.61 03/31/2017 05:15 AM    Calcium 8.1 03/31/2017 05:15 AM    Albumin 3.3 02/06/2017 11:30 AM     Anthropometrics: BMI (calculated): 21.5  Last 3 Recorded Weights in this Encounter    03/31/17 2018 04/04/17 1017   Weight: 50.1 kg (110 lb 8 oz) 49.9 kg (110 lb)      Ht Readings from Last 1 Encounters:   04/04/17 5' (1.524 m)     No data found.    [] Weight Loss [] Weight Gain [x] Weight Stable    Nutrition Needs:   Calories: 2033-8290 Kcal    Protein:   55-65 g      [x] No Cultural, Restorationist or ethnic dietary need identified.     [] Cultural, Restorationist and ethnic food preferences identified and addressed     Wt Status:  [x] Normal (18.6 - 24.9) [] Underweight (<18.5) [] Overweight (25 - 29.9) [] Mild Obesity (30 - 34.9)  [] Moderate Obesity (35 - 39.9) [] Morbid Obesity (40+)     Nutrition Problems Identified:   [] Suboptimal PO intake   [] Food Allergies  [] Difficulty chewing/swallowing/poor dentition  [] Constipation/Diarrhea   [] Nausea/Vomiting   [x] None  [] Other:     Plan:   [] Therapeutic Diet  [x]  Obtained/adjusted food preferences/tolerances and/or snacks options   [x]  Continue supplements as prescribed  [] Occupational therapy following for feeding techniques  []  HS snack added   []  Modify diet texture   []  Modify diet for food allergies   []  Assist with menu selection   [x]  Monitor PO intake on meal rounds   [x]  Continue inpatient monitoring and intervention   [x]  Participate in discharge planning/Interdisciplinary rounds/Team meetings   []  Other:     Education Needs:   [] Not appropriate for teaching at this time due to:   [x] Identified and addressed    Nutrition Monitoring and Evaluation:  [x] Continue ongoing monitoring and intervention  [] Other    [address]

## 2017-04-04 NOTE — ROUTINE PROCESS
Bedside and Verbal shift change report given to [facility] LPN (oncoming nurse) by Abilio Wolff RN (offgoing nurse). Report included the following information SBAR, Kardex and MAR. Hourly rounds made.

## 2017-04-04 NOTE — PROGRESS NOTES
Problem: Mobility Impaired (Adult and Pediatric)  Goal: *Acute Goals and Plan of Care (Insert Text)  PHYSICAL THERAPY STG GOALS :  Initiated 4/1/2017 and to be accomplished within 1-2 Weeks    1. Patient will move from supine to sit and sit to supine in bed with minimal assistance/contact guard assist.   2. Patient will transfer from bed to chair and chair to bed with minimum assistance using 2WW.  3. Patient will perform sit to stand with minimum assistance with Fair balance and safety awareness. 4. Patient will ambulate with minimum assistance for 10 feet with 2WW on level surfaces with 1 turn. 5. Patient will ascend/descend 1 step with bilateral handrail(s) withminimal assistance to allow for safe home access/exit. 6. Patient will improve standardized test score for [facility] Standing Balance Scale to 1+    PHYSICAL THERAPY LTG GOALS :  Initiated 4/1/2017 and to be accomplished within 2-4 Weeks    1. Patient will move from supine to sit and sit to supine in bed with supervision/set-up. 2. Patient will transfer from bed to chair and chair to bed with supervision/set-up using 2WW.  3. Patient will perform sit to stand with supervision/set-up with Good balance and safety awareness. 4. Patient will ambulate with supervision/set-up for 50 feet with CGA on level surfaces and be able to maneuver through narrow spaces and obstacles without loss of balance. 5. Patient will ascend/descend 2 steps with bilateral handrail(s) with contact guard assist to allow for safe home access/exit. 6. Patient will improve standardized test score for [employer] [facility] Balance Scale to 2    Physical Therapist: Luan Fothergill, PT on 4/1/2017    [facility]   PHYSICAL THERAPY DAILY TREATMENT NOTE        Patient:  Meghan Valdes (60 y.o. female)               Date: 4/4/2017    Physician: Tai Reynaga MD  Primary Diagnosis: cellulitis          Treatment Diagnosis  Treatment Diagnosis: weakness  Treatment Diagnosis 2: difficulty with ambulation  Precautions: Fall, Skin  Vital Signs  Vital Signs  Temp: 97.6 °F (36.4 °C)  Temp Source: Axillary  Pulse (Heart Rate): 88  Heart Rate Source: Monitor  Resp Rate: 16  O2 Sat (%): 94 %  Level of Consciousness: Alert  BP: 133/73  MAP (Calculated): 93  BP 1 Method: Automatic  BP 1 Location: Left arm  BP Patient Position: At rest  MEWS Score: 1     Cognitive Status:  Mental Status  Neurologic State: Alert  Orientation Level: Oriented to person  Pain  Pain Screen  Pain Scale 1: Numeric (0 - 10)  Pain Intensity 1: 0  Patient Stated Pain Goal: 0  Pain Reassessment 1: Yes  Bed Mobility Training  Bed Mobility Training  Rolling: Minimum assistance; Additional time  Supine to Sit: Moderate assistance; Additional time  Sit to Supine: Moderate assistance; Additional time  Scooting: Moderate assistance; Additional time  Balance  Sitting: With support  Sitting - Static: Fair (occasional)  Sitting - Dynamic: Fair (occasional) (-)  Transfer Training  Gait Training  Wheelchair Mobility/Mgmt  Therapeutic Exercise: Pt presented supine in bed. Pt utilizing 2L of supplemental 02 throughout session. Sp02 assessed at rest, with bed mobility, while seated at EOB and with supine TE's. Sp02 ranged between 92-96%. Pt's spouse and son present during therapy session. Pt verbal, but difficult to understand with whispered speech. Bed mobility as noted above with extensive verbal cues for sequencing. Pt sat EOB with CGA/SBA and B UE support. Pt with noted R lateral lean, and hold head in right neck bend. Therapist provided visual and tactile cues for upright posture. Pt able to hold upright posture for just a few seconds unassisted. Pt able to perform minimal range LAQ while seated at EOB x5 each leg. Supine assisted heel slides, LAQ, hip abd 2x10. Therapist repositioned pt with pillows pt promote midline posture in bed.       Patient/Caregiver Education:   Pt /Caregiver Education on safety and fall prevention, and upright posture was provided to reduce risk of falls and maximize gains. ASSESSMENT:  Patient continues to benefit from Skilled PT services to improve bed mobility, transfers, strength, balance, gait and stair negotiation. Progression toward goals:  [ ]      Improving appropriately and progressing toward goals  [X]      Improving slowly and progressing toward goals  [ ]      Not making progress toward goals and plan of care will be adjusted      Treatment session: 30 minutes.   Therapist:   Wilfredo Del Cid PTA,          4/4/2017

## 2017-04-04 NOTE — PROGRESS NOTES
Problem: Self Care Deficits Care Plan (Adult)  Goal: *Acute Goals and Plan of Care (Insert Text)  OCCUPATIONAL THERAPY SHORT TERM GOALS   Initiated 4/3/2017 and to be accomplished within 2 Week(s)    1. Patient will perform Upper body ADLs with/without adaptive equipment with minimal assistance/contact guard assist.  2. Patient will perform Lower body ADLs with/without adaptive equipment with moderate assistance. 3. Patient will perform toileting task with moderate assistance with Fair safety to reduce falls risk. 4. Patient will perform functional transfers with Rolling Walker and moderate assistance. 5. Patient will perform standing static/dynamic balance activities for improved ADL/IADL function with moderate assistance x2 and Fair balance and safety awarenes. 6. Patient will improve Barthel index scores to atleast 50/100 to improve functional mobility. 7. Patient will improve standardized test score for [place] Sitting Balance Scale to 2     OCCUPATIONAL THERAPY LONG TERM GOALS   Initiated 4/3/2017 and to be accomplished within 3-4 Week(s)    1. Patient will perform Upper body ADLs with/without adaptive equipment with supervision/set-up. 2. Patient will perform Lower body ADLs with/without adaptive equipment with minimal assistance/contact guard assist .  3. Patient will perform toileting task with minimal assistance/contact guard assist with Fair safety to reduce falls risk. 4. Patient will perform functional transfers with Beatriz Goodpasture and minimal assistance/contact guard assist and Fair balance and safety awareness. 5. Patient will perform standing static/dynamic activity for improved ADL/IADL function with minimal assistance/contact guard assist an and Fair balance and safety awareness. 6. Patient will improve Barthel index score to 65/100 to improve independence with mobility. 7. Patient will improve standardized test score for [place] Sitting Balance to 3.      Therapist: MARIPOSA Dunbar 4/3/2017   [facility]   OCCUPATIONAL THERAPY DAILY TREATMENT NOTE        Patient: Na Marino (66 y.o. female)                      Date: 4/4/2017  Attending Physician: Pepe Barrera MD  Primary Diagnosis: cellulitis    Treatment Diagnosis  Treatment Diagnosis: oropharyngeal dysphagia  Treatment Diagnosis 2: dysarthria   Precautions : Precautions at Admission: Fall, Skin  Vital Signs:  Vital Signs  Temp: 98 °F (36.7 °C)  Temp Source: Axillary  Pulse (Heart Rate): 99  Heart Rate Source: Monitor  Resp Rate: 18  O2 Sat (%): 94 %  Level of Consciousness: Alert  BP: 142/56  MAP (Calculated): 85  BP 1 Method: Automatic  BP 1 Location: Left arm  BP Patient Position: At rest;Head of bed elevated (Comment degrees)  MEWS Score: 1     Cognitive Status:  Mental Status  Neurologic State: Alert;Confused  Orientation Level: Oriented to person  Cognition: Decreased command following  Perception: Cues to maintain midline in sitting  Perseveration: No perseveration noted  Safety/Judgement: Fall prevention  Pain:  Pain Screen  Pain Scale 1: Numeric (0 - 10)  Pain Intensity 1: 0  Patient Stated Pain Goal: 0  Pain Reassessment 1: Yes  Pain Scale 1: Numeric (0 - 10)  Bed Mobility:  Bed Mobility  Rolling: Minimum assistance; Additional time  Supine to Sit: Moderate assistance; Additional time  Sit to Supine: Moderate assistance; Additional time  Scooting: Moderate assistance; Additional time  Balance:  Balance  Sitting: With support  Sitting - Static: Fair (occasional)  Sitting - Dynamic: Fair (occasional) (-)  ADL Intervention:  Upper Body Dressing Assistance  Dressing Assistance: Moderate assistance  Hospital Gown: Moderate assistance  Cues: Physical assistance;Visual cues provided  Therapeutic Activities:     Therapeutic Exercises:  UE AROM exercises, 2x15 in all planes at bed level. Pt required frequent verbal cues for redirection and continuation of activity.    Patient/Caregiver Education:    PtJorge Alberto Weiss Education on safety with ADLs and transfers. ASSESSMENT:  Patient continues to demonstrate the need for skilled Occupational Therapy services to improve srength, endurance and balance.    Progression toward goals:  [ ]      Improving appropriately and progressing toward goals  [X]      Improving slowly and progressing toward goals  [ ]      Not making progress toward goals and plan of care will be adjusted      Treatment session:   30 minutes     Therapist:    MARIPOSA Heller    4/4/2017

## 2017-04-05 PROCEDURE — 74011636637 HC RX REV CODE- 636/637: Performed by: INTERNAL MEDICINE

## 2017-04-05 PROCEDURE — 74011250637 HC RX REV CODE- 250/637: Performed by: INTERNAL MEDICINE

## 2017-04-05 RX ADMIN — FLUCONAZOLE 200 MG: 100 TABLET ORAL at 09:04

## 2017-04-05 RX ADMIN — ROPINIROLE HYDROCHLORIDE 1 MG: 1 TABLET, FILM COATED ORAL at 09:04

## 2017-04-05 RX ADMIN — MELOXICAM 7.5 MG: 7.5 TABLET ORAL at 09:04

## 2017-04-05 RX ADMIN — LEVOTHYROXINE SODIUM 50 MCG: 50 TABLET ORAL at 09:04

## 2017-04-05 RX ADMIN — HYDROCORTISONE 5 ML: 10 TABLET ORAL at 22:00

## 2017-04-05 RX ADMIN — HYDROCORTISONE 5 ML: 10 TABLET ORAL at 16:00

## 2017-04-05 RX ADMIN — PANTOPRAZOLE SODIUM 40 MG: 40 TABLET, DELAYED RELEASE ORAL at 09:04

## 2017-04-05 RX ADMIN — ASPIRIN 325 MG ORAL TABLET 325 MG: 325 PILL ORAL at 09:03

## 2017-04-05 RX ADMIN — FLUOXETINE HYDROCHLORIDE 20 MG: 20 CAPSULE ORAL at 09:04

## 2017-04-05 RX ADMIN — HYDROCORTISONE 5 ML: 10 TABLET ORAL at 09:00

## 2017-04-05 RX ADMIN — AMLODIPINE BESYLATE 2.5 MG: 2.5 TABLET ORAL at 09:04

## 2017-04-05 RX ADMIN — PREDNISONE 40 MG: 20 TABLET ORAL at 09:04

## 2017-04-05 RX ADMIN — CLOPIDOGREL BISULFATE 75 MG: 75 TABLET, FILM COATED ORAL at 09:04

## 2017-04-05 RX ADMIN — MIRTAZAPINE 15 MG: 15 TABLET, FILM COATED ORAL at 21:39

## 2017-04-05 RX ADMIN — BIMATOPROST 1 DROP: 0.1 SOLUTION/ DROPS OPHTHALMIC at 17:25

## 2017-04-05 RX ADMIN — SIMVASTATIN 20 MG: 20 TABLET, FILM COATED ORAL at 21:39

## 2017-04-05 RX ADMIN — ROPINIROLE HYDROCHLORIDE 2 MG: 1 TABLET, FILM COATED ORAL at 21:39

## 2017-04-05 RX ADMIN — LEVOFLOXACIN 750 MG: 750 TABLET, FILM COATED ORAL at 09:46

## 2017-04-05 RX ADMIN — FOLIC ACID 1 MG: 1 TABLET ORAL at 09:04

## 2017-04-05 RX ADMIN — CLONAZEPAM 1 MG: 0.5 TABLET ORAL at 09:04

## 2017-04-05 RX ADMIN — CLONAZEPAM 1 MG: 0.5 TABLET ORAL at 17:23

## 2017-04-05 NOTE — PROGRESS NOTES
Problem: Self Care Deficits Care Plan (Adult)  Goal: *Acute Goals and Plan of Care (Insert Text)  OCCUPATIONAL THERAPY SHORT TERM GOALS   Initiated 4/3/2017 and to be accomplished within 2 Week(s)    1. Patient will perform Upper body ADLs with/without adaptive equipment with minimal assistance/contact guard assist.  2. Patient will perform Lower body ADLs with/without adaptive equipment with moderate assistance. 3. Patient will perform toileting task with moderate assistance with Fair safety to reduce falls risk. 4. Patient will perform functional transfers with Rolling Walker and moderate assistance. 5. Patient will perform standing static/dynamic balance activities for improved ADL/IADL function with moderate assistance x2 and Fair balance and safety awarenes. 6. Patient will improve Barthel index scores to atleast 50/100 to improve functional mobility. 7. Patient will improve standardized test score for [place] S[place] Balance Scale to 2     OCCUPATIONAL THERAPY LONG TERM GOALS   Initiated 4/3/2017 and to be accomplished within 3-4 Week(s)    1. Patient will perform Upper body ADLs with/without adaptive equipment with supervision/set-up. 2. Patient will perform Lower body ADLs with/without adaptive equipment with minimal assistance/contact guard assist .  3. Patient will perform toileting task with minimal assistance/contact guard assist with Fair safety to reduce falls risk. 4. Patient will perform functional transfers with Samantha Saran and minimal assistance/contact guard assist and Fair balance and safety awareness. 5. Patient will perform standing static/dynamic activity for improved ADL/IADL function with minimal assistance/contact guard assist an and Fair balance and safety awareness. 6. Patient will improve Barthel index score to 65/100 to improve independence with mobility. 7. Patient will improve standardized test score for [place] Sitting Balance to 3.      Therapist: MARIPOSA Owens 4/3/2017   [facility]   OCCUPATIONAL THERAPY DAILY TREATMENT NOTE        Patient: Sarabjit Anthony (1 y.o. female)                      Date: 4/5/2017  Attending Physician: Lisa Westbrook MD  Primary Diagnosis: cellulitis    Treatment Diagnosis  Treatment Diagnosis: oropharyngeal dysphagia  Treatment Diagnosis 2: dysarthria   Precautions : Precautions at Admission: Fall, Skin  Vital Signs:  Vital Signs  Pulse (Heart Rate): 91  Heart Rate Source: Monitor  Resp Rate: 15  O2 Sat (%): 93 %  Level of Consciousness: Alert  BP: 132/75  MAP (Calculated): 94  BP 1 Method: Automatic  BP 1 Location: Right arm  BP Patient Position: At rest;Sitting     Cognitive Status:  Mental Status  Neurologic State: Confused  Orientation Level: Oriented to person  Cognition: Impulsive; Impaired decision making; Follows commands  Pain:  Pain Screen  Pain Scale 1: Numeric (0 - 10)  Pain Intensity 1: 0  Patient Stated Pain Goal: 0  Pain Scale 1: Numeric (0 - 10)  Gross Assessment:     Coordination:     Bed Mobility:  Bed Mobility  Rolling: Minimum assistance; Additional time  Supine to Sit: Moderate assistance  Sit to Supine: Moderate assistance  Scooting: Moderate assistance;Maximum assistance  Transfers:  Functional Transfers  Sit to Stand: Total assistance  Stand to Sit: Total assistance  Bed to Chair: Total assistance     Balance:  Balance  Sitting: With support  Sitting - Static: Fair (occasional)  Sitting - Dynamic: Fair (occasional) (-)  Standing: With support; Impaired  Standing - Static: Poor  Standing - Dynamic : None  ADL Self Care:     ADL Intervention:           Toileting  Toileting Assistance: Maximum assistance  Bladder Hygiene: Maximum assistance  Clothing Management: Maximum assistance  Grooming  Grooming Assistance: Contact guard assistance (sitting EOB)  Brushing Teeth: Contact guard assistance        :     Therapeutic Activities:  Co-treated with PT for optimal functional gains with functional mobility due to decreased functional activity tolerance. Assisted patient with changing undergarments due to bladder incontinence episode in order to assess safety and independence during routine. See above for levels of A needed. Patient was able to assist with rolling with Mod A. Assisted patient with mouthwash task, seated EOB, in order to assess safety and independence during routine. Patient required cueing to spit during task for optimal safety. Assisted patient with bed>w/c transfer in order to assess independence with task. See above for levels of A needed. KAUR positioned patient with pillows for midline positioning while upright in w/c for optimal safety. Patient/Caregiver Education:    PtJorge Alberto Fry April Education on safe hand placement during bed mobility was provided for optimal safety and independence.         ASSESSMENT:  Patient continues to demonstrate the need for skilled Occupational Therapy services to improve static standing balance needed for bathing  Progression toward goals:  [ ]      Improving appropriately and progressing toward goals  [X]      Improving slowly and progressing toward goals  [ ]      Not making progress toward goals and plan of care will be adjusted      Treatment session:   30 minutes     Therapist:    NATASHA Locke,  4/5/2017

## 2017-04-05 NOTE — PROGRESS NOTES
Problem: Mobility Impaired (Adult and Pediatric)  Goal: *Acute Goals and Plan of Care (Insert Text)  PHYSICAL THERAPY STG GOALS :  Initiated 4/1/2017 and to be accomplished within 1-2 Weeks    1. Patient will move from supine to sit and sit to supine in bed with minimal assistance/contact guard assist.   2. Patient will transfer from bed to chair and chair to bed with minimum assistance using 2WW.  3. Patient will perform sit to stand with minimum assistance with Fair balance and safety awareness. 4. Patient will ambulate with minimum assistance for 10 feet with 2WW on level surfaces with 1 turn. 5. Patient will ascend/descend 1 step with bilateral handrail(s) withminimal assistance to allow for safe home access/exit. 6. Patient will improve standardized test score for [facility] Standing Balance Scale to 1+    PHYSICAL THERAPY LTG GOALS :  Initiated 4/1/2017 and to be accomplished within 2-4 Weeks    1. Patient will move from supine to sit and sit to supine in bed with supervision/set-up. 2. Patient will transfer from bed to chair and chair to bed with supervision/set-up using 2WW.  3. Patient will perform sit to stand with supervision/set-up with Good balance and safety awareness. 4. Patient will ambulate with supervision/set-up for 50 feet with CGA on level surfaces and be able to maneuver through narrow spaces and obstacles without loss of balance. 5. Patient will ascend/descend 2 steps with bilateral handrail(s) with contact guard assist to allow for safe home access/exit. 6. Patient will improve standardized test score for [facility] Balance Scale to 2    Physical Therapist: Heather Acosta PT on 4/1/2017    [facility]   PHYSICAL THERAPY DAILY TREATMENT NOTE        Patient:  Heather Amaya (89 y.o. female)               Date: 4/5/2017    Physician: Devyn Farr MD  Primary Diagnosis: cellulitis          Treatment Diagnosis  Treatment Diagnosis: oropharyngeal dysphagia  Treatment Diagnosis 2: dysarthria  Precautions: Fall, Skin  Vital Signs  Vital Signs  Pulse (Heart Rate): 91  Heart Rate Source: Monitor  Resp Rate: 15  O2 Sat (%): 93 %  Level of Consciousness: Alert  BP: 132/75  MAP (Calculated): 94  BP 1 Method: Automatic  BP 1 Location: Right arm  BP Patient Position: At rest;Sitting     Cognitive Status:  Mental Status  Neurologic State: Confused  Orientation Level: Oriented to person  Cognition: Impulsive; Impaired decision making; Follows commands  Pain  Pain Screen  Pain Scale 1: Numeric (0 - 10)  Pain Intensity 1: 0  Patient Stated Pain Goal: 0  Bed Mobility Training  Bed Mobility Training  Rolling: Minimum assistance; Additional time  Supine to Sit: Moderate assistance  Sit to Supine: Moderate assistance  Scooting: Moderate assistance;Maximum assistance  Balance  Sitting: With support  Sitting - Static: Fair (occasional)  Sitting - Dynamic: Fair (occasional) (-)  Standing: With support; Impaired  Standing - Static: Poor  Standing - Dynamic : None  Transfer Training  Transfer Training  Sit to Stand: Total assistance  Stand to Sit: Total assistance  Stand Pivot Transfers: Total assistance  Bed to Chair: Total assistance  Level of Assistance: Assist x2  Interventions: Safety awareness training;Verbal cues; Tactile cues;Manual cues  Sit to Stand: Total assistance  Gait Training  Wheelchair Mobility/Mgmt  Therapeutic Exercise: Co-treat with OT to maximize gains with bed mobility, seated balance and transfers. Pt presented supine in bed. Bed mobility to include rolling L<>R several times to allow for self care task with OT with Min A and verbal cues for use of bed rails. Supine<>sit x2 with Min A for supine>sit as pt was able to assist with LE's, but required Mod A for upper body to push into sitting position at EOB. Pt sat EOB for ~4-5' to allow for self care task with OT. Pt able to sit with SBA/CGA and verbal cues for upright posture.  Pt with noted R lateral lean with head postured to R side. Pt continues to fatigue quickly and required several rest breaks throughout session. SPT from EOB>w/c with total A x2 as pt was unable to assist with transfer. Therapist assisted pt with postioning in high back w/c with seated back slightly reclined. Wedge cushion placed under pt with dycem to minimize risk of sliding forward. Therapist notified NSG (carly) in re; to pt's posture in w/c. Pt transported to [facility] to remain sitting up in w/c for safety. Patient/Caregiver Education:   Pt /Caregiver Education on safety and fall prevention, and posture was provided to reduce risk of falls and promote proper spinal alignment. ASSESSMENT:  Patient continues to benefit from Skilled PT services to improve bed mobility, strength, balance, transfer, gait and step negotiation. Progression toward goals:  [ ]      Improving appropriately and progressing toward goals  [X]      Improving slowly and progressing toward goals  [ ]      Not making progress toward goals and plan of care will be adjusted      Treatment session: 35 minutes.   Therapist:   Dominga Black PTA,          4/5/2017

## 2017-04-05 NOTE — ROUTINE PROCESS
Bedside and Verbal shift change report given to NAVJOT Rene LPN (oncoming nurse) by FLY Oneal (offgoing nurse). Report included the following information SBAR, Kardex, MAR, Recent Results and Med Rec Status. Visually checked on patient hourly by staff.

## 2017-04-05 NOTE — ROUTINE PROCESS
Bedside and Verbal shift change report given to latisha Dorado lpn (oncoming nurse) by victor manuel Castillo lpn (offgoing nurse). Report included the following information SBAR, Kardex and MAR.  Hourly rounds

## 2017-04-05 NOTE — PROGRESS NOTES
Speech Therapy Note:    SLP attempted speech therapy, however patient is refusing services at this time. Will reattempt at a later time.   Jaqueline Ornelas M.S. [facility] SLP  Ph: [phone]  Pager: [phone]

## 2017-04-05 NOTE — PROGRESS NOTES
Problem: Motor Speech Impaired (Adult)  Goal: *Acute Goals and Plan of Care (Insert Text)  Pt will:  1. Utilize compensatory strategies (decrease rate, overarticulate, increase intensity) to increase intelligibility to 90% at conversation level with min visual/verbal cues  2. Complete articulatory agility tasks (reading-conversation) with min visual/verbal cues  3. Name greater than/equal to 7 items from a concrete linguistic categories with min verbal cues  4. Increase temporal/environmental orientation with 80% acc, min verbal cues.     LTGs: 2 weeks  1. Pt will demonstrate increase in cognitive-linguistic skills for participate in medical care, min A.  2. Pt will exhibit effective expressive communication for expressing basic wants, needs, and health status, min A. Outcome: 2020 [facility] SPEECH-LANGUAGE    DAILY TREATMENT NOTE     Patient: Irma Wall (89 y.o. female)                                   Date: 4/5/2017       Primary Diagnosis: cellulitis   Treatment Diagnosis  Treatment Diagnosis: dysarthria  Treatment Diagnosis 2: dysarthria  Physician: Pako Brady MD  Precautions: Fall, Skin  Mental Status:  Mental Status  Neurologic State: Confused  Orientation Level: Oriented X4  Cognition: Impulsive, Impaired decision making, Follows commands  Perception: Cues to maintain midline in sitting  Perseveration: No perseveration noted  Safety/Judgement: Fall prevention      OBJECTIVE DATA SUMMARY:   Treatment & Interventions: Motor Speech:  Oral-Motor Structure/Motor Speech  Dysarthric Characteristics: Blended word boundaries; Decreased breath support; Imprecise  Word Intelligibility (%): 85 %  Phrase Intelligibility (%): 85 %  Sentence Intelligibility (%): 50 %  Conversation Intelligibility (%): 30 %  Language Comprehension and Expression:  Auditory Comprehension  Hearing Aid: None           Neuro-Linguistics: Pragmatics:     Voice:        Response & Tolerance to Activities:     Pain:  Pain Scale 1: Numeric (0 - 10)  Pain Intensity 1: 0     ASSESSMENT:  Patient seen for follow up speech and language therapy. Patient continues severely dysarthric (~40% intelligible in conversation this day). Introduced patient to compensatory strategies of increase intensity, over articulate, decrease rate. Patient demonstrated understanding at the word and phrase level increasing intelligibility to ~85%. Patient answered orientation questions with multiple prompts (no cues) with 80% accuracy including month, year, place, self, but not president (stated [place]). Patient would continue to benefit from skilled ST services to address speech and language. Progression toward goals:  [ ]         Improving appropriately and progressing toward goals  [X]         Improving slowly and progressing toward goals  [ ]         Not making progress toward goals and plan of care will be adjusted       PLAN:  Recommendations and Planned Interventions:  Continue skilled ST services to address speech and language deficits  Patient continues to benefit from skilled intervention to address the above impairments. Continue treatment per established plan of care.   Discharge Recommendations:  Home Health       COMMUNICATION/EDUCATION:     [ ]        Safety precautions  [X]        Compensatory communication techniques  [ ]        Internal/external memory techniques        Treatment Time:  27  Minutes     Therapist:    Bernard Morales        4/5/2017

## 2017-04-05 NOTE — ROUTINE PROCESS
Bedside and Verbal shift change report given to [facility] LPN (oncoming nurse) by Yanci Kang RN (offgoing nurse). Report included the following information SBAR, Kardex and MAR. Hourly rounds made.

## 2017-04-06 PROCEDURE — 74011250637 HC RX REV CODE- 250/637: Performed by: INTERNAL MEDICINE

## 2017-04-06 PROCEDURE — 74011636637 HC RX REV CODE- 636/637: Performed by: INTERNAL MEDICINE

## 2017-04-06 RX ADMIN — LEVOTHYROXINE SODIUM 50 MCG: 50 TABLET ORAL at 09:58

## 2017-04-06 RX ADMIN — CLONAZEPAM 1 MG: 0.5 TABLET ORAL at 17:28

## 2017-04-06 RX ADMIN — PREDNISONE 40 MG: 20 TABLET ORAL at 09:58

## 2017-04-06 RX ADMIN — HYDROCORTISONE 5 ML: 10 TABLET ORAL at 09:00

## 2017-04-06 RX ADMIN — HYDROCORTISONE 5 ML: 10 TABLET ORAL at 17:29

## 2017-04-06 RX ADMIN — FLUOXETINE HYDROCHLORIDE 20 MG: 20 CAPSULE ORAL at 09:58

## 2017-04-06 RX ADMIN — TRAMADOL HYDROCHLORIDE 100 MG: 50 TABLET, FILM COATED ORAL at 21:12

## 2017-04-06 RX ADMIN — BIMATOPROST 1 DROP: 0.1 SOLUTION/ DROPS OPHTHALMIC at 17:30

## 2017-04-06 RX ADMIN — ROPINIROLE HYDROCHLORIDE 2 MG: 1 TABLET, FILM COATED ORAL at 21:13

## 2017-04-06 RX ADMIN — MIRTAZAPINE 15 MG: 15 TABLET, FILM COATED ORAL at 21:13

## 2017-04-06 RX ADMIN — PANTOPRAZOLE SODIUM 40 MG: 40 TABLET, DELAYED RELEASE ORAL at 09:58

## 2017-04-06 RX ADMIN — AMLODIPINE BESYLATE 2.5 MG: 2.5 TABLET ORAL at 09:58

## 2017-04-06 RX ADMIN — CLONAZEPAM 1 MG: 0.5 TABLET ORAL at 09:58

## 2017-04-06 RX ADMIN — HYDROCORTISONE 5 ML: 10 TABLET ORAL at 21:14

## 2017-04-06 RX ADMIN — ROPINIROLE HYDROCHLORIDE 1 MG: 1 TABLET, FILM COATED ORAL at 09:58

## 2017-04-06 RX ADMIN — ASPIRIN 325 MG ORAL TABLET 325 MG: 325 PILL ORAL at 09:00

## 2017-04-06 RX ADMIN — CLOPIDOGREL BISULFATE 75 MG: 75 TABLET, FILM COATED ORAL at 09:58

## 2017-04-06 RX ADMIN — SIMVASTATIN 20 MG: 20 TABLET, FILM COATED ORAL at 21:13

## 2017-04-06 RX ADMIN — FOLIC ACID 1 MG: 1 TABLET ORAL at 09:58

## 2017-04-06 RX ADMIN — MELOXICAM 7.5 MG: 7.5 TABLET ORAL at 09:58

## 2017-04-06 NOTE — PROGRESS NOTES
Problem: Mobility Impaired (Adult and Pediatric)  Goal: *Acute Goals and Plan of Care (Insert Text)  PHYSICAL THERAPY STG GOALS :  Initiated 4/1/2017 and to be accomplished within 1-2 Weeks    1. Patient will move from supine to sit and sit to supine in bed with minimal assistance/contact guard assist.   2. Patient will transfer from bed to chair and chair to bed with minimum assistance using 2WW.  3. Patient will perform sit to stand with minimum assistance with Fair balance and safety awareness. 4. Patient will ambulate with minimum assistance for 10 feet with 2WW on level surfaces with 1 turn. 5. Patient will ascend/descend 1 step with bilateral handrail(s) withminimal assistance to allow for safe home access/exit. 6. Patient will improve standardized test score for [facility] Standing Balance Scale to 1+    PHYSICAL THERAPY LTG GOALS :  Initiated 4/1/2017 and to be accomplished within 2-4 Weeks    1. Patient will move from supine to sit and sit to supine in bed with supervision/set-up. 2. Patient will transfer from bed to chair and chair to bed with supervision/set-up using 2WW.  3. Patient will perform sit to stand with supervision/set-up with Good balance and safety awareness. 4. Patient will ambulate with supervision/set-up for 50 feet with CGA on level surfaces and be able to maneuver through narrow spaces and obstacles without loss of balance. 5. Patient will ascend/descend 2 steps with bilateral handrail(s) with contact guard assist to allow for safe home access/exit. 6. Patient will improve standardized test score for [facility] Balance Scale to 2    Physical Therapist: Rachel Jaramillo PT on 4/1/2017    [facility]   PHYSICAL THERAPY DAILY TREATMENT NOTE        Patient:  Blayne Chacon (84 y.o. female)               Date: 4/6/2017    Physician: Ghanshyam Fowler MD  Primary Diagnosis: cellulitis          Treatment Diagnosis  Treatment Diagnosis: dysarthria  Treatment Diagnosis 2: dysarthria  Precautions: Fall, Skin  Vital Signs  Cognitive Status:  Pain  Bed Mobility Training  Bed Mobility Training  Rolling: Moderate assistance  Scooting: Maximum assistance;Assist x2  Balance  Transfer Training  Gait Training  Wheelchair Mobility/Mgmt  Therapeutic Exercise:  Pt presented supine in bed. Pt with gown off and had spilled her ensure on herself and blankets. Therapist assisted pt with rolling in bed L<>R to allow for cleaning up. Pt required Mod A for rolling and was provided with verbal cues to bend knees to assist with ease of rolling and use of bed rails. Pt began to cry during session. Pt shook head no when asked if she had pain. Pt verbalized, but was unable to understand. Pt did not have supplemental 02 donned, and therapist assisted with donning nasal canula. Therapist reported to Marleny KING to follow up with pt. Patient/Caregiver Education:   Pt /Caregiver Education on safety and use of call bell was provided. ASSESSMENT:  Patient continues to benefit from Skilled PT services to improve bed mobility, strength, balance, transfers and gait. Progression toward goals:  [ ]      Improving appropriately and progressing toward goals  [ ]      Improving slowly and progressing toward goals  [X]      Not making progress toward goals and plan of care will be adjusted      Treatment session: 30 minutes.   Therapist:   Mal Carrera PTA,          4/6/2017

## 2017-04-06 NOTE — PROGRESS NOTES
conducted a Follow up consultation and Spiritual Assessment for [facility], who is a 66 y.o.,female. The  provided the following Interventions:  Continued the relationship of care and support. Listened empathically. Offered prayer and assurance of continued prayer on patients behalf. Chart reviewed. The following outcomes were achieved:  Patient expressed gratitude for pastoral care visit. Assessment:  There are no further spiritual or Evangelical issues which require Spiritual Care Services interventions at this time. Plan:  Chaplains will continue to follow and will provide pastoral care on an as needed/requested basis.  recommends bedside caregivers page  on duty if patient shows signs of acute spiritual or emotional distress.      [address] [facility]   Staff [address] [facility]   [phone]

## 2017-04-06 NOTE — PROGRESS NOTES
Problem: Motor Speech Impaired (Adult)  Goal: *Acute Goals and Plan of Care (Insert Text)  Pt will:  1. Utilize compensatory strategies (decrease rate, overarticulate, increase intensity) to increase intelligibility to 90% at conversation level with min visual/verbal cues  2. Complete articulatory agility tasks (reading-conversation) with min visual/verbal cues  3. Name greater than/equal to 7 items from a concrete linguistic categories with min verbal cues  4. Increase temporal/environmental orientation with 80% acc, min verbal cues.     LTGs: 2 weeks  1. Pt will demonstrate increase in cognitive-linguistic skills for participate in medical care, min A.  2. Pt will exhibit effective expressive communication for expressing basic wants, needs, and health status, min A. Outcome: 2020 [facility] SPEECH-LANGUAGE    DAILY TREATMENT NOTE     Patient: Reyes Singh (66 y.o. female)                                   Date: 4/6/2017       Primary Diagnosis: cellulitis   Treatment Diagnosis  Treatment Diagnosis: dysarthria  Treatment Diagnosis 2: dysarthria  Physician: Calvin Lira MD  Precautions: Fall, Skin  Mental Status:  Mental Status  Neurologic State: Alert  Orientation Level: Oriented to person  Cognition: Follows commands  Perception: Appears intact  Perseveration: No perseveration noted  Safety/Judgement: Fall prevention      OBJECTIVE DATA SUMMARY:   Treatment & Interventions: Motor Speech:  Oral-Motor Structure/Motor Speech  Dysarthric Characteristics: Blended word boundaries; Decreased breath support;Decreased prosody; Imprecise  Intelligibility: Impaired  Word Intelligibility (%): 85 %  Phrase Intelligibility (%): 85 %  Sentence Intelligibility (%): 50 %  Conversation Intelligibility (%): 50 %  Language Comprehension and Expression:  Auditory Comprehension  Auditory Impairment: No   Hearing Aid: None  Verbal Expression  Primary Mode of Expression: Verbal  Conversation: Dysarthric  Overall Impairment: Severe  Cueing type: Verbal;Repetition            Response & Tolerance to Activities: 100% participation     Pain:  Pain Scale 1: Numeric (0 - 10)  Pain Intensity 1: 0     ASSESSMENT:Pt seen for speech therapy this day. Pt's speech is severely dysarthric c/b blended word boundaries, low intensity, and slow rate. Pt used compensatory strategies (speak loud, over articulate, slow down) to increase intelligibility during conversational task. Her overall conversational intelligibility was ~30%. With concrete naming task, pt able to name 60% of category given max multimodal cues. With orientation task, pt only oriented to self and month. SLP left compensatory strategies on pt's white board in room. D/w Veena BIGGS. ST to continue to follow for increased intelligibility. Progression toward goals:  [ ]         Improving appropriately and progressing toward goals  [X]         Improving slowly and progressing toward goals  [ ]         Not making progress toward goals and plan of care will be adjusted       PLAN:  Recommendations and Planned Interventions:  S-O-S (Speak Up, Over-articulate, Slow Down)     Patient continues to benefit from skilled intervention to address the above impairments. Continue treatment per established plan of care.   Discharge Recommendations:  Amadeo Beauchamp 48:     [ ]        Safety precautions  [X]        Compensatory communication techniques  [ ]        Internal/external memory techniques        Treatment Time:  27  Minutes     Therapist:    Ted Cool        4/6/2017

## 2017-04-06 NOTE — ROUTINE PROCESS
Bedside and Verbal shift change report given to GENARO Bryant (oncoming nurse) by Jhony Flores LPN (offgoing nurse). Report included the following information SBAR, Kardex, MAR, Recent Results and Med Rec Status. Visually checked patient hourly by staff.

## 2017-04-06 NOTE — ROUTINE PROCESS
Bedside and verbal shift change report given to [address] [place] [address] LPN (oncoming nurse) by Alessio Shaw LPN (off going nurse) Report included SBAR,KARDEX, Mars and recent results.

## 2017-04-06 NOTE — ROUTINE PROCESS
Bedside and Verbal shift change report given to latisha Ramirez lpn (oncoming nurse) by victor manuel Castillo lpn (offgoing nurse). Report included the following information SBAR, Kardex and MAR.  Hourly rounds

## 2017-04-07 PROCEDURE — 74011636637 HC RX REV CODE- 636/637: Performed by: INTERNAL MEDICINE

## 2017-04-07 PROCEDURE — 74011250637 HC RX REV CODE- 250/637: Performed by: INTERNAL MEDICINE

## 2017-04-07 RX ADMIN — FOLIC ACID 1 MG: 1 TABLET ORAL at 09:00

## 2017-04-07 RX ADMIN — ROPINIROLE HYDROCHLORIDE 2 MG: 1 TABLET, FILM COATED ORAL at 21:23

## 2017-04-07 RX ADMIN — ROPINIROLE HYDROCHLORIDE 1 MG: 1 TABLET, FILM COATED ORAL at 09:00

## 2017-04-07 RX ADMIN — CLONAZEPAM 1 MG: 0.5 TABLET ORAL at 17:52

## 2017-04-07 RX ADMIN — ASPIRIN 325 MG ORAL TABLET 325 MG: 325 PILL ORAL at 09:00

## 2017-04-07 RX ADMIN — HYDROCORTISONE 5 ML: 10 TABLET ORAL at 22:00

## 2017-04-07 RX ADMIN — FLUOXETINE HYDROCHLORIDE 20 MG: 20 CAPSULE ORAL at 09:00

## 2017-04-07 RX ADMIN — MIRTAZAPINE 15 MG: 15 TABLET, FILM COATED ORAL at 21:23

## 2017-04-07 RX ADMIN — CLOPIDOGREL BISULFATE 75 MG: 75 TABLET, FILM COATED ORAL at 09:00

## 2017-04-07 RX ADMIN — CLONAZEPAM 1 MG: 0.5 TABLET ORAL at 09:00

## 2017-04-07 RX ADMIN — PREDNISONE 40 MG: 20 TABLET ORAL at 09:00

## 2017-04-07 RX ADMIN — PANTOPRAZOLE SODIUM 40 MG: 40 TABLET, DELAYED RELEASE ORAL at 07:30

## 2017-04-07 RX ADMIN — LEVOTHYROXINE SODIUM 50 MCG: 50 TABLET ORAL at 07:30

## 2017-04-07 RX ADMIN — HYDROCORTISONE 5 ML: 10 TABLET ORAL at 17:52

## 2017-04-07 RX ADMIN — AMLODIPINE BESYLATE 2.5 MG: 2.5 TABLET ORAL at 09:00

## 2017-04-07 RX ADMIN — HYDROCORTISONE 5 ML: 10 TABLET ORAL at 09:00

## 2017-04-07 RX ADMIN — MELOXICAM 7.5 MG: 7.5 TABLET ORAL at 09:00

## 2017-04-07 RX ADMIN — SIMVASTATIN 20 MG: 20 TABLET, FILM COATED ORAL at 21:23

## 2017-04-07 NOTE — ROUTINE PROCESS
Bedside and Verbal shift change report given to [address] [place] [address] LPN (oncoming nurse) by Rafael Lopez RN (offgoing nurse). Report included the following information SBAR, Kardex and MAR.

## 2017-04-07 NOTE — PROGRESS NOTES
Problem: Self Care Deficits Care Plan (Adult)  Goal: *Acute Goals and Plan of Care (Insert Text)  OCCUPATIONAL THERAPY SHORT TERM GOALS     Updated 4/07/17    1. Patient will perform Upper body ADLs with/without adaptive equipment with minimal assistance/contact guard assist.   2. Patient will perform Lower body ADLs with/without adaptive equipment with moderate assistance. 3. Patient will perform toileting task with moderate assistance with Fair safety to reduce falls risk. 4. Patient will perform functional transfers with Rolling Walker and moderate assistance. 5. Patient will perform standing static/dynamic balance activities for improved ADL/IADL function with moderate assistance x 2 and Fair balance and safety awarenes. 6. Patient will improve Barthel index scores to atleast 50/100 to improve functional mobility. 7. Patient will improve standardized test score for [place] Sitting Balance Scale to 2     Initiated 4/3/2017 and to be accomplished within 2 Week(s)    1. Patient will perform Upper body ADLs with/without adaptive equipment with minimal assistance/contact guard assist. (progressing-currently Max A)  2. Patient will perform Lower body ADLs with/without adaptive equipment with moderate assistance. (progressing-Max A)  3. Patient will perform toileting task with moderate assistance with Fair safety to reduce falls risk. (progressing-currently Max A)  4. Patient will perform functional transfers with Rolling Walker and moderate assistance. (progressing-currently Total A)  5. Patient will perform standing static/dynamic balance activities for improved ADL/IADL function with moderate assistance x 2 and Fair balance and safety awarenes. (progressing-currently Total A )  6. Patient will improve Barthel index scores to atleast 50/100 to improve functional mobility. (progressing)  7.  Patient will improve standardized test score for [place] Sitting Balance Scale to 2 (progressing)    OCCUPATIONAL THERAPY LONG TERM GOALS   Initiated 4/3/2017 and to be accomplished within 3-4 Week(s)    1. Patient will perform Upper body ADLs with/without adaptive equipment with supervision/set-up. 2. Patient will perform Lower body ADLs with/without adaptive equipment with minimal assistance/contact guard assist .  3. Patient will perform toileting task with minimal assistance/contact guard assist with Fair safety to reduce falls risk. 4. Patient will perform functional transfers with Bia Burnette and minimal assistance/contact guard assist and Fair balance and safety awareness. 5. Patient will perform standing static/dynamic activity for improved ADL/IADL function with minimal assistance/contact guard assist an and Fair balance and safety awareness. 6. Patient will improve Barthel index score to 65/100 to improve independence with mobility. 7. Patient will improve standardized test score for [place] Sitting Balance to 3. Therapist: Merideth Boas, [address]. 4/3/2017   [facility]  OCCUPATIONAL THERAPY WEEKLY SUMMARY   Reporting period:  from 4/03/17 through 4/07/17           Patient: Yanet Hernandez (33 y.o. female)                             Date: 4/7/2017    Primary Diagnosis: cellulitis                                  Attending Physician: Destinee Trujillo MD Treatment Diagnosis  Treatment Diagnosis: dysarthria  Treatment Diagnosis 2: dysarthria  Precautions: Fall, Skin  Rehab Potential : Guarded     Skill interventions and education provided with clinical rationale (include individualized treatment techniques and standardized tests):  Skilled Occupational services were provided utilizing therapeutic exericises, therapeutic activities, functional mobility, functional transfers, and EC/WS.      Using a comparative statement, summarize significant progress toward goals as a result of skilled intervention provided:  Patient has made Poor progress towards their Occupational Therapy goals in the following areas. Patient has not met any STGS and making slow progression towards meeting next weekly period. Identify remaining functional areas, impairments limiting progress and/or barriers to improvement:  Patient would benefit from continued skilled Occupational Therapy Services to address the following functional deficits in decreased static and dynamic sitting and standing balance and tolerance needed for ADLS. OBJECTIVE DATA SUMMARY:          INITIAL ASSESSMENT WEEKLY PROGRESS   COGNITIVE STATUS: COGNITIVE STATUS:   Neurologic State: Alert  Orientation Level: Oriented to person  Cognition: Follows commands  Perception: Cues to maintain midline in sitting, Tactile, Verbal  Perseveration: No perseveration noted  Safety/Judgement: Fall prevention Neurologic State: Drowsy  Orientation Level: Oriented to person  Cognition: Follows commands  Perception: Appears intact  Perseveration: Perseverates during conversation  Safety/Judgement: Fall prevention   PAIN: PAIN:   Pain Scale 1: Numeric (0 - 10)  Pain Intensity 1: 0  Pain Onset 1: movement  Pain Location 1: Abdomen  Pain Orientation 1: Mid  Pain Description 1: Sharp  Pain Intervention(s) 1: Medication (see MAR), Rest, Repositioned, Position  Patient Stated Pain Goal: 0  Pain Reassessment 1: Yes       Pain Scale 1: Numeric (0 - 10)  Pain Intensity 1: 5  Pain Onset 1: movement  Pain Location 1: Abdomen  Pain Orientation 1: Mid  Pain Description 1: Sharp  Pain Intervention(s) 1: Medication (see MAR), Repositioned  Patient Stated Pain Goal: 0  Pain Reassessment 1: Yes   BED MOBILITY BED MOBILITY   Rolling: Minimum assistance, Additional time  Supine to Sit: Moderate assistance  Sit to Supine: Minimum assistance, Additional time  Scooting: Minimum assistance Rolling: Moderate assistance  Supine to Sit: Moderate assistance  Sit to Supine: Moderate assistance  Scooting:  Moderate assistance   ADL SELF CARE ADL SELF CARE   Feeding: Setup  Oral Facial Hygiene/Grooming: Contact guard assistance  Bathing: Moderate assistance  Upper Body Dressing: Minimum assistance  Lower Body Dressing: Maximum assistance  Toileting: Maximum assistance       Dressing Assistance: Moderate assistance  Hospital Gown:  Moderate assistance  Cues: Physical assistance, Visual cues provided           Toileting Assistance: Maximum assistance  Bladder Hygiene: Maximum assistance  Clothing Management: Maximum assistance     Grooming Assistance: Contact guard assistance (sitting EOB)  Brushing Teeth: Contact guard assistance               TRANSFERS TRANSFERS   Sit to Stand: Maximum assistance  Stand to Sit: Maximum assistance  Bed to Chair: Total assistance Sit to Stand: Maximum assistance  Stand to Sit: Maximum assistance  Bed to Chair: Assist x2         BALANCE BALANCE   Sitting: With support  Sitting - Static: Poor (constant support)  Sitting - Dynamic: Poor (constant support)  Standing: Impaired, Pull to stand, With support  Standing - Static: Poor  Standing - Dynamic : None Sitting: With support  Sitting - Static: Fair (occasional)  Sitting - Dynamic: Fair (occasional) (-)  Standing: Impaired, Pull to stand, With support  Standing - Static: Poor  Standing - Dynamic :  (NT)         GROSS ASSESSMENT  GROSS ASSESSMENT   AROM: Generally decreased, functional (BLE)  PROM: Generally decreased, functional  Strength: Generally decreased, functional (BLE)  Coordination: Generally decreased, functional  Tone: Normal  Sensation: Intact AROM: Generally decreased, functional  PROM: Generally decreased, functional  Strength: Generally decreased, functional  Coordination: Generally decreased, functional  Tone: Normal  Sensation: Intact   COORDINATION COORDINATION   Fine Motor Skills-Upper: Left Intact, Right Intact  Gross Motor Skills-Upper: Left Intact, Right Intact Fine Motor Skills-Upper: Left Intact, Right Intact  Gross Motor Skills-Upper: Left Intact, Right Intact   VISUAL/PERCEPTUAL VISUAL/PERCEPTUAL   Tracking: Able to track stimulus in all quadrants w/o difficulty (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)   Tracking: Able to track stimulus in all quadrants w/o difficulty (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)    0   BOWELS  0=incontinent  5=occasional accident  10=continent    0   BLADDER  0=incontinent, or catheterized and unable to manage alone  5=occasional accident  10=continent    0   TOILET USE  0=dependent  5=needs some help, but can do something alone  10=independent (on and off, dressing, wiping)    0   TRANSFER (BED TO CHAIR AND BACK)  0=unable, no sitting balance  5=major help(one or two people,physical), can sit  10=minor help(verbal or physical)  15=independent    5   MOBILITY (ON LEVEL SURFACES)  0=immobile or <50 yards  5=wheelchair independent,including corners,>50 yards  10=walkes with help of one person (verbal or physical) >50 yards  15=independent(but may use any aid; for example, stick) >50 yards    0   STAIRS  0=unable  5=needs help (verbal, physical, carrying aid)  10=independent    0             TOTAL:                  10/100      Treatment:  Co-treated with PT for optimal functional gains with static standing balance needed for functional transfers. Patient presents with increased arousal today. Assisted patient with bed mobility in order to assess safety and independence during routine. See above for levels of A needed.  Attempted static standing activity in order to assess safety and independence however patient unable to sustain standing and balance during task. Patient was able to sit EOB for 8 mins today with CGA-Min A for sitting balance and cueing for head positioning. Patient's response to Treatment rendered:  Patient is pleasant and receptive to therapist cueing. Patient expected Discharge Location:  [X]Private Residence  [ ] [facility]  [ [facility]  [ ]Other:     Plan: Continue OT services as established on the Plan of Care for 5 times a week.   Treatment Minutes:  30  OT and Assistant have had a weekly case conference regarding the above treatment:  [X] Yes     [ ] No    Therapist:   Mardel Krabbe,         Date:4/7/2017      Forward to OT for co-signature when completed

## 2017-04-07 NOTE — ROUTINE PROCESS
Bedside and Verbal shift change report given to chepe borden (oncoming nurse) by victor manuel Castillo lpn (offgoing nurse). Report included the following information SBAR, Kardex and MAR.  Hourly rounds

## 2017-04-07 NOTE — PROGRESS NOTES
Problem: Mobility Impaired (Adult and Pediatric)  Goal: *Acute Goals and Plan of Care (Insert Text)  PHYSICAL THERAPY STG GOALS :  Initiated 4/1/2017 and to be accomplished within 1-2 Weeks (Updated 4/7/17)     1. Patient will move from supine to sit and sit to supine in bed with minimal assistance/contact guard assist. (No progress)   2. Patient will transfer from bed to chair and chair to bed with minimum assistance using 2WW. (No progress; total A transfer)   3. Patient will perform sit to stand with minimum assistance with Fair balance and safety awareness. (No progress; Max A x2 for attempt to stand)   4. Patient will ambulate with minimum assistance for 10 feet with 2WW on level surfaces with 1 turn. (NT, pt unable)   5. Patient will ascend/descend 1 step with bilateral handrail(s) withminimal assistance to allow for safe home access/exit.(NT, pt unable)   6. Patient will improve standardized test score for [facility] Standing Balance Scale to 1+ (No progress; pt unable to stand)     PHYSICAL THERAPY LTG GOALS :  Initiated 4/1/2017 and to be accomplished within 2-4 Weeks    1. Patient will move from supine to sit and sit to supine in bed with supervision/set-up. 2. Patient will transfer from bed to chair and chair to bed with supervision/set-up using 2WW.  3. Patient will perform sit to stand with supervision/set-up with Good balance and safety awareness. 4. Patient will ambulate with supervision/set-up for 50 feet with CGA on level surfaces and be able to maneuver through narrow spaces and obstacles without loss of balance. 5. Patient will ascend/descend 2 steps with bilateral handrail(s) with contact guard assist to allow for safe home access/exit.   6. Patient will improve standardized test score for [facility] Balance Scale to 2    Physical Therapist: Ti Mccullough PT on 4/1/2017    [facility]   PHYSICAL THERAPY WEEKLY PROGRESS REPORT  Reporting Period: Date:  4/1/17  to 4/7/17        Patient: Meghan Valdes (66 y.o. female)                         Date: 4/7/2017    Primary Diagnosis: cellulitis                      Attending Physician: Tai Reynaga MD   Treatment Diagnosis  Treatment Diagnosis: dysarthria  Treatment Diagnosis 2: dysarthria  Precautions:  Fall, Skin  Rehab Potential : Guarded:     Skill interventions and education provided with clinical rationale (include individualized treatment techniques and standardized tests):   Skilled Physical Therapy services were provided with  TA to promote greater independence with bed mobility and transfers, TE to build strength and endurance for improved functional mobility,Gait training to allow pt to return to PLOF, Neuromuscular reeducation of movement, coordination and balance for reduced risk of falls, stair training to allow pt to safely return home upon DC. Using a comparative statement, summarize significant progress toward goals as a result of skilled intervention provided:  Patient has made Poor progress towards their Physical Therapy goals in the areas of bed mobility, transfers    Identify remaining functional areas, impairments limiting progress and/or barriers to improvement:  Patient would benefit from continues PT services to address the following functional deficits in strength, balance, bed mobility, transfers, gait and stair negotiation.        OBJECTIVE DATA SUMMARY:       INITIAL ASSESSMENT WEEKLY ASSESSMENT   COGNITIVE STATUS COGNITIVE STATUS   Neurologic State: Alert  Orientation Level: Oriented to person  Cognition: Follows commands  Perception: Cues to maintain midline in sitting, Tactile, Verbal  Perseveration: No perseveration noted  Safety/Judgement: Fall prevention Neurologic State: Drowsy  Orientation Level: Oriented to person  Cognition: Follows commands  Perception: Appears intact  Perseveration: Perseverates during conversation  Safety/Judgement: Fall prevention   PAIN PAIN   Pain Scale 1: Numeric (0 - 10)  Pain Intensity 1: 0  Pain Onset 1: movement  Pain Location 1: Abdomen  Pain Orientation 1: Mid  Pain Description 1: Sharp  Pain Intervention(s) 1: Medication (see MAR), Rest, Repositioned, Position  Patient Stated Pain Goal: 0  Pain Reassessment 1: Yes Pain Scale 1: Numeric (0 - 10)  Pain Intensity 1: 5  Pain Onset 1: movement  Pain Location 1: Abdomen  Pain Orientation 1: Mid  Pain Description 1: Sharp  Pain Intervention(s) 1: Medication (see MAR), Repositioned  Patient Stated Pain Goal: 0  Pain Reassessment 1: Yes   GROSS ASSESSMENT GROSS ASSESSMENT   AROM: Generally decreased, functional (BLE)  PROM: Generally decreased, functional  Strength: Generally decreased, functional (BLE)  Coordination: Generally decreased, functional  Tone: Normal  Sensation: Intact AROM: Generally decreased, functional  PROM: Generally decreased, functional  Strength: Generally decreased, functional  Coordination: Generally decreased, functional  Tone: Normal  Sensation: Intact   BED MOBILITY BED MOBILITY   Rolling: Minimum assistance, Additional time  Supine to Sit: Moderate assistance  Sit to Supine: Minimum assistance, Additional time  Scooting: Minimum assistance Rolling: Moderate assistance  Supine to Sit: Moderate assistance  Sit to Supine: Moderate assistance  Scooting:  Moderate assistance   GAIT GAIT                           TRANSFERS TRANSFERS   Sit to Stand: Maximum assistance  Stand to Sit: Maximum assistance  Bed to Chair: Total assistance Sit to Stand: Maximum assistance  Stand to Sit: Maximum assistance  Bed to Chair: Assist x2   BALANCE BALANCE   Sitting: With support  Sitting - Static: Poor (constant support)  Sitting - Dynamic: Poor (constant support)  Standing: Impaired, Pull to stand, With support  Standing - Static: Poor  Standing - Dynamic : None Sitting: With support  Sitting - Static: Fair (occasional)  Sitting - Dynamic: Fair (occasional) (-)  Standing: Impaired, Pull to stand, With support  Standing - Static: Poor  Standing - Dynamic :  (NT)   WHEELCHAIR MOBILITY/MGMT WHEELCHAIR MOBILITY/MGMT         Activity Tolerance:  Poor Activity Tolerance: Poor   Visual/Perceptual   Tracking: Able to track stimulus in all quadrants w/o difficulty (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 Pt with Mod A for supine>sit. Pt sat EOB for ~15' with B UE support and SBA with occasional CGA due to posterior and R lateal lean. Attempted sit>stand from EOB at [facility]. Pt required Max A x2 to complete partial stand. Pt held B LE's in extension with sit>stand causing B LE's to slide out from under her. Pt very anxious with attempting to stand. SPT from EOB>w/c with total A x2. Therapist positioned pt in high back w/c to minimize R lateral lean and promote upright posture. Pt's  present during therapy session, and remained in the room with pt as she sat in w/c to eat lunch. Patient's response to treatment rendered:  Fair -     Patient expected Discharge Location:  [X]Private Residence  [ ] [facility]  [ [facility]  [ ]Other:      Plan: Continue Skilled PT services as established by the Plan of Care for 5-6 times a week. PT and Assistant have had a weekly case conference regarding the above treatment:  [X] Yes     [ ] No        Treatment session:  47 minutes. Therapist: Arturo Haines, PTA       Date:4/7/2017  Forward to PT for co-signature when completed.

## 2017-04-07 NOTE — PROGRESS NOTES
Problem: Motor Speech Impaired (Adult)  Goal: *Acute Goals and Plan of Care (Insert Text)  Pt will:  1. Utilize compensatory strategies (decrease rate, overarticulate, increase intensity) to increase intelligibility to 90% at conversation level with min visual/verbal cues  2. Complete articulatory agility tasks (reading-conversation) with min visual/verbal cues  3. Name greater than/equal to 7 items from a concrete linguistic categories with min verbal cues  4. Increase temporal/environmental orientation with 80% acc, min verbal cues.     LTGs: 2 weeks  1. Pt will demonstrate increase in cognitive-linguistic skills for participate in medical care, min A.  2. Pt will exhibit effective expressive communication for expressing basic wants, needs, and health status, min A. Outcome: [facility] SPEECH-LANGUAGE    DAILY TREATMENT NOTE     Patient: Zachariah Yung (26 y.o. female)                                   Date: 4/7/2017       Primary Diagnosis: cellulitis   Treatment Diagnosis  Treatment Diagnosis: dysarthria  Treatment Diagnosis 2: dysarthria  Physician: Tray Alberts MD  Precautions: Fall, Skin  Mental Status:  Mental Status  Neurologic State: Drowsy  Orientation Level: Oriented to person  Cognition: Follows commands  Perception: Appears intact  Perseveration: Perseverates during conversation  Safety/Judgement: Fall prevention      OBJECTIVE DATA SUMMARY:   Treatment & Interventions: Motor Speech:  Oral-Motor Structure/Motor Speech  Dysarthric Characteristics: Decreased prosody; Decreased breath support;Blended word boundaries; Imprecise  Intelligibility: Impaired  Word Intelligibility (%): 85 %  Phrase Intelligibility (%): 85 %  Sentence Intelligibility (%): 50 %  Conversation Intelligibility (%): 50 %  Overall Impairment Severity: Severe  Language Comprehension and Expression:  Auditory Comprehension  Auditory Impairment: No   Verbal Expression  Primary Mode of Expression: Verbal  Initiation: No impairment  Automatic Speech Task: No impairment  Conversation: Dysarthric  Effective Techniques: Cueing  Overall Impairment: Severe  Cueing type: Verbal            ASSESSMENT:Pt seen for speech therapy this day.  at bedside. Pt's speech is severely dysarthric c/b blended word boundaries, low intensity, and slow rate. Pt used compensatory strategies (speak loud, over articulate, slow down) to increase intelligibility during conversational task. Her overall conversational intelligibility was ~50%. With concrete naming task, pt able to name 70% of category given max multimodal cues. SLP left compensatory strategies on pt's white board in room. ST to continue to follow for increased intelligibility. Progression toward goals:  [X]         Improving appropriately and progressing toward goals  [ ]         Improving slowly and progressing toward goals  [ ]         Not making progress toward goals and plan of care will be adjusted       PLAN:  Recommendations and Planned Interventions:  S-O-S (Speak Up, Over-articulate, Slow Down)     Patient continues to benefit from skilled intervention to address the above impairments. Continue treatment per established plan of care.   Discharge Recommendations:  [id number]:     [ ]        Safety precautions  [X]        Compensatory communication techniques  [ ]        Internal/external memory techniques        Treatment Time:  27  Minutes     Therapist:    Simon Ulloa        4/7/2017

## 2017-04-07 NOTE — PROGRESS NOTES
Speech Therapy Note:    SLP attempted speech therapy this am; pt refused with spouse at b/s. Sienna BOTELLO  [facility] [address] [facility]  Office: [phone]  Pager: [phone]

## 2017-04-08 PROBLEM — B00.1 HERPES LABIALIS: Status: ACTIVE | Noted: 2017-04-08

## 2017-04-08 PROBLEM — E87.5 HYPERKALEMIA: Status: RESOLVED | Noted: 2017-03-27 | Resolved: 2017-04-08

## 2017-04-08 PROBLEM — R41.82 ALTERED MENTAL STATE: Status: ACTIVE | Noted: 2017-04-08

## 2017-04-08 PROBLEM — M70.21 OLECRANON BURSITIS OF RIGHT ELBOW: Status: RESOLVED | Noted: 2017-03-27 | Resolved: 2017-04-08

## 2017-04-08 PROBLEM — L03.113 RIGHT ARM CELLULITIS: Status: RESOLVED | Noted: 2017-03-26 | Resolved: 2017-04-08

## 2017-04-08 PROBLEM — L03.113 CELLULITIS OF ARM, RIGHT: Status: ACTIVE | Noted: 2017-04-08

## 2017-04-08 PROBLEM — J20.9 ACUTE BRONCHITIS: Status: RESOLVED | Noted: 2017-03-27 | Resolved: 2017-04-08

## 2017-04-08 PROBLEM — R04.2 HEMOPTYSIS: Status: RESOLVED | Noted: 2017-03-26 | Resolved: 2017-04-08

## 2017-04-08 LAB
APPEARANCE UR: CLEAR
BACTERIA URNS QL MICRO: NEGATIVE /HPF
BILIRUB UR QL: NEGATIVE
COLOR UR: YELLOW
EPITH CASTS URNS QL MICRO: ABNORMAL /LPF (ref 0–5)
GLUCOSE UR STRIP.AUTO-MCNC: 100 MG/DL
HGB UR QL STRIP: NEGATIVE
KETONES UR QL STRIP.AUTO: NEGATIVE MG/DL
LEUKOCYTE ESTERASE UR QL STRIP.AUTO: NEGATIVE
NITRITE UR QL STRIP.AUTO: NEGATIVE
PH UR STRIP: 7 [PH] (ref 5–8)
PROT UR STRIP-MCNC: NEGATIVE MG/DL
RBC #/AREA URNS HPF: NEGATIVE /HPF (ref 0–5)
SP GR UR REFRACTOMETRY: 1.02 (ref 1–1.03)
UROBILINOGEN UR QL STRIP.AUTO: 1 EU/DL (ref 0.2–1)
WBC URNS QL MICRO: NEGATIVE /HPF (ref 0–4)

## 2017-04-08 PROCEDURE — 81001 URINALYSIS AUTO W/SCOPE: CPT | Performed by: INTERNAL MEDICINE

## 2017-04-08 PROCEDURE — 74011250637 HC RX REV CODE- 250/637: Performed by: INTERNAL MEDICINE

## 2017-04-08 PROCEDURE — 77030011256 HC DRSG MEPILEX <16IN NO BORD MOLN -A

## 2017-04-08 PROCEDURE — 74011636637 HC RX REV CODE- 636/637: Performed by: INTERNAL MEDICINE

## 2017-04-08 PROCEDURE — 74011000250 HC RX REV CODE- 250: Performed by: INTERNAL MEDICINE

## 2017-04-08 PROCEDURE — 87086 URINE CULTURE/COLONY COUNT: CPT | Performed by: INTERNAL MEDICINE

## 2017-04-08 PROCEDURE — 77030011943

## 2017-04-08 RX ORDER — VALACYCLOVIR HYDROCHLORIDE 500 MG/1
500 TABLET, FILM COATED ORAL 3 TIMES DAILY
Status: DISPENSED | OUTPATIENT
Start: 2017-04-08 | End: 2017-04-13

## 2017-04-08 RX ADMIN — CLOPIDOGREL BISULFATE 75 MG: 75 TABLET, FILM COATED ORAL at 10:34

## 2017-04-08 RX ADMIN — FLUOXETINE HYDROCHLORIDE 20 MG: 20 CAPSULE ORAL at 10:34

## 2017-04-08 RX ADMIN — LEVOTHYROXINE SODIUM 50 MCG: 50 TABLET ORAL at 10:33

## 2017-04-08 RX ADMIN — PANTOPRAZOLE SODIUM 40 MG: 40 TABLET, DELAYED RELEASE ORAL at 10:33

## 2017-04-08 RX ADMIN — CLONAZEPAM 1 MG: 0.5 TABLET ORAL at 10:33

## 2017-04-08 RX ADMIN — SIMVASTATIN 20 MG: 20 TABLET, FILM COATED ORAL at 21:55

## 2017-04-08 RX ADMIN — BIMATOPROST 1 DROP: 0.1 SOLUTION/ DROPS OPHTHALMIC at 18:03

## 2017-04-08 RX ADMIN — AMLODIPINE BESYLATE 2.5 MG: 2.5 TABLET ORAL at 10:33

## 2017-04-08 RX ADMIN — HYDROCORTISONE 5 ML: 10 TABLET ORAL at 10:40

## 2017-04-08 RX ADMIN — HYDROCORTISONE 5 ML: 10 TABLET ORAL at 16:59

## 2017-04-08 RX ADMIN — MIRTAZAPINE 15 MG: 15 TABLET, FILM COATED ORAL at 21:55

## 2017-04-08 RX ADMIN — MELOXICAM 7.5 MG: 7.5 TABLET ORAL at 10:33

## 2017-04-08 RX ADMIN — ROPINIROLE HYDROCHLORIDE 1 MG: 1 TABLET, FILM COATED ORAL at 10:34

## 2017-04-08 RX ADMIN — PREDNISONE 40 MG: 20 TABLET ORAL at 10:33

## 2017-04-08 RX ADMIN — ROPINIROLE HYDROCHLORIDE 2 MG: 1 TABLET, FILM COATED ORAL at 21:55

## 2017-04-08 RX ADMIN — CLONAZEPAM 1 MG: 0.5 TABLET ORAL at 17:36

## 2017-04-08 RX ADMIN — VALACYCLOVIR HYDROCHLORIDE 500 MG: 500 TABLET, FILM COATED ORAL at 21:55

## 2017-04-08 RX ADMIN — FOLIC ACID 1 MG: 1 TABLET ORAL at 10:33

## 2017-04-08 RX ADMIN — ASPIRIN 325 MG ORAL TABLET 325 MG: 325 PILL ORAL at 10:34

## 2017-04-08 RX ADMIN — HYDROCORTISONE 5 ML: 10 TABLET ORAL at 21:54

## 2017-04-08 NOTE — ROUTINE PROCESS
Bedside and Verbal shift change report given to David Miguel (oncoming nurse) by MARKY Mann RN (offgoing nurse). Report included the following information SBAR, Kardex and MAR.

## 2017-04-08 NOTE — ROUTINE PROCESS
Bedside and Verbal shift change report given to Brianda Barraza RN (oncoming nurse) by Roxann Cali RN (offgoing nurse). Report included the following information SBAR and Kardex.

## 2017-04-08 NOTE — PROGRESS NOTES
General Internal Medicine/Geriatrics    Patient: Heather Amaya MRN: [id number]  CSN: [id number]    YOB: 1945  Age: 66 y.o. Sex: female    DOA: 3/31/2017 LOS:  LOS: 8 days                    Subjective:     Asked to see d/t lethargy   at bedside  No new focal sx  arousable    Review of Systems:  Eyes: negative  Ears, Nose, Mouth, Throat, and Face: labial ulcers  Respiratory: negative for dyspnea on exertion  Cardiovascular: negative for chest pain  Gastrointestinal: negative for nausea, vomiting and diarrhea  Genitourinary:negative for dysuria and hematuria  Integument/Breast: negative  Hematologic/Lymphatic: negative for bleeding  Musculoskeletal:negative for arthralgias  Neurological: no focal sx. Behavioral/Psychiatric: negative for behavior problems  Endocrine: negative for temperature intolerance  Allergic/Immunologic: negative for hay fever    Objective:     Physical Exam:  Patient Vitals for the past 24 hrs:   Temp Pulse Resp BP SpO2   04/08/17 1003 - (!) 105 - 135/70 -   04/07/17 1622 97.3 °F (36.3 °C) 96 20 152/84 98 %     VSS  HEENT: wnl  NECK:  No venous distention or adenopathy   HEART: RRR, no rubs or gallops  LUNGS: Clear to auscultation  ABDOMEN: soft with active BS. No tenderness, no distention, no organomegaly  EXT: warm,no edema  SKIN: Normal turgor, no breaks  NEURO: Awake, alert, non focal    Intake and Output:  Current Shift:     Last three shifts:       No results found for this or any previous visit (from the past 24 hour(s)). No results found.     Current Facility-Administered Medications   Medication Dose Route Frequency    valACYclovir (VALTREX) tablet 500 mg  500 mg Oral TID    acetaminophen (TYLENOL) tablet 650 mg  650 mg Oral Q6H PRN    [facility] ANESTHESIA   Other PRN    [facility] EMERGENCY/STAT BOX   Other PRN    albuterol-ipratropium (DUO-NEB) 2.5 MG-0.5 MG/3 ML  3 mL Nebulization Q6H PRN    amLODIPine (NORVASC) tablet 2.5 mg  2.5 mg Oral DAILY    aspirin (ASPIRIN) tablet 325 mg  325 mg Oral DAILY    bimatoprost (LUMIGAN) 0.01 % ophthalmic drops 1 Drop  1 Drop Left Eye QPM    clonazePAM (KlonoPIN) tablet 1 mg  1 mg Oral BID    clopidogrel (PLAVIX) tablet 75 mg  75 mg Oral DAILY    FLUoxetine (PROzac) capsule 20 mg  20 mg Oral DAILY    folic acid (FOLVITE) tablet 1 mg  1 mg Oral DAILY    levothyroxine (SYNTHROID) tablet 50 mcg  50 mcg Oral ACB    klack's mouthwash oral suspension (COMPOUNDED)  5 mL Oral TID    meloxicam (MOBIC) tablet 7.5 mg  7.5 mg Oral DAILY    mirtazapine (REMERON) tablet 15 mg  15 mg Oral QHS    ondansetron (ZOFRAN ODT) tablet 4 mg  4 mg Oral Q8H PRN    pantoprazole (PROTONIX) tablet 40 mg  40 mg Oral ACB    predniSONE (DELTASONE) tablet 40 mg  40 mg Oral DAILY    simvastatin (ZOCOR) tablet 20 mg  20 mg Oral QHS    traMADol (ULTRAM) tablet 100 mg  100 mg Oral Q6H PRN    rOPINIRole (REQUIP) tablet 2 mg  2 mg Oral QHS       Lab Results   Component Value Date/Time    Glucose 124 03/31/2017 05:15 AM    Glucose 153 03/29/2017 03:30 AM    Glucose 211 03/28/2017 03:35 AM    Glucose 73 03/27/2017 03:35 AM    Glucose 187 03/26/2017 04:05 AM        Assessment     Active Problems:    Altered mental state (4/8/2017)      Herpes labialis (4/8/2017)      Cellulitis of arm, right (4/8/2017)        Plan       U/A, C&S  Decrease Requip  Valtrex  monitor      Tristan Barbosa MD  4/8/2017, 12:09 PM

## 2017-04-08 NOTE — PROGRESS NOTES
Problem: Self Care Deficits Care Plan (Adult)  Goal: *Acute Goals and Plan of Care (Insert Text)  OCCUPATIONAL THERAPY SHORT TERM GOALS     Updated 4/07/17    1. Patient will perform Upper body ADLs with/without adaptive equipment with minimal assistance/contact guard assist.   2. Patient will perform Lower body ADLs with/without adaptive equipment with moderate assistance. 3. Patient will perform toileting task with moderate assistance with Fair safety to reduce falls risk. 4. Patient will perform functional transfers with Rolling Walker and moderate assistance. 5. Patient will perform standing static/dynamic balance activities for improved ADL/IADL function with moderate assistance x 2 and Fair balance and safety awarenes. 6. Patient will improve Barthel index scores to atleast 50/100 to improve functional mobility. 7. Patient will improve standardized test score for [place] Sitting Balance Scale to 2     Initiated 4/3/2017 and to be accomplished within 2 Week(s)    1. Patient will perform Upper body ADLs with/without adaptive equipment with minimal assistance/contact guard assist. (progressing-currently Max A)  2. Patient will perform Lower body ADLs with/without adaptive equipment with moderate assistance. (progressing-Max A)  3. Patient will perform toileting task with moderate assistance with Fair safety to reduce falls risk. (progressing-currently Max A)  4. Patient will perform functional transfers with Rolling Walker and moderate assistance. (progressing-currently Total A)  5. Patient will perform standing static/dynamic balance activities for improved ADL/IADL function with moderate assistance x 2 and Fair balance and safety awarenes. (progressing-currently Total A )  6. Patient will improve Barthel index scores to atleast 50/100 to improve functional mobility. (progressing)  7.  Patient will improve standardized test score for [place] Sitting Balance Scale to 2 (progressing)    OCCUPATIONAL THERAPY LONG TERM GOALS   Initiated 4/3/2017 and to be accomplished within 3-4 Week(s)    1. Patient will perform Upper body ADLs with/without adaptive equipment with supervision/set-up. 2. Patient will perform Lower body ADLs with/without adaptive equipment with minimal assistance/contact guard assist .  3. Patient will perform toileting task with minimal assistance/contact guard assist with Fair safety to reduce falls risk. 4. Patient will perform functional transfers with Db Ashing and minimal assistance/contact guard assist and Fair balance and safety awareness. 5. Patient will perform standing static/dynamic activity for improved ADL/IADL function with minimal assistance/contact guard assist an and Fair balance and safety awareness. 6. Patient will improve Barthel index score to 65/100 to improve independence with mobility. 7. Patient will improve standardized test [facility] for [facility] to 3. Therapist: MARIPOSA Us 4/3/2017   [facility] [place]   OCCUPATIONAL THERAPY DAILY TREATMENT NOTE        Patient: Deshawn Bhatt (42 y.o. female)                      Date: 4/8/2017  Attending Physician: Kasey Gutierrez MD  Primary Diagnosis: cellulitis    Treatment Diagnosis  Treatment Diagnosis: dysarthria  Treatment Diagnosis 2: dysarthria   Precautions : Precautions at Admission: Fall, Skin  Vital Signs:  Vital Signs  Pulse (Heart Rate): (!) 105  Heart Rate Source: Monitor  BP: 135/70  MAP (Calculated): 92     Cognitive Status:  Mental Status  Neurologic State: Drowsy; Lethargic  Orientation Level: Oriented to person  Pain:        Gross Assessment:     Coordination:     Bed Mobility:     Transfers:        Balance:     ADL Self Care:     ADL Intervention:                                Therapeutic Activities:  1st attempt : pt. Was very lethargic therapist asked pt. If she would like to sit on EOB to finish meal. Pt. Refused. Therapist asked pt.  If she would like to participate in therapy. Pt. Stated no. Therapy stated to pt. That she would try back later. 2 nd attempt: pt. Was still very lethargic therapist asked pt. If she wanted to get up for lunch. Pt. Stated no. Therapist assesed pt. With bed mobility for safety and independence with task. Patient/Caregiver Education:    Pt. Jair Heck Education on increasing OOB time was provided to  Optimize gains .         ASSESSMENT:  Patient continues to demonstrate the need for skilled Occupational Therapy services to improve activity toleranceself-feeding and bathing  Progression toward goals:  [X]      Improving appropriately and progressing toward goals  [ ]      Improving slowly and progressing toward goals  [ ]      Not making progress toward goals and plan of care will be adjusted      Treatment session:   15 minutes     Therapist:    Sly López,  4/8/2017

## 2017-04-09 LAB
CRP SERPL-MCNC: 2.5 MG/DL (ref 0–0.3)
ERYTHROCYTE [SEDIMENTATION RATE] IN BLOOD: 86 MM/HR (ref 0–30)

## 2017-04-09 PROCEDURE — 36415 COLL VENOUS BLD VENIPUNCTURE: CPT | Performed by: INTERNAL MEDICINE

## 2017-04-09 PROCEDURE — 86140 C-REACTIVE PROTEIN: CPT | Performed by: INTERNAL MEDICINE

## 2017-04-09 PROCEDURE — 74011636637 HC RX REV CODE- 636/637: Performed by: INTERNAL MEDICINE

## 2017-04-09 PROCEDURE — 74011250637 HC RX REV CODE- 250/637: Performed by: INTERNAL MEDICINE

## 2017-04-09 PROCEDURE — 85652 RBC SED RATE AUTOMATED: CPT | Performed by: INTERNAL MEDICINE

## 2017-04-09 RX ADMIN — PANTOPRAZOLE SODIUM 40 MG: 40 TABLET, DELAYED RELEASE ORAL at 07:25

## 2017-04-09 RX ADMIN — AMLODIPINE BESYLATE 2.5 MG: 2.5 TABLET ORAL at 09:30

## 2017-04-09 RX ADMIN — BIMATOPROST 1 DROP: 0.1 SOLUTION/ DROPS OPHTHALMIC at 18:00

## 2017-04-09 RX ADMIN — TRAMADOL HYDROCHLORIDE 100 MG: 50 TABLET, FILM COATED ORAL at 22:13

## 2017-04-09 RX ADMIN — LEVOTHYROXINE SODIUM 50 MCG: 50 TABLET ORAL at 07:25

## 2017-04-09 RX ADMIN — HYDROCORTISONE 5 ML: 10 TABLET ORAL at 09:33

## 2017-04-09 RX ADMIN — ASPIRIN 325 MG ORAL TABLET 325 MG: 325 PILL ORAL at 09:30

## 2017-04-09 RX ADMIN — CLONAZEPAM 1 MG: 0.5 TABLET ORAL at 09:30

## 2017-04-09 RX ADMIN — MELOXICAM 7.5 MG: 7.5 TABLET ORAL at 09:30

## 2017-04-09 RX ADMIN — HYDROCORTISONE 5 ML: 10 TABLET ORAL at 17:18

## 2017-04-09 RX ADMIN — ROPINIROLE HYDROCHLORIDE 2 MG: 1 TABLET, FILM COATED ORAL at 22:13

## 2017-04-09 RX ADMIN — CLONAZEPAM 1 MG: 0.5 TABLET ORAL at 17:19

## 2017-04-09 RX ADMIN — SIMVASTATIN 20 MG: 20 TABLET, FILM COATED ORAL at 22:13

## 2017-04-09 RX ADMIN — MIRTAZAPINE 15 MG: 15 TABLET, FILM COATED ORAL at 22:13

## 2017-04-09 RX ADMIN — VALACYCLOVIR HYDROCHLORIDE 500 MG: 500 TABLET, FILM COATED ORAL at 22:13

## 2017-04-09 RX ADMIN — HYDROCORTISONE 5 ML: 10 TABLET ORAL at 22:00

## 2017-04-09 RX ADMIN — VALACYCLOVIR HYDROCHLORIDE 500 MG: 500 TABLET, FILM COATED ORAL at 09:30

## 2017-04-09 RX ADMIN — FLUOXETINE HYDROCHLORIDE 20 MG: 20 CAPSULE ORAL at 09:30

## 2017-04-09 RX ADMIN — CLOPIDOGREL BISULFATE 75 MG: 75 TABLET, FILM COATED ORAL at 09:30

## 2017-04-09 RX ADMIN — PREDNISONE 40 MG: 20 TABLET ORAL at 09:30

## 2017-04-09 RX ADMIN — FOLIC ACID 1 MG: 1 TABLET ORAL at 09:30

## 2017-04-09 RX ADMIN — VALACYCLOVIR HYDROCHLORIDE 500 MG: 500 TABLET, FILM COATED ORAL at 17:19

## 2017-04-09 NOTE — PROGRESS NOTES
Problem: Mobility Impaired (Adult and Pediatric)  Goal: *Acute Goals and Plan of Care (Insert Text)  PHYSICAL THERAPY STG GOALS :  Initiated 4/1/2017 and to be accomplished within 1-2 Weeks (Updated 4/7/17)     1. Patient will move from supine to sit and sit to supine in bed with minimal assistance/contact guard assist. (No progress)   2. Patient will transfer from bed to chair and chair to bed with minimum assistance using 2WW. (No progress; total A transfer)   3. Patient will perform sit to stand with minimum assistance with Fair balance and safety awareness. (No progress; Max A x2 for attempt to stand)   4. Patient will ambulate with minimum assistance for 10 feet with 2WW on level surfaces with 1 turn. (NT, pt unable)   5. Patient will ascend/descend 1 step with bilateral handrail(s) withminimal assistance to allow for safe home access/exit.(NT, pt unable)   6. Patient will improve standardized test score for [facility] Standing Balance Scale to 1+ (No progress; pt unable to stand)     PHYSICAL THERAPY LTG GOALS :  Initiated 4/1/2017 and to be accomplished within 2-4 Weeks    1. Patient will move from supine to sit and sit to supine in bed with supervision/set-up. 2. Patient will transfer from bed to chair and chair to bed with supervision/set-up using 2WW.  3. Patient will perform sit to stand with supervision/set-up with Good balance and safety awareness. 4. Patient will ambulate with supervision/set-up for 50 feet with CGA on level surfaces and be able to maneuver through narrow spaces and obstacles without loss of balance. 5. Patient will ascend/descend 2 steps with bilateral handrail(s) with contact guard assist to allow for safe home access/exit. 6. Patient will improve standardized test score for [facility] Balance Scale to 2    Physical Therapist: Ismael Ivory PT on 4/1/2017    [facility]   PHYSICAL THERAPY DAILY TREATMENT NOTE        Patient:  Radhagetachew Pal Alvena Collet (34 y.o. female)               Date: 4/9/2017    Physician: Carlee Coelho MD  Primary Diagnosis: cellulitis          Treatment Diagnosis  Treatment Diagnosis: dysarthria  Treatment Diagnosis 2: dysarthria  Precautions: Fall, Skin  Vital Signs  Vital Signs  Pulse (Heart Rate): (!) 106  BP: 147/81  Cognitive Status:  Mental Status  Neurologic State: Confused  Orientation Level: Oriented to person  Pain  Bed Mobility Training  Balance  Sitting:  (NT; very confused; resisting)  Transfer Training  Therapeutic Exercise:   Pt demonstrated increase confusion and disorientation with difficulty following one-step commands throughout session. Pt was resisting during supine LE exercises AAROM BLE's: ankle DF/PF, hip flex/ext, and hip abd/add x5 reps only due to increase agitation and refusal to continue with activity. Patient/Caregiver Education:   Pt /Caregiver Education on importance of participation in PT intervention and benefits of exercise to improve overall ROM, strength, and functional mobility. Pt needs max reinforcement and demonstrated no evidence of learning secondary to cognitive limitations. ASSESSMENT:  Patient continues to benefit from Skilled PT services to improve strength, endurance, and overall functional mobility. Progression toward goals:  [ ]      Improving appropriately and progressing toward goals  [ ]      Improving slowly and progressing toward goals  [ ]      Not making progress toward goals and plan of care will be adjusted      Treatment session: 18 minutes.   Therapist:   Duard Meckel, PTA,          4/9/2017

## 2017-04-09 NOTE — PROGRESS NOTES
Discussed with her  [id number]. Difficult to determine to what degree mental status is due to a primary disorder and what may be an effect of other disease or medication. .    I ordered an ESR and C reactive protein and may reduce prednisone tomorrow. Note above note of Dr. Severo Marry yesterday.

## 2017-04-09 NOTE — ROUTINE PROCESS
Bedside and Verbal shift change report given to Lenard Shook RN (oncoming nurse) by Kait Jacobson RN (offgoing nurse). Report included the following information SBAR, Kardex and MAR. 24 hour chart check & hourly rounds made.

## 2017-04-09 NOTE — ROUTINE PROCESS
Bedside and Verbal shift change report given to BLANCA Mann RN (oncoming nurse) by Tiffanie Parsons RN (offgoing nurse). Report included the following information SBAR and Kardex.

## 2017-04-09 NOTE — ROUTINE PROCESS
Bedside and Verbal shift change report given to Phil Moore LPN (oncoming nurse) by MARKY Mann RN (offgoing nurse). Report included the following information SBAR, Kardex and MAR.

## 2017-04-10 LAB
BACTERIA SPEC CULT: NORMAL
SERVICE CMNT-IMP: NORMAL

## 2017-04-10 PROCEDURE — 74011636637 HC RX REV CODE- 636/637: Performed by: INTERNAL MEDICINE

## 2017-04-10 PROCEDURE — 74011250637 HC RX REV CODE- 250/637: Performed by: INTERNAL MEDICINE

## 2017-04-10 RX ADMIN — MIRTAZAPINE 15 MG: 15 TABLET, FILM COATED ORAL at 22:05

## 2017-04-10 RX ADMIN — FOLIC ACID 1 MG: 1 TABLET ORAL at 10:04

## 2017-04-10 RX ADMIN — SIMVASTATIN 20 MG: 20 TABLET, FILM COATED ORAL at 22:05

## 2017-04-10 RX ADMIN — VALACYCLOVIR HYDROCHLORIDE 500 MG: 500 TABLET, FILM COATED ORAL at 10:03

## 2017-04-10 RX ADMIN — PANTOPRAZOLE SODIUM 40 MG: 40 TABLET, DELAYED RELEASE ORAL at 06:54

## 2017-04-10 RX ADMIN — VALACYCLOVIR HYDROCHLORIDE 500 MG: 500 TABLET, FILM COATED ORAL at 22:05

## 2017-04-10 RX ADMIN — FLUOXETINE HYDROCHLORIDE 20 MG: 20 CAPSULE ORAL at 10:03

## 2017-04-10 RX ADMIN — CLOPIDOGREL BISULFATE 75 MG: 75 TABLET, FILM COATED ORAL at 10:03

## 2017-04-10 RX ADMIN — AMLODIPINE BESYLATE 2.5 MG: 2.5 TABLET ORAL at 10:03

## 2017-04-10 RX ADMIN — MELOXICAM 7.5 MG: 7.5 TABLET ORAL at 10:03

## 2017-04-10 RX ADMIN — CLONAZEPAM 1 MG: 0.5 TABLET ORAL at 17:42

## 2017-04-10 RX ADMIN — HYDROCORTISONE 5 ML: 10 TABLET ORAL at 09:00

## 2017-04-10 RX ADMIN — ROPINIROLE HYDROCHLORIDE 2 MG: 1 TABLET, FILM COATED ORAL at 22:05

## 2017-04-10 RX ADMIN — CLONAZEPAM 1 MG: 0.5 TABLET ORAL at 10:04

## 2017-04-10 RX ADMIN — VALACYCLOVIR HYDROCHLORIDE 500 MG: 500 TABLET, FILM COATED ORAL at 17:42

## 2017-04-10 RX ADMIN — BIMATOPROST 1 DROP: 0.1 SOLUTION/ DROPS OPHTHALMIC at 19:06

## 2017-04-10 RX ADMIN — ASPIRIN 325 MG ORAL TABLET 325 MG: 325 PILL ORAL at 10:03

## 2017-04-10 RX ADMIN — PREDNISONE 40 MG: 20 TABLET ORAL at 10:04

## 2017-04-10 RX ADMIN — HYDROCORTISONE 5 ML: 10 TABLET ORAL at 19:07

## 2017-04-10 RX ADMIN — HYDROCORTISONE 5 ML: 10 TABLET ORAL at 22:00

## 2017-04-10 RX ADMIN — LEVOTHYROXINE SODIUM 50 MCG: 50 TABLET ORAL at 06:54

## 2017-04-10 NOTE — PROGRESS NOTES
KAUR attempted OT treatment this morning however patient declined. Patient stated \"I would just like to be left alone. \" KAUR asked patient if she was experiencing any pain and she declined. KAUR also asked patient if KAUR could check back later for treatment session. Patient stated maybe. Will check back later afternoon.

## 2017-04-10 NOTE — PROGRESS NOTES
I have reviewed this patient's current medication list and recent laboratory results. At this time, I do not suggest any drug therapy adjustments or additional laboratory monitoring. Thank you,  Aixa NEGRO  Ph. M. S.  4/10/2017

## 2017-04-10 NOTE — PROGRESS NOTES
[facility] SPEECH WEEKLY PROGRESS NOTE    Patient: Reyes Singh (50 y.o. female)                                                  Date: 4/14/2017    Primary Diagnosis: cellulitis      Attending Physician: Calvin Lira MD  Precautions Fall, Skin    Reporting Period from Date 4/4/17     to 4/10/17    Skill interventions provided with clinical rationale (include individualized treatment techniques and standardized tests): SLP provided a variety of expressive speech-language tasks to address effective expressive communication. Skilled verbal instruction provided in collaboration with multimodal cueing for use of compensatory intelligibility techniques: increased intensity, over-articulate, and slow down speech rate when increasing intensity. Using a comparative statement, summarize significant progress toward goals as a result of skilled intervention provided: Pt has made slow but steady gains this week; however, continues to require consistent mod cues for increasing intensity. (+) verbal feedback provided. It should be noted that therapy staff has provided feedback to SLP and patient, that her intelligibility as improved since the onset of therapy. Identify remaining functional areas, impairments limiting progress and/or barriers to improvement:  SLP has noted that pt is highly dependent on spouse and is not as functional in his presence as she is when he is not in the therapy sessions. Further, pt with significantly decreased endurance which impedes speech intensity.      Objective reports (include relevant measurements and test scores, etc.):n/a    Patient expected Discharge Location:  [x]Private Residence  [] PARAM/ILF  []LTC  []Other: with spouse; will need 24-hour care    REHAB POTENTIAL : Fair    INITIAL ASSESSMENT CURRENT ASSESSMENT   Mental Status: Mental Status:   Neurologic State: Alert  Orientation Level: Oriented to person  Cognition: Follows commands  Perception: Cues to maintain midline in sitting, Tactile, Verbal  Perseveration: No perseveration noted  Safety/Judgement: Fall prevention Neurologic State: Alert  Orientation Level: Oriented to person  Cognition: Follows commands  Perception: Cues to maintain midline in sitting, Tactile, Verbal  Perseveration: No perseveration noted  Safety/Judgement: Fall prevention   Motor Speech: Motor Speech:   Labial: Decreased rate;Decreased seal  Dentition: Limited  Oral Hygiene: fair  Lingual: Decreased rate;Decreased strength  Velum: No impairment  Mandible: No impairment  Dysarthric Characteristics: Decreased rate;Decreased prosody; Decreased breath support;Blended word boundaries  Intelligibility: Impaired  Word Intelligibility (%): 85 %  Phrase Intelligibility (%): 85 %  Sentence Intelligibility (%): 50 %  Conversation Intelligibility (%): 60 %  Overall Impairment Severity: Severe  Compensatory Strategies for Motor Speech: increase intensity. decrease rate, over articulation Labial: Decreased rate;Decreased seal  Dentition: Limited  Oral Hygiene: fair  Lingual: Decreased rate;Decreased strength  Velum: No impairment  Mandible: No impairment  Dysarthric Characteristics: Decreased rate;Decreased prosody; Decreased breath support;Blended word boundaries  Intelligibility: Impaired  Word Intelligibility (%): 85 %  Phrase Intelligibility (%): 85 %  Sentence Intelligibility (%): 50 %  Conversation Intelligibility (%): 70 %  Overall Impairment Severity: Severe  Compensatory Strategies for Motor Speech: increase intensity.  decrease rate, over articulation   Language Comprehension and Expression: Language Comprehension and Expression:   Auditory Impairment: No   Hearing Aid: At home Auditory Impairment: No   Hearing Aid: At home   Primary Mode of Expression: Verbal  Initiation: No impairment  Automatic Speech Task: No impairment  Automatic speech task cueing type: Verbal  Automatic speech task cueing amount: Minimal  Repetition: No impairment  Repetition cueing type: Verbal  Repetition cueing amount: Minimal  Naming: Impaired  Divergent (%): 70 %  Naming cueing type: Verbal  Naming cueing amount: Minimal  Conversation: Dysarthric  Speech Characteristics: Perseveration  Interfering Components: Attention  Effective Techniques: Cueing  Overall Impairment: Severe  Cueing type: Repetition;Verbal;Multi modality  Cueing amount: Moderate Primary Mode of Expression: Verbal  Initiation: No impairment  Automatic Speech Task: No impairment  Automatic speech task cueing type: Verbal  Automatic speech task cueing amount: Minimal  Repetition: No impairment  Repetition cueing type: Verbal  Repetition cueing amount: Minimal  Naming: Impaired  Divergent (%): 70 %  Naming cueing type: Verbal  Naming cueing amount: Minimal  Conversation: Dysarthric  Speech Characteristics: Perseveration  Interfering Components: Attention  Effective Techniques: Cueing  Overall Impairment: Severe  Cueing type: Repetition;Verbal;Multi modality  Cueing amount: Moderate               Pragmatics: Pragmatics:         Voice: Voice:   Compensatory Strategies for Motor Speech: increase intensity. decrease rate, over articulation  Vocal Quality: Low volume Compensatory Strategies for Motor Speech: increase intensity. decrease rate, over articulation  Vocal Quality: Low volume         Pain: Pain:   Pain Scale 1: Numeric (0 - 10)  Pain Intensity 1: 0  Pain Onset 1: movement  Pain Location 1: Abdomen  Pain Orientation 1: Mid  Pain Description 1: Sharp  Pain Intervention(s) 1: Medication (see MAR); Rest;Repositioned;Position  Patient Stated Pain Goal: 0  Pain Reassessment 1: Yes No pain reported before or after tx this day.      Other: notable increase in speech intelligibility    Goal or treatment changes with explanation & therapist recommendation: Continue ST to address effective expressive communication for expressive basic wants, needs, and health status, as well as to participate in self-care.      MS CCC-SLP: JANELL Villegas       Date:4/14/2017 (addendum)        Treatment Time:  30  Minutes     Therapist:    JANELL Villegas        4/10/2017

## 2017-04-10 NOTE — PROGRESS NOTES
Problem: Mobility Impaired (Adult and Pediatric)  Goal: *Acute Goals and Plan of Care (Insert Text)  PHYSICAL THERAPY STG GOALS :  Initiated 4/1/2017 and to be accomplished within 1-2 Weeks (Updated 4/7/17)     1. Patient will move from supine to sit and sit to supine in bed with minimal assistance/contact guard assist. (No progress)   2. Patient will transfer from bed to chair and chair to bed with minimum assistance using 2WW. (No progress; total A transfer)   3. Patient will perform sit to stand with minimum assistance with Fair balance and safety awareness. (No progress; Max A x2 for attempt to stand)   4. Patient will ambulate with minimum assistance for 10 feet with 2WW on level surfaces with 1 turn. (NT, pt unable)   5. Patient will ascend/descend 1 step with bilateral handrail(s) withminimal assistance to allow for safe home access/exit.(NT, pt unable)   6. Patient will improve standardized test score for [facility] Standing Balance Scale to 1+ (No progress; pt unable to stand)     PHYSICAL THERAPY LTG GOALS :  Initiated 4/1/2017 and to be accomplished within 2-4 Weeks    1. Patient will move from supine to sit and sit to supine in bed with supervision/set-up. 2. Patient will transfer from bed to chair and chair to bed with supervision/set-up using 2WW.  3. Patient will perform sit to stand with supervision/set-up with Good balance and safety awareness. 4. Patient will ambulate with supervision/set-up for 50 feet with CGA on level surfaces and be able to maneuver through narrow spaces and obstacles without loss of balance. 5. Patient will ascend/descend 2 steps with bilateral handrail(s) with contact guard assist to allow for safe home access/exit. 6. Patient will improve standardized test score for [facility] Balance Scale to 2    Physical Therapist: Fan June PT on 4/1/2017    [facility]   PHYSICAL THERAPY DAILY TREATMENT NOTE        Patient:  Stephytristanbonnie Junior Salvatore Swann (79 y.o. female)               Date: 4/10/2017    Physician: Jayme Valles MD  Primary Diagnosis: cellulitis          Treatment Diagnosis  Treatment Diagnosis: dysarthria  Treatment Diagnosis 2: dysarthria  Precautions: Fall, Skin  Vital Signs  Vital Signs  Pulse (Heart Rate): 95  O2 Sat (%): 97 %  BP: 125/58  MAP (Calculated): 80  BP 1 Method: Automatic  BP 1 Location: Left arm  BP Patient Position: At rest;Supine     Cognitive Status:  Pain  Pain Screen  Pain Scale 1: Behavioral Pain Scale (BPS)  Pain Intensity 1: 0  Patient Stated Pain Goal: 0  Bed Mobility Training  Balance  Transfer Training  Gait Training  Therapeutic Exercise: Pt presented supine in bed. Pt appeared very drowsy and with difficult staying awake. Vitals assessed as noted in vital flow sheet. Therapist attempted to have pt sit EOB. Pt declined and stated \"leave me alone\", but refused bed level therapy. Pt continuously stated \"go away\", and \"I'm too tired\". Therapist educated pt on importance of movement. Pt unreceptive to therapy attempts. Patient/Caregiver Education:   Pt /Caregiver Education on safety and importance for [employer] was provided to maximize gains. ASSESSMENT:  Patient continues to benefit from Skilled PT services to improve strength, balance, transfers and gait. Progression toward goals:  [ ]      Improving appropriately and progressing toward goals  [ ]      Improving slowly and progressing toward goals  [X]      Not making progress toward goals and plan of care will be adjusted      Treatment session: 15 minutes.   Therapist:   Joseph Godwin PTA,          4/10/2017

## 2017-04-10 NOTE — PROGRESS NOTES
Problem: Self Care Deficits Care Plan (Adult)  Goal: *Acute Goals and Plan of Care (Insert Text)  OCCUPATIONAL THERAPY SHORT TERM GOALS     Updated 4/07/17    1. Patient will perform Upper body ADLs with/without adaptive equipment with minimal assistance/contact guard assist.   2. Patient will perform Lower body ADLs with/without adaptive equipment with moderate assistance. 3. Patient will perform toileting task with moderate assistance with Fair safety to reduce falls risk. 4. Patient will perform functional transfers with Rolling Walker and moderate assistance. 5. Patient will perform standing static/dynamic balance activities for improved ADL/IADL function with moderate assistance x 2 and Fair balance and safety awarenes. 6. Patient will improve Barthel index scores to atleast 50/100 to improve functional mobility. 7. Patient will improve standardized test score for [place] Sitting Balance Scale to 2     Initiated 4/3/2017 and to be accomplished within 2 Week(s)    1. Patient will perform Upper body ADLs with/without adaptive equipment with minimal assistance/contact guard assist. (progressing-currently Max A)  2. Patient will perform Lower body ADLs with/without adaptive equipment with moderate assistance. (progressing-Max A)  3. Patient will perform toileting task with moderate assistance with Fair safety to reduce falls risk. (progressing-currently Max A)  4. Patient will perform functional transfers with Rolling Walker and moderate assistance. (progressing-currently Total A)  5. Patient will perform standing static/dynamic balance activities for improved ADL/IADL function with moderate assistance x 2 and Fair balance and safety awarenes. (progressing-currently Total A )  6. Patient will improve Barthel index scores to atleast 50/100 to improve functional mobility. (progressing)  7.  Patient will improve standardized test score for [place] Sitting Balance Scale to 2 (progressing)    OCCUPATIONAL THERAPY LONG TERM GOALS   Initiated 4/3/2017 and to be accomplished within 3-4 Week(s)    1. Patient will perform Upper body ADLs with/without adaptive equipment with supervision/set-up. 2. Patient will perform Lower body ADLs with/without adaptive equipment with minimal assistance/contact guard assist .  3. Patient will perform toileting task with minimal assistance/contact guard assist with Fair safety to reduce falls risk. 4. Patient will perform functional transfers with Bia Burnette and minimal assistance/contact guard assist and Fair balance and safety awareness. 5. Patient will perform standing static/dynamic activity for improved ADL/IADL function with minimal assistance/contact guard assist an and Fair balance and safety awareness. 6. Patient will improve Barthel index score to 65/100 to improve independence with mobility. 7. Patient will improve standardized test score for [facility] to 3. Therapist: Merideth Boas, OTR 4/3/2017   [facility]   OCCUPATIONAL THERAPY DAILY TREATMENT NOTE        Patient: Yanet Hernandez (66 y.o. female)                      Date: 4/10/2017  Attending Physician: Destinee Trujillo MD  Primary Diagnosis: cellulitis    Treatment Diagnosis  Treatment Diagnosis: dysarthria  Treatment Diagnosis 2: dysarthria   Precautions : Precautions at Admission: Fall, Skin  Vital Signs:  Vital Signs  Pulse (Heart Rate): 95  O2 Sat (%): 97 %  BP: 125/58  MAP (Calculated): 80  BP 1 Method: Automatic  BP 1 Location: Left arm  BP Patient Position: At rest;Supine     Cognitive Status:  Mental Status  Neurologic State: Confused  Orientation Level: Oriented to person        Therapeutic Activities:  Patient presents in bed, awake. Patient reports she doesn't feel well however reports no pain. Patient demonstrated reaching for object however unable to verbalize which one.  KAUR offered to assist patient with donning lip balm due to increase dryness and soreness on patient's lips. Patient willing to complete. After task, patient started to cry. Patient unwilling to verbalize why she was upset. NATASHA asked patient if she would like to sit OOB however patient decline. Patient/Caregiver Education:    Yury Weiss Education on .         ASSESSMENT:  Patient continues to demonstrate the need for skilled Occupational Therapy services to improve sitting balance needed for upper body dressing  Progression toward goals:  [ ]      Improving appropriately and progressing toward goals  [ ]      Improving slowly and progressing toward goals  [X]      Not making progress toward goals and plan of care will be adjusted      Treatment session:   15 minutes     Therapist:    NATASHA Saxena,  4/10/2017

## 2017-04-11 PROCEDURE — 74011250637 HC RX REV CODE- 250/637: Performed by: INTERNAL MEDICINE

## 2017-04-11 PROCEDURE — 74011636637 HC RX REV CODE- 636/637: Performed by: INTERNAL MEDICINE

## 2017-04-11 RX ADMIN — ROPINIROLE HYDROCHLORIDE 2 MG: 1 TABLET, FILM COATED ORAL at 21:54

## 2017-04-11 RX ADMIN — BIMATOPROST 1 DROP: 0.1 SOLUTION/ DROPS OPHTHALMIC at 17:38

## 2017-04-11 RX ADMIN — FLUOXETINE HYDROCHLORIDE 20 MG: 20 CAPSULE ORAL at 09:42

## 2017-04-11 RX ADMIN — MELOXICAM 7.5 MG: 7.5 TABLET ORAL at 09:43

## 2017-04-11 RX ADMIN — PREDNISONE 40 MG: 20 TABLET ORAL at 09:43

## 2017-04-11 RX ADMIN — ASPIRIN 325 MG ORAL TABLET 325 MG: 325 PILL ORAL at 09:43

## 2017-04-11 RX ADMIN — VALACYCLOVIR HYDROCHLORIDE 500 MG: 500 TABLET, FILM COATED ORAL at 09:42

## 2017-04-11 RX ADMIN — CLOPIDOGREL BISULFATE 75 MG: 75 TABLET, FILM COATED ORAL at 09:42

## 2017-04-11 RX ADMIN — HYDROCORTISONE 5 ML: 10 TABLET ORAL at 21:56

## 2017-04-11 RX ADMIN — PANTOPRAZOLE SODIUM 40 MG: 40 TABLET, DELAYED RELEASE ORAL at 06:34

## 2017-04-11 RX ADMIN — LEVOTHYROXINE SODIUM 50 MCG: 50 TABLET ORAL at 06:34

## 2017-04-11 RX ADMIN — HYDROCORTISONE 5 ML: 10 TABLET ORAL at 09:47

## 2017-04-11 RX ADMIN — FOLIC ACID 1 MG: 1 TABLET ORAL at 09:42

## 2017-04-11 RX ADMIN — VALACYCLOVIR HYDROCHLORIDE 500 MG: 500 TABLET, FILM COATED ORAL at 21:54

## 2017-04-11 RX ADMIN — SIMVASTATIN 20 MG: 20 TABLET, FILM COATED ORAL at 21:54

## 2017-04-11 RX ADMIN — MIRTAZAPINE 15 MG: 15 TABLET, FILM COATED ORAL at 21:54

## 2017-04-11 RX ADMIN — AMLODIPINE BESYLATE 2.5 MG: 2.5 TABLET ORAL at 09:42

## 2017-04-11 RX ADMIN — CLONAZEPAM 1 MG: 0.5 TABLET ORAL at 09:42

## 2017-04-11 RX ADMIN — VALACYCLOVIR HYDROCHLORIDE 500 MG: 500 TABLET, FILM COATED ORAL at 16:30

## 2017-04-11 RX ADMIN — CLONAZEPAM 1 MG: 0.5 TABLET ORAL at 17:38

## 2017-04-11 RX ADMIN — HYDROCORTISONE 5 ML: 10 TABLET ORAL at 16:30

## 2017-04-11 NOTE — PROGRESS NOTES
Problem: Self Care Deficits Care Plan (Adult)  Goal: *Acute Goals and Plan of Care (Insert Text)  OCCUPATIONAL THERAPY SHORT TERM GOALS     Updated 4/07/17    1. Patient will perform Upper body ADLs with/without adaptive equipment with minimal assistance/contact guard assist.   2. Patient will perform Lower body ADLs with/without adaptive equipment with moderate assistance. 3. Patient will perform toileting task with moderate assistance with Fair safety to reduce falls risk. 4. Patient will perform functional transfers with Rolling Walker and moderate assistance. 5. Patient will perform standing static/dynamic balance activities for improved ADL/IADL function with moderate assistance x 2 and Fair balance and safety awarenes. 6. Patient will improve Barthel index scores to atleast 50/100 to improve functional mobility. 7. Patient will improve standardized test score for [place] Sitting Balance Scale to 2     Initiated 4/3/2017 and to be accomplished within 2 Week(s)    1. Patient will perform Upper body ADLs with/without adaptive equipment with minimal assistance/contact guard assist. (progressing-currently Max A)  2. Patient will perform Lower body ADLs with/without adaptive equipment with moderate assistance. (progressing-Max A)  3. Patient will perform toileting task with moderate assistance with Fair safety to reduce falls risk. (progressing-currently Max A)  4. Patient will perform functional transfers with Rolling Walker and moderate assistance. (progressing-currently Total A)  5. Patient will perform standing static/dynamic balance activities for improved ADL/IADL function with moderate assistance x 2 and Fair balance and safety awarenes. (progressing-currently Total A )  6. Patient will improve Barthel index scores to atleast 50/100 to improve functional mobility. (progressing)  7.  Patient will improve standardized test score for [place] Sitting Balance Scale to 2 (progressing)    OCCUPATIONAL THERAPY LONG TERM GOALS   Initiated 4/3/2017 and to be accomplished within 3-4 Week(s)    1. Patient will perform Upper body ADLs with/without adaptive equipment with supervision/set-up. 2. Patient will perform Lower body ADLs with/without adaptive equipment with minimal assistance/contact guard assist .  3. Patient will perform toileting task with minimal assistance/contact guard assist with Fair safety to reduce falls risk. 4. Patient will perform functional transfers with Lawayne Harriet and minimal assistance/contact guard assist and Fair balance and safety awareness. 5. Patient will perform standing static/dynamic activity for improved ADL/IADL function with minimal assistance/contact guard assist an and Fair balance and safety awareness. 6. Patient will improve Barthel index score to 65/100 to improve independence with mobility. 7. Patient will improve standardized test score for [place] Sitting Balance to 3. Therapist: MARIPOSA Navarrete 4/3/2017   [facility] [place]   OCCUPATIONAL THERAPY DAILY TREATMENT NOTE        Patient: Max Becerra (33 y.o. female)                      Date: 4/11/2017  Attending Physician: Brett Mcmullen MD  Primary Diagnosis: cellulitis    Treatment Diagnosis  Treatment Diagnosis: dysarthria  Treatment Diagnosis 2: dysarthria   Precautions : Precautions at Admission: Fall, Skin  Vital Signs:  Vital Signs  Temp: 98.5 °F (36.9 °C)  Temp Source: Oral  Pulse (Heart Rate): (!) 106  Heart Rate Source: Apical;Monitor  Resp Rate: 16  Level of Consciousness: Alert  BP: 143/79  MAP (Calculated): 100  BP 1 Method: Automatic  BP 1 Location: Left arm  BP Patient Position: Sitting  MEWS Score: 2  Cognitive Status:  Mental Status  Neurologic State: Confused; Agitated  Orientation Level: Oriented to person  Cognition: Impaired decision making; Impulsive;Poor safety awareness  Perception: Appears intact  Perseveration: Perseverates during conversation  Safety/Judgement: Decreased insight into deficits; Decreased awareness of need for safety;Decreased awareness of need for assistance;Decreased awareness of environment; Fall prevention  Balance:  Balance  Sitting: With support  Sitting - Static: Fair (occasional) (-)  Sitting - Dynamic: Fair (occasional) (-)  Therapeutic Activities:  Pt required max verbal encouragement to participate with therapy. Pt adamant about getting back in bed and did not want to go to the gym. Upon attempting to assist pt to get back in the bed, pt got agitated and refusing to let therapist put safety belt around her waist for using with transfers. Upon multiple attempts, pt got agitated and stated she does not want to get in the bed. Attempted pt to wheel to the gym in w/c, pt stretched her arms out at the doorframe and preventing further mobility for transfers. Pt returned to room and left seated in w/c. Pt refusing to put oxygen back on. Pt left with call bell, room telephone and oxygen tube within reach at the end of session. Patient/Caregiver Education:    Pt. Reji Landa Education on safety with transfers and ADLs. Pt verbalized understanding. ASSESSMENT:  Patient continues to demonstrate the need for skilled Occupational Therapy services to improve strength, endurance and balance.    Progression toward goals:  [ ]      Improving appropriately and progressing toward goals  [ ]      Improving slowly and progressing toward goals  [X]      Not making progress toward goals and plan of care will be adjusted      Treatment session:   15 minutes     Therapist:    MARIPOSA Jones   4/11/2017

## 2017-04-11 NOTE — ROUTINE PROCESS
Bedside and Verbal shift change report given to Kaitlin Salguero RN (oncoming nurse) by Jung Price RN (offgoing nurse). Report included the following information SBAR, Kardex and MAR. Hourly rounds made.

## 2017-04-11 NOTE — PROGRESS NOTES
Problem: Mobility Impaired (Adult and Pediatric)  Goal: *Acute Goals and Plan of Care (Insert Text)  PHYSICAL THERAPY STG GOALS :  Initiated 4/1/2017 and to be accomplished within 1-2 Weeks (Updated 4/7/17)     1. Patient will move from supine to sit and sit to supine in bed with minimal assistance/contact guard assist. (No progress)   2. Patient will transfer from bed to chair and chair to bed with minimum assistance using 2WW. (No progress; total A transfer)   3. Patient will perform sit to stand with minimum assistance with Fair balance and safety awareness. (No progress; Max A x2 for attempt to stand)   4. Patient will ambulate with minimum assistance for 10 feet with 2WW on level surfaces with 1 turn. (NT, pt unable)   5. Patient will ascend/descend 1 step with bilateral handrail(s) withminimal assistance to allow for safe home access/exit.(NT, pt unable)   6. Patient will improve standardized test score for [address] Standing Balance Scale to 1+ (No progress; pt unable to stand)     PHYSICAL THERAPY LTG GOALS :  Initiated 4/1/2017 and to be accomplished within 2-4 Weeks    1. Patient will move from supine to sit and sit to supine in bed with supervision/set-up. 2. Patient will transfer from bed to chair and chair to bed with supervision/set-up using 2WW.  3. Patient will perform sit to stand with supervision/set-up with Good balance and safety awareness. 4. Patient will ambulate with supervision/set-up for 50 feet with CGA on level surfaces and be able to maneuver through narrow spaces and obstacles without loss of balance. 5. Patient will ascend/descend 2 steps with bilateral handrail(s) with contact guard assist to allow for safe home access/exit. 6. Patient will improve standardized test score for [facility] Balance Scale to 2    Physical Therapist: Ann Singer, PT on 4/1/2017    [facility]   PHYSICAL THERAPY DAILY TREATMENT NOTE        Patient:  Fazal Keith Nico Oliveira (73 y.o. female)               Date: 4/11/2017    Physician: Kong Marte MD  Primary Diagnosis: cellulitis          Treatment Diagnosis  Treatment Diagnosis: dysarthria  Treatment Diagnosis 2: dysarthria  Precautions: Fall, Skin  Vital Signs  Vital Signs  Pulse (Heart Rate): 100  BP: 129/78  MAP (Calculated): 95  BP 1 Method: Automatic  BP 1 Location: Left arm  BP Patient Position: At rest;Sitting     Cognitive Status:  Mental Status  Neurologic State: Alert  Orientation Level: Oriented to person;Oriented to place  Cognition: Decreased command following  Perception: Appears intact  Perseveration: Perseverates during conversation  Safety/Judgement: Fall prevention  Pain     Bed Mobility Training     Balance  Sitting: With support  Sitting - Static: Fair (occasional) (-)  Sitting - Dynamic: Fair (occasional) (-)  Transfer Training        Gait Training                       Therapeutic Exercise:    Upon presentation, pt was sitting in w/c asleep. Awakened with minimal verbal cueing. TE rendered with active assistance to include: LAQ, marching 20 reps x 1 set with extensive verbal, tactile, and visual cueing for proper performance. Vitals assessed and listed in vital signs flowsheet. Patient/Caregiver Education:   Pt /Caregiver Education on safety and fall prevention to reduce fall risk. ASSESSMENT:  Patient continues to benefit from Skilled PT services to improve strength, endurance, mobility. Progression toward goals:  [ ]      Improving appropriately and progressing toward goals  [ ]      Improving slowly and progressing toward goals  [X]      Not making progress toward goals and plan of care will be adjusted      Treatment session: 30 minutes.   Therapist:   Pricilla King, PT,          4/11/2017

## 2017-04-11 NOTE — PROGRESS NOTES
Nutrition follow up-[facility]/  Plan of care      RECOMMENDATIONS:     1. Dental Soft diet  2. Ensure TID  3. Monitor weight and PO intake  4. RD to follow     GOALS:     1. Met/Ongoing: PO intake meets >75% of protein/calorie needs by 4/18  2. Met/Ongoing: Weight Maintenance (+/- 1-2 lb by 4/18)    ASSESSMENT:     Weight status is classified as normal per BMI of 21.3. However, patient is at nutrition risk due to BMI below 23 with patient above 70years of age. Improved PO intake. Continue Ensure TID for additional calories/protein. Weight stable. Labs noted. Nutrition recommendations listed. RD to follow. Nutrition Risk:  [] High  [x] Moderate []  Low    SUBJECTIVE/OBJECTIVE:      Transferred from [address] [place] [address] to [facility] on 3/31/17. Patient with AMS ans right arm cellulitis. She has h/o COPD, CVAs with residual left sided weakness. Family helping feed patient lunch meal. Observed 75% intake of lunch meal. Reports good appetite and drinking Ensure supplements but requests flavor variety. Implemented food preferences. Will monitor. 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 9XXA    Cerebral aneurysm G39.2    LICA cavernous severe stenosis with chronic infarct I65.22    Dehydration E86.0    COPD (chronic obstructive pulmonary disease) ([facility]) J44.9  Acute encephalopathy G93.40    Altered mental state R41.82    Herpes labialis B00.1    Cellulitis of arm, right L03.113     Past Medical History:   Diagnosis Date    Bipolar 1 disorder ([facility]     Cerebral aneurysm 12/11/2015    RPCoA, RAChA,, RMCA bifurcation, and RM2      Cervical spine fracture ([facility] 08/20/2014    C2 and C3    COPD     Coronary artery disease     Giant cell arteritis ([facility] 11/15/2014    Lummi     Hyperlipidemia     Hypertension     Hypothyroidism     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    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    Menopause     Psychiatric disorder bipolar    Respiratory abnormalities     Restless leg syndrome     Thyroid nodule 4/23/2014    Abnormal biopsy        Labs:    Lab Results   Component Value Date/Time    Sodium 142 03/31/2017 05:15 AM    Potassium 3.8 03/31/2017 05:15 AM    Chloride 108 03/31/2017 05:15 AM    CO2 27 03/31/2017 05:15 AM    Anion gap 7 03/31/2017 05:15 AM    Glucose 124 03/31/2017 05:15 AM    BUN 20 03/31/2017 05:15 AM    Creatinine 0.61 03/31/2017 05:15 AM    Calcium 8.1 03/31/2017 05:15 AM    Albumin 3.3 02/06/2017 11:30 AM     Anthropometrics: BMI (calculated): 21.3  Last 3 Recorded Weights in this Encounter    03/31/17 2018 04/04/17 1017 04/11/17 1343   Weight: 50.1 kg (110 lb 8 oz) 49.9 kg (110 lb) 49.5 kg (109 lb 3.2 oz)      Ht Readings from Last 1 Encounters:   04/11/17 5' (1.524 m)     No data found.    [] Weight Loss   [] Weight Gain   [x] Weight Stable (+/- 1-2 lb)    Nutrition Needs:   Calories: 7775-5719 Kcal    Protein:   55-65 g      [x] No Cultural, Yarsanism or ethnic dietary need identified.     [] Cultural, Yarsanism and ethnic food preferences identified and addressed     Wt Status:  [x] Normal (18.6 - 24.9) [] Underweight (<18.5) [] Overweight (25 - 29.9) [] Mild Obesity (30 - 34.9)  [] Moderate Obesity (35 - 39.9) [] Morbid Obesity (40+)     Nutrition Problems Identified: [] Suboptimal PO intake   [] Food Allergies  [] Difficulty chewing/swallowing/poor dentition  [] Constipation/Diarrhea   [] Nausea/Vomiting   [x] None  [] Other:     Plan:   [] Therapeutic Diet  [x]  Obtained/adjusted food preferences/tolerances and/or snacks options   [x]  Continue supplements as prescribed  [] Occupational therapy following for feeding techniques  []  HS snack added   []  Modify diet texture   []  Modify diet for food allergies   []  Assist with menu selection   [x]  Monitor PO intake on meal rounds   [x]  Continue inpatient monitoring and intervention   [x]  Participate in discharge planning/Interdisciplinary rounds/Team meetings   []  Other:     Education Needs:   [] Not appropriate for teaching at this time due to:   [x] Identified and addressed    Nutrition Monitoring and Evaluation:  [x] Continue ongoing monitoring and intervention  [] Sherry Lopez

## 2017-04-11 NOTE — ROUTINE PROCESS
Bedside and Verbal shift change report given to N (oncoming nurse) by Darshana Lopez LPN (offgoing nurse). Report included the following information SBAR, Kardex and MAR.

## 2017-04-11 NOTE — PROGRESS NOTES
Problem: Motor Speech Impaired (Adult)  Goal: *Acute Goals and Plan of Care (Insert Text)  Pt will:  1. Utilize compensatory strategies (decrease rate, overarticulate, increase intensity) to increase intelligibility to 90% at conversation level with min visual/verbal cues  2. Complete articulatory agility tasks (reading-conversation) with min visual/verbal cues  3. Name greater than/equal to 7 items from a concrete linguistic categories with min verbal cues  4. Increase temporal/environmental orientation with 80% acc, min verbal cues.     LTGs: 2 weeks  1. Pt will demonstrate increase in cognitive-linguistic skills for participate in medical care, min A.  2. Pt will exhibit effective expressive communication for expressing basic wants, needs, and health status, min A. Outcome: [facility] SPEECH-LANGUAGE    DAILY TREATMENT NOTE     Patient: Heavenly Colón (78 y.o. female)                                   Date: 4/11/2017       Primary Diagnosis: cellulitis   Treatment Diagnosis  Treatment Diagnosis: dysarthria  Treatment Diagnosis 2: dysarthria  Physician: Swathi Gee MD  Precautions: Fall, Skin  Mental Status:  Mental Status  Neurologic State: Alert  Orientation Level: Oriented to person, Oriented to place  Cognition: Decreased command following  Perception: Appears intact  Perseveration: Perseverates during conversation  Safety/Judgement: Fall prevention      OBJECTIVE DATA SUMMARY:   Treatment & Interventions: Motor Speech:  Oral-Motor Structure/Motor Speech  Word Intelligibility (%): 85 %  Phrase Intelligibility (%): 85 %  Sentence Intelligibility (%): 50 %  Conversation Intelligibility (%): 50 %  Language Comprehension and Expression:     Verbal Expression  Naming: Impaired  Divergent (%): 70 %  Overall Impairment: Severe  Cueing type: Phonemic  Cueing amount:  Moderate  Pain:  Pain Scale 1: Numeric (0 - 10) 0  Pain Intensity 1: 0      ASSESSMENT:Pt seen for skilled ST this am. Continues to exhibit severe dysarthric c/b blended word boundaries, low intensity, and slow rate. Pt utilized compensatory strategies (speak loud, over articulate, slow down) to increase intelligibility during conversational task. Intelligibility in conversation ~50%. With concrete naming task, pt able to name 70% of category given max multimodal cues. With orientation task, pt with 50% accuracy given max multimodal cues. ST to follow for 1 week for increased intelligibility. Progression toward goals:  [X]         Improving appropriately and progressing toward goals  [ ]         Improving slowly and progressing toward goals  [ ]         Not making progress toward goals and plan of care will be adjusted       PLAN:  Follow for 1 more week for increased intelligibility. Recommendations and Planned Interventions:  Patient continues to benefit from skilled intervention to address the above impairments. Continue treatment per established plan of care.   Discharge Recommendations:  Amade[address]:     [ ]        Safety precautions  [X]        Compensatory communication techniques  [ ]        Internal/external memory techniques        Treatment Time:  27  Minutes     Therapist:    Ceci Lind        4/11/2017

## 2017-04-11 NOTE — PROGRESS NOTES
Remote note:  CRP 2.5 and ESR 85. Difficult to reduce prednisone but can try carefully as other problems may be contributing to the elevated ESR.

## 2017-04-12 PROCEDURE — 74011250637 HC RX REV CODE- 250/637: Performed by: INTERNAL MEDICINE

## 2017-04-12 PROCEDURE — 74011636637 HC RX REV CODE- 636/637: Performed by: INTERNAL MEDICINE

## 2017-04-12 RX ADMIN — MIRTAZAPINE 15 MG: 15 TABLET, FILM COATED ORAL at 20:35

## 2017-04-12 RX ADMIN — BIMATOPROST 1 DROP: 0.1 SOLUTION/ DROPS OPHTHALMIC at 18:00

## 2017-04-12 RX ADMIN — LEVOTHYROXINE SODIUM 50 MCG: 50 TABLET ORAL at 08:14

## 2017-04-12 RX ADMIN — PANTOPRAZOLE SODIUM 40 MG: 40 TABLET, DELAYED RELEASE ORAL at 08:14

## 2017-04-12 RX ADMIN — AMLODIPINE BESYLATE 2.5 MG: 2.5 TABLET ORAL at 08:14

## 2017-04-12 RX ADMIN — HYDROCORTISONE 5 ML: 10 TABLET ORAL at 22:00

## 2017-04-12 RX ADMIN — CLONAZEPAM 1 MG: 0.5 TABLET ORAL at 17:05

## 2017-04-12 RX ADMIN — HYDROCORTISONE 5 ML: 10 TABLET ORAL at 08:15

## 2017-04-12 RX ADMIN — ASPIRIN 325 MG ORAL TABLET 325 MG: 325 PILL ORAL at 08:14

## 2017-04-12 RX ADMIN — VALACYCLOVIR HYDROCHLORIDE 500 MG: 500 TABLET, FILM COATED ORAL at 08:14

## 2017-04-12 RX ADMIN — CLONAZEPAM 1 MG: 0.5 TABLET ORAL at 08:14

## 2017-04-12 RX ADMIN — FOLIC ACID 1 MG: 1 TABLET ORAL at 08:14

## 2017-04-12 RX ADMIN — SIMVASTATIN 20 MG: 20 TABLET, FILM COATED ORAL at 20:35

## 2017-04-12 RX ADMIN — HYDROCORTISONE 5 ML: 10 TABLET ORAL at 16:00

## 2017-04-12 RX ADMIN — CLOPIDOGREL BISULFATE 75 MG: 75 TABLET, FILM COATED ORAL at 08:14

## 2017-04-12 RX ADMIN — VALACYCLOVIR HYDROCHLORIDE 500 MG: 500 TABLET, FILM COATED ORAL at 20:35

## 2017-04-12 RX ADMIN — VALACYCLOVIR HYDROCHLORIDE 500 MG: 500 TABLET, FILM COATED ORAL at 17:05

## 2017-04-12 RX ADMIN — PREDNISONE 35 MG: 20 TABLET ORAL at 08:14

## 2017-04-12 RX ADMIN — ROPINIROLE HYDROCHLORIDE 2 MG: 1 TABLET, FILM COATED ORAL at 20:35

## 2017-04-12 RX ADMIN — FLUOXETINE HYDROCHLORIDE 20 MG: 20 CAPSULE ORAL at 08:14

## 2017-04-12 RX ADMIN — MELOXICAM 7.5 MG: 7.5 TABLET ORAL at 08:14

## 2017-04-12 NOTE — PROGRESS NOTES
Problem: Self Care Deficits Care Plan (Adult)  Goal: *Acute Goals and Plan of Care (Insert Text)  OCCUPATIONAL THERAPY SHORT TERM GOALS     Updated 4/07/17    1. Patient will perform Upper body ADLs with/without adaptive equipment with minimal assistance/contact guard assist.   2. Patient will perform Lower body ADLs with/without adaptive equipment with moderate assistance. 3. Patient will perform toileting task with moderate assistance with Fair safety to reduce falls risk. 4. Patient will perform functional transfers with Rolling Walker and moderate assistance. 5. Patient will perform standing static/dynamic balance activities for improved ADL/IADL function with moderate assistance x 2 and Fair balance and safety awarenes. 6. Patient will improve Barthel index scores to atleast 50/100 to improve functional mobility. 7. Patient will improve standardized test score for [place] S[place] Balance Scale to 2     Initiated 4/3/2017 and to be accomplished within 2 Week(s)    1. Patient will perform Upper body ADLs with/without adaptive equipment with minimal assistance/contact guard assist. (progressing-currently Max A)  2. Patient will perform Lower body ADLs with/without adaptive equipment with moderate assistance. (progressing-Max A)  3. Patient will perform toileting task with moderate assistance with Fair safety to reduce falls risk. (progressing-currently Max A)  4. Patient will perform functional transfers with Rolling Walker and moderate assistance. (progressing-currently Total A)  5. Patient will perform standing static/dynamic balance activities for improved ADL/IADL function with moderate assistance x 2 and Fair balance and safety awarenes. (progressing-currently Total A )  6. Patient will improve Barthel index scores to atleast 50/100 to improve functional mobility. (progressing)  7.  Patient will improve standardized test score for [place] Sitting Balance Scale to 2 (progressing)    OCCUPATIONAL THERAPY LONG TERM GOALS   Initiated 4/3/2017 and to be accomplished within 3-4 Week(s)    1. Patient will perform Upper body ADLs with/without adaptive equipment with supervision/set-up. 2. Patient will perform Lower body ADLs with/without adaptive equipment with minimal assistance/contact guard assist .  3. Patient will perform toileting task with minimal assistance/contact guard assist with Fair safety to reduce falls risk. 4. Patient will perform functional transfers with Marzella Copas and minimal assistance/contact guard assist and Fair balance and safety awareness. 5. Patient will perform standing static/dynamic activity for improved ADL/IADL function with minimal assistance/contact guard assist an and Fair balance and safety awareness. 6. Patient will improve Barthel index score to 65/100 to improve independence with mobility. 7. Patient will improve standardized test score for [place] Sitting Balance to 3. Therapist: MARIPOSA Grimaldo 4/3/2017   [facility] [place]   OCCUPATIONAL THERAPY DAILY TREATMENT NOTE        Patient:  Catha Babinski (66 y.o. female)                      Date: 4/12/2017  Attending Physician: Marielena Kelly MD  Primary Diagnosis: cellulitis    Treatment Diagnosis  Treatment Diagnosis: dysarthria  Treatment Diagnosis 2: dysarthria   Precautions : Precautions at Admission: Fall, Skin  Vital Signs:  Vital Signs  Temp: 96.7 °F (35.9 °C)  Temp Source: Axillary  Pulse (Heart Rate): 80  Heart Rate Source: Monitor  Resp Rate: 15  O2 Sat (%): 97 %  Level of Consciousness: Alert  BP: 157/83  MAP (Calculated): 108  BP 1 Method: Automatic  BP 1 Location: Left arm  BP Patient Position: At rest;Supine  MEWS Score: 1     Cognitive Status:  Mental Status  Neurologic State: Confused  Orientation Level: Oriented to person;Oriented to place  Cognition: Follows commands  Pain:  Pain Screen  Pain Scale 1: Numeric (0 - 10)  Pain Intensity 1: 0  Patient Stated Pain Goal: 0  Pain Scale 1: Numeric (0 - 10)        Therapeutic Activities:  Collaborated with Yuli Hoffmann, Mr. Ally Finn, patient's , and Андрей Madden, patient's daughter in law regarding patient's current progression towards OT goals and discharge recommendations. NATASHA informed patient and patient's family, she has currently been limited with progression towards skilled OT goals due to decreased participation as well as decreased strengthening. KAUR informed patient and patient's family she currently requires extensive assistance with all ADL tasks and transfers due to decreased sitting and standing balance and completing all tasks bed level for optimal safety. NATASHA also informed patient's family she requires assistance with self feeding due low vision. KAUR informed patient's family she would recommend 24 hour care if returning home or long term care due to patient's current condition. Patient's family verbalized understanding and asked suggestions to assist with overall strengthening. Patient/Caregiver Education:    Pt. Jorge Peters Education on see above.         ASSESSMENT:  Patient continues to demonstrate the need for skilled Occupational Therapy services to improve static sitting balance needed for bathing  Progression toward goals:  [ ]      Improving appropriately and progressing toward goals  [ ]      Improving slowly and progressing toward goals  [X]      Not making progress toward goals and plan of care will be adjusted      Treatment session:   40 minutes     Therapist:    NATASHA Mojica,  4/12/2017

## 2017-04-12 NOTE — PROGRESS NOTES
Family conference held with pt, spouse and sister-in-law Samantha with [employer] and [employer]. Therapy reported that pt has made no significant progress since her admission to the hospital and needs long term care. SW informed pt and her family that she has no long term care benefits with her [employer]. Pt isn't eligible for medicare since she didn't have enough quarters with Social Security. SW suggested that pt's  apply for SSI for pt since she wouldn't need to have quarters but will still need to meet the family income requirement. Pt's  was given brochures for private duty and portable ramps. ZULY informed that no update is required bu [employer] and that someone will call SW if update is needed. ZULY informed pt's  of this. Pt's  is supportive and willing to assist with d/c planning. Pt's  agreed to meet with ZULY again on Monday re: d/c plans for pt/ Pt's  was given a d/c date of 4/20/17.

## 2017-04-12 NOTE — PROGRESS NOTES
Problem: Mobility Impaired (Adult and Pediatric)  Goal: *Acute Goals and Plan of Care (Insert Text)  PHYSICAL THERAPY STG GOALS :  Initiated 4/1/2017 and to be accomplished within 1-2 Weeks (Updated 4/7/17)     1. Patient will move from supine to sit and sit to supine in bed with minimal assistance/contact guard assist. (No progress)   2. Patient will transfer from bed to chair and chair to bed with minimum assistance using 2WW. (No progress; total A transfer)   3. Patient will perform sit to stand with minimum assistance with Fair balance and safety awareness. (No progress; Max A x2 for attempt to stand)   4. Patient will ambulate with minimum assistance for 10 feet with 2WW on level surfaces with 1 turn. (NT, pt unable)   5. Patient will ascend/descend 1 step with bilateral handrail(s) withminimal assistance to allow for safe home access/exit.(NT, pt unable)   6. Patient will improve standardized test score for [facility] Standing Balance Scale to 1+ (No progress; pt unable to stand)     PHYSICAL THERAPY LTG GOALS :  Initiated 4/1/2017 and to be accomplished within 2-4 Weeks    1. Patient will move from supine to sit and sit to supine in bed with supervision/set-up. 2. Patient will transfer from bed to chair and chair to bed with supervision/set-up using 2WW.  3. Patient will perform sit to stand with supervision/set-up with Good balance and safety awareness. 4. Patient will ambulate with supervision/set-up for 50 feet with CGA on level surfaces and be able to maneuver through narrow spaces and obstacles without loss of balance. 5. Patient will ascend/descend 2 steps with bilateral handrail(s) with contact guard assist to allow for safe home access/exit. 6. Patient will improve standardized test score for [employer] Balance Scale to 2    Physical Therapist: Ti Mccullough, PT on 4/1/2017    [facility]   PHYSICAL THERAPY DAILY TREATMENT NOTE        Patient:  Joseia Ismael Guillermo Socks (12 y.o. female)               Date: 4/12/2017    Physician: Pako Brady MD  Primary Diagnosis: cellulitis          Treatment Diagnosis  Treatment Diagnosis: dysarthria  Treatment Diagnosis 2: dysarthria  Precautions: Fall, Skin  Vital Signs  Vital Signs  Temp: 96.7 °F (35.9 °C)  Temp Source: Axillary  Pulse (Heart Rate): 80  Heart Rate Source: Monitor  Resp Rate: 15  O2 Sat (%): 97 %  Level of Consciousness: Alert  BP: 157/83  MAP (Calculated): 108  BP 1 Method: Automatic  BP 1 Location: Left arm  BP Patient Position: At rest;Supine  MEWS Score: 1  Cognitive Status:  Mental Status  Neurologic State: Confused  Orientation Level: Oriented to person;Oriented to place  Cognition: Follows commands  Pain  Pain Screen  Pain Scale 1: Numeric (0 - 10)  Pain Intensity 1: 0  Patient Stated Pain Goal: 0  Bed Mobility Training  Balance  Transfer Training  Gait Training  Therapeutic Exercise: Therapist attended family/pt care meeting with Sammi Palomares, pt's spouse and sister-in-law Raul Muñiz. Therapist reported that pt is not making progress with skilled therapy at this time. Pt continues to require Mod-Max A for bed mobility and is unable to maintain unsupported seated balance. Pt is dependent for all transfers. Therapist informed pt's family that pt is declining therapy, and states \"go away\", and \"leave me alone\" with therapy attempts this week. Therapist recommended LTC or 24 hour home care upon DC due to pt's current level of assistance required. Patient/Caregiver Education:   Pt /Caregiver Education on safety and level of assistance required (see above) was provided. ASSESSMENT:  Patient continues to benefit from Skilled PT services to improve strength, balance, transfers and gait.    Progression toward goals:  [ ]      Improving appropriately and progressing toward goals  [ ]      Improving slowly and progressing toward goals  [X]      Not making progress toward goals and plan of care will be adjusted Treatment session: 40 minutes.   Therapist:   Delfin Coker PTA,          4/12/2017

## 2017-04-12 NOTE — PROGRESS NOTES
Problem: Motor Speech Impaired (Adult)  Goal: *Acute Goals and Plan of Care (Insert Text)  Pt will:  1. Utilize compensatory strategies (decrease rate, overarticulate, increase intensity) to increase intelligibility to 90% at conversation level with min visual/verbal cues  2. Complete articulatory agility tasks (reading-conversation) with min visual/verbal cues  3. Name greater than/equal to 7 items from a concrete linguistic categories with min verbal cues  4. Increase temporal/environmental orientation with 80% acc, min verbal cues.     LTGs: 2 weeks  1. Pt will demonstrate increase in cognitive-linguistic skills for participate in medical care, min A.  2. Pt will exhibit effective expressive communication for expressing basic wants, needs, and health status, min A. Outcome: Not Progressing Towards Goal     [facility] SPEECH-LANGUAGE    DAILY TREATMENT NOTE     Patient: Heavenly Colón (66 y.o. female)                                   Date: 4/12/2017       Primary Diagnosis: cellulitis   Treatment Diagnosis  Treatment Diagnosis: dysarthria  Treatment Diagnosis 2: dysarthria  Physician: Swathi Gee MD  Precautions: Fall, Skin  Mental Status:  Mental Status  Neurologic State: Confused  Orientation Level: Oriented to person, Oriented to place  Cognition: Follows commands  Perception: Appears intact  Perseveration: Perseverates during conversation  Safety/Judgement: Decreased insight into deficits, Decreased awareness of need for safety, Decreased awareness of need for assistance, Decreased awareness of environment, Fall prevention      OBJECTIVE DATA SUMMARY:   Treatment & Interventions: Motor Speech:  Oral-Motor Structure/Motor Speech  Word Intelligibility (%): 80 %  Phrase Intelligibility (%): 50 %  Sentence Intelligibility (%): 50 %  Conversation Intelligibility (%): 40 %  Overall Impairment Severity: Moderate-severe  Compensatory Strategies for Motor Speech: increase intensity.  decrease rate, over articulation  Language Comprehension and Expression:              Neuro-Linguistics:                                                  Pragmatics:     Voice:  Voice Evaluation  Compensatory Strategies for Motor Speech: increase intensity. decrease rate, over articulation     Response & Tolerance to Activities:     Pain:  Pain Scale 1: Numeric (0 - 10)  Pain Intensity 1: 0     ASSESSMENT:  Patient seen for speech and language therapy over breakfast tray. Unable to recall intelligibility compensatory strategies despite max cues. Patient required maximum cues during all structured tasks to both participate and utilize compensatory strategies. Patient benefit from models, however with increased utterance length intelligibility greatly decreased despite max cues. Word level intelligibility ~90% with single syllable words, decreasing to 50% with multi syllabic words, phrases and sentences and 40% with conversation. Unable to recall strategies at the end of session. Reeducated on compensatory strategies (increase intensity, decrease rate, over articulation) however will need reinforcement. Progression toward goals:  [ ]         Improving appropriately and progressing toward goals  [ ]         Improving slowly and progressing toward goals  [X]         Not making progress toward goals and plan of care will be adjusted       PLAN:  Recommendations and Planned Interventions:  Utilize compensatory strategies in speech  Patient continues to benefit from skilled intervention to address the above impairments. Continue treatment per established plan of care.   Discharge Recommendations:  Home Health and LTC       COMMUNICATION/EDUCATION:     [ ]        Safety precautions  [X]        Compensatory communication techniques  [ ]        Internal/external memory techniques        Treatment Time:  30  Minutes     Therapist:    Corie Bernheim        4/12/2017

## 2017-04-12 NOTE — ROUTINE PROCESS
Bedside and Verbal shift change report given to Vinicius Martinez LPN (oncoming nurse) by Ramesh Hsu RN (offgoing nurse). Report included the following information SBAR, Kardex and MAR.

## 2017-04-13 PROCEDURE — 74011250637 HC RX REV CODE- 250/637: Performed by: INTERNAL MEDICINE

## 2017-04-13 PROCEDURE — 74011636637 HC RX REV CODE- 636/637: Performed by: INTERNAL MEDICINE

## 2017-04-13 RX ADMIN — SIMVASTATIN 20 MG: 20 TABLET, FILM COATED ORAL at 21:35

## 2017-04-13 RX ADMIN — MELOXICAM 7.5 MG: 7.5 TABLET ORAL at 08:40

## 2017-04-13 RX ADMIN — MIRTAZAPINE 15 MG: 15 TABLET, FILM COATED ORAL at 21:35

## 2017-04-13 RX ADMIN — LEVOTHYROXINE SODIUM 50 MCG: 50 TABLET ORAL at 06:55

## 2017-04-13 RX ADMIN — BIMATOPROST 1 DROP: 0.1 SOLUTION/ DROPS OPHTHALMIC at 17:17

## 2017-04-13 RX ADMIN — VALACYCLOVIR HYDROCHLORIDE 500 MG: 500 TABLET, FILM COATED ORAL at 08:42

## 2017-04-13 RX ADMIN — HYDROCORTISONE 5 ML: 10 TABLET ORAL at 16:30

## 2017-04-13 RX ADMIN — AMLODIPINE BESYLATE 2.5 MG: 2.5 TABLET ORAL at 08:40

## 2017-04-13 RX ADMIN — FOLIC ACID 1 MG: 1 TABLET ORAL at 08:40

## 2017-04-13 RX ADMIN — FLUOXETINE HYDROCHLORIDE 20 MG: 20 CAPSULE ORAL at 08:40

## 2017-04-13 RX ADMIN — HYDROCORTISONE 5 ML: 10 TABLET ORAL at 21:35

## 2017-04-13 RX ADMIN — CLOPIDOGREL BISULFATE 75 MG: 75 TABLET, FILM COATED ORAL at 08:40

## 2017-04-13 RX ADMIN — HYDROCORTISONE 5 ML: 10 TABLET ORAL at 08:42

## 2017-04-13 RX ADMIN — ROPINIROLE HYDROCHLORIDE 2 MG: 1 TABLET, FILM COATED ORAL at 21:35

## 2017-04-13 RX ADMIN — PREDNISONE 30 MG: 10 TABLET ORAL at 08:40

## 2017-04-13 RX ADMIN — PANTOPRAZOLE SODIUM 40 MG: 40 TABLET, DELAYED RELEASE ORAL at 06:55

## 2017-04-13 RX ADMIN — CLONAZEPAM 1 MG: 0.5 TABLET ORAL at 08:40

## 2017-04-13 RX ADMIN — CLONAZEPAM 1 MG: 0.5 TABLET ORAL at 17:15

## 2017-04-13 RX ADMIN — ASPIRIN 325 MG ORAL TABLET 325 MG: 325 PILL ORAL at 08:40

## 2017-04-13 NOTE — PROGRESS NOTES
Problem: Motor Speech Impaired (Adult)  Goal: *Acute Goals and Plan of Care (Insert Text)  Pt will:  1. Utilize compensatory strategies (decrease rate, overarticulate, increase intensity) to increase intelligibility to 90% at conversation level with min visual/verbal cues  2. Complete articulatory agility tasks (reading-conversation) with min visual/verbal cues  3. Name greater than/equal to 7 items from a concrete linguistic categories with min verbal cues  4. Increase temporal/environmental orientation with 80% acc, min verbal cues.     LTGs: 2 weeks  1. Pt will demonstrate increase in cognitive-linguistic skills for participate in medical care, min A.  2. Pt will exhibit effective expressive communication for expressing basic wants, needs, and health status, min A. Outcome: [facility] SPEECH-LANGUAGE    DAILY TREATMENT NOTE     Patient: Wisam Matos (54 y.o. female)                                   Date: 4/13/2017       Primary Diagnosis: cellulitis   Treatment Diagnosis  Treatment Diagnosis: dysarthria  Treatment Diagnosis 2: dysarthria  Physician: Bharathi Roque MD  Precautions: Fall, Skin  Mental Status:  Mental Status  Neurologic State: Alert  Orientation Level: Oriented to person, Oriented to place  Cognition: Follows commands  Perception: Appears intact  Perseveration: Perseverates during conversation  Safety/Judgement: Fall prevention      OBJECTIVE DATA SUMMARY:   Treatment & Interventions:   Motor Speech:  Oral-Motor Structure/Motor Speech  Labial: Decreased rate;Decreased seal  Dentition: Natural  Oral Hygiene: fair  Lingual: Decreased rate;Decreased strength  Language Comprehension and Expression:  Auditory Comprehension  Auditory Impairment: No   Hearing Aid: None  Verbal Expression  Primary Mode of Expression: Verbal  Automatic Speech Task: No impairment  Automatic speech task cueing type: Verbal  Automatic speech task cueing amount: Minimal  Naming: Impaired  Divergent (%): 70 %  Naming cueing type: Verbal  Naming cueing amount: Minimal  Conversation: Dysarthric  Speech Characteristics: Perseveration  Interfering Components: Attention  Effective Techniques: Cueing;Remove environmental distractions  Overall Impairment: Moderate-severe  Cueing type: Verbal;Multi modality      Pain:  Pain Scale 1: Numeric (0 - 10) 0  Pain Intensity 1: 0 0     ASSESSMENT:Pt seen for skilled ST this pm. Continues to exhibit severe dysarthric c/b blended word boundaries, low intensity, and slow rate. Pt utilized compensatory strategies (speak loud, over articulate, slow down) to increase intelligibility during conversational task. Intelligibility in conversation ~50%. With concrete naming task, pt able to name 70% of category given max multimodal cues. With orientation task, pt with 65% accuracy given max multimodal cues. Caregivers reporting pt with increased intelligibility during conversational tasks. Pt reaching max therapeutic gains. Will follow up x1 visit and d/c.    Progression toward goals:  [X]         Improving appropriately and progressing toward goals  [ ]         Improving slowly and progressing toward goals  [ ]         Not making progress toward goals and plan of care will be adjusted       PLAN:  Recommendations and Planned Interventions:  S-O-S (Speak Up, Over-articulate, Slow Down)  Follow up x1 visit and d/c     Discharge Recommendations:  [id number]:     [ ]        Safety precautions  [X]        Compensatory communication techniques  [ ]        Internal/external memory techniques        Treatment Time:  30 Minutes     Therapist:    Wilbert Drake        4/13/2017

## 2017-04-13 NOTE — PROGRESS NOTES
Problem: Mobility Impaired (Adult and Pediatric)  Goal: *Acute Goals and Plan of Care (Insert Text)  PHYSICAL THERAPY STG GOALS :  Initiated 4/1/2017 and to be accomplished within 1-2 Weeks (Updated 4/7/17)     1. Patient will move from supine to sit and sit to supine in bed with minimal assistance/contact guard assist. (No progress)   2. Patient will transfer from bed to chair and chair to bed with minimum assistance using 2WW. (No progress; total A transfer)   3. Patient will perform sit to stand with minimum assistance with Fair balance and safety awareness. (No progress; Max A x2 for attempt to stand)   4. Patient will ambulate with minimum assistance for 10 feet with 2WW on level surfaces with 1 turn. (NT, pt unable)   5. Patient will ascend/descend 1 step with bilateral handrail(s) withminimal assistance to allow for safe home access/exit.(NT, pt unable)   6. Patient will improve standardized test score for [address] Standing Balance Scale to 1+ (No progress; pt unable to stand)     PHYSICAL THERAPY LTG GOALS :  Initiated 4/1/2017 and to be accomplished within 2-4 Weeks    1. Patient will move from supine to sit and sit to supine in bed with supervision/set-up. 2. Patient will transfer from bed to chair and chair to bed with supervision/set-up using 2WW.  3. Patient will perform sit to stand with supervision/set-up with Good balance and safety awareness. 4. Patient will ambulate with supervision/set-up for 50 feet with CGA on level surfaces and be able to maneuver through narrow spaces and obstacles without loss of balance. 5. Patient will ascend/descend 2 steps with bilateral handrail(s) with contact guard assist to allow for safe home access/exit. 6. Patient will improve standardized test score for [employer] Balance Scale to 2    Physical Therapist: Fran Ansari, PT on 4/1/2017    [facility]   PHYSICAL THERAPY DAILY TREATMENT NOTE        [facility]  [facility] Nadine Whitlock (58 y.o. female)               Date: 4/13/2017    Physician: Macy Portillo MD  Primary Diagnosis: cellulitis          Treatment Diagnosis  Treatment Diagnosis: dysarthria  Treatment Diagnosis 2: dysarthria  Precautions: Fall, Skin  Vital Signs  Vital Signs  Pulse (Heart Rate): 90  Heart Rate Source: Monitor  Resp Rate: 16  O2 Sat (%): 98 %  Level of Consciousness: Alert  BP: 136/87  MAP (Calculated): 103  BP 1 Method: Automatic  BP 1 Location: Right arm  BP Patient Position: At rest;Sitting     Cognitive Status:  Mental Status  Neurologic State: Alert  Orientation Level: Oriented to person;Oriented to place  Cognition: Follows commands  Perception: Appears intact  Perseveration: Perseverates during conversation  Safety/Judgement: Fall prevention  Pain  Pain Screen  Pain Scale 1: Numeric (0 - 10)  Pain Intensity 1: 0  Patient Stated Pain Goal: 0  Bed Mobility Training  Bed Mobility Training  Supine to Sit: Maximum assistance  Sit to Supine: Total assistance  Balance  Sitting: With support  Sitting - Static: Fair (occasional)  Sitting - Dynamic: Fair (occasional)  Transfer Training        Gait Training                       Therapeutic Exercise:    Bed mobility to include max A for supine to sit, not total A due to pt moving BLEs minimally. Sit to supine total A. TE rendered to include heel raises, toe raises, LAQ 10 reps each with rest breaks. Pt required extensive rest breaks due to fatigue. Patient/Caregiver Education:   Pt /Caregiver Education on safety and fall prevention to reduce fall risk. ASSESSMENT:  Patient continues to benefit from Skilled PT services to improve strength, endurance, mobility. Progression toward goals:  [ ]      Improving appropriately and progressing toward goals  [ ]      Improving slowly and progressing toward goals  [X]      Not making progress toward goals and plan of care will be adjusted      Treatment session: 30 minutes.   Therapist:   Elvis Cárdenas, PT, 4/13/2017

## 2017-04-13 NOTE — PROGRESS NOTES
Problem: Self Care Deficits Care Plan (Adult)  Goal: *Acute Goals and Plan of Care (Insert Text)  OCCUPATIONAL THERAPY SHORT TERM GOALS     Updated 4/07/17    1. Patient will perform Upper body ADLs with/without adaptive equipment with minimal assistance/contact guard assist.   2. Patient will perform Lower body ADLs with/without adaptive equipment with moderate assistance. 3. Patient will perform toileting task with moderate assistance with Fair safety to reduce falls risk. 4. Patient will perform functional transfers with Rolling Walker and moderate assistance. 5. Patient will perform standing static/dynamic balance activities for improved ADL/IADL function with moderate assistance x 2 and Fair balance and safety awarenes. 6. Patient will improve Barthel index scores to atleast 50/100 to improve functional mobility. 7. Patient will improve standardized test score for [place] Sitting Balance Scale to 2     Initiated 4/3/2017 and to be accomplished within 2 Week(s)    1. Patient will perform Upper body ADLs with/without adaptive equipment with minimal assistance/contact guard assist. (progressing-currently Max A)  2. Patient will perform Lower body ADLs with/without adaptive equipment with moderate assistance. (progressing-Max A)  3. Patient will perform toileting task with moderate assistance with Fair safety to reduce falls risk. (progressing-currently Max A)  4. Patient will perform functional transfers with Rolling Walker and moderate assistance. (progressing-currently Total A)  5. Patient will perform standing static/dynamic balance activities for improved ADL/IADL function with moderate assistance x 2 and Fair balance and safety awarenes. (progressing-currently Total A )  6. Patient will improve Barthel index scores to atleast 50/100 to improve functional mobility. (progressing)  7.  Patient will improve standardized test score for [place] Sitting Balance Scale to 2 (progressing)    OCCUPATIONAL THERAPY LONG TERM GOALS   Initiated 4/3/2017 and to be accomplished within 3-4 Week(s)    1. Patient will perform Upper body ADLs with/without adaptive equipment with supervision/set-up. 2. Patient will perform Lower body ADLs with/without adaptive equipment with minimal assistance/contact guard assist .  3. Patient will perform toileting task with minimal assistance/contact guard assist with Fair safety to reduce falls risk. 4. Patient will perform functional transfers with Atlee Cordon and minimal assistance/contact guard assist and Fair balance and safety awareness. 5. Patient will perform standing static/dynamic activity for improved ADL/IADL function with minimal assistance/contact guard assist an and Fair balance and safety awareness. 6. Patient will improve Barthel index score to 65/100 to improve independence with mobility. 7. Patient will improve standardized test score for [place] Sitting Balance to 3. Therapist: MARIPOSA Dc 4/3/2017   [facility] [place]   OCCUPATIONAL THERAPY DAILY TREATMENT NOTE        Patient:  Blayne Chacon (33 y.o. female)                      Date: 4/13/2017  Attending Physician: Ghanshyam Fowler MD  Primary Diagnosis: cellulitis    Treatment Diagnosis  Treatment Diagnosis: dysarthria  Treatment Diagnosis 2: dysarthria   Precautions : Precautions at Admission: Fall, Skin  Vital Signs:  Vital Signs  Pulse (Heart Rate): 90  Heart Rate Source: Monitor  Resp Rate: 16  O2 Sat (%): 98 %  Level of Consciousness: Alert  BP: 136/87  MAP (Calculated): 103  BP 1 Method: Automatic  BP 1 Location: Right arm  BP Patient Position: At rest;Sitting     Cognitive Status:  Mental Status  Neurologic State: Alert  Orientation Level: Oriented to person;Oriented to place  Cognition: Follows commands  Perception: Appears intact  Perseveration: Perseverates during conversation  Safety/Judgement: Fall prevention  Pain:  Pain Screen  Pain Scale 1: Numeric (0 - 10)  Pain Intensity 1: 0  Patient Stated Pain Goal: 0  Pain Scale 1: Numeric (0 - 10)  Gross Assessment:     Coordination:     Bed Mobility:  Bed Mobility  Supine to Sit: Maximum assistance  Sit to Supine: Total assistance  Transfers:        Balance:  Balance  Sitting: With support  Sitting - Static: Fair (occasional)  Sitting - Dynamic: Fair (occasional)  ADL Self Care:     ADL Intervention:  Upper Body Bathing  Bathing Assistance: Maximum assistance  Position Performed: Seated edge of bed  Upper Body Dressing Assistance  Dressing Assistance: Maximum assistance  Pullover Shirt: Maximum assistance  Lower Body Bathing  Bathing Assistance: Total assistance(dependent)  Perineal  : Total assistance (dependent)  Position Performed: Seated in chair     Grooming  Grooming Assistance: Minimum assistance  Washing Hands: Minimum assistance  Cues: Verbal cues provided           Therapeutic Activities:  Co-treated with PT for optimal functional gains with static sitting balance due to decreased functional activity tolerance. Completed morning ADLS with patient in order to assess safety and independence during routine. See above for levels of A needed. Patient was able to tolerate sitting balance for 10 mins prior to LOB during UB dressing. Patient required visual, tactile, and physical cueing to increase independence and participation. Patient/Caregiver Education:    Pt. Irma Lowery Education on see above.         ASSESSMENT:  Patient continues to demonstrate the need for skilled Occupational Therapy services to improve independence with grooming  Progression toward goals:  [ ]      Improving appropriately and progressing toward goals  [ ]      Improving slowly and progressing toward goals  [X]      Not making progress toward goals and plan of care will be adjusted      Treatment session:   30 minutes     Therapist:    NATASHA Connelly,  4/13/2017

## 2017-04-13 NOTE — PROGRESS NOTES
Bedside shift change report given to Christian lundberg (oncoming nurse) by Sonia Cotton (offgoing nurse). Report included the following information SBAR, Kardex, OR Summary, Intake/Output, MAR and Recent Results.

## 2017-04-13 NOTE — ROUTINE PROCESS
Plan:  To provide an enjoyable diversion. Implementation:  Provided live bedside harp music, varied styles of music. Evaluation:  Patient smiling and chatting during music time, although it's often difficult to understand what she's saying.

## 2017-04-13 NOTE — PROGRESS NOTES
Merlinda Sills. Celeste Ordoñez MD, [address]  [place],  [address]   [phone] fax         Brionna Li [id number] 2017, 7:30 AM    SUBJECTIVE:  Cannot obtain meaningful information. 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 [place]umbles. MUSCULOSKELETAL: Dressed wounds on extremities. Labs: Results:   Chemistry No results for input(s): GLU, NA, K, CL, CO2, BUN, CREA, MG, CA, AGAP, BUCR, TBIL, GPT, AP, TP, ALB, GLOB, AGRAT in the last 72 hours. Estimated Creatinine Clearance: 59 mL/min (based on Cr of 0.61). CBC w/Diff No results for input(s): WBC, RBC, HGB, HCT, PLT, GRANS, LYMPH, EOS, HGBEXT, HCTEXT, PLTEXT in the last 72 hours. Cardiac Enzymes No results for input(s): CPK, CKND1, GONZALO in the last 72 hours.     No lab exists for component: CKRMB, TROIP, PBNP   Coagulation No results for input(s): PTP, INR, APTT in the last 72 hours. No lab exists for component: INREXT    Limited Lipid Panel No results for input(s): CHOL, LDLC, VLDL, TGL, CHHD in the last 72 hours. No lab exists for component: HDLC   Full Lipid Panel No results for input(s): CHOL, CHOLPOCT, CHOLX, CHLST, CHOLV, [id number], HDL, LDL, NLDLCT, DLDL, LDLC, DLDLP, [id number], VLDLC, VLDL, TGL, TGLX, TRIGL, [id number], TRIGP, TGLPOCT, [id number], CHHD, CHHDX in the last 72 hours. No lab exists for component: [id number], [id number], [id number], CHOLP, HDLPOCT, [id number], NHDLCT, [id number], HDLC, HDLP, LDLPOCT, [id number], [id number]   BMP No results for input(s): NA, K, CL, CO2, AGAP, GLU, BUN, CREA, GFRAA, GFRNA in the last 72 hours. CMP No results for input(s): NA, K, CL, CO2, AGAP, GLU, BUN, CREA, GFRAA, GFRNA, CA, MG, PHOS, ALB, TBIL, TP, ALB, GLOB, AGRAT, SGOT, ALT, GPT in the last 72 hours. BNP No lab exists for component: PBNP   Liver Enzymes No results for input(s): TP, ALB, TBIL, AP, SGOT, GPT in the last 72 hours. No lab exists for component: DBIL       ASSESSMENT:    1) GCA rx difficult to gauge activity in this setting with skin wounds present. Hve to try to reduce prednisone to aid in wound healing. 2) skin wounds being addressed  3) other issues above. PLAN:     Prednisone 30 mg PO daily begin this decrease today.

## 2017-04-14 PROCEDURE — 74011250637 HC RX REV CODE- 250/637: Performed by: INTERNAL MEDICINE

## 2017-04-14 PROCEDURE — 74011636637 HC RX REV CODE- 636/637: Performed by: INTERNAL MEDICINE

## 2017-04-14 RX ADMIN — HYDROCORTISONE 5 ML: 10 TABLET ORAL at 22:00

## 2017-04-14 RX ADMIN — CLOPIDOGREL BISULFATE 75 MG: 75 TABLET, FILM COATED ORAL at 11:23

## 2017-04-14 RX ADMIN — SIMVASTATIN 20 MG: 20 TABLET, FILM COATED ORAL at 22:00

## 2017-04-14 RX ADMIN — ASPIRIN 325 MG ORAL TABLET 325 MG: 325 PILL ORAL at 11:23

## 2017-04-14 RX ADMIN — MIRTAZAPINE 15 MG: 15 TABLET, FILM COATED ORAL at 21:22

## 2017-04-14 RX ADMIN — PANTOPRAZOLE SODIUM 40 MG: 40 TABLET, DELAYED RELEASE ORAL at 06:43

## 2017-04-14 RX ADMIN — FOLIC ACID 1 MG: 1 TABLET ORAL at 11:23

## 2017-04-14 RX ADMIN — CLONAZEPAM 1 MG: 0.5 TABLET ORAL at 11:23

## 2017-04-14 RX ADMIN — LEVOTHYROXINE SODIUM 50 MCG: 50 TABLET ORAL at 06:43

## 2017-04-14 RX ADMIN — ROPINIROLE HYDROCHLORIDE 2 MG: 1 TABLET, FILM COATED ORAL at 21:22

## 2017-04-14 RX ADMIN — MELOXICAM 7.5 MG: 7.5 TABLET ORAL at 11:24

## 2017-04-14 RX ADMIN — FLUOXETINE HYDROCHLORIDE 20 MG: 20 CAPSULE ORAL at 11:24

## 2017-04-14 RX ADMIN — PREDNISONE 30 MG: 10 TABLET ORAL at 11:24

## 2017-04-14 RX ADMIN — BIMATOPROST 1 DROP: 0.1 SOLUTION/ DROPS OPHTHALMIC at 17:53

## 2017-04-14 RX ADMIN — CLONAZEPAM 1 MG: 0.5 TABLET ORAL at 17:53

## 2017-04-14 RX ADMIN — HYDROCORTISONE 5 ML: 10 TABLET ORAL at 11:25

## 2017-04-14 RX ADMIN — HYDROCORTISONE 5 ML: 10 TABLET ORAL at 16:30

## 2017-04-14 RX ADMIN — AMLODIPINE BESYLATE 2.5 MG: 2.5 TABLET ORAL at 11:24

## 2017-04-14 NOTE — ROUTINE PROCESS
Bedside and Verbal shift change report given to Markel Wagoner RN (oncoming nurse) by Héctor Perkins RN (offgoing nurse). Report included the following information SBAR, Kardex and MAR.

## 2017-04-14 NOTE — PROGRESS NOTES
Problem: Self Care Deficits Care Plan (Adult)  Goal: *Acute Goals and Plan of Care (Insert Text)  OCCUPATIONAL THERAPY SHORT TERM GOALS     Updated 4/14/17    1. Patient will perform Upper body ADLs with/without adaptive equipment with minimal assistance/contact guard assist.   2. Patient will perform Lower body ADLs with/without adaptive equipment with moderate assistance. 3. Patient will perform toileting task with moderate assistance with Fair safety to reduce falls risk. 4. Patient will perform functional transfers with Rolling Walker and moderate assistance. 5. Patient will perform standing static/dynamic balance activities for improved ADL/IADL function with moderate assistance x 2 and Fair balance and safety awarenes. 6. Patient will improve Barthel index scores to atleast 50/100 to improve functional mobility. 7. Patient will improve standardized test score for [place] Sitting Balance Scale to 2     Updated 4/07/17    1. Patient will perform Upper body ADLs with/without adaptive equipment with minimal assistance/contact guard assist. (progressing)  2. Patient will perform Lower body ADLs with/without adaptive equipment with moderate assistance. (progressing)  3. Patient will perform toileting task with moderate assistance with Fair safety to reduce falls risk. (progressing)  4. Patient will perform functional transfers with Rolling Walker and moderate assistance. (progressing)  5. Patient will perform standing static/dynamic balance activities for improved ADL/IADL function with moderate assistance x 2 and Fair balance and safety awarenes. (progressing)  6. Patient will improve Barthel index scores to atleast 50/100 to improve functional mobility. (progressing)  7. Patient will improve standardized test score for [place] S[place] Balance Scale to 2 (goal met-continue for consistency)    Initiated 4/3/2017 and to be accomplished within 2 Week(s)    1.  Patient will perform Upper body ADLs with/without adaptive equipment with minimal assistance/contact guard assist. (progressing-currently Max A)  2. Patient will perform Lower body ADLs with/without adaptive equipment with moderate assistance. (progressing-Max A)  3. Patient will perform toileting task with moderate assistance with Fair safety to reduce falls risk. (progressing-currently Max A)  4. Patient will perform functional transfers with Rolling Walker and moderate assistance. (progressing-currently Total A)  5. Patient will perform standing static/dynamic balance activities for improved ADL/IADL function with moderate assistance x 2 and Fair balance and safety awarenes. (progressing-currently Total A )  6. Patient will improve Barthel index scores to atleast 50/100 to improve functional mobility. (progressing)  7. Patient will improve standardized test score for [place] Sitting Balance Scale to 2 (progressing)    OCCUPATIONAL THERAPY LONG TERM GOALS   Initiated 4/3/2017 and to be accomplished within 3-4 Week(s)    1. Patient will perform Upper body ADLs with/without adaptive equipment with supervision/set-up. 2. Patient will perform Lower body ADLs with/without adaptive equipment with minimal assistance/contact guard assist .  3. Patient will perform toileting task with minimal assistance/contact guard assist with Fair safety to reduce falls risk. 4. Patient will perform functional transfers with Tang Balk and minimal assistance/contact guard assist and Fair balance and safety awareness. 5. Patient will perform standing static/dynamic activity for improved ADL/IADL function with minimal assistance/contact guard assist an and Fair balance and safety awareness. 6. Patient will improve Barthel index score to 65/100 to improve independence with mobility. 7. Patient will improve standardized test score for [place] Sitting Balance to 3.      Therapist: Emory Silva 77. 4/3/2017   [facility]  OCCUPATIONAL THERAPY WEEKLY SUMMARY   Reporting period:  from 4/07/17 through 4/14/17         Patient: Laurence Garcia (61 y.o. female)                             Date: 4/14/2017    Primary Diagnosis: cellulitis                                  Attending Physician: Leopoldo Fend, MD Treatment Diagnosis  Treatment Diagnosis: dysarthria  Treatment Diagnosis 2: dysarthria  Precautions: Fall, Skin  Rehab Potential : Guarded     Skill interventions and education provided with clinical rationale (include individualized treatment techniques and standardized tests):  Skilled Occupational services were provided utilizing therapeutic exericises, therapeutic activities, functional mobility, functional activities, and EC/WS. Using a comparative statement, summarize significant progress toward goals as a result of skilled intervention provided:  Patient has made Poor progress towards their Occupational Therapy goals in the following areas:. Patient has not met any STGS this weekly period however demonstrating slow progression towards meeting next weekly period. Identify remaining functional areas, impairments limiting progress and/or barriers to improvement:  Patient would benefit from continued skilled Occupational Therapy Services to address the following functional deficits in decreased static and dynamic standing and sitting balance and tolerance needed for ADL tasks and transfers.              OBJECTIVE DATA SUMMARY:          INITIAL ASSESSMENT WEEKLY PROGRESS   COGNITIVE STATUS: COGNITIVE STATUS:   Neurologic State: Alert  Orientation Level: Oriented to person  Cognition: Follows commands  Perception: Cues to maintain midline in sitting, Tactile, Verbal  Perseveration: No perseveration noted  Safety/Judgement: Fall prevention Neurologic State: Alert  Orientation Level: Oriented to person  Cognition: Follows commands  Perception: Appears intact  Perseveration: Perseverates during conversation  Safety/Judgement: Fall prevention   PAIN: PAIN: Pain Scale 1: Numeric (0 - 10)  Pain Intensity 1: 0  Pain Onset 1: movement  Pain Location 1: Abdomen  Pain Orientation 1: Mid  Pain Description 1: Sharp  Pain Intervention(s) 1: Medication (see MAR), Rest, Repositioned, Position  Patient Stated Pain Goal: 0  Pain Reassessment 1: Yes       Pain Scale 1: Numeric (0 - 10)  Pain Intensity 1: 0  Pain Onset 1: movement  Pain Location 1: Abdomen  Pain Orientation 1: Mid  Pain Description 1: Sharp  Pain Intervention(s) 1: Medication (see MAR), Repositioned  Patient Stated Pain Goal: 0  Pain Reassessment 1: Yes   BED MOBILITY BED MOBILITY   Rolling: Minimum assistance, Additional time  Supine to Sit: Moderate assistance  Sit to Supine: Minimum assistance, Additional time  Scooting: Minimum assistance Rolling: Moderate assistance  Supine to Sit: Maximum assistance  Sit to Supine: Total assistance  Scooting: Total assistance   ADL SELF CARE ADL SELF CARE   Feeding: Setup  Oral Facial Hygiene/Grooming: Contact guard assistance  Bathing: Moderate assistance  Upper Body Dressing: Minimum assistance  Lower Body Dressing: Maximum assistance  Toileting: Maximum assistance Bathing Assistance: Maximum assistance  Position Performed: Seated edge of bed     Dressing Assistance: Maximum assistance  Hospital Gown: Moderate assistance  Pullover Shirt: Maximum assistance  Cues: Physical assistance, Visual cues provided     Bathing Assistance:  Total assistance(dependent)  Perineal  : Total assistance (dependent)  Position Performed: Seated in chair     Toileting Assistance: Maximum assistance  Bladder Hygiene: Maximum assistance  Clothing Management: Maximum assistance     Grooming Assistance: Minimum assistance  Washing Hands: Minimum assistance  Brushing Teeth: Contact guard assistance  Cues: Verbal cues provided               TRANSFERS TRANSFERS   Sit to Stand: Maximum assistance  Stand to Sit: Maximum assistance  Bed to Chair: Total assistance Sit to Stand: Maximum assistance  Stand to Sit: Maximum assistance  Bed to Chair: Assist x2         BALANCE BALANCE   Sitting: With support  Sitting - Static: Poor (constant support)  Sitting - Dynamic: Poor (constant support)  Standing: Impaired, Pull to stand, With support  Standing - Static: Poor  Standing - Dynamic : None Sitting: With support  Sitting - Static: Fair (occasional)  Sitting - Dynamic: Fair (occasional)  Standing: Impaired, Pull to stand, With support  Standing - Static: Poor  Standing - Dynamic :  (NT)         GROSS ASSESSMENT  GROSS ASSESSMENT   AROM: Generally decreased, functional (BLE)  PROM: Generally decreased, functional  Strength: Generally decreased, functional (BLE)  Coordination: Generally decreased, functional  Tone: Normal  Sensation: Intact AROM: Generally decreased, functional  PROM: Generally decreased, functional  Strength: Generally decreased, functional  Coordination: Generally decreased, functional  Tone: Normal  Sensation: Intact   COORDINATION COORDINATION   Fine Motor Skills-Upper: Left Intact, Right Intact  Gross Motor Skills-Upper: Left Intact, Right Intact Fine Motor Skills-Upper: Left Intact, Right Intact  Gross Motor Skills-Upper: Left Intact, Right Intact   VISUAL/PERCEPTUAL VISUAL/PERCEPTUAL   Tracking: Able to track stimulus in all quadrants w/o difficulty (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)   Tracking: Able to track stimulus in all quadrants w/o difficulty (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)    0   BOWELS  0=incontinent  5=occasional accident  10=continent    5   BLADDER  0=incontinent, or catheterized and unable to manage alone  5=occasional accident  10=continent    5   TOILET USE  0=dependent  5=needs some help, but can do something alone  10=independent (on and off, dressing, wiping)    0   TRANSFER (BED TO CHAIR AND BACK)  0=unable, no sitting balance  5=major help(one or two people,physical), can sit  10=minor help(verbal or physical)  15=independent    5   MOBILITY (ON LEVEL SURFACES)  0=immobile or <50 yards  5=wheelchair independent,including corners,>50 yards  10=walkes with help of one person (verbal or physical) >50 yards  15=independent(but may use any aid; for example, stick) >50 yards    0   STAIRS  0=unable  5=needs help (verbal, physical, carrying aid)  10=independent    0             TOTAL:                  20/100      Treatment:  AROM of B UE in order to increase B UE muscle strength and ROM. UB strengthening with 1#, 1 set x 5 reps, in order to increase UB muscle strength. KAUR informed patient she will leave dumbbell in room and can complete exercises with patient's  when he visits. Patient verbalized understanding. Patient's response to Treatment rendered:  Patient is pleasant and receptive to therapist cueing. Patient expected Discharge Location:  [X]Private Residence  [ ] [facility]  [ [facility]  [ ]Other:     Plan: Continue OT services as established on the Plan of Care for 5 times a week.   Treatment Minutes:  25  OT and Assistant have had a weekly case conference regarding the above treatment:  [X] Yes     [ ] No    Therapist:   NATASHA Locke,         Date:4/14/2017      Forward to OT for co-signature when completed

## 2017-04-14 NOTE — PROGRESS NOTES
Problem: Mobility Impaired (Adult and Pediatric)  Goal: *Acute Goals and Plan of Care (Insert Text)  PHYSICAL THERAPY STG GOALS :  Initiated 4/1/2017 and to be accomplished within 1-2 Weeks (Updated 4/14/17)     1. Patient will move from supine to sit and sit to supine in bed with minimal assistance/contact guard assist. (Maintained; max A to total A)   2. Patient will transfer from bed to chair and chair to bed with minimum assistance using 2WW. (No progress; total A transfer)   3. Patient will perform sit to stand with minimum assistance with Fair balance and safety awareness. (No progress; Max A x2 for attempt to stand)   4. Patient will ambulate with minimum assistance for 10 feet with 2WW on level surfaces with 1 turn. (Unable)   5. Patient will ascend/descend 1 step with bilateral handrail(s) withminimal assistance to allow for safe home access/exit. (Unable)   6. Patient will improve standardized test score for [facility] Standing Balance Scale to 1+ (No progress; pt unable to stand)    PHYSICAL THERAPY STG GOALS :  Initiated 4/1/2017 and to be accomplished within 1-2 Weeks (Updated 4/7/17)     1. Patient will move from supine to sit and sit to supine in bed with minimal assistance/contact guard assist. (No progress)   2. Patient will transfer from bed to chair and chair to bed with minimum assistance using 2WW. (No progress; total A transfer)   3. Patient will perform sit to stand with minimum assistance with Fair balance and safety awareness. (No progress; Max A x2 for attempt to stand)   4. Patient will ambulate with minimum assistance for 10 feet with 2WW on level surfaces with 1 turn. (NT, pt unable)   5. Patient will ascend/descend 1 step with bilateral handrail(s) withminimal assistance to allow for safe home access/exit.(NT, pt unable)   6.  Patient will improve standardized test score for [facility] Standing Balance Scale to 1+ (No progress; pt unable to stand)     PHYSICAL THERAPY [facility] GOALS :  Initiated 4/1/2017 and to be accomplished within 2-4 Weeks    1. Patient will move from supine to sit and sit to supine in bed with supervision/set-up. 2. Patient will transfer from bed to chair and chair to bed with supervision/set-up using 2WW.  3. Patient will perform sit to stand with supervision/set-up with Good balance and safety awareness. 4. Patient will ambulate with supervision/set-up for 50 feet with CGA on level surfaces and be able to maneuver through narrow spaces and obstacles without loss of balance. 5. Patient will ascend/descend 2 steps with bilateral handrail(s) with contact guard assist to allow for safe home access/exit. 6. Patient will improve standardized test score for [facility] Balance Scale to 2    Physical Therapist: Barrington Morrow PT on 4/1/2017    [facility]   PHYSICAL THERAPY WEEKLY PROGRESS REPORT  Reporting Period:  Date:   4/7/17  to 4/14/17        Patient: Marla Leal (12 y.o. female)                         Date: 4/14/2017    Primary Diagnosis: cellulitis                      Attending Physician: Tristan Barbosa MD   Treatment Diagnosis  Treatment Diagnosis: dysarthria  Treatment Diagnosis 2: dysarthria  Precautions:  Fall, Skin  Rehab Potential : Guarded:     Skill interventions and education provided with clinical rationale (include individualized treatment techniques and standardized tests):   Skilled Physical Therapy services were provided with: therapeutic exercises rendered to address strength, endurance, mobility. Therapeutic activities rendered to address bed mobility and transfers. Neuromuscular re-education rendered to address sitting balance. Using a comparative statement, summarize significant progress toward goals as a result of skilled intervention provided:  Patient has made Poor progress towards their Physical Therapy goals. No progressions are noted in areas bed mobility, transfers, and gait. Minimal progressions static sitting balance at fair, with LOB experienced with fatigue. Identify remaining functional areas, impairments limiting progress and/or barriers to improvement:  Patient would benefit from continues PT services to address the following functional deficits in bed mobility, transfers, functional strength, endurance. Barriers to improvement include: fatigue, lethargy, poor activity tolerance.        OBJECTIVE DATA SUMMARY:       INITIAL ASSESSMENT WEEKLY ASSESSMENT   COGNITIVE STATUS COGNITIVE STATUS   Neurologic State: Alert  Orientation Level: Oriented to person  Cognition: Follows commands  Perception: Cues to maintain midline in sitting, Tactile, Verbal  Perseveration: No perseveration noted  Safety/Judgement: Fall prevention Neurologic State: Alert  Orientation Level: Oriented to person  Cognition: Follows commands  Perception: Appears intact  Perseveration: Perseverates during conversation  Safety/Judgement: Fall prevention   PAIN PAIN   Pain Scale 1: Numeric (0 - 10)  Pain Intensity 1: 0  Pain Onset 1: movement  Pain Location 1: Abdomen  Pain Orientation 1: Mid  Pain Description 1: Sharp  Pain Intervention(s) 1: Medication (see MAR), Rest, Repositioned, Position  Patient Stated Pain Goal: 0  Pain Reassessment 1: Yes Pain Scale 1: Numeric (0 - 10)  Pain Intensity 1: 0  Pain Onset 1: movement  Pain Location 1: Abdomen  Pain Orientation 1: Mid  Pain Description 1: Sharp  Pain Intervention(s) 1: Medication (see MAR), Repositioned  Patient Stated Pain Goal: 0  Pain Reassessment 1: Yes   GROSS ASSESSMENT GROSS ASSESSMENT   AROM: Generally decreased, functional (BLE)  PROM: Generally decreased, functional  Strength: Generally decreased, functional (BLE)  Coordination: Generally decreased, functional  Tone: Normal  Sensation: Intact AROM: Generally decreased, functional  PROM: Generally decreased, functional  Strength: Generally decreased, functional  Coordination: Generally decreased, functional  Tone: Normal  Sensation: Intact   BED MOBILITY BED MOBILITY   Rolling: Minimum assistance, Additional time  Supine to Sit: Moderate assistance  Sit to Supine: Minimum assistance, Additional time  Scooting: Minimum assistance Rolling: Moderate assistance  Supine to Sit: Maximum assistance  Sit to Supine: Total assistance  Scooting: Total assistance   GAIT GAIT                           TRANSFERS TRANSFERS   Sit to Stand: Maximum assistance  Stand to Sit: Maximum assistance  Bed to Chair: Total assistance Sit to Stand: Maximum assistance  Stand to Sit: Maximum assistance  Bed to Chair: Assist x2   BALANCE BALANCE   Sitting: With support  Sitting - Static: Poor (constant support)  Sitting - Dynamic: Poor (constant support)  Standing: Impaired, Pull to stand, With support  Standing - Static: Poor  Standing - Dynamic : None Sitting: With support  Sitting - Static: Fair (occasional)  Sitting - Dynamic: Fair (occasional)  Standing: Impaired, Pull to stand, With support  Standing - Static: Poor  Standing - Dynamic :  (NT)   WHEELCHAIR MOBILITY/MGMT WHEELCHAIR MOBILITY/MGMT         Activity Tolerance:  Poor Activity Tolerance: Poor   Visual/Perceptual   Tracking: Able to track stimulus in all quadrants w/o difficulty (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 She followed cues with very slow and delayed responses. Therapist educated  and pt on chair position of bed in order to closely simulate being in actual chair in order to improve endurance.  Pt performed ~12 ankle pumps then required rest break, was able to perform about 2-3 additional ankle pumps and was unable to complete further despite verbal and tactile cueing. Therapist left and allowed pt to have lunch. Upon return, pt complete SAQ and quad sets x 15 reps with verbal and tactile cueing to complete, rest breaks performed to avoid exacerbation of fatigue. Pt has not made any significant progressions. Family meeting has been conducted, therapist recommended to family 25 hour supervision/assistance or LTC. Therapist spoke with pt's  today to inquire about d/c disposition, he reported that he planned to take pt home but had no plans for daytime supervision/assistance as he continues to work. D/c date established for 4/20/17. Patient's response to treatment rendered:  Poor     Patient expected Discharge Location:  [X]Private Residence  [ ] PARAM/ILF  [ Dany Mcdonough  [ Heriberto Bors:     Plan: Continue Skilled PT services as established by the Plan of Care for 5-6 times x 1 week, d/c date established for 4/20/17      PT and Assistant have had a weekly case conference regarding the above treatment:  [X] Yes     [ ] No        Treatment session:  25 minutes. Therapist: Montserrat Chen, PT       Date:4/14/2017  Forward to PT for co-signature when completed.

## 2017-04-14 NOTE — ROUTINE PROCESS
Bedside and Verbal shift change report given to Raissa Sears RN (oncoming nurse) by Rodo Newton RN (offgoing nurse). Report included the following information SBAR, Kardex and MAR. Hourly rounds made.

## 2017-04-14 NOTE — PROGRESS NOTES
[facility]   SPeech language pathology Discharge Summary       Patient: Libra Mckeon (66 y.o. female) Start of Care Date: 2017    Referred by : Kong Marte MD  Attending Physician: Kong Marte MD  Primary Diagnosis: cellulitis        Precautions: Fall, Skin  MEDICAL HISTORY:   Past Medical History:   Diagnosis Date    Bipolar 1 disorder ([facility]     Cerebral aneurysm 2015    RPCoA, RAChA,, RMCA bifurcation, and RM2      Cervical spine fracture ([facility] 2014    C2 and C3    COPD     Coronary artery disease     Giant cell arteritis ([facility] 11/15/2014    [place]     Hyperlipidemia     Hypertension     Hypothyroidism     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    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    Menopause     Psychiatric disorder bipolar    Respiratory abnormalities     Restless leg syndrome     Thyroid nodule 2014    Abnormal biopsy       Past Surgical History:   Procedure Laterality Date    HX CORONARY STENT PLACEMENT         Treatment Diagnosis  Treatment Diagnosis: dysarthria  Treatment Diagnosis 2: dysarthria    Reason for Discharge: []Goals Met   [x]Met highest potential  []  []Hospitalized  [x]Other:plateau in progress  Discharge Location:  []Private Residence  [] PARAM/ILF  []LTC  [x]Other: This facility    Summarize skilled services provided and significant progress attained since last daily/weekly note (include individualized treatment techniques and standardized tests): This patient has received skilled speech therapy services from 17  through 17 and has made Poor progress towards their goals. Improvements noted in responsiveness to cues to decrease rate, increase intensity, and overarticulate at the word and phrase level. Patient continually required verbal cues and models to increase intensity throughout sessions.  Patient was unable to recall compensatory strategies from session to session and within sessions. Patient's caregivers did report increase in intelligibility, however due to patient's lack of progress and continued requirement of verbal cues, patient is not benefiting from skilled ST services. Summary of education/recommendation provided to patient/caregivers: The patient/family members have received training on cuing the patient to utilize speech intelligibility compensatory strategies. Patient's spouse demonstrated understanding by cueing patient to increase articulation and increase intensity during the last therapy session. OBJECTIVE DATA SUMMARY:     INITIAL ASSESSMENT CURRENT ASSESSMENT   Mental Status: Mental Status:   Neurologic State: Alert  Orientation Level: Oriented to person  Cognition: Follows commands  Perception: Cues to maintain midline in sitting, Tactile, Verbal  Perseveration: No perseveration noted  Safety/Judgement: Fall prevention Orientation Level: Oriented to person   Motor Speech: Motor Speech:   Labial: Decreased rate;Decreased seal  Dentition: Limited  Oral Hygiene: fair  Lingual: Decreased rate;Decreased strength  Velum: No impairment  Mandible: No impairment  Dysarthric Characteristics: Decreased rate;Decreased prosody; Decreased breath support;Blended word boundaries  Intelligibility: Impaired  Word Intelligibility (%): 85 %  Phrase Intelligibility (%): 85 %  Sentence Intelligibility (%): 50 %  Conversation Intelligibility (%): 60 %  Overall Impairment Severity: Severe  Compensatory Strategies for Motor Speech: increase intensity. decrease rate, over articulation Dysarthric Characteristics: Blended word boundaries; Decreased breath support; Imprecise;Decreased rate  Word Intelligibility (%): 90 %  Phrase Intelligibility (%): 80 %  Sentence Intelligibility (%): 50 %  Conversation Intelligibility (%): 50 %  Overall Impairment Severity: Moderate-severe  Compensatory Strategies for Motor Speech: decrease rate, increase intensity, overarticulate   Language Comprehension and   Expression: Language Comprehension and Expression:   Auditory Impairment: No   Hearing Aid: At home     Primary Mode of Expression: Verbal  Initiation: No impairment  Automatic Speech Task: No impairment  Automatic speech task cueing type: Verbal  Automatic speech task cueing amount: Minimal  Repetition: No impairment  Repetition cueing type: Verbal  Repetition cueing amount: Minimal  Naming: Impaired  Divergent (%): 70 %  Naming cueing type: Verbal  Naming cueing amount: Minimal  Conversation: Dysarthric  Speech Characteristics: Perseveration  Interfering Components: Attention  Effective Techniques: Cueing  Overall Impairment: Severe  Cueing type: Repetition;Verbal;Multi modality  Cueing amount: Moderate                 Neuro-Linguistics: Neuro-Linguistics:                                                                                                   Pragmatics: Pragmatics:         Voice: Voice:   Compensatory Strategies for Motor Speech: increase intensity. decrease rate, over articulation  Vocal Quality: Low volume Compensatory Strategies for Motor Speech: decrease rate, increase intensity, overarticulate         Pain: Pain:   Pain Scale 1: Numeric (0 - 10)  Pain Intensity 1: 0  Pain Onset 1: movement  Pain Location 1: Abdomen  Pain Orientation 1: Mid  Pain Description 1: Sharp  Pain Intervention(s) 1: Medication (see MAR); Rest;Repositioned;Position  Patient Stated Pain Goal: 0  Pain Reassessment 1: Yes       Discharge Recommendations: Home with Home Health for motor speech training in natural setting    Treatment Time:  30 minutes.     Therapist:      Ghassan Lam [facility] SLP           Date:4/14/2017

## 2017-04-14 NOTE — ROUTINE PROCESS
Bedside and Verbal shift change report given to Raissa Sears RN (oncoming nurse) by Kaleb Delgado RN (offgoing nurse). Report included the following information SBAR, Kardex and MAR. Hourly rounds made.

## 2017-04-14 NOTE — PROGRESS NOTES
Encouraged to drink fluid. Tolerated all PO meds. Bedside shift change report given to Kathleen Ohara LPN (oncoming nurse) by Zack Bonilla (offgoing nurse). Report included the following information SBAR, Kardex, Intake/Output, MAR and Recent Results.

## 2017-04-15 PROCEDURE — 74011636637 HC RX REV CODE- 636/637: Performed by: INTERNAL MEDICINE

## 2017-04-15 PROCEDURE — 74011250637 HC RX REV CODE- 250/637: Performed by: INTERNAL MEDICINE

## 2017-04-15 RX ADMIN — ASPIRIN 325 MG ORAL TABLET 325 MG: 325 PILL ORAL at 09:34

## 2017-04-15 RX ADMIN — LEVOTHYROXINE SODIUM 50 MCG: 50 TABLET ORAL at 07:06

## 2017-04-15 RX ADMIN — FLUOXETINE HYDROCHLORIDE 20 MG: 20 CAPSULE ORAL at 09:35

## 2017-04-15 RX ADMIN — MELOXICAM 7.5 MG: 7.5 TABLET ORAL at 09:35

## 2017-04-15 RX ADMIN — CLONAZEPAM 1 MG: 0.5 TABLET ORAL at 09:35

## 2017-04-15 RX ADMIN — BIMATOPROST 1 DROP: 0.1 SOLUTION/ DROPS OPHTHALMIC at 17:36

## 2017-04-15 RX ADMIN — CLOPIDOGREL BISULFATE 75 MG: 75 TABLET, FILM COATED ORAL at 09:34

## 2017-04-15 RX ADMIN — CLONAZEPAM 1 MG: 0.5 TABLET ORAL at 17:35

## 2017-04-15 RX ADMIN — SIMVASTATIN 20 MG: 20 TABLET, FILM COATED ORAL at 21:30

## 2017-04-15 RX ADMIN — FOLIC ACID 1 MG: 1 TABLET ORAL at 09:35

## 2017-04-15 RX ADMIN — HYDROCORTISONE 5 ML: 10 TABLET ORAL at 16:44

## 2017-04-15 RX ADMIN — MIRTAZAPINE 15 MG: 15 TABLET, FILM COATED ORAL at 21:30

## 2017-04-15 RX ADMIN — HYDROCORTISONE 5 ML: 10 TABLET ORAL at 21:34

## 2017-04-15 RX ADMIN — AMLODIPINE BESYLATE 2.5 MG: 2.5 TABLET ORAL at 09:35

## 2017-04-15 RX ADMIN — HYDROCORTISONE 5 ML: 10 TABLET ORAL at 09:36

## 2017-04-15 RX ADMIN — ROPINIROLE HYDROCHLORIDE 2 MG: 1 TABLET, FILM COATED ORAL at 21:30

## 2017-04-15 RX ADMIN — PREDNISONE 30 MG: 10 TABLET ORAL at 09:35

## 2017-04-15 RX ADMIN — PANTOPRAZOLE SODIUM 40 MG: 40 TABLET, DELAYED RELEASE ORAL at 07:06

## 2017-04-15 NOTE — PROGRESS NOTES
Bedside shift change report given to Nayan Walters Rn (oncoming nurse) by Raissa [employer], Rn (offgoing nurse). Report included the following information SBAR, Kardex, OR Summary, Intake/Output, MAR and Recent Results.

## 2017-04-15 NOTE — ROUTINE PROCESS
Bedside and Verbal shift change report given to [address] [place] [address] (oncoming nurse) by Erik Bradford RN (offgoing nurse). Report included the following information SBAR. QH VISUAL CHECKS DONE

## 2017-04-16 PROCEDURE — 74011250637 HC RX REV CODE- 250/637: Performed by: INTERNAL MEDICINE

## 2017-04-16 PROCEDURE — 74011636637 HC RX REV CODE- 636/637: Performed by: INTERNAL MEDICINE

## 2017-04-16 RX ADMIN — HYDROCORTISONE 5 ML: 10 TABLET ORAL at 09:00

## 2017-04-16 RX ADMIN — FLUOXETINE HYDROCHLORIDE 20 MG: 20 CAPSULE ORAL at 09:19

## 2017-04-16 RX ADMIN — CLONAZEPAM 1 MG: 0.5 TABLET ORAL at 09:00

## 2017-04-16 RX ADMIN — ROPINIROLE HYDROCHLORIDE 2 MG: 1 TABLET, FILM COATED ORAL at 21:20

## 2017-04-16 RX ADMIN — HYDROCORTISONE 5 ML: 10 TABLET ORAL at 16:37

## 2017-04-16 RX ADMIN — MIRTAZAPINE 15 MG: 15 TABLET, FILM COATED ORAL at 21:20

## 2017-04-16 RX ADMIN — MELOXICAM 7.5 MG: 7.5 TABLET ORAL at 09:19

## 2017-04-16 RX ADMIN — CLONAZEPAM 1 MG: 0.5 TABLET ORAL at 18:40

## 2017-04-16 RX ADMIN — AMLODIPINE BESYLATE 2.5 MG: 2.5 TABLET ORAL at 09:19

## 2017-04-16 RX ADMIN — HYDROCORTISONE 5 ML: 10 TABLET ORAL at 21:19

## 2017-04-16 RX ADMIN — BIMATOPROST 1 DROP: 0.1 SOLUTION/ DROPS OPHTHALMIC at 17:44

## 2017-04-16 RX ADMIN — TRAMADOL HYDROCHLORIDE 100 MG: 50 TABLET, FILM COATED ORAL at 18:45

## 2017-04-16 RX ADMIN — FOLIC ACID 1 MG: 1 TABLET ORAL at 09:19

## 2017-04-16 RX ADMIN — CLOPIDOGREL BISULFATE 75 MG: 75 TABLET, FILM COATED ORAL at 09:00

## 2017-04-16 RX ADMIN — PANTOPRAZOLE SODIUM 40 MG: 40 TABLET, DELAYED RELEASE ORAL at 09:19

## 2017-04-16 RX ADMIN — SIMVASTATIN 20 MG: 20 TABLET, FILM COATED ORAL at 21:20

## 2017-04-16 RX ADMIN — LEVOTHYROXINE SODIUM 50 MCG: 50 TABLET ORAL at 09:19

## 2017-04-16 RX ADMIN — PREDNISONE 30 MG: 10 TABLET ORAL at 09:19

## 2017-04-16 RX ADMIN — ASPIRIN 325 MG ORAL TABLET 325 MG: 325 PILL ORAL at 09:19

## 2017-04-16 NOTE — ROUTINE PROCESS
Bedside and Verbal shift change report given to Yvon Elkins RN (oncoming nurse) by Brody Johns RN (offgoing nurse). Report included the following information SBAR, ED Summary and MAR. Hourly rounds made.

## 2017-04-16 NOTE — ROUTINE PROCESS
Bedside and Verbal shift change report given to Vicente Maurer (oncoming nurse) by Alvin Phillips RN (offgoing nurse). Report included the following information SBAR. Q VISUAL CHECKS DONE.

## 2017-04-17 PROCEDURE — 77030011256 HC DRSG MEPILEX <16IN NO BORD MOLN -A

## 2017-04-17 PROCEDURE — 74011250637 HC RX REV CODE- 250/637: Performed by: INTERNAL MEDICINE

## 2017-04-17 PROCEDURE — 74011636637 HC RX REV CODE- 636/637: Performed by: INTERNAL MEDICINE

## 2017-04-17 RX ADMIN — MELOXICAM 7.5 MG: 7.5 TABLET ORAL at 09:49

## 2017-04-17 RX ADMIN — BIMATOPROST 1 DROP: 0.1 SOLUTION/ DROPS OPHTHALMIC at 19:44

## 2017-04-17 RX ADMIN — MIRTAZAPINE 15 MG: 15 TABLET, FILM COATED ORAL at 21:14

## 2017-04-17 RX ADMIN — PREDNISONE 30 MG: 10 TABLET ORAL at 09:50

## 2017-04-17 RX ADMIN — CLONAZEPAM 1 MG: 0.5 TABLET ORAL at 19:34

## 2017-04-17 RX ADMIN — FOLIC ACID 1 MG: 1 TABLET ORAL at 09:49

## 2017-04-17 RX ADMIN — HYDROCORTISONE 5 ML: 10 TABLET ORAL at 19:42

## 2017-04-17 RX ADMIN — AMLODIPINE BESYLATE 2.5 MG: 2.5 TABLET ORAL at 09:49

## 2017-04-17 RX ADMIN — SIMVASTATIN 20 MG: 20 TABLET, FILM COATED ORAL at 21:14

## 2017-04-17 RX ADMIN — FLUOXETINE HYDROCHLORIDE 20 MG: 20 CAPSULE ORAL at 09:49

## 2017-04-17 RX ADMIN — HYDROCORTISONE 5 ML: 10 TABLET ORAL at 09:00

## 2017-04-17 RX ADMIN — CLONAZEPAM 1 MG: 0.5 TABLET ORAL at 09:49

## 2017-04-17 RX ADMIN — LEVOTHYROXINE SODIUM 50 MCG: 50 TABLET ORAL at 09:48

## 2017-04-17 RX ADMIN — CLOPIDOGREL BISULFATE 75 MG: 75 TABLET, FILM COATED ORAL at 09:48

## 2017-04-17 RX ADMIN — ASPIRIN 325 MG ORAL TABLET 325 MG: 325 PILL ORAL at 09:48

## 2017-04-17 RX ADMIN — ROPINIROLE HYDROCHLORIDE 2 MG: 1 TABLET, FILM COATED ORAL at 21:14

## 2017-04-17 RX ADMIN — HYDROCORTISONE 5 ML: 10 TABLET ORAL at 22:00

## 2017-04-17 NOTE — ROUTINE PROCESS
Bedside and Verbal shift change report given to SHUN Mendiola RN (oncoming nurse) by Shannan Salcedo LPN (offgoing nurse). Report included the following information SBAR, Kardex and MAR.

## 2017-04-17 NOTE — ROUTINE PROCESS
Bedside and Verbal shift change report given to Ramesh Roberts RN (oncoming nurse) by Emmanuelle Parsons RN (offgoing nurse). Report included the following information SBAR, Kardex and MAR.

## 2017-04-17 NOTE — PROGRESS NOTES
I have reviewed this patient's current medication list and recent laboratory results. At this time, I do not suggest any drug therapy adjustments or additional laboratory monitoring. Thank you,  Autumn NEGRO  Ph. M. S.  4/17/2017

## 2017-04-17 NOTE — ROUTINE PROCESS
Bedside shift change report given to Milka Oh RN (oncoming nurse) by Cindy Melton RN (offgoing nurse). Report included the following information SBAR, Kardex, MAR and Recent Results. VISUALLY CHECKED PT Q 1 HR BY NURSING STAFF. 24 hour order chart check done.

## 2017-04-17 NOTE — PROGRESS NOTES
conducted a Follow up consultation and Spiritual Assessment for [facility], who is a 66 y.o.,female. The  provided the following Interventions:  Continued the relationship of care and support. Listened empathically. Offered prayer and assurance of continued prayer on patients behalf. Chart reviewed. The following outcomes were achieved:  Patient expressed gratitude for pastoral care visit. Assessment:  There are no further spiritual or Sikh issues which require Spiritual Care Services interventions at this time. Plan:  Chaplains will continue to follow and will provide pastoral care on an as needed/requested basis.  recommends bedside caregivers page  on duty if patient shows signs of acute spiritual or emotional distress.      [address] [facility]   Staff [address] [facility]   [phone]

## 2017-04-17 NOTE — PROGRESS NOTES
Problem: Self Care Deficits Care Plan (Adult)  Goal: *Acute Goals and Plan of Care (Insert Text)  OCCUPATIONAL THERAPY SHORT TERM GOALS     Updated 4/14/17    1. Patient will perform Upper body ADLs with/without adaptive equipment with minimal assistance/contact guard assist.   2. Patient will perform Lower body ADLs with/without adaptive equipment with moderate assistance. 3. Patient will perform toileting task with moderate assistance with Fair safety to reduce falls risk. 4. Patient will perform functional transfers with Rolling Walker and moderate assistance. 5. Patient will perform standing static/dynamic balance activities for improved ADL/IADL function with moderate assistance x 2 and Fair balance and safety awarenes. 6. Patient will improve Barthel index scores to atleast 50/100 to improve functional mobility. 7. Patient will improve standardized test score for [place] Sitting Balance Scale to 2     Updated 4/07/17    1. Patient will perform Upper body ADLs with/without adaptive equipment with minimal assistance/contact guard assist. (progressing)  2. Patient will perform Lower body ADLs with/without adaptive equipment with moderate assistance. (progressing)  3. Patient will perform toileting task with moderate assistance with Fair safety to reduce falls risk. (progressing)  4. Patient will perform functional transfers with Rolling Walker and moderate assistance. (progressing)  5. Patient will perform standing static/dynamic balance activities for improved ADL/IADL function with moderate assistance x 2 and Fair balance and safety awarenes. (progressing)  6. Patient will improve Barthel index scores to atleast 50/100 to improve functional mobility. (progressing)  7. Patient will improve standardized test score for [place] S[place] Balance Scale to 2 (goal met-continue for consistency)    Initiated 4/3/2017 and to be accomplished within 2 Week(s)    1.  Patient will perform Upper body ADLs with/without adaptive equipment with minimal assistance/contact guard assist. (progressing-currently Max A)  2. Patient will perform Lower body ADLs with/without adaptive equipment with moderate assistance. (progressing-Max A)  3. Patient will perform toileting task with moderate assistance with Fair safety to reduce falls risk. (progressing-currently Max A)  4. Patient will perform functional transfers with Rolling Walker and moderate assistance. (progressing-currently Total A)  5. Patient will perform standing static/dynamic balance activities for improved ADL/IADL function with moderate assistance x 2 and Fair balance and safety awarenes. (progressing-currently Total A )  6. Patient will improve Barthel index scores to atleast 50/100 to improve functional mobility. (progressing)  7. Patient will improve standardized test score for [place] S[place] Balance Scale to 2 (progressing)    OCCUPATIONAL THERAPY LONG TERM GOALS   Initiated 4/3/2017 and to be accomplished within 3-4 Week(s)    1. Patient will perform Upper body ADLs with/without adaptive equipment with supervision/set-up. 2. Patient will perform Lower body ADLs with/without adaptive equipment with minimal assistance/contact guard assist .  3. Patient will perform toileting task with minimal assistance/contact guard assist with Fair safety to reduce falls risk. 4. Patient will perform functional transfers with [address] and minimal assistance/contact guard assist and Fair balance and safety awareness. 5. Patient will perform standing static/dynamic activity for improved ADL/IADL function with minimal assistance/contact guard assist an and Fair balance and safety awareness. 6. Patient will improve Barthel index score to 65/100 to improve independence with mobility. 7. Patient will improve standardized test score for [place] Sitting Balance to 3.      Therapist: Emory Villalpando 77. 4/3/2017   [facility]   OCCUPATIONAL THERAPY DAILY TREATMENT NOTE        Patient: Mel Bowman (66 y.o. female)                      Date: 4/17/2017  Attending Physician: Macy Portillo MD  Primary Diagnosis: cellulitis    Treatment Diagnosis  Treatment Diagnosis: dysarthria  Treatment Diagnosis 2: dysarthria   Precautions : Precautions at Admission: Fall, Skin  Vital Signs:  Vital Signs  Temp: 97.1 °F (36.2 °C)  Temp Source: Oral  Pulse (Heart Rate): 94  Heart Rate Source: Monitor  Resp Rate: 18  O2 Sat (%): 98 %  Level of Consciousness: Alert  BP: 130/73  MAP (Calculated): 92  BP 1 Method: Automatic  BP 1 Location: Right arm  BP Patient Position: At rest;Head of bed elevated (Comment degrees)  MEWS Score: 1     Cognitive Status:  Mental Status  Neurologic State: Confused  Orientation Level: Oriented to person  Cognition: Follows commands  Perception: Cues to maintain midline in sitting (occasional)  Perseveration: No perseveration noted  Safety/Judgement: Fall prevention  Bed Mobility:  Bed Mobility  Rolling: Minimum assistance  Supine to Sit: Minimum assistance  Balance:  Balance  Sitting: With support  Sitting - Static: Good (unsupported)  Sitting - Dynamic: Fair (occasional) (+)  ADL Intervention:  Feeding  Feeding Assistance: Minimum assistance  Container Management: Contact guard assistance  Cutting Food: Minimum assistance  Utensil Management: Minimum assistance  Food to Mouth: Contact guard assistance  Drink to Mouth: Contact guard assistance  Therapeutic Activities:  Pt participated with self feeding activity seated EOB. Pt able to tolerate sitting EOB throughout the session and agreed to continue sitting EOB after eating. Pt's  present in room and agreed to assist pt with maintianing sitting EOB. See above for levels of assistance required. Patient/Caregiver Education:    PtJorge Alberto Fry April Education on safety with ADLs and transfers.          ASSESSMENT:  Patient continues to demonstrate the need for skilled Occupational Therapy services to improve strength, endurance and balance.   Progression toward goals:  [ ]      Improving appropriately and progressing toward goals  [X]      Improving slowly and progressing toward goals  [ ]      Not making progress toward goals and plan of care will be adjusted      Treatment session:   25 minutes     Therapist:    MARIPOSA Helm    4/17/2017

## 2017-04-17 NOTE — PROGRESS NOTES
Problem: Mobility Impaired (Adult and Pediatric)  Goal: *Acute Goals and Plan of Care (Insert Text)  PHYSICAL THERAPY STG GOALS :  Initiated 4/1/2017 and to be accomplished within 1-2 Weeks (Updated 4/14/17)     1. Patient will move from supine to sit and sit to supine in bed with minimal assistance/contact guard assist. (Maintained; max A to total A)   2. Patient will transfer from bed to chair and chair to bed with minimum assistance using 2WW. (No progress; total A transfer)   3. Patient will perform sit to stand with minimum assistance with Fair balance and safety awareness. (No progress; Max A x2 for attempt to stand)   4. Patient will ambulate with minimum assistance for 10 feet with 2WW on level surfaces with 1 turn. (Unable)   5. Patient will ascend/descend 1 step with bilateral handrail(s) withminimal assistance to allow for safe home access/exit. (Unable)   6. Patient will improve standardized test score for [employer] Standing Balance Scale to 1+ (No progress; pt unable to stand)    PHYSICAL THERAPY STG GOALS :  Initiated 4/1/2017 and to be accomplished within 1-2 Weeks (Updated 4/7/17)     1. Patient will move from supine to sit and sit to supine in bed with minimal assistance/contact guard assist. (No progress)   2. Patient will transfer from bed to chair and chair to bed with minimum assistance using 2WW. (No progress; total A transfer)   3. Patient will perform sit to stand with minimum assistance with Fair balance and safety awareness. (No progress; Max A x2 for attempt to stand)   4. Patient will ambulate with minimum assistance for 10 feet with 2WW on level surfaces with 1 turn. (NT, pt unable)   5. Patient will ascend/descend 1 step with bilateral handrail(s) withminimal assistance to allow for safe home access/exit.(NT, pt unable)   6.  Patient will improve standardized test score for [employer] Standing Balance Scale to 1+ (No progress; pt unable to stand)     PHYSICAL THERAPY [facility] GOALS :  Initiated 4/1/2017 and to be accomplished within 2-4 Weeks    1. Patient will move from supine to sit and sit to supine in bed with supervision/set-up. 2. Patient will transfer from bed to chair and chair to bed with supervision/set-up using 2WW.  3. Patient will perform sit to stand with supervision/set-up with Good balance and safety awareness. 4. Patient will ambulate with supervision/set-up for 50 feet with CGA on level surfaces and be able to maneuver through narrow spaces and obstacles without loss of balance. 5. Patient will ascend/descend 2 steps with bilateral handrail(s) with contact guard assist to allow for safe home access/exit. 6. Patient will improve standardized test score for [facility] Balance Scale to 2    Physical Therapist: Obdulio Verma PT on 4/1/2017    [facility]   PHYSICAL THERAPY DAILY TREATMENT NOTE        Patient: Corky Harrison (69 y.o. female)               Date: 4/17/2017    Physician: Dorothy Hodgkin, MD  Primary Diagnosis: cellulitis          Treatment Diagnosis  Treatment Diagnosis: dysarthria  Treatment Diagnosis 2: dysarthria  Precautions: Fall, Skin  Vital Signs  Vital Signs  Pulse (Heart Rate): (!) 101  BP: 152/88     Cognitive Status:  Mental Status  Neurologic State: Drowsy; Confused  Orientation Level: Oriented to person  Cognition: Decreased attention/concentration  Pain     Bed Mobility Training     Balance     Transfer Training        Gait Training                       Therapeutic Exercise:   Pt presented asleep and was easily aroused. TE rendered to address strength and endurance including heel slides 10 x 2. Positioned pt bed into chair position for breakfast. Upon return pt was still in chair position. TE of ankle pumps 20 x 2 and hip abd 10 x 1 rendered. Notified nurse Jon Cast about visible blood through bandaging on LLE.      Patient/Caregiver Education:   Pt /Caregiver Education on safety and fall prevention to reduce fall risk. ASSESSMENT:  Patient continues to benefit from Skilled PT services to improve strength, endurance, balance, and mobility. Progression toward goals:  [ ]      Improving appropriately and progressing toward goals  [ ]      Improving slowly and progressing toward goals  [X]      Not making progress toward goals and plan of care will be adjusted      Treatment session: 25 minutes.   Therapist:   TRISTIAN Raphael         4/17/2017

## 2017-04-18 PROCEDURE — 74011636637 HC RX REV CODE- 636/637: Performed by: INTERNAL MEDICINE

## 2017-04-18 PROCEDURE — 74011250637 HC RX REV CODE- 250/637: Performed by: INTERNAL MEDICINE

## 2017-04-18 RX ADMIN — HYDROCORTISONE 5 ML: 10 TABLET ORAL at 21:22

## 2017-04-18 RX ADMIN — FLUOXETINE HYDROCHLORIDE 20 MG: 20 CAPSULE ORAL at 09:45

## 2017-04-18 RX ADMIN — CLOPIDOGREL BISULFATE 75 MG: 75 TABLET, FILM COATED ORAL at 09:45

## 2017-04-18 RX ADMIN — SIMVASTATIN 20 MG: 20 TABLET, FILM COATED ORAL at 21:22

## 2017-04-18 RX ADMIN — CLONAZEPAM 1 MG: 0.5 TABLET ORAL at 17:09

## 2017-04-18 RX ADMIN — MELOXICAM 7.5 MG: 7.5 TABLET ORAL at 09:45

## 2017-04-18 RX ADMIN — PREDNISONE 30 MG: 10 TABLET ORAL at 09:45

## 2017-04-18 RX ADMIN — ROPINIROLE HYDROCHLORIDE 2 MG: 1 TABLET, FILM COATED ORAL at 21:21

## 2017-04-18 RX ADMIN — AMLODIPINE BESYLATE 2.5 MG: 2.5 TABLET ORAL at 09:45

## 2017-04-18 RX ADMIN — LEVOTHYROXINE SODIUM 50 MCG: 50 TABLET ORAL at 09:45

## 2017-04-18 RX ADMIN — FOLIC ACID 1 MG: 1 TABLET ORAL at 09:45

## 2017-04-18 RX ADMIN — HYDROCORTISONE 5 ML: 10 TABLET ORAL at 17:09

## 2017-04-18 RX ADMIN — HYDROCORTISONE 5 ML: 10 TABLET ORAL at 09:00

## 2017-04-18 RX ADMIN — BIMATOPROST 1 DROP: 0.1 SOLUTION/ DROPS OPHTHALMIC at 17:13

## 2017-04-18 RX ADMIN — CLONAZEPAM 1 MG: 0.5 TABLET ORAL at 09:45

## 2017-04-18 RX ADMIN — MIRTAZAPINE 15 MG: 15 TABLET, FILM COATED ORAL at 21:21

## 2017-04-18 RX ADMIN — ASPIRIN 325 MG ORAL TABLET 325 MG: 325 PILL ORAL at 09:45

## 2017-04-18 RX ADMIN — PANTOPRAZOLE SODIUM 40 MG: 40 TABLET, DELAYED RELEASE ORAL at 09:45

## 2017-04-18 NOTE — PROGRESS NOTES
relayed message re: pt's  desiring to speak with therapists. Therapist followed-up with pt's  in pt's room. He inquired about pt needing a high back w/c. Therapist educated on pt's need for ability to recline w/c due to poor tolerance to sit up in w/c for extended periods as well as poor sitting balance after about 10 minutes. He insists that pt would be ok her personal standard wheelchair but is agreeable to rent high back w/c temporarily. He also inquired about having a lift at home, such as a [place] lift. Therapist informed that I am unaware of a [place] lift option for home but would follow-up. Pt's 's also expressed concern about use of bedside commode for pt; therapist educated that since pt is dependent in transfers and is unable to assist with transfer in addition to poor balance, pt is most suitable and safest for bedpan. He continued to appear uncertain, therapist informed that I would consult OT for additional information.

## 2017-04-18 NOTE — PROGRESS NOTES
Problem: Mobility Impaired (Adult and Pediatric)  Goal: *Acute Goals and Plan of Care (Insert Text)  PHYSICAL THERAPY STG GOALS :  Initiated 4/1/2017 and to be accomplished within 1-2 Weeks (Updated 4/14/17)     1. Patient will move from supine to sit and sit to supine in bed with minimal assistance/contact guard assist. (Maintained; max A to total A)   2. Patient will transfer from bed to chair and chair to bed with minimum assistance using 2WW. (No progress; total A transfer)   3. Patient will perform sit to stand with minimum assistance with Fair balance and safety awareness. (No progress; Max A x2 for attempt to stand)   4. Patient will ambulate with minimum assistance for 10 feet with 2WW on level surfaces with 1 turn. (Unable)   5. Patient will ascend/descend 1 step with bilateral handrail(s) withminimal assistance to allow for safe home access/exit. (Unable)   6. Patient will improve standardized test score for [facility] Standing Balance Scale to 1+ (No progress; pt unable to stand)    PHYSICAL THERAPY STG GOALS :  Initiated 4/1/2017 and to be accomplished within 1-2 Weeks (Updated 4/7/17)     1. Patient will move from supine to sit and sit to supine in bed with minimal assistance/contact guard assist. (No progress)   2. Patient will transfer from bed to chair and chair to bed with minimum assistance using 2WW. (No progress; total A transfer)   3. Patient will perform sit to stand with minimum assistance with Fair balance and safety awareness. (No progress; Max A x2 for attempt to stand)   4. Patient will ambulate with minimum assistance for 10 feet with 2WW on level surfaces with 1 turn. (NT, pt unable)   5. Patient will ascend/descend 1 step with bilateral handrail(s) withminimal assistance to allow for safe home access/exit.(NT, pt unable)   6.  Patient will improve standardized test score for [facility] Standing Balance Scale to 1+ (No progress; pt unable to stand)     PHYSICAL THERAPY [facility] GOALS :  Initiated 4/1/2017 and to be accomplished within 2-4 Weeks    1. Patient will move from supine to sit and sit to supine in bed with supervision/set-up. 2. Patient will transfer from bed to chair and chair to bed with supervision/set-up using 2WW.  3. Patient will perform sit to stand with supervision/set-up with Good balance and safety awareness. 4. Patient will ambulate with supervision/set-up for 50 feet with CGA on level surfaces and be able to maneuver through narrow spaces and obstacles without loss of balance. 5. Patient will ascend/descend 2 steps with bilateral handrail(s) with contact guard assist to allow for safe home access/exit. 6. Patient will improve standardized test score for [employer] Balance Scale to 2    Physical Therapist: Bob Ruelas PT on 4/1/2017    [facility]   PHYSICAL THERAPY DAILY TREATMENT NOTE        Patient: Lexa Mclean (68 y.o. female)               Date: 4/18/2017    Physician: Neo Bermudez MD  Primary Diagnosis: cellulitis          Treatment Diagnosis  Treatment Diagnosis: dysarthria  Treatment Diagnosis 2: dysarthria  Precautions: Fall, Skin  Vital Signs  Vital Signs  Pulse (Heart Rate): 92  BP: 164/86     Cognitive Status:     Pain     Bed Mobility Training  Bed Mobility Training  Rolling: Minimum assistance  Supine to Sit: Moderate assistance  Sit to Supine: Moderate assistance  Scooting: Maximum assistance;Assist x2  Balance  Sitting: Without support  Sitting - Static: Fair (occasional)  Sitting - Dynamic: Fair (occasional)  Standing: With support  Standing - Static: Poor  Transfer Training  Transfer Training  Sit to Stand: Maximum assistance;Assist x2  Stand to Sit: Maximum assistance  Sit to Stand: Maximum assistance;Assist x2  Gait Training                      Therapeutic Exercise: Pt presented awake and lying in bed. Pt O2 on room air assessed at 98%. Levels of assistance for bed mobility noted above.  Pt able to sit EOB with Fair balance for TE rendered to address strength and endurance including LAQ 15 x1; extensive verbal, visual, and tactile cueing required for proper technique. Pt performed sit<>stand max assist x 2. Neuro re-ed rendered for balance. Pt able to weight shift on BLE for about 1 minute before requiring rest. O2 assessed after sit<>stand at 99%. Pt returned to bed and positioned in chair position without nasal cannula donned as O2 is prn per orders. Patient/Caregiver Education:   Pt /Caregiver Education on safety and fall prevention to reduce fall risk. ASSESSMENT:  Patient continues to benefit from Skilled PT services to improve strength, endurance, balance, and mobility. Progression toward goals:  [ ]      Improving appropriately and progressing toward goals  [X]      Improving slowly and progressing toward goals  [ ]      Not making progress toward goals and plan of care will be adjusted      Treatment session: 26 minutes.   Therapist:   Erendira Dumont, SPT          4/18/2017

## 2017-04-18 NOTE — ROUTINE PROCESS
Bedside and Verbal shift change report given to Kenya Johnson LPN (oncoming nurse) by Armida Hogue RN (offgoing nurse). Report included the following information SBAR, Kardex, MAR and Recent Results.

## 2017-04-18 NOTE — PROGRESS NOTES
ZULY spoke with pt's  earlier today re: dme order. Pt's  questioned whether pt needed the highback recliner and hospital bed. SW requested that pt's  discuss concerns with therapy. ZULY informed by pt's  this evening that he spoke with the and was informed of the medical necessity for w/c and hospital bed. ZULY informed pt's  that Sw had ordered dme earlier today and will call him after ZULY follows up with dme provider re: status of the order.

## 2017-04-18 NOTE — PROGRESS NOTES
ZULY faxed order for highback reclining wheelchair bed and hospital bed to THE [facility] [place]. ZULY requested a return call from DME provider re: coverage for dme and to contact pt's  re: coverage and any copayments. ZULY informed dme provider of d/c date of 4/20/17.

## 2017-04-18 NOTE — ROUTINE PROCESS
Bedside shift change report given to Nicole Tamez RN (oncoming nurse) by Ethan Gordon RN (offgoing nurse). Report included the following information SBAR, Kardex and Recent Results. VISUALLY CHECKED PT Q 1 HR BY NURSING STAFF. 24 hour chart check done

## 2017-04-18 NOTE — PROGRESS NOTES
NUTRITION follow-up/Plan of care    RECOMMENDATIONS:   1. Dental Soft diet  2. Ensure BID  3. Monitor weight and PO intake  4. RD to follow     GOALS:   1. Ongoing: PO intake meets >75% of protein/calorie needs by 4/25  2. Ongoing: Maintain weight (+/- 1-2 lb) by 4/25    ASSESSMENT:   Weight status is classified as normal per BMI of 21.3. However, patient is at nutrition risk due to BMI below 23 with patient above 70years of age. Improved PO intake. No new labs. Nutrition recommendations listed. RD to follow. Nutrition Risk:  []   High [x]  Moderate [] Low    SUBJECTIVE/OBJECTIVE:   Transferred from  [facility] to [facility] on 3/31/17. Patient with AMS ans right arm cellulitis. She has h/o COPD, CVAs with residual left sided weakness. Patient is eating lunch at time of visit. Reports good appetite and drinking Ensure supplements. UBW is around 110 lb.     Information Obtained From:   [x] Chart Review  [x] Patient  [] Family/Caregiver  [] Nurse/Physician   [] Patient Rounds/Interdisciplinary Meeting    Diet: Dental soft  Medications: [x] Reviewed   Past Medical History:   Diagnosis Date    B[address] 1 disorder [address] [address]     Cerebral aneurysm 12/11/2015    RPCoA, RAChA,, RMCA bifurcation, and RM2      Cervical spine fracture ([address] 08/20/2014    C2 and C3    COPD     Coronary artery disease     Giant cell arteritis ([facility] [address] 11/15/2014    [place]     Hyperlipidemia     Hypertension     Hypothyroidism     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    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    Menopause     Psychiatric disorder bipolar    Respiratory abnormalities     Restless leg syndrome     Thyroid nodule 4/23/2014    Abnormal biopsy       Labs:    Lab Results   Component Value Date/Time    Sodium 142 03/31/2017 05:15 AM    Potassium 3.8 03/31/2017 05:15 AM    Chloride 108 03/31/2017 05:15 AM    CO2 27 03/31/2017 05:15 AM    Anion gap 7 03/31/2017 05:15 AM    Glucose 124 03/31/2017 05:15 AM    BUN 20 03/31/2017 05:15 AM    Creatinine 0.61 03/31/2017 05:15 AM    Calcium 8.1 03/31/2017 05:15 AM    Albumin 3.3 02/06/2017 11:30 AM     Anthropometrics: BMI (calculated): 21.3   Last 3 Recorded Weights in this Encounter    03/31/17 2018 04/04/17 1017 04/11/17 1343   Weight: 50.1 kg (110 lb 8 oz) 49.9 kg (110 lb) 49.5 kg (109 lb 3.2 oz)      Ht Readings from Last 1 Encounters:   04/11/17 5' (1.524 m)     []  Weight Loss  []  Weight Gain  []  Weight Stable   [x]  New wt n/a on record     Nutrition Needs:   Calories: 5387-8953 Kcal   Protein: 55-65 g     Nutrition Problems Identified:   [] Suboptimal PO intake   [] Food Allergies  [] Difficulty chewing/swallowing/poor dentition  [] Constipation/Diarrhea   [] Nausea/Vomiting   [x] None  [] Other:     Plan:   [] Therapeutic Diet  [x]  Obtained/adjusted food preferences/tolerances and/or snacks options   [x]  Supplements added   [] Occupational therapy following for feeding techniques  []  HS snack added   []  Modify diet texture   []  Modify diet for food allergies   []  Educate patient   []  Assist with menu selection   [x]  Monitor PO intake on meal rounds   [x]  Continue inpatient monitoring and intervention   [x]  Participated in discharge planning/Interdisciplinary rounds/Team meetings   []  Other:     Education Needs:   [] Not appropriate for teaching at this time due to:   [x] Identified and addressed    Nutrition Monitoring and Evaluation:   [x] Continue inpatient monitoring and interventions    [] Other:     Veronica Ortiz, [address]  Pager: [phone]

## 2017-04-18 NOTE — PROGRESS NOTES
Problem: Self Care Deficits Care Plan (Adult)  Goal: *Acute Goals and Plan of Care (Insert Text)  OCCUPATIONAL THERAPY SHORT TERM GOALS     Updated 4/14/17    1. Patient will perform Upper body ADLs with/without adaptive equipment with minimal assistance/contact guard assist.   2. Patient will perform Lower body ADLs with/without adaptive equipment with moderate assistance. 3. Patient will perform toileting task with moderate assistance with Fair safety to reduce falls risk. 4. Patient will perform functional transfers with Rolling Walker and moderate assistance. 5. Patient will perform standing static/dynamic balance activities for improved ADL/IADL function with moderate assistance x 2 and Fair balance and safety awarenes. 6. Patient will improve Barthel index scores to atleast 50/100 to improve functional mobility. 7. Patient will improve standardized test score for [place] Sitting Balance Scale to 2     Updated 4/07/17    1. Patient will perform Upper body ADLs with/without adaptive equipment with minimal assistance/contact guard assist. (progressing)  2. Patient will perform Lower body ADLs with/without adaptive equipment with moderate assistance. (progressing)  3. Patient will perform toileting task with moderate assistance with Fair safety to reduce falls risk. (progressing)  4. Patient will perform functional transfers with Rolling Walker and moderate assistance. (progressing)  5. Patient will perform standing static/dynamic balance activities for improved ADL/IADL function with moderate assistance x 2 and Fair balance and safety awarenes. (progressing)  6. Patient will improve Barthel index scores to atleast 50/100 to improve functional mobility. (progressing)  7. Patient will improve standardized test score for [place] Sitting Balance Scale to 2 (goal met-continue for consistency)    Initiated 4/3/2017 and to be accomplished within 2 Week(s)    1.  Patient will perform Upper body ADLs with/without adaptive equipment with minimal assistance/contact guard assist. (progressing-currently Max A)  2. Patient will perform Lower body ADLs with/without adaptive equipment with moderate assistance. (progressing-Max A)  3. Patient will perform toileting task with moderate assistance with Fair safety to reduce falls risk. (progressing-currently Max A)  4. Patient will perform functional transfers with Rolling Walker and moderate assistance. (progressing-currently Total A)  5. Patient will perform standing static/dynamic balance activities for improved ADL/IADL function with moderate assistance x 2 and Fair balance and safety awarenes. (progressing-currently Total A )  6. Patient will improve Barthel index scores to atleast 50/100 to improve functional mobility. (progressing)  7. Patient will improve standardized test score for [place] Sitting Balance Scale to 2 (progressing)    OCCUPATIONAL THERAPY LONG TERM GOALS   Initiated 4/3/2017 and to be accomplished within 3-4 Week(s)    1. Patient will perform Upper body ADLs with/without adaptive equipment with supervision/set-up. 2. Patient will perform Lower body ADLs with/without adaptive equipment with minimal assistance/contact guard assist .  3. Patient will perform toileting task with minimal assistance/contact guard assist with Fair safety to reduce falls risk. 4. Patient will perform functional transfers with Dellia Burow and minimal assistance/contact guard assist and Fair balance and safety awareness. 5. Patient will perform standing static/dynamic activity for improved ADL/IADL function with minimal assistance/contact guard assist an and Fair balance and safety awareness. 6. Patient will improve Barthel index score to 65/100 to improve independence with mobility. 7. Patient will improve standardized test score for [place] Sitting Balance to 3.      Therapist: Emory Daugherty 77. 4/3/2017   [facility]   OCCUPATIONAL THERAPY DAILY TREATMENT NOTE        Patient: Miguel Hansen (66 y.o. female)                      Date: 4/18/2017  Attending Physician: Yves Clements MD  Primary Diagnosis: cellulitis    Treatment Diagnosis  Treatment Diagnosis: dysarthria  Treatment Diagnosis 2: dysarthria   Precautions : Precautions at Admission: Fall, Skin  Vital Signs:  Vital Signs  Pulse (Heart Rate): 92  BP: 164/86     Cognitive Status:  Mental Status  Neurologic State: Confused  Orientation Level: Oriented to person  Cognition: Follows commands  Pain:        Gross Assessment:     Coordination:     Bed Mobility:  Bed Mobility  Supine to Sit: Minimum assistance  Sit to Supine: Maximum assistance  Transfers:        Balance:  Balance  Sitting: With support  Sitting - Static: Fair (occasional) (+)  Sitting - Dynamic: Fair (occasional)  ADL Self Care:     ADL Intervention:                    Feeding  Feeding Assistance: Supervision/set-up  Utensil Management: Supervision/set-up  Food to Mouth: Supervision/set-up  Drink to Mouth: Stand-by assistance  Cues: Verbal cues provided   Therapeutic Activities:  Assisted patient with bed mobility and self feeding, seated EOB in order to increase independence and performance with task as well as increase static sitting balance and tolerance needed for ADLS. See above for levels of A needed. Patient was able to tolerate sitting EOB for 12 mins with SBA today. Cueing needed for food placement due to low vision as well as increasing independence with self feeding. Patient was able to complete with Supervision after initial setup and demonstration. Patient/Caregiver Education:    Pt. Jere Hernandez Education on see above.       ASSESSMENT:  Patient continues to demonstrate the need for skilled Occupational Therapy services to improve independence with self-feeding  Progression toward goals:  [ ]      Improving appropriately and progressing toward goals  [ ]      Improving slowly and progressing toward goals  [X]      Not making progress toward goals and plan of care will be adjusted      Treatment session:   26 minutes     Therapist:    NATASHA Locke,  4/18/2017

## 2017-04-19 PROCEDURE — 74011250637 HC RX REV CODE- 250/637: Performed by: INTERNAL MEDICINE

## 2017-04-19 PROCEDURE — 74011636637 HC RX REV CODE- 636/637: Performed by: INTERNAL MEDICINE

## 2017-04-19 RX ORDER — ROPINIROLE 2 MG/1
2 TABLET, FILM COATED ORAL
Qty: 60 TAB | Refills: 0 | Status: SHIPPED
Start: 2017-04-19 | End: 2017-08-30

## 2017-04-19 RX ORDER — TRAMADOL HYDROCHLORIDE 50 MG/1
50 TABLET ORAL
Qty: 12 TAB | Refills: 0 | Status: ON HOLD | OUTPATIENT
Start: 2017-04-19 | End: 2017-05-26

## 2017-04-19 RX ORDER — PREDNISONE 10 MG/1
30 TABLET ORAL DAILY
Qty: 90 TAB | Refills: 0 | Status: SHIPPED | OUTPATIENT
Start: 2017-04-19 | End: 2017-08-30

## 2017-04-19 RX ADMIN — ASPIRIN 325 MG ORAL TABLET 325 MG: 325 PILL ORAL at 09:57

## 2017-04-19 RX ADMIN — ROPINIROLE HYDROCHLORIDE 2 MG: 1 TABLET, FILM COATED ORAL at 21:26

## 2017-04-19 RX ADMIN — CLOPIDOGREL BISULFATE 75 MG: 75 TABLET, FILM COATED ORAL at 09:57

## 2017-04-19 RX ADMIN — SIMVASTATIN 20 MG: 20 TABLET, FILM COATED ORAL at 21:27

## 2017-04-19 RX ADMIN — FLUOXETINE HYDROCHLORIDE 20 MG: 20 CAPSULE ORAL at 09:57

## 2017-04-19 RX ADMIN — PANTOPRAZOLE SODIUM 40 MG: 40 TABLET, DELAYED RELEASE ORAL at 09:57

## 2017-04-19 RX ADMIN — CLONAZEPAM 1 MG: 0.5 TABLET ORAL at 09:57

## 2017-04-19 RX ADMIN — CLONAZEPAM 1 MG: 0.5 TABLET ORAL at 17:46

## 2017-04-19 RX ADMIN — AMLODIPINE BESYLATE 2.5 MG: 2.5 TABLET ORAL at 09:57

## 2017-04-19 RX ADMIN — MIRTAZAPINE 15 MG: 15 TABLET, FILM COATED ORAL at 21:26

## 2017-04-19 RX ADMIN — PREDNISONE 30 MG: 10 TABLET ORAL at 09:57

## 2017-04-19 RX ADMIN — LEVOTHYROXINE SODIUM 50 MCG: 50 TABLET ORAL at 09:57

## 2017-04-19 RX ADMIN — BIMATOPROST 1 DROP: 0.1 SOLUTION/ DROPS OPHTHALMIC at 17:48

## 2017-04-19 RX ADMIN — FOLIC ACID 1 MG: 1 TABLET ORAL at 09:57

## 2017-04-19 RX ADMIN — MELOXICAM 7.5 MG: 7.5 TABLET ORAL at 09:57

## 2017-04-19 NOTE — PROGRESS NOTES
SW received a  message from pt's  informing SW that he spoke with therapy and he is in agreement with pt's staying on TCC for ongoing rehab.

## 2017-04-19 NOTE — ROUTINE PROCESS
Written report given to Jorge Montano RN (oncoming nurse) by [facility] LPN Report included SBAR, Kardex, Mars and recent results.

## 2017-04-19 NOTE — ROUTINE PROCESS
Bedside and Verbal shift change report given to Haydee Mott LPN (oncoming nurse) by Fuentes Stanton RN (offgoing nurse). Report included the following information SBAR, Kardex and MAR.  24 hour chart check completed

## 2017-04-19 NOTE — PROGRESS NOTES
Pt was scheduled to discharge home on 4/20/17; however, due to improvements noted in skilled physical therapy services including sit <> stand transfers with min/mod A x 2 and bed to standard w/c (as opposed to reclining high back w/c) transfer with mod/max A x 2. Therapist discussed extending skilled rehab services with  and he agreed. Rehab supervisor,  also notified. Pt will continue skilled physical therapy services at this time.

## 2017-04-19 NOTE — PROGRESS NOTES
ZULY informed by therapy that they will extend pt's TCC stay. ZULY called MD's office and left message for Latanya to call ZULY. ZULY faxed note to MD re: extension request by therapy.

## 2017-04-19 NOTE — PROGRESS NOTES
Problem: Mobility Impaired (Adult and Pediatric)  Goal: *Acute Goals and Plan of Care (Insert Text)  PHYSICAL THERAPY STG GOALS :  Initiated 4/1/2017 and to be accomplished within 1-2 Weeks (Updated 4/14/17)     1. Patient will move from supine to sit and sit to supine in bed with minimal assistance/contact guard assist. (Maintained; max A to total A)   2. Patient will transfer from bed to chair and chair to bed with minimum assistance using 2WW. (No progress; total A transfer)   3. Patient will perform sit to stand with minimum assistance with Fair balance and safety awareness. (No progress; Max A x2 for attempt to stand)   4. Patient will ambulate with minimum assistance for 10 feet with 2WW on level surfaces with 1 turn. (Unable)   5. Patient will ascend/descend 1 step with bilateral handrail(s) withminimal assistance to allow for safe home access/exit. (Unable)   6. Patient will improve standardized test score for [facility] Standing Balance Scale to 1+ (No progress; pt unable to stand)    PHYSICAL THERAPY STG GOALS :  Initiated 4/1/2017 and to be accomplished within 1-2 Weeks (Updated 4/7/17)     1. Patient will move from supine to sit and sit to supine in bed with minimal assistance/contact guard assist. (No progress)   2. Patient will transfer from bed to chair and chair to bed with minimum assistance using 2WW. (No progress; total A transfer)   3. Patient will perform sit to stand with minimum assistance with Fair balance and safety awareness. (No progress; Max A x2 for attempt to stand)   4. Patient will ambulate with minimum assistance for 10 feet with 2WW on level surfaces with 1 turn. (NT, pt unable)   5. Patient will ascend/descend 1 step with bilateral handrail(s) withminimal assistance to allow for safe home access/exit.(NT, pt unable)   6.  Patient will improve standardized test score for [facility] Standing Balance Scale to 1+ (No progress; pt unable to stand)     PHYSICAL THERAPY [facility] GOALS :  Initiated 4/1/2017 and to be accomplished within 2-4 Weeks    1. Patient will move from supine to sit and sit to supine in bed with supervision/set-up. 2. Patient will transfer from bed to chair and chair to bed with supervision/set-up using 2WW.  3. Patient will perform sit to stand with supervision/set-up with Good balance and safety awareness. 4. Patient will ambulate with supervision/set-up for 50 feet with CGA on level surfaces and be able to maneuver through narrow spaces and obstacles without loss of balance. 5. Patient will ascend/descend 2 steps with bilateral handrail(s) with contact guard assist to allow for safe home access/exit. 6. Patient will improve standardized test score for [facility] Balance Scale to 2    Physical Therapist: Daria Mclaughlin PT on 4/1/2017    [facility]   PHYSICAL THERAPY DAILY TREATMENT NOTE        Patient: Patricia Galeana (46 y.o. female)               Date: 4/19/2017    Physician: Carlee Coelho MD  Primary Diagnosis: cellulitis          Treatment Diagnosis  Treatment Diagnosis: dysarthria  Treatment Diagnosis 2: dysarthria  Precautions: Fall, Skin  Vital Signs        Cognitive Status:  Mental Status  Neurologic State: Confused  Orientation Level: Oriented to person  Cognition: Poor safety awareness;Decreased attention/concentration  Pain     Bed Mobility Training  Bed Mobility Training  Rolling:  Moderate assistance  Supine to Sit: Moderate assistance  Scooting: Minimum assistance  Balance  Sitting: Without support  Sitting - Static: Fair (occasional)  Sitting - Dynamic: Fair (occasional)  Standing: With support  Standing - Static: Fair  Transfer Training  Transfer Training  Sit to Stand: Minimum assistance;Assist x2  Stand to Sit: Maximum assistance  Stand Pivot Transfers: Maximum assistance  Sit to Stand: Minimum assistance;Assist x2  Gait Training                     Therapeutic Exercise:   Pt presented asleep lying in bed however was easily aroused. TE rendered to address strength and endurance including ankle pumps 20 x 2. Levels of assistance for bed mobility noted above. Pt demonstrated Fair static sitting balance sitting EOB. Pt transferred sit<>stand max assist x 2; pt was able to pivot her feet to transfer to w/c. Pt has made notable improvements in bed mobility and transfers and would benefit from continued skilled physical therapy services. Patient/Caregiver Education:   Pt /Caregiver Education on safety and fall prevention to reduce fall risk. ASSESSMENT:  Patient continues to benefit from Skilled PT services to improve strength, endurance, balance, mobility. Progression toward goals:  [ ]      Improving appropriately and progressing toward goals  [X]      Improving slowly and progressing toward goals  [ ]      Not making progress toward goals and plan of care will be adjusted      Treatment session: 32 minutes.   Therapist:   Welby Holter, SPT          4/19/2017

## 2017-04-19 NOTE — PROGRESS NOTES
Problem: Self Care Deficits Care Plan (Adult)  Goal: *Acute Goals and Plan of Care (Insert Text)  OCCUPATIONAL THERAPY SHORT TERM GOALS     Updated 4/14/17    1. Patient will perform Upper body ADLs with/without adaptive equipment with minimal assistance/contact guard assist.   2. Patient will perform Lower body ADLs with/without adaptive equipment with moderate assistance. 3. Patient will perform toileting task with moderate assistance with Fair safety to reduce falls risk. 4. Patient will perform functional transfers with Rolling Walker and moderate assistance. 5. Patient will perform standing static/dynamic balance activities for improved ADL/IADL function with moderate assistance x 2 and Fair balance and safety awarenes. 6. Patient will improve Barthel index scores to atleast 50/100 to improve functional mobility. 7. Patient will improve standardized test score for [place] Sitting Balance Scale to 2     Updated 4/07/17    1. Patient will perform Upper body ADLs with/without adaptive equipment with minimal assistance/contact guard assist. (progressing)  2. Patient will perform Lower body ADLs with/without adaptive equipment with moderate assistance. (progressing)  3. Patient will perform toileting task with moderate assistance with Fair safety to reduce falls risk. (progressing)  4. Patient will perform functional transfers with Rolling Walker and moderate assistance. (progressing)  5. Patient will perform standing static/dynamic balance activities for improved ADL/IADL function with moderate assistance x 2 and Fair balance and safety awarenes. (progressing)  6. Patient will improve Barthel index scores to atleast 50/100 to improve functional mobility. (progressing)  7. Patient will improve standardized test score for [place] S[place] Balance Scale to 2 (goal met-continue for consistency)    Initiated 4/3/2017 and to be accomplished within 2 Week(s)    1.  Patient will perform Upper body ADLs with/without adaptive equipment with minimal assistance/contact guard assist. (progressing-currently Max A)  2. Patient will perform Lower body ADLs with/without adaptive equipment with moderate assistance. (progressing-Max A)  3. Patient will perform toileting task with moderate assistance with Fair safety to reduce falls risk. (progressing-currently Max A)  4. Patient will perform functional transfers with Rolling Walker and moderate assistance. (progressing-currently Total A)  5. Patient will perform standing static/dynamic balance activities for improved ADL/IADL function with moderate assistance x 2 and Fair balance and safety awarenes. (progressing-currently Total A )  6. Patient will improve Barthel index scores to atleast 50/100 to improve functional mobility. (progressing)  7. Patient will improve standardized test score for [place] Sitting Balance Scale to 2 (progressing)    OCCUPATIONAL THERAPY LONG TERM GOALS   Initiated 4/3/2017 and to be accomplished within 3-4 Week(s)    1. Patient will perform Upper body ADLs with/without adaptive equipment with supervision/set-up. 2. Patient will perform Lower body ADLs with/without adaptive equipment with minimal assistance/contact guard assist .  3. Patient will perform toileting task with minimal assistance/contact guard assist with Fair safety to reduce falls risk. 4. Patient will perform functional transfers with Crystal Harvey and minimal assistance/contact guard assist and Fair balance and safety awareness. 5. Patient will perform standing static/dynamic activity for improved ADL/IADL function with minimal assistance/contact guard assist an and Fair balance and safety awareness. 6. Patient will improve Barthel index score to 65/100 to improve independence with mobility. 7. Patient will improve standardized test score for [place] Sitting Balance to 3.      Therapist: Emory Helm 79. 4/3/2017   [facility]   OCCUPATIONAL THERAPY DAILY TREATMENT NOTE        Patient: David Wolfe (69 y.o. female)                      Date: 4/19/2017  Attending Physician: Hilario Dye MD  Primary Diagnosis: cellulitis    Treatment Diagnosis  Treatment Diagnosis: dysarthria  Treatment Diagnosis 2: dysarthria   Precautions : Precautions at Admission: Fall, Skin  Vital Signs:        Cognitive Status:  Mental Status  Neurologic State: Confused  Orientation Level: Oriented to person  Cognition: Poor safety awareness;Decreased attention/concentration  Pain:        Gross Assessment:     Coordination:     Bed Mobility:  Bed Mobility  Rolling: Moderate assistance  Supine to Sit: Moderate assistance  Scooting: Minimum assistance  Transfers:  Functional Transfers  Sit to Stand: Minimum assistance;Assist x2  Stand to Sit: Maximum assistance     Balance:  Balance  Sitting: Without support  Sitting - Static: Fair (occasional)  Sitting - Dynamic: Fair (occasional)  Standing: With support  Standing - Static: Fair  ADL Self Care:     ADL Intervention:              Grooming  Grooming Assistance: Minimum assistance  Brushing/Combing Hair: Minimum assistance           Therapeutic Activities:  Partially co-treated with PT for optimal functional gains with static standing balance needed for ADLS. Assisted patient with bed mobility and hair brushing, seated EOB, in order to increase independence and performance during task. See above for levels of A needed. Patient was able to complete hair brushing activity, seated EOB with SBA for balance today. Practiced static standing activity in order to increase standing balance and tolerance needed for functional transfers. Patient was able to tolerate two trials of standing, one for 10 sec and 30 sec with Min A x 2. Practiced SPT utilizing RW from bed>w/c in order to assess safety and independence. Patient was able to complete with Max A x 2 today.  Patient able to move B LE during transfer prior to backing into w/c when assistance needed for stand to sit. Patient demonstrated increased independence with sitting and standing balance as well as transfers needed for ADL tasks and transfers. Therapeutic Exercises:  UB strengthening with 1#, 1 sets x 10 reps in order to increase UB muscle strength needed for ADLS. Patient/Caregiver Education:    PtJorge Alberto Fry Education on see above.         ASSESSMENT:  Patient continues to demonstrate the need for skilled Occupational Therapy services to improve static standing needed for toilet transfer  Progression toward goals:  [ ]      Improving appropriately and progressing toward goals  [X]      Improving slowly and progressing toward goals  [ ]      Not making progress toward goals and plan of care will be adjusted      Treatment session:  26 minutes     Therapist:    NATASHA Gil,  4/19/2017

## 2017-04-19 NOTE — DISCHARGE SUMMARY
[address] [facility] SUMMARY    Name:  Saud Figueroa  MR#:  [id number]  :  1945  Account #:  [de-identified]  Date of Adm:  2017  Date of Discharge:  2017      DISCHARGE DIAGNOSES:  1. Acute right forearm cellulitis. 2. Infected right olecranon bursitis. 3. Acute bronchitis with bronchospasm, improved. 4. Oral thrush, improved. 5. Hemoptysis due to bronchitis, improved. 6. Deconditioning. 7. 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 8. Giant cell arteritis on high-dose steroids as above, followed by Dr. Flora Cheatham. 9. Old cervical spine fracture. 10. Restless leg syndrome. 11. Thyroid nodule, status post equivocal biopsy by Dr. Antonio Jimenez. 12. Osteoarthritis. 13. Osteoporosis. 14. Depression. 15. Status post multiple surgeries including hysterectomy, left breast  biopsy, thyroid biopsy temporal artery biopsy lower extremity  revascularization. ALLERGIES: ALLERGIC TO SULFA. DISCHARGE MEDICATIONS:      1. DuoNeb every 6 hours as needed. 2. Protonix 40 mg daily. 3. Requip 2 mg at bedtime. 4. Tramadol 50 mg as needed. 5. Norvasc 2.5 a day. 6. Aspirin 325 mg a day. 7. Lumigan eyedrops left eye every evening. 8. Klonopin 1 mg twice daily. 9. Plavix 75 mg a day. 10. Prozac 20 mg a day. 11. Folic acid 1 mg a day. 12. Synthroid 50 micrograms a day. 13. Mobic 7.5 mg a day. 14. Remeron 15 mg at bedtime. 15. Zofran every 8 hours as needed. 16. Prednisone 30 mg daily. 17. Zocor 20 mg daily. DISPOSITION: Home with home health. Miguel Angel Lopez has been ordered. FOLLOWUP PLANS: In my office in 1 week and with Dr. Gertrude Stanford  as per his directions.     HOSPITAL COURSE: On TCC - The patient was admitted after acute  hospitalization, she was continued on her antibiotics, inhaled  bronchodilators, Diflucan, Klack's solution, etc. Medically, she  improved. The patient received physical rehab with modest  improvement. She continues to be somnolent. Her pain is controlled. She has not recovered much function. The patient's vital signs have  been stable. She is stable from the cardiopulmonary standpoint. She is  felt to have achieved maximum benefits of stay on TCC and will be  discharged home for followup as above.         MD CHIDI Cooper / ARMANDO  D:  04/19/2017   08:36  T:  04/19/2017   10:18  Job #:  [id number]

## 2017-04-20 PROCEDURE — 74011250637 HC RX REV CODE- 250/637: Performed by: INTERNAL MEDICINE

## 2017-04-20 PROCEDURE — 74011636637 HC RX REV CODE- 636/637: Performed by: INTERNAL MEDICINE

## 2017-04-20 RX ADMIN — SIMVASTATIN 20 MG: 20 TABLET, FILM COATED ORAL at 21:08

## 2017-04-20 RX ADMIN — AMLODIPINE BESYLATE 2.5 MG: 2.5 TABLET ORAL at 08:23

## 2017-04-20 RX ADMIN — ROPINIROLE HYDROCHLORIDE 2 MG: 1 TABLET, FILM COATED ORAL at 21:08

## 2017-04-20 RX ADMIN — MELOXICAM 7.5 MG: 7.5 TABLET ORAL at 08:24

## 2017-04-20 RX ADMIN — MIRTAZAPINE 15 MG: 15 TABLET, FILM COATED ORAL at 21:17

## 2017-04-20 RX ADMIN — PANTOPRAZOLE SODIUM 40 MG: 40 TABLET, DELAYED RELEASE ORAL at 08:23

## 2017-04-20 RX ADMIN — PREDNISONE 30 MG: 10 TABLET ORAL at 08:23

## 2017-04-20 RX ADMIN — LEVOTHYROXINE SODIUM 50 MCG: 50 TABLET ORAL at 08:23

## 2017-04-20 RX ADMIN — FOLIC ACID 1 MG: 1 TABLET ORAL at 08:23

## 2017-04-20 RX ADMIN — CLONAZEPAM 1 MG: 0.5 TABLET ORAL at 17:37

## 2017-04-20 RX ADMIN — CLOPIDOGREL BISULFATE 75 MG: 75 TABLET, FILM COATED ORAL at 08:23

## 2017-04-20 RX ADMIN — BIMATOPROST 1 DROP: 0.1 SOLUTION/ DROPS OPHTHALMIC at 18:00

## 2017-04-20 RX ADMIN — CLONAZEPAM 1 MG: 0.5 TABLET ORAL at 08:23

## 2017-04-20 RX ADMIN — FLUOXETINE HYDROCHLORIDE 20 MG: 20 CAPSULE ORAL at 08:23

## 2017-04-20 RX ADMIN — ASPIRIN 325 MG ORAL TABLET 325 MG: 325 PILL ORAL at 08:23

## 2017-04-20 NOTE — PROGRESS NOTES
Problem: Self Care Deficits Care Plan (Adult)  Goal: *Acute Goals and Plan of Care (Insert Text)  OCCUPATIONAL THERAPY SHORT TERM GOALS     Updated 4/14/17    1. Patient will perform Upper body ADLs with/without adaptive equipment with minimal assistance/contact guard assist.   2. Patient will perform Lower body ADLs with/without adaptive equipment with moderate assistance. 3. Patient will perform toileting task with moderate assistance with Fair safety to reduce falls risk. 4. Patient will perform functional transfers with Rolling Walker and moderate assistance. 5. Patient will perform standing static/dynamic balance activities for improved ADL/IADL function with moderate assistance x 2 and Fair balance and safety awarenes. 6. Patient will improve Barthel index scores to atleast 50/100 to improve functional mobility. 7. Patient will improve standardized test score for [place] Sitting Balance Scale to 2     Updated 4/07/17    1. Patient will perform Upper body ADLs with/without adaptive equipment with minimal assistance/contact guard assist. (progressing)  2. Patient will perform Lower body ADLs with/without adaptive equipment with moderate assistance. (progressing)  3. Patient will perform toileting task with moderate assistance with Fair safety to reduce falls risk. (progressing)  4. Patient will perform functional transfers with Rolling Walker and moderate assistance. (progressing)  5. Patient will perform standing static/dynamic balance activities for improved ADL/IADL function with moderate assistance x 2 and Fair balance and safety awarenes. (progressing)  6. Patient will improve Barthel index scores to atleast 50/100 to improve functional mobility. (progressing)  7. Patient will improve standardized test score for [place] Sitting Balance Scale to 2 (goal met-continue for consistency)    Initiated 4/3/2017 and to be accomplished within 2 Week(s)    1.  Patient will perform Upper body ADLs with/without adaptive equipment with minimal assistance/contact guard assist. (progressing-currently Max A)  2. Patient will perform Lower body ADLs with/without adaptive equipment with moderate assistance. (progressing-Max A)  3. Patient will perform toileting task with moderate assistance with Fair safety to reduce falls risk. (progressing-currently Max A)  4. Patient will perform functional transfers with Rolling Walker and moderate assistance. (progressing-currently Total A)  5. Patient will perform standing static/dynamic balance activities for improved ADL/IADL function with moderate assistance x 2 and Fair balance and safety awarenes. (progressing-currently Total A )  6. Patient will improve Barthel index scores to atleast 50/100 to improve functional mobility. (progressing)  7. Patient will improve standardized test score for [place] Sitting Balance Scale to 2 (progressing)    OCCUPATIONAL THERAPY LONG TERM GOALS   Initiated 4/3/2017 and to be accomplished within 3-4 Week(s)    1. Patient will perform Upper body ADLs with/without adaptive equipment with supervision/set-up. 2. Patient will perform Lower body ADLs with/without adaptive equipment with minimal assistance/contact guard assist .  3. Patient will perform toileting task with minimal assistance/contact guard assist with Fair safety to reduce falls risk. 4. Patient will perform functional transfers with Crystal Harvey and minimal assistance/contact guard assist and Fair balance and safety awareness. 5. Patient will perform standing static/dynamic activity for improved ADL/IADL function with minimal assistance/contact guard assist an and Fair balance and safety awareness. 6. Patient will improve Barthel index score to 65/100 to improve independence with mobility. 7. Patient will improve standardized test score for [place] Sitting Balance to 3.      Therapist: Emory Helm 77. 4/3/2017   [facility]   OCCUPATIONAL THERAPY DAILY TREATMENT NOTE        Patient: Corky Harrison (1 y.o. female)                      Date: 4/20/2017  Attending Physician: Dorothy Hodgkin, MD  Primary Diagnosis: cellulitis    Treatment Diagnosis  Treatment Diagnosis: dysarthria  Treatment Diagnosis 2: dysarthria   Precautions : Precautions at Admission: Fall, Skin  Vital Signs:  Vital Signs  Pulse (Heart Rate): 73  Heart Rate Source: Monitor  Resp Rate: 15  O2 Sat (%): 98 %  Level of Consciousness: Alert  BP: 160/86  MAP (Calculated): 111  BP 1 Method: Automatic  BP 1 Location: Right arm  BP Patient Position: At rest;Supine     Cognitive Status:  Mental Status  Neurologic State: Confused  Orientation Level: Disoriented X4  Cognition: Impulsive; Impaired decision making;Poor safety awareness  Pain:  Pain Screen  Pain Scale 1: Numeric (0 - 10)  Pain Intensity 1: 0  Patient Stated Pain Goal: 0  Pain Scale 1: Numeric (0 - 10)  : Therapeutic Activities:  Assisted patient and nursing staff with changing undergarments in bed in order to assess safety and independence. See above for levels of A needed. Therapeutic Exercises:  UB strengthening with 2#, 2 sets x 10 reps in order to increase functional activity tolerance needed for ADLS. Minimum rest breaks needed. Patient/Caregiver Education:    PtJorge Alberto Orozco Education on self pacing during exercises for optimal energy consveration.         ASSESSMENT:  Patient continues to demonstrate the need for skilled Occupational Therapy services to improve static standing balance needed for LB dressing  Progression toward goals:  [ ]      Improving appropriately and progressing toward goals  [X]      Improving slowly and progressing toward goals  [ ]      Not making progress toward goals and plan of care will be adjusted      Treatment session:   30 minutes     Therapist:    NATASHA Short,  4/20/2017

## 2017-04-20 NOTE — ROUTINE PROCESS
Verbal shift report given to Kvng Addison RN (oncoming nurse) by Louise Irizarry LPN (off going nurse) Report included Sinan MENDOSA and recent results

## 2017-04-20 NOTE — PROGRESS NOTES
Problem: Mobility Impaired (Adult and Pediatric)  Goal: *Acute Goals and Plan of Care (Insert Text)  PHYSICAL THERAPY STG GOALS :  Initiated 4/1/2017 and to be accomplished within 1-2 Weeks (Updated 4/14/17)     1. Patient will move from supine to sit and sit to supine in bed with minimal assistance/contact guard assist. (Maintained; max A to total A)   2. Patient will transfer from bed to chair and chair to bed with minimum assistance using 2WW. (No progress; total A transfer)   3. Patient will perform sit to stand with minimum assistance with Fair balance and safety awareness. (No progress; Max A x2 for attempt to stand)   4. Patient will ambulate with minimum assistance for 10 feet with 2WW on level surfaces with 1 turn. (Unable)   5. Patient will ascend/descend 1 step with bilateral handrail(s) withminimal assistance to allow for safe home access/exit. (Unable)   6. Patient will improve standardized test score for [facility] Standing Balance Scale to 1+ (No progress; pt unable to stand)    PHYSICAL THERAPY STG GOALS :  Initiated 4/1/2017 and to be accomplished within 1-2 Weeks (Updated 4/7/17)     1. Patient will move from supine to sit and sit to supine in bed with minimal assistance/contact guard assist. (No progress)   2. Patient will transfer from bed to chair and chair to bed with minimum assistance using 2WW. (No progress; total A transfer)   3. Patient will perform sit to stand with minimum assistance with Fair balance and safety awareness. (No progress; Max A x2 for attempt to stand)   4. Patient will ambulate with minimum assistance for 10 feet with 2WW on level surfaces with 1 turn. (NT, pt unable)   5. Patient will ascend/descend 1 step with bilateral handrail(s) withminimal assistance to allow for safe home access/exit.(NT, pt unable)   6.  Patient will improve standardized test score for [facility] Standing Balance Scale to 1+ (No progress; pt unable to stand)     PHYSICAL THERAPY [facility] GOALS :  Initiated 4/1/2017 and to be accomplished within 2-4 Weeks    1. Patient will move from supine to sit and sit to supine in bed with supervision/set-up. 2. Patient will transfer from bed to chair and chair to bed with supervision/set-up using 2WW.  3. Patient will perform sit to stand with supervision/set-up with Good balance and safety awareness. 4. Patient will ambulate with supervision/set-up for 50 feet with CGA on level surfaces and be able to maneuver through narrow spaces and obstacles without loss of balance. 5. Patient will ascend/descend 2 steps with bilateral handrail(s) with contact guard assist to allow for safe home access/exit. 6. Patient will improve standardized test score for [facility] Balance Scale to 2    Physical Therapist: Fan June PT on 4/1/2017    [facility]ENTER   PHYSICAL THERAPY DAILY TREATMENT NOTE        Patient: Miguel Hansen (84 y.o. female)               Date: 4/20/2017    Physician: Yves Clements MD  Primary Diagnosis: cellulitis          Treatment Diagnosis  Treatment Diagnosis: dysarthria  Treatment Diagnosis 2: dysarthria  Precautions: Fall, Skin  Vital Signs  Vital Signs  Pulse (Heart Rate): 73  Heart Rate Source: Monitor  Resp Rate: 15  O2 Sat (%): 98 %  Level of Consciousness: Alert  BP: 160/86  MAP (Calculated): 111  BP 1 Method: Automatic  BP 1 Location: Right arm  BP Patient Position: At rest;Supine     Cognitive Status:  Mental Status  Neurologic State: Confused  Orientation Level: Disoriented X4  Cognition: Impulsive; Impaired decision making;Poor safety awareness  Pain  Pain Screen  Pain Scale 1: Numeric (0 - 10)  Pain Intensity 1: 0  Patient Stated Pain Goal: 0  Bed Mobility Training  Bed Mobility Training  Rolling: Minimum assistance  Supine to Sit: Moderate assistance  Scooting: Minimum assistance  Balance  Sitting: Without support  Sitting - Static: Poor (constant support)  Sitting - Dynamic: Poor (constant support)  Standing: With support  Standing - Static: Poor  Transfer Training  Transfer Training  Sit to Stand: Minimum assistance;Assist x2  Stand to Sit: Minimum assistance  Stand Pivot Transfers: Moderate assistance (x2)  Bed to Chair: Moderate assistance (x2)  Sit to Stand: Minimum assistance;Assist x2  Gait Training                       Therapeutic Exercise:   Pt repositioned in bed and into chair position for breakfast. Upon return, TA rendered to address bed mobility. Pt required extensive verbal cueing for proper technique to sit EOB. TE rendered sitting EOB LAQ 10 x 1. Neuro re-ed rendered to address balance. Pt was able to stand for about 2 minutes and shift weight on BLE. Pt transferred mod assist x 2 to chair and left sitting with call bell in reach. Patient/Caregiver Education:   Pt /Caregiver Education on safety and fall prevention to reduce fall risk. ASSESSMENT:  Patient continues to benefit from Skilled PT services to improve strength, endurance, and mobility. Progression toward goals:  [ ]      Improving appropriately and progressing toward goals  [X]      Improving slowly and progressing toward goals  [ ]      Not making progress toward goals and plan of care will be adjusted      Treatment session: 33 minutes.   Therapist:   TRISTIAN Perez          4/20/2017

## 2017-04-20 NOTE — ROUTINE PROCESS
Bedside shift change report given to Apollo Dominique RN (oncoming nurse) by Carol Johnson RN (offgoing nurse). Report included the following information SBAR, Kardex, MAR and Med Rec Status. VISUALLY CHECKED PT Q 1 HR BY NURSING STAFF. 24 hour chart check

## 2017-04-21 PROCEDURE — 74011250637 HC RX REV CODE- 250/637: Performed by: INTERNAL MEDICINE

## 2017-04-21 PROCEDURE — 74011636637 HC RX REV CODE- 636/637: Performed by: INTERNAL MEDICINE

## 2017-04-21 RX ADMIN — PREDNISONE 30 MG: 10 TABLET ORAL at 09:00

## 2017-04-21 RX ADMIN — PANTOPRAZOLE SODIUM 40 MG: 40 TABLET, DELAYED RELEASE ORAL at 07:30

## 2017-04-21 RX ADMIN — ROPINIROLE HYDROCHLORIDE 2 MG: 1 TABLET, FILM COATED ORAL at 21:11

## 2017-04-21 RX ADMIN — MELOXICAM 7.5 MG: 7.5 TABLET ORAL at 09:00

## 2017-04-21 RX ADMIN — MIRTAZAPINE 15 MG: 15 TABLET, FILM COATED ORAL at 21:11

## 2017-04-21 RX ADMIN — CLONAZEPAM 1 MG: 0.5 TABLET ORAL at 17:12

## 2017-04-21 RX ADMIN — CLONAZEPAM 1 MG: 0.5 TABLET ORAL at 09:00

## 2017-04-21 RX ADMIN — AMLODIPINE BESYLATE 2.5 MG: 2.5 TABLET ORAL at 09:00

## 2017-04-21 RX ADMIN — FOLIC ACID 1 MG: 1 TABLET ORAL at 09:00

## 2017-04-21 RX ADMIN — BIMATOPROST 1 DROP: 0.1 SOLUTION/ DROPS OPHTHALMIC at 17:13

## 2017-04-21 RX ADMIN — SIMVASTATIN 20 MG: 20 TABLET, FILM COATED ORAL at 21:11

## 2017-04-21 RX ADMIN — FLUOXETINE HYDROCHLORIDE 20 MG: 20 CAPSULE ORAL at 09:00

## 2017-04-21 RX ADMIN — LEVOTHYROXINE SODIUM 50 MCG: 50 TABLET ORAL at 07:30

## 2017-04-21 RX ADMIN — ASPIRIN 325 MG ORAL TABLET 325 MG: 325 PILL ORAL at 09:00

## 2017-04-21 RX ADMIN — CLOPIDOGREL BISULFATE 75 MG: 75 TABLET, FILM COATED ORAL at 09:00

## 2017-04-21 NOTE — PROGRESS NOTES
Problem: Mobility Impaired (Adult and Pediatric)  Goal: *Acute Goals and Plan of Care (Insert Text)  PHYSICAL THERAPY STG GOALS :  Initiated 4/1/2017 and to be accomplished within 1-2 Weeks (Updated 4/21/17)     1. Patient will move from supine to sit and sit to supine in bed with minimal assistance/contact guard assist. (Progressing; Mod A)   2. Patient will transfer from bed to chair and chair to bed with minimum assistance using 2WW. (Progressing; Max A x1, Mod A x2)   3. Patient will perform sit to stand with minimum assistance with Fair balance and safety awareness. (Progressing; Mod A x2)   4. Patient will ambulate with minimum assistance for 10 feet with 2WW on level surfaces with 1 turn. (Unable)   5. Patient will ascend/descend 1 step with bilateral handrail(s) withminimal assistance to allow for safe home access/exit. (Unable)   6. Patient will improve standardized test score for [facility] Standing Balance Scale to 1+ (Achieved)      PHYSICAL THERAPY STG GOALS :  Initiated 4/1/2017 and to be accomplished within 1-2 Weeks (Updated 4/14/17)     1. Patient will move from supine to sit and sit to supine in bed with minimal assistance/contact guard assist. (Maintained; max A to total A)   2. Patient will transfer from bed to chair and chair to bed with minimum assistance using 2WW. (No progress; total A transfer)   3. Patient will perform sit to stand with minimum assistance with Fair balance and safety awareness. (No progress; Max A x2 for attempt to stand)   4. Patient will ambulate with minimum assistance for 10 feet with 2WW on level surfaces with 1 turn. (Unable)   5. Patient will ascend/descend 1 step with bilateral handrail(s) withminimal assistance to allow for safe home access/exit. (Unable)   6.  Patient will improve standardized test score for [facility] Standing Balance Scale to 1+ (No progress; pt unable to stand)    PHYSICAL THERAPY STG GOALS :  Initiated 4/1/2017 and to be accomplished within 1-2 Weeks (Updated 4/7/17)     1. Patient will move from supine to sit and sit to supine in bed with minimal assistance/contact guard assist. (No progress)   2. Patient will transfer from bed to chair and chair to bed with minimum assistance using 2WW. (No progress; total A transfer)   3. Patient will perform sit to stand with minimum assistance with Fair balance and safety awareness. (No progress; Max A x2 for attempt to stand)   4. Patient will ambulate with minimum assistance for 10 feet with 2WW on level surfaces with 1 turn. (NT, pt unable)   5. Patient will ascend/descend 1 step with bilateral handrail(s) withminimal assistance to allow for safe home access/exit.(NT, pt unable)   6. Patient will improve standardized test score for [facility] Standing Balance Scale to 1+ (No progress; pt unable to stand)     PHYSICAL THERAPY LTG GOALS :  Initiated 4/1/2017 and to be accomplished within 2-4 Weeks    1. Patient will move from supine to sit and sit to supine in bed with supervision/set-up. 2. Patient will transfer from bed to chair and chair to bed with supervision/set-up using 2WW.  3. Patient will perform sit to stand with supervision/set-up with Good balance and safety awareness. 4. Patient will ambulate with supervision/set-up for 50 feet with CGA on level surfaces and be able to maneuver through narrow spaces and obstacles without loss of balance. 5. Patient will ascend/descend 2 steps with bilateral handrail(s) with contact guard assist to allow for safe home access/exit. 6. Patient will improve standardized test score for [facility] Balance Scale to 2    Physical Therapist: Oh Jalloh PT on 4/1/2017    [facility]ENTER   PHYSICAL THERAPY WEEKLY PROGRESS REPORT  Reporting Period:  Date:  4/14/17to 4/21/17        Patient:  Laurence Garcia (26 y.o. female)                         Date: 4/21/2017    Primary Diagnosis: cellulitis Attending Physician: Go Carrasco MD   Treatment Diagnosis  Treatment Diagnosis: dysarthria  Treatment Diagnosis 2: dysarthria  Precautions:  Fall, Skin  Rehab Potential : Guarded:     Skill interventions and education provided with clinical rationale (include individualized treatment techniques and standardized tests):   Skilled Physical Therapy services were provided with  TA for greater independence with bed mobility and transfers, TE to promote strength and endurance for improved functional mobility, Gait training to allow pt to return to PLOF, Neuromuscular reeducation of movement, coordination and balance for reduced risk of falls, Stair training to allow pt to safely return home upon DC. Using a comparative statement, summarize significant progress toward goals as a result of skilled intervention provided:  Patient has made Fair (-) progress towards their Physical Therapy goals in the areas of bed mobility, sit<>stand and transfers    Identify remaining functional areas, impairments limiting progress and/or barriers to improvement:  Patient would benefit from continues PT services to address the following functional deficits in bed mobility, transfers, strength, balance gait and stair negotiation.        OBJECTIVE DATA SUMMARY:       INITIAL ASSESSMENT WEEKLY ASSESSMENT   COGNITIVE STATUS COGNITIVE STATUS   Neurologic State: Alert  Orientation Level: Oriented to person  Cognition: Follows commands  Perception: Cues to maintain midline in sitting, Tactile, Verbal  Perseveration: No perseveration noted  Safety/Judgement: Fall prevention Neurologic State: Drowsy  Orientation Level: Disoriented to person, Disoriented to place, Disoriented to situation, Disoriented to time  Cognition: Impaired decision making, Poor safety awareness  Perception: Cues to maintain midline in sitting (occasional)  Perseveration: No perseveration noted  Safety/Judgement: Fall prevention   PAIN PAIN   Pain Scale 1: Numeric (0 - 10)  Pain Intensity 1: 0  Pain Onset 1: movement  Pain Location 1: Abdomen  Pain Orientation 1: Mid  Pain Description 1: Sharp  Pain Intervention(s) 1: Medication (see MAR), Rest, Repositioned, Position  Patient Stated Pain Goal: 0  Pain Reassessment 1: Yes Pain Scale 1: Numeric (0 - 10)  Pain Intensity 1: 0  Pain Onset 1: movement  Pain Location 1: Back  Pain Orientation 1: Lower  Pain Description 1: Aching  Pain Intervention(s) 1: Medication (see MAR), Repositioned  Patient Stated Pain Goal: 0  Pain Reassessment 1: Yes   GROSS ASSESSMENT GROSS ASSESSMENT   AROM: Generally decreased, functional (BLE)  PROM: Generally decreased, functional  Strength: Generally decreased, functional (BLE)  Coordination: Generally decreased, functional  Tone: Normal  Sensation: Intact AROM: Generally decreased, functional  PROM: Generally decreased, functional  Strength: Generally decreased, functional  Coordination: Generally decreased, functional  Tone: Normal  Sensation: Intact   BED MOBILITY BED MOBILITY   Rolling: Minimum assistance, Additional time  Supine to Sit: Moderate assistance  Sit to Supine: Minimum assistance, Additional time  Scooting: Minimum assistance Rolling: Minimum assistance  Supine to Sit: Moderate assistance  Sit to Supine: Moderate assistance  Scooting: Minimum assistance, Moderate assistance   GAIT GAIT                           TRANSFERS TRANSFERS   Sit to Stand: Maximum assistance  Stand to Sit: Maximum assistance  Bed to Chair: Total assistance Sit to Stand:  Moderate assistance, Maximum assistance  Stand to Sit: Moderate assistance  Bed to Chair: Maximum assistance   BALANCE BALANCE   Sitting: With support  Sitting - Static: Poor (constant support)  Sitting - Dynamic: Poor (constant support)  Standing: Impaired, Pull to stand, With support  Standing - Static: Poor  Standing - Dynamic : None Sitting: Without support  Sitting - Static: Poor (constant support)  Sitting - Dynamic: Poor (constant support)  Standing: With support  Standing - Static: Poor  Standing - Dynamic :  (NT)   WHEELCHAIR MOBILITY/MGMT WHEELCHAIR MOBILITY/MGMT         Activity Tolerance:  Poor Activity Tolerance: Poor   Visual/Perceptual   Tracking: Able to track stimulus in all quadrants w/o difficulty (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 Pt required much encouragement for OOB therapy session. Pt required assistance donning gown (as pt had removed gown) and nasal canula for supplemental 02. Bed mobility with Mod A and additional time and effort. Pt sat EOB for ~5' and required constant support. Sit<>stand from EOB at [facility] with Max. SPT from EOB>w/c with Max A of one. Pt remained sitting up in w/c at bedside for breakfast. Therapist informed NSG (Erna) of pt sitting up OOB. NSG to check on pt. Patient's response to treatment rendered:  Fair (-)     Patient expected Discharge Location:  [X]Private Residence  [ ] [facility]  [ [facility]  [ ]Other:     Plan: Continue Skilled PT services as established by the Plan of Care for 5-6 times a week. PT and Assistant have had a weekly case conference regarding the above treatment:  [ ] Yes     [ ] No        Treatment session:  30 minutes. Therapist: Daria Cartagena PTA       Date:4/21/2017  Forward to PT for co-signature when completed.

## 2017-04-21 NOTE — PROGRESS NOTES
Problem: Self Care Deficits Care Plan (Adult)  Goal: *Acute Goals and Plan of Care (Insert Text)  OCCUPATIONAL THERAPY SHORT TERM GOALS   Updated 4/21/2017:  1. Patient will perform Upper body ADLs with/without adaptive equipment with minimal assistance/contact guard assist.   2. Patient will perform Lower body ADLs with/without adaptive equipment with moderate assistance. 3. Patient will perform toileting task with moderate assistance with Fair safety to reduce falls risk. 4. Patient will perform functional transfers with Rolling Walker and MIN A X2.   6. Patient will improve Barthel index scores to atleast 50/100 to improve functional mobility. 7. Patient will improve standardized test score for [place] Sitting Balance Scale to 2+ OR MORE. Updated 4/14/17    1. Patient will perform Upper body ADLs with/without adaptive equipment with minimal assistance/contact guard assist. progressing   2. Patient will perform Lower body ADLs with/without adaptive equipment with moderate assistance. progressing  3. Patient will perform toileting task with moderate assistance with Fair safety to reduce falls risk. progressing  4. Patient will perform functional transfers with Rolling Walker and moderate assistance. GM, upg MIN A X2  5. Patient will perform standing static/dynamic balance activities for improved ADL/IADL function with moderate assistance x 2 and Fair balance and safety awarenes. D/C GOAL, N/A at this time  6. Patient will improve Barthel index scores to atleast 50/100 to improve functional mobility. progressing  7. Patient will improve standardized test score for [place] Sitting Balance Scale to 2 UPG 2+ OR MORE    Updated 4/07/17    1. Patient will perform Upper body ADLs with/without adaptive equipment with minimal assistance/contact guard assist. (progressing)  2. Patient will perform Lower body ADLs with/without adaptive equipment with moderate assistance. (progressing)  3.  Patient will perform toileting task with moderate assistance with Fair safety to reduce falls risk. (progressing)  4. Patient will perform functional transfers with Rolling Walker and moderate assistance. (progressing)  5. Patient will perform standing static/dynamic balance activities for improved ADL/IADL function with moderate assistance x 2 and Fair balance and safety awarenes. (progressing)  6. Patient will improve Barthel index scores to atleast 50/100 to improve functional mobility. (progressing)  7. Patient will improve standardized test score for [place] Sitting Balance Scale to 2 (goal met-continue for consistency)    Initiated 4/3/2017 and to be accomplished within 2 Week(s)    1. Patient will perform Upper body ADLs with/without adaptive equipment with minimal assistance/contact guard assist. (progressing-currently Max A)  2. Patient will perform Lower body ADLs with/without adaptive equipment with moderate assistance. (progressing-Max A)  3. Patient will perform toileting task with moderate assistance with Fair safety to reduce falls risk. (progressing-currently Max A)  4. Patient will perform functional transfers with Rolling Walker and moderate assistance. (progressing-currently Total A)  5. Patient will perform standing static/dynamic balance activities for improved ADL/IADL function with moderate assistance x 2 and Fair balance and safety awarenes. (progressing-currently Total A )  6. Patient will improve Barthel index scores to atleast 50/100 to improve functional mobility. (progressing)  7. Patient will improve standardized test score for [place] Sitting Balance Scale to 2 (progressing)    OCCUPATIONAL THERAPY LONG TERM GOALS   Initiated 4/3/2017 and to be accomplished within 3-4 Week(s)    1. Patient will perform Upper body ADLs with/without adaptive equipment with supervision/set-up. 2. Patient will perform Lower body ADLs with/without adaptive equipment with minimal assistance/contact guard assist .  3.  Patient will perform toileting task with minimal assistance/contact guard assist with Fair safety to reduce falls risk. 4. Patient will perform functional transfers with Bia Burnette and minimal assistance/contact guard assist and Fair balance and safety awareness. 5. Patient will perform standing static/dynamic activity for improved ADL/IADL function with minimal assistance/contact guard assist an and Fair balance and safety awareness. 6. Patient will improve Barthel index score to 65/100 to improve independence with mobility. 7. Patient will improve standardized test score for [place] Sitting Balance to 3. Therapist: Merideth Boas, [address]. 4/3/2017   [facility]  OCCUPATIONAL THERAPY WEEKLY SUMMARY   Reporting period:  from 4/14/2017 through 4/21/2017         Patient: Yanet Hernandez (85 y.o. female)                             Date: 4/21/2017    Primary Diagnosis: cellulitis                                  Attending Physician: Destinee Trujillo MD Treatment Diagnosis  Treatment Diagnosis: dysarthria  Treatment Diagnosis 2: dysarthria  Precautions: Fall, Skin  Rehab Potential : Good     Skill interventions and education provided with clinical rationale (include individualized treatment techniques and standardized tests):  Skilled Occupational services were provided utilizing Therapeutic activities, therapeutic exercises, ADLs training, functional mobility, functional transfers training and EC/WS. Using a comparative statement, summarize significant progress toward goals as a result of skilled intervention provided:  Patient has made Fair progress towards their Occupational Therapy goals in the following areas: Pt has met 1/6 ST goals this week and continues to demo slow but steady progress with skilled OT intervention at this time. Pt to benefit from medically necessary OT intervention at this time in prep for safe return to home and participate with ADLs and transfers with safety.   Identify remaining functional areas, impairments limiting progress and/or barriers to improvement:  Patient would benefit from continued skilled Occupational Therapy Services to address the following functional deficits in strength, endurance and balance. OBJECTIVE DATA SUMMARY:          INITIAL ASSESSMENT WEEKLY PROGRESS   COGNITIVE STATUS: COGNITIVE STATUS:   Neurologic State: Alert  Orientation Level: Oriented to person  Cognition: Follows commands  Perception: Cues to maintain midline in sitting, Tactile, Verbal  Perseveration: No perseveration noted  Safety/Judgement: Fall prevention Neurologic State: Drowsy  Orientation Level: Oriented to person  Cognition: Decreased command following  Perception: Appears intact  Perseveration: No perseveration noted  Safety/Judgement: Fall prevention, Decreased awareness of environment, Decreased awareness of need for assistance, Decreased awareness of need for safety, Decreased insight into deficits   PAIN: PAIN:   Pain Scale 1: Numeric (0 - 10)  Pain Intensity 1: 0  Pain Onset 1: movement  Pain Location 1: Abdomen  Pain Orientation 1: Mid  Pain Description 1: Sharp  Pain Intervention(s) 1: Medication (see MAR), Rest, Repositioned, Position  Patient Stated Pain Goal: 0  Pain Reassessment 1: Yes       Pain Scale 1: Numeric (0 - 10)  Pain Intensity 1: 0  Pain Onset 1: movement  Pain Location 1: Back  Pain Orientation 1: Lower  Pain Description 1: Aching  Pain Intervention(s) 1: Medication (see MAR), Repositioned  Patient Stated Pain Goal: 0  Pain Reassessment 1: Yes   BED MOBILITY BED MOBILITY   Rolling: Minimum assistance, Additional time  Supine to Sit: Moderate assistance  Sit to Supine: Minimum assistance, Additional time  Scooting: Minimum assistance Rolling: Minimum assistance  Supine to Sit: Moderate assistance  Sit to Supine:  Moderate assistance  Scooting: Minimum assistance, Moderate assistance   ADL SELF CARE ADL SELF CARE   Feeding: Setup  Oral Facial Hygiene/Grooming: Contact guard assistance  Bathing: Moderate assistance  Upper Body Dressing: Minimum assistance  Lower Body Dressing: Maximum assistance  Toileting: Maximum assistance Bathing Assistance: Maximum assistance  Position Performed: Seated edge of bed     Dressing Assistance: Maximum assistance  Hospital Gown: Moderate assistance  Pullover Shirt: Maximum assistance  Cues: Physical assistance, Visual cues provided     Bathing Assistance: Total assistance(dependent)  Perineal  : Total assistance (dependent)  Position Performed: Seated in chair     Toileting Assistance: Maximum assistance  Bladder Hygiene: Maximum assistance  Clothing Management: Maximum assistance     Grooming Assistance: Minimum assistance  Washing Hands: Minimum assistance  Brushing Teeth: Contact guard assistance  Brushing/Combing Hair: Minimum assistance  Cues: Verbal cues provided     Dressing Assistance: Maximum assistance  Underpants: Maximum assistance     Feeding Assistance: Supervision/set-up  Container Management: Contact guard assistance  Cutting Food: Minimum assistance  Utensil Management: Supervision/set-up  Food to Mouth: Supervision/set-up  Drink to Mouth: Stand-by assistance  Cues: Verbal cues provided   TRANSFERS TRANSFERS   Sit to Stand: Maximum assistance  Stand to Sit: Maximum assistance  Bed to Chair: Total assistance Sit to Stand:  Moderate assistance, Maximum assistance  Stand to Sit: Moderate assistance  Bed to Chair: Maximum assistance   BALANCE BALANCE   Sitting: With support  Sitting - Static: Poor (constant support)  Sitting - Dynamic: Poor (constant support)  Standing: Impaired, Pull to stand, With support  Standing - Static: Poor  Standing - Dynamic : None Sitting: Without support  Sitting - Static: Poor (constant support)  Sitting - Dynamic: Poor (constant support)  Standing: With support  Standing - Static: Poor  Standing - Dynamic :  (NT)   GROSS ASSESSMENT  GROSS ASSESSMENT   AROM: Generally decreased, functional (BLE)  PROM: Generally decreased, functional  Strength: Generally decreased, functional (BLE)  Coordination: Generally decreased, functional  Tone: Normal  Sensation: Intact AROM: Generally decreased, functional  PROM: Generally decreased, functional  Strength: Generally decreased, functional  Coordination: Generally decreased, functional  Tone: Normal  Sensation: Intact   COORDINATION COORDINATION   Fine Motor Skills-Upper: Left Intact, Right Intact  Gross Motor Skills-Upper: Left Intact, Right Intact Fine Motor Skills-Upper: Left Intact, Right Intact  Gross Motor Skills-Upper: Left Intact, Right Intact   VISUAL/PERCEPTUAL VISUAL/PERCEPTUAL   Tracking: Able to track stimulus in all quadrants w/o difficulty (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)   Tracking: Able to track stimulus in all quadrants w/o difficulty (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)     AUDITORY: AUDITORY:   Auditory Impairment: Hard of hearing, bilateral Auditory Impairment: Hard of hearing, bilateral   THE BARTHEL INDEX  ACTIVITY    SCORE   FEEDING  0=unable  5=needs help cutting,spreading butter,etc., or modified diet  10= independent    5   BATHING  0=dependent  5=independent (or in shower    0   GROOMING  0=needs help  5=independent face/hair/teeth/shaving (implements provided)    0   DRESSING  0=dependent  5=needs help but can do about half unaided  10=independent(including buttons, zips,laces etc.)    0   BOWELS  0=incontinent  5=occasional accident  10=continent    0   BLADDER  0=incontinent, or catheterized and unable to manage alone  5=occasional accident  10=continent    0   TOILET USE  0=dependent  5=needs some help, but can do something alone  10=independent (on and off, dressing, wiping)    0   TRANSFER (BED TO CHAIR AND BACK)  0=unable, no sitting balance  5=major help(one or two people,physical), can sit  10=minor help(verbal or physical)  15=independent    5   MOBILITY (ON LEVEL SURFACES)  0=immobile or <50 yards  5=wheelchair independent,including corners,>50 yards  10=walkes with help of one person (verbal or physical) >50 yards  15=independent(but may use any aid; for example, stick) >50 yards    0   STAIRS  0=unable  5=needs help (verbal, physical, carrying aid)  10=independent    0             TOTAL:                  10/100      Treatment:  UE strengthening exercises for carryover of ADLs and safe transfers. PNF diagonals with max verbal and tactile cues to improve dynamic sitting balance and endurance. Patient's response to Treatment rendered:  Pt left seated in w/c at the end of session in no apparent distress. Patient expected Discharge Location:  [X]Private Residence  [ ] [facility]  [ [facility]  [ ]Other:     Plan: Continue OT services as established on the Plan of Care for 5 times a week.   Treatment Minutes:  28  OT and Assistant have had a weekly case conference regarding the above treatment:  [X] Yes     [ ] No    Therapist:   Jose Wong OTR          Date:4/21/2017      Forward to OT for co-signature when completed

## 2017-04-21 NOTE — PROGRESS NOTES
met with patient completed the initial Spiritual Assessment of the patient, and offered Pastoral Care, see flow sheets for interventions.  extended the assurance of prayer and spiritual care materials. Patient does not have any Sabianist/cultural needs that will affect patients preferences in health care. The patient was informed that chaplains will continue to follow and will provide pastoral care on an as needed/requested basis.       Alessia Bentley, MPH, [address] [id number] [phone]

## 2017-04-21 NOTE — ROUTINE PROCESS
Bedside and Verbal shift change report given B Bennie RESTREPON (oncoming nurse) by Sylvester Lacey LPN (offgoing nurse) Report included SBAR, Kardex Mars and recent results.

## 2017-04-21 NOTE — ROUTINE PROCESS
Bedside shift change report given to [place]  RN (oncoming nurse) by Axel Miles LPN (offgoing nurse). Report included the following information SBAR, Kardex, MAR and Med Rec Status. VISUALLY CHECKED PT Q 1 HR BY NURSING STAFF. 24 hour chart check

## 2017-04-22 PROCEDURE — 74011636637 HC RX REV CODE- 636/637: Performed by: INTERNAL MEDICINE

## 2017-04-22 PROCEDURE — 74011250637 HC RX REV CODE- 250/637: Performed by: INTERNAL MEDICINE

## 2017-04-22 RX ADMIN — ROPINIROLE HYDROCHLORIDE 2 MG: 1 TABLET, FILM COATED ORAL at 21:27

## 2017-04-22 RX ADMIN — FOLIC ACID 1 MG: 1 TABLET ORAL at 09:08

## 2017-04-22 RX ADMIN — MELOXICAM 7.5 MG: 7.5 TABLET ORAL at 09:08

## 2017-04-22 RX ADMIN — CLOPIDOGREL BISULFATE 75 MG: 75 TABLET, FILM COATED ORAL at 09:08

## 2017-04-22 RX ADMIN — PREDNISONE 30 MG: 10 TABLET ORAL at 09:08

## 2017-04-22 RX ADMIN — PANTOPRAZOLE SODIUM 40 MG: 40 TABLET, DELAYED RELEASE ORAL at 06:48

## 2017-04-22 RX ADMIN — BIMATOPROST 1 DROP: 0.1 SOLUTION/ DROPS OPHTHALMIC at 17:09

## 2017-04-22 RX ADMIN — CLONAZEPAM 1 MG: 0.5 TABLET ORAL at 17:08

## 2017-04-22 RX ADMIN — MIRTAZAPINE 15 MG: 15 TABLET, FILM COATED ORAL at 21:27

## 2017-04-22 RX ADMIN — ASPIRIN 325 MG ORAL TABLET 325 MG: 325 PILL ORAL at 09:08

## 2017-04-22 RX ADMIN — CLONAZEPAM 1 MG: 0.5 TABLET ORAL at 09:08

## 2017-04-22 RX ADMIN — AMLODIPINE BESYLATE 2.5 MG: 2.5 TABLET ORAL at 09:08

## 2017-04-22 RX ADMIN — FLUOXETINE HYDROCHLORIDE 20 MG: 20 CAPSULE ORAL at 09:08

## 2017-04-22 RX ADMIN — LEVOTHYROXINE SODIUM 50 MCG: 50 TABLET ORAL at 06:48

## 2017-04-22 RX ADMIN — SIMVASTATIN 20 MG: 20 TABLET, FILM COATED ORAL at 22:00

## 2017-04-22 NOTE — ROUTINE PROCESS
Bedside and Verbal shift change report given toPgabo Ramsey lpn (oncoming nurse) by Akhil Kimble RN (offgoing nurse). Report included the following information SBAR. Qh visual checks done.

## 2017-04-22 NOTE — ROUTINE PROCESS
Bedside and Verbal shift change report given to Alessio Shaw LPN (oncoming nurse) by Fuentes Stanton RN (offgoing nurse). Report included the following information SBAR, Kardex and MAR.

## 2017-04-22 NOTE — PROGRESS NOTES
Problem: Mobility Impaired (Adult and Pediatric)  Goal: *Acute Goals and Plan of Care (Insert Text)  PHYSICAL THERAPY STG GOALS :  Initiated 4/1/2017 and to be accomplished within 1-2 Weeks (Updated 4/21/17)     1. Patient will move from supine to sit and sit to supine in bed with minimal assistance/contact guard assist. (Progressing; Mod A)   2. Patient will transfer from bed to chair and chair to bed with minimum assistance using 2WW. (Progressing; Max A x1, Mod A x2)   3. Patient will perform sit to stand with minimum assistance with Fair balance and safety awareness. (Progressing; Mod A x2)   4. Patient will ambulate with minimum assistance for 10 feet with 2WW on level surfaces with 1 turn. (Unable)   5. Patient will ascend/descend 1 step with bilateral handrail(s) withminimal assistance to allow for safe home access/exit. (Unable)   6. Patient will improve standardized test score for [facility] Standing Balance Scale to 1+ (Achieved)      PHYSICAL THERAPY STG GOALS :  Initiated 4/1/2017 and to be accomplished within 1-2 Weeks (Updated 4/14/17)     1. Patient will move from supine to sit and sit to supine in bed with minimal assistance/contact guard assist. (Maintained; max A to total A)   2. Patient will transfer from bed to chair and chair to bed with minimum assistance using 2WW. (No progress; total A transfer)   3. Patient will perform sit to stand with minimum assistance with Fair balance and safety awareness. (No progress; Max A x2 for attempt to stand)   4. Patient will ambulate with minimum assistance for 10 feet with 2WW on level surfaces with 1 turn. (Unable)   5. Patient will ascend/descend 1 step with bilateral handrail(s) withminimal assistance to allow for safe home access/exit. (Unable)   6.  Patient will improve standardized test score for [facility] Standing Balance Scale to 1+ (No progress; pt unable to stand)    PHYSICAL THERAPY STG GOALS :  Initiated 4/1/2017 and to be accomplished within 1-2 Weeks (Updated 4/7/17)     1. Patient will move from supine to sit and sit to supine in bed with minimal assistance/contact guard assist. (No progress)   2. Patient will transfer from bed to chair and chair to bed with minimum assistance using 2WW. (No progress; total A transfer)   3. Patient will perform sit to stand with minimum assistance with Fair balance and safety awareness. (No progress; Max A x2 for attempt to stand)   4. Patient will ambulate with minimum assistance for 10 feet with 2WW on level surfaces with 1 turn. (NT, pt unable)   5. Patient will ascend/descend 1 step with bilateral handrail(s) withminimal assistance to allow for safe home access/exit.(NT, pt unable)   6. Patient will improve standardized test score for [facility] Standing Balance Scale to 1+ (No progress; pt unable to stand)     PHYSICAL THERAPY LTG GOALS :  Initiated 4/1/2017 and to be accomplished within 2-4 Weeks    1. Patient will move from supine to sit and sit to supine in bed with supervision/set-up. 2. Patient will transfer from bed to chair and chair to bed with supervision/set-up using 2WW.  3. Patient will perform sit to stand with supervision/set-up with Good balance and safety awareness. 4. Patient will ambulate with supervision/set-up for 50 feet with CGA on level surfaces and be able to maneuver through narrow spaces and obstacles without loss of balance. 5. Patient will ascend/descend 2 steps with bilateral handrail(s) with contact guard assist to allow for safe home access/exit. 6. Patient will improve standardized test score for [facility] Balance Scale to 2    Physical Therapist: Barrington Morrow PT on 4/1/2017    [facility]   PHYSICAL THERAPY DAILY TREATMENT NOTE        Patient:  Marla Leal (18 y.o. female)               Date: 4/22/2017    Physician: Tristan Barbosa MD  Primary Diagnosis: cellulitis          Treatment Diagnosis  Treatment Diagnosis: dysarthria  Treatment Diagnosis 2: dysarthria  Precautions: Fall, Skin  Vital Signs  Vital Signs  Pulse (Heart Rate): (!) 107  BP: 180/90     Cognitive Status:  Mental Status  Neurologic State: Confused  Orientation Level: Oriented to person  Cognition: Decreased attention/concentration  Pain  Pain Screen  Pain Scale 1: Numeric (0 - 10)  Pain Intensity 1: 0  Patient Stated Pain Goal: 0  Pain Reassessment 1: Yes  Bed Mobility Training     Balance     Transfer Training        Gait Training                       Therapeutic Exercise:    Pt participated in PROM stretching BLEs to PF/DF, hip adductors and HS x 15 reps with varying sec for static hold; initiated heel slides AAROM x 10 ea LE, and hip abd/add slides with assistance due to patient lethargic and requiring frequent cuing for participation-- to increase strength, ROM and tolerance to activity in prep for improved levels of functional mobility  Patient/Caregiver Education:   Pt /Caregiver Education on safety and fall prevention, positional changes and LE exercises (AP, QS, GS) was provided to  Increase strength, tolerance to activity and to prevent wounds. ASSESSMENT:  Patient continues to benefit from Skilled PT services to improve strength, functional mobility and overall safety awareness  Progression toward goals:  [ ]      Improving appropriately and progressing toward goals  [X]      Improving slowly and progressing toward goals  [ ]      Not making progress toward goals and plan of care will be adjusted      Treatment session: 25 minutes. Therapist:   Abel Jimenez PTA,          4/22/2017

## 2017-04-23 PROCEDURE — 74011250637 HC RX REV CODE- 250/637: Performed by: INTERNAL MEDICINE

## 2017-04-23 PROCEDURE — 74011636637 HC RX REV CODE- 636/637: Performed by: INTERNAL MEDICINE

## 2017-04-23 RX ADMIN — MELOXICAM 7.5 MG: 7.5 TABLET ORAL at 09:53

## 2017-04-23 RX ADMIN — BIMATOPROST 1 DROP: 0.1 SOLUTION/ DROPS OPHTHALMIC at 17:46

## 2017-04-23 RX ADMIN — PANTOPRAZOLE SODIUM 40 MG: 40 TABLET, DELAYED RELEASE ORAL at 09:52

## 2017-04-23 RX ADMIN — FLUOXETINE HYDROCHLORIDE 20 MG: 20 CAPSULE ORAL at 09:52

## 2017-04-23 RX ADMIN — AMLODIPINE BESYLATE 2.5 MG: 2.5 TABLET ORAL at 09:53

## 2017-04-23 RX ADMIN — SIMVASTATIN 20 MG: 20 TABLET, FILM COATED ORAL at 21:18

## 2017-04-23 RX ADMIN — CLONAZEPAM 1 MG: 0.5 TABLET ORAL at 17:46

## 2017-04-23 RX ADMIN — PREDNISONE 30 MG: 10 TABLET ORAL at 09:52

## 2017-04-23 RX ADMIN — ROPINIROLE HYDROCHLORIDE 2 MG: 1 TABLET, FILM COATED ORAL at 21:18

## 2017-04-23 RX ADMIN — ASPIRIN 325 MG ORAL TABLET 325 MG: 325 PILL ORAL at 09:53

## 2017-04-23 RX ADMIN — FOLIC ACID 1 MG: 1 TABLET ORAL at 09:52

## 2017-04-23 RX ADMIN — CLOPIDOGREL BISULFATE 75 MG: 75 TABLET, FILM COATED ORAL at 09:53

## 2017-04-23 RX ADMIN — CLONAZEPAM 1 MG: 0.5 TABLET ORAL at 09:52

## 2017-04-23 RX ADMIN — MIRTAZAPINE 15 MG: 15 TABLET, FILM COATED ORAL at 21:18

## 2017-04-23 RX ADMIN — LEVOTHYROXINE SODIUM 50 MCG: 50 TABLET ORAL at 09:53

## 2017-04-23 NOTE — ROUTINE PROCESS
Bedside and Verbal shift report given to SANTANA Avery LPN (oncoming nurse) by Gokul Nichols LPN (off going nurse) report included SBAR, Kardex, Mars and recent results.

## 2017-04-23 NOTE — ROUTINE PROCESS
Bedside and Verbal shift report given to Celena Ramirez RN (oncoming nurse)by YAW Marlow (off going nurse) Report included SBAR, Kardex, Mars and recent results.

## 2017-04-23 NOTE — ROUTINE PROCESS
Bedside shift change report given to Maverick Beasley RN (oncoming nurse) by Surekha Koehler RN (offgoing nurse). Report included the following information SBAR, Kardex, MAR and Recent Results. VISUALLY CHECKED PT Q 1 HR BY NURSING STAFF. 24 hour order check

## 2017-04-23 NOTE — ROUTINE PROCESS
Bedside and Verbal shift change report given to Luis Felipe Chang (oncoming nurse) by Jo John RN (offgoing nurse). Report included the following information SBAR. QH VISUAL CHECKS DONE.

## 2017-04-24 PROCEDURE — 74011250637 HC RX REV CODE- 250/637: Performed by: INTERNAL MEDICINE

## 2017-04-24 PROCEDURE — 74011636637 HC RX REV CODE- 636/637: Performed by: INTERNAL MEDICINE

## 2017-04-24 RX ADMIN — ASPIRIN 325 MG ORAL TABLET 325 MG: 325 PILL ORAL at 08:27

## 2017-04-24 RX ADMIN — ROPINIROLE HYDROCHLORIDE 2 MG: 1 TABLET, FILM COATED ORAL at 21:55

## 2017-04-24 RX ADMIN — CLOPIDOGREL BISULFATE 75 MG: 75 TABLET, FILM COATED ORAL at 08:27

## 2017-04-24 RX ADMIN — MIRTAZAPINE 15 MG: 15 TABLET, FILM COATED ORAL at 21:55

## 2017-04-24 RX ADMIN — FOLIC ACID 1 MG: 1 TABLET ORAL at 08:27

## 2017-04-24 RX ADMIN — SIMVASTATIN 20 MG: 20 TABLET, FILM COATED ORAL at 21:55

## 2017-04-24 RX ADMIN — MELOXICAM 7.5 MG: 7.5 TABLET ORAL at 08:27

## 2017-04-24 RX ADMIN — PANTOPRAZOLE SODIUM 40 MG: 40 TABLET, DELAYED RELEASE ORAL at 08:27

## 2017-04-24 RX ADMIN — PREDNISONE 30 MG: 10 TABLET ORAL at 08:27

## 2017-04-24 RX ADMIN — FLUOXETINE HYDROCHLORIDE 20 MG: 20 CAPSULE ORAL at 08:27

## 2017-04-24 RX ADMIN — CLONAZEPAM 1 MG: 0.5 TABLET ORAL at 08:27

## 2017-04-24 RX ADMIN — LEVOTHYROXINE SODIUM 50 MCG: 50 TABLET ORAL at 08:27

## 2017-04-24 RX ADMIN — CLONAZEPAM 1 MG: 0.5 TABLET ORAL at 17:39

## 2017-04-24 RX ADMIN — BIMATOPROST 1 DROP: 0.1 SOLUTION/ DROPS OPHTHALMIC at 17:47

## 2017-04-24 RX ADMIN — AMLODIPINE BESYLATE 2.5 MG: 2.5 TABLET ORAL at 08:27

## 2017-04-24 NOTE — ROUTINE PROCESS
Bedside and Verbal shift change report given to KVNG Pappas,RN (oncoming nurse) by ROSMERY Brown LPN (offgoing nurse). Report included the following information SBAR, Kardex and MAR.

## 2017-04-24 NOTE — PROGRESS NOTES
I have reviewed this patient's current medication list and recent laboratory results. At this time, I do not suggest any drug therapy adjustments or additional laboratory monitoring. Thank you,  Sadia NEGRO  Ph. M. S.  4/24/2017

## 2017-04-24 NOTE — PROGRESS NOTES
Problem: Self Care Deficits Care Plan (Adult)  Goal: *Acute Goals and Plan of Care (Insert Text)  OCCUPATIONAL THERAPY SHORT TERM GOALS   Updated 4/21/2017:  1. Patient will perform Upper body ADLs with/without adaptive equipment with minimal assistance/contact guard assist.   2. Patient will perform Lower body ADLs with/without adaptive equipment with moderate assistance. 3. Patient will perform toileting task with moderate assistance with Fair safety to reduce falls risk. 4. Patient will perform functional transfers with Rolling Walker and MIN A X2.   6. Patient will improve Barthel index scores to atleast 50/100 to improve functional mobility. 7. Patient will improve standardized test score for [place] Sitting Balance Scale to 2+ OR MORE. Updated 4/14/17    1. Patient will perform Upper body ADLs with/without adaptive equipment with minimal assistance/contact guard assist. progressing   2. Patient will perform Lower body ADLs with/without adaptive equipment with moderate assistance. progressing  3. Patient will perform toileting task with moderate assistance with Fair safety to reduce falls risk. progressing  4. Patient will perform functional transfers with Rolling Walker and moderate assistance. GM, upg MIN A X2  5. Patient will perform standing static/dynamic balance activities for improved ADL/IADL function with moderate assistance x 2 and Fair balance and safety awarenes. D/C GOAL, N/A at this time  6. Patient will improve Barthel index scores to atleast 50/100 to improve functional mobility. progressing  7. Patient will improve standardized test score for [place] Sitting Balance Scale to 2 UPG 2+ OR MORE    Updated 4/07/17    1. Patient will perform Upper body ADLs with/without adaptive equipment with minimal assistance/contact guard assist. (progressing)  2. Patient will perform Lower body ADLs with/without adaptive equipment with moderate assistance. (progressing)  3.  Patient will perform toileting task with moderate assistance with Fair safety to reduce falls risk. (progressing)  4. Patient will perform functional transfers with Rolling Walker and moderate assistance. (progressing)  5. Patient will perform standing static/dynamic balance activities for improved ADL/IADL function with moderate assistance x 2 and Fair balance and safety awarenes. (progressing)  6. Patient will improve Barthel index scores to atleast 50/100 to improve functional mobility. (progressing)  7. Patient will improve standardized test score for [place] Sitting Balance Scale to 2 (goal met-continue for consistency)    Initiated 4/3/2017 and to be accomplished within 2 Week(s)    1. Patient will perform Upper body ADLs with/without adaptive equipment with minimal assistance/contact guard assist. (progressing-currently Max A)  2. Patient will perform Lower body ADLs with/without adaptive equipment with moderate assistance. (progressing-Max A)  3. Patient will perform toileting task with moderate assistance with Fair safety to reduce falls risk. (progressing-currently Max A)  4. Patient will perform functional transfers with Rolling Walker and moderate assistance. (progressing-currently Total A)  5. Patient will perform standing static/dynamic balance activities for improved ADL/IADL function with moderate assistance x 2 and Fair balance and safety awarenes. (progressing-currently Total A )  6. Patient will improve Barthel index scores to atleast 50/100 to improve functional mobility. (progressing)  7. Patient will improve standardized test score for [place] Sitting Balance Scale to 2 (progressing)    OCCUPATIONAL THERAPY LONG TERM GOALS   Initiated 4/3/2017 and to be accomplished within 3-4 Week(s)    1. Patient will perform Upper body ADLs with/without adaptive equipment with supervision/set-up. 2. Patient will perform Lower body ADLs with/without adaptive equipment with minimal assistance/contact guard assist .  3.  Patient will perform toileting task with minimal assistance/contact guard assist with Fair safety to reduce falls risk. 4. Patient will perform functional transfers with Jermaine Parisian and minimal assistance/contact guard assist and Fair balance and safety awareness. 5. Patient will perform standing static/dynamic activity for improved ADL/IADL function with minimal assistance/contact guard assist an and Fair balance and safety awareness. 6. Patient will improve Barthel index score to 65/100 to improve independence with mobility. 7. Patient will improve standardized test [facility] to 3. Therapist: MARIPOSA Sawyer 4/3/2017   [facility]   OCCUPATIONAL THERAPY DAILY TREATMENT NOTE        Patient: Heavenly Colón (66 y.o. female)                      Date: 4/24/2017  Attending Physician: Swathi Gee MD  Primary Diagnosis: cellulitis    Treatment Diagnosis  Treatment Diagnosis: dysarthria  Treatment Diagnosis 2: dysarthria   Precautions : Precautions at Admission: Fall, Skin  Vital Signs:  Vital Signs  Pulse (Heart Rate): 61  Heart Rate Source: Monitor  Resp Rate: 16  Level of Consciousness: Alert  BP: 164/85  MAP (Calculated): 111  BP 1 Method: Automatic  BP 1 Location: Right arm  BP Patient Position: At rest;Sitting     Cognitive Status:  Mental Status  Neurologic State: Confused  Orientation Level: Oriented to person;Disoriented to place; Disoriented to situation  Cognition: Decreased attention/concentration;Poor safety awareness;Memory loss  Pain:  Pain Screen  Pain Scale 1: Visual  Pain Intensity 1: 0  Patient Stated Pain Goal: 0  Pain Scale 1: Visual  Gross Assessment:     Coordination:     Bed Mobility:  Bed Mobility  Supine to Sit: Moderate assistance  Transfers:  Functional Transfers  Sit to Stand: Maximum assistance     Balance:  Balance  Sitting: With support  Sitting - Static: Fair (occasional)  Sitting - Dynamic: Fair (occasional)  Standing: Pull to stand; With support  Standing - Static: Poor  ADL Self Care:     ADL Intervention:     Upper Body Dressing Assistance  Dressing Assistance: Minimum assistance  Pullover Shirt: Minimum assistance           Lower Body Dressing Assistance  Dressing Assistance: Maximum assistance  Leg Crossed Method Used: No  Position Performed: Supine             Therapeutic Activities:  Assisted nursing staff with ADLS in order to assess safety and independence during routine. See above for levels of A needed. Assisted patient with bed mobility and attempted SPT utilizing RW from bed>w/c in order to assess safety and independence during routine. See above for levels of A needed. During static standing task, patient demonstrated increased L knee hyperextension and unable to sustain stand on L UE safely. Patient reports no pain during task. Assisted patient with stand pivot transfer in order to assess safety and independence during task. Patient's L LE continues to demonstrated increased hyperextension during transfer and physical assistance to bend to L knee. Notifed PT staff regarding L knee hypertension. Patient/Caregiver Education:    Pt. Lilliana Rodriguez Education on see above.         ASSESSMENT:  Patient continues to demonstrate the need for skilled Occupational Therapy services to improve dynamic standing balance needed for upper body dressing  Progression toward goals:  [X]      Improving appropriately and progressing toward goals  [ ]      Improving slowly and progressing toward goals  [ ]      Not making progress toward goals and plan of care will be adjusted      Treatment session:   30 minutes     Therapist:    NATASHA Mcgrath,  4/24/2017

## 2017-04-24 NOTE — ROUTINE PROCESS
Bedside and Verbal shift change report given to A Kevin LPN (oncoming nurse) by Carlos Cota RN (offgoing nurse). Report included the following information SBAR, Kardex and MAR.

## 2017-04-24 NOTE — PROGRESS NOTES
Problem: Mobility Impaired (Adult and Pediatric)  Goal: *Acute Goals and Plan of Care (Insert Text)  PHYSICAL THERAPY STG GOALS :  Initiated 4/1/2017 and to be accomplished within 1-2 Weeks (Updated 4/21/17)     1. Patient will move from supine to sit and sit to supine in bed with minimal assistance/contact guard assist. (Progressing; Mod A)   2. Patient will transfer from bed to chair and chair to bed with minimum assistance using 2WW. (Progressing; Max A x1, Mod A x2)   3. Patient will perform sit to stand with minimum assistance with Fair balance and safety awareness. (Progressing; Mod A x2)   4. Patient will ambulate with minimum assistance for 10 feet with 2WW on level surfaces with 1 turn. (Unable)   5. Patient will ascend/descend 1 step with bilateral handrail(s) withminimal assistance to allow for safe home access/exit. (Unable)   6. Patient will improve standardized test score for [address] Standing Balance Scale to 1+ (Achieved)      PHYSICAL THERAPY STG GOALS :  Initiated 4/1/2017 and to be accomplished within 1-2 Weeks (Updated 4/14/17)     1. Patient will move from supine to sit and sit to supine in bed with minimal assistance/contact guard assist. (Maintained; max A to total A)   2. Patient will transfer from bed to chair and chair to bed with minimum assistance using 2WW. (No progress; total A transfer)   3. Patient will perform sit to stand with minimum assistance with Fair balance and safety awareness. (No progress; Max A x2 for attempt to stand)   4. Patient will ambulate with minimum assistance for 10 feet with 2WW on level surfaces with 1 turn. (Unable)   5. Patient will ascend/descend 1 step with bilateral handrail(s) withminimal assistance to allow for safe home access/exit. (Unable)   6.  Patient will improve standardized test score for [address] Standing Balance Scale to 1+ (No progress; pt unable to stand)    PHYSICAL THERAPY STG GOALS :  Initiated 4/1/2017 and to be accomplished within 1-2 Weeks (Updated 4/7/17)     1. Patient will move from supine to sit and sit to supine in bed with minimal assistance/contact guard assist. (No progress)   2. Patient will transfer from bed to chair and chair to bed with minimum assistance using 2WW. (No progress; total A transfer)   3. Patient will perform sit to stand with minimum assistance with Fair balance and safety awareness. (No progress; Max A x2 for attempt to stand)   4. Patient will ambulate with minimum assistance for 10 feet with 2WW on level surfaces with 1 turn. (NT, pt unable)   5. Patient will ascend/descend 1 step with bilateral handrail(s) withminimal assistance to allow for safe home access/exit.(NT, pt unable)   6. Patient will improve standardized test score for [facility] Standing Balance Scale to 1+ (No progress; pt unable to stand)     PHYSICAL THERAPY LTG GOALS :  Initiated 4/1/2017 and to be accomplished within 2-4 Weeks    1. Patient will move from supine to sit and sit to supine in bed with supervision/set-up. 2. Patient will transfer from bed to chair and chair to bed with supervision/set-up using 2WW.  3. Patient will perform sit to stand with supervision/set-up with Good balance and safety awareness. 4. Patient will ambulate with supervision/set-up for 50 feet with CGA on level surfaces and be able to maneuver through narrow spaces and obstacles without loss of balance. 5. Patient will ascend/descend 2 steps with bilateral handrail(s) with contact guard assist to allow for safe home access/exit. 6. Patient will improve standardized test score for [facility] Balance Scale to 2    Physical Therapist: Yara Faustin, PT on 4/1/2017    [facility]   PHYSICAL THERAPY DAILY TREATMENT NOTE        Patient:  Irma Wall (75 y.o. female)               Date: 4/24/2017    Physician: Pako Brady MD  Primary Diagnosis: cellulitis          Treatment Diagnosis  Treatment Diagnosis: dysarthria  Treatment Diagnosis 2: dysarthria  Precautions: Fall, Skin  Vital Signs  Vital Signs  Pulse (Heart Rate): 61  Heart Rate Source: Monitor  Resp Rate: 16  O2 Sat (%): 99 %  Level of Consciousness: Alert  BP: 164/85  MAP (Calculated): 111  BP 1 Method: Automatic  BP 1 Location: Right arm  BP Patient Position: At rest;Sitting     Cognitive Status:  Mental Status  Neurologic State: Confused  Orientation Level: Oriented to person;Disoriented to place; Disoriented to situation  Cognition: Decreased attention/concentration;Poor safety awareness;Memory loss  Pain  Pain Screen  Pain Scale 1: Visual  Pain Intensity 1: 0  Patient Stated Pain Goal: 0  Bed Mobility Training  Bed Mobility Training  Rolling: Minimum assistance  Supine to Sit: Moderate assistance  Balance  Sitting: With support  Sitting - Static: Fair (occasional)  Sitting - Dynamic: Fair (occasional)  Standing: Pull to stand; With support  Standing - Static: Poor  Transfer Training  Transfer Training  Sit to Stand: Maximum assistance  Sit to Stand: Maximum assistance  Gait Training                      Therapeutic Exercise:   Pt presented with nasal cannula doffed and BLE hanging over edge of bed. SpO2 assessed at 99% on room air. Levels of assistance for bed mobility noted above. TE rendered at bed level to address strength and endurance including heel slides, supine hip abd 10 x 2 and ankle pumps 20 x 1 with AAROM and nasal cannula donned. Patient/Caregiver Education:   Pt /Caregiver Education on safety and fall prevent to reduce fall risk. ASSESSMENT:  Patient continues to benefit from Skilled PT services to improve strength, endurance, balance, mobility, and gait. Progression toward goals:  [ ]      Improving appropriately and progressing toward goals  [X]      Improving slowly and progressing toward goals  [ ]      Not making progress toward goals and plan of care will be adjusted      Treatment session: 30 minutes.   Therapist: Govind Hinkle, SPT          4/24/2017

## 2017-04-25 PROCEDURE — 74011250637 HC RX REV CODE- 250/637: Performed by: INTERNAL MEDICINE

## 2017-04-25 PROCEDURE — 74011636637 HC RX REV CODE- 636/637: Performed by: INTERNAL MEDICINE

## 2017-04-25 RX ADMIN — MELOXICAM 7.5 MG: 7.5 TABLET ORAL at 09:29

## 2017-04-25 RX ADMIN — ROPINIROLE HYDROCHLORIDE 2 MG: 1 TABLET, FILM COATED ORAL at 21:21

## 2017-04-25 RX ADMIN — PANTOPRAZOLE SODIUM 40 MG: 40 TABLET, DELAYED RELEASE ORAL at 07:41

## 2017-04-25 RX ADMIN — FOLIC ACID 1 MG: 1 TABLET ORAL at 09:29

## 2017-04-25 RX ADMIN — CLONAZEPAM 1 MG: 0.5 TABLET ORAL at 17:40

## 2017-04-25 RX ADMIN — AMLODIPINE BESYLATE 2.5 MG: 2.5 TABLET ORAL at 09:29

## 2017-04-25 RX ADMIN — PREDNISONE 30 MG: 10 TABLET ORAL at 09:29

## 2017-04-25 RX ADMIN — MIRTAZAPINE 15 MG: 15 TABLET, FILM COATED ORAL at 21:22

## 2017-04-25 RX ADMIN — ASPIRIN 325 MG ORAL TABLET 325 MG: 325 PILL ORAL at 09:29

## 2017-04-25 RX ADMIN — CLOPIDOGREL BISULFATE 75 MG: 75 TABLET, FILM COATED ORAL at 09:29

## 2017-04-25 RX ADMIN — CLONAZEPAM 1 MG: 0.5 TABLET ORAL at 09:29

## 2017-04-25 RX ADMIN — FLUOXETINE HYDROCHLORIDE 20 MG: 20 CAPSULE ORAL at 09:29

## 2017-04-25 RX ADMIN — SIMVASTATIN 20 MG: 20 TABLET, FILM COATED ORAL at 21:22

## 2017-04-25 RX ADMIN — BIMATOPROST 1 DROP: 0.1 SOLUTION/ DROPS OPHTHALMIC at 18:00

## 2017-04-25 RX ADMIN — LEVOTHYROXINE SODIUM 50 MCG: 50 TABLET ORAL at 07:30

## 2017-04-25 NOTE — PROGRESS NOTES
Problem: Self Care Deficits Care Plan (Adult)  Goal: *Acute Goals and Plan of Care (Insert Text)  OCCUPATIONAL THERAPY SHORT TERM GOALS   Updated 4/21/2017:  1. Patient will perform Upper body ADLs with/without adaptive equipment with minimal assistance/contact guard assist.   2. Patient will perform Lower body ADLs with/without adaptive equipment with moderate assistance. 3. Patient will perform toileting task with moderate assistance with Fair safety to reduce falls risk. 4. Patient will perform functional transfers with Rolling Walker and MIN A X2.   6. Patient will improve Barthel index scores to atleast 50/100 to improve functional mobility. 7. Patient will improve standardized test score for [place] Sitting Balance Scale to 2+ OR MORE. Updated 4/14/17    1. Patient will perform Upper body ADLs with/without adaptive equipment with minimal assistance/contact guard assist. progressing   2. Patient will perform Lower body ADLs with/without adaptive equipment with moderate assistance. progressing  3. Patient will perform toileting task with moderate assistance with Fair safety to reduce falls risk. progressing  4. Patient will perform functional transfers with Rolling Walker and moderate assistance. GM, upg MIN A X2  5. Patient will perform standing static/dynamic balance activities for improved ADL/IADL function with moderate assistance x 2 and Fair balance and safety awarenes. D/C GOAL, N/A at this time  6. Patient will improve Barthel index scores to atleast 50/100 to improve functional mobility. progressing  7. Patient will improve standardized test score for [place] Sitting Balance Scale to 2 UPG 2+ OR MORE    Updated 4/07/17    1. Patient will perform Upper body ADLs with/without adaptive equipment with minimal assistance/contact guard assist. (progressing)  2. Patient will perform Lower body ADLs with/without adaptive equipment with moderate assistance. (progressing)  3.  Patient will perform toileting task with moderate assistance with Fair safety to reduce falls risk. (progressing)  4. Patient will perform functional transfers with Rolling Walker and moderate assistance. (progressing)  5. Patient will perform standing static/dynamic balance activities for improved ADL/IADL function with moderate assistance x 2 and Fair balance and safety awarenes. (progressing)  6. Patient will improve Barthel index scores to atleast 50/100 to improve functional mobility. (progressing)  7. Patient will improve standardized test score for [place] Sitting Balance Scale to 2 (goal met-continue for consistency)    Initiated 4/3/2017 and to be accomplished within 2 Week(s)    1. Patient will perform Upper body ADLs with/without adaptive equipment with minimal assistance/contact guard assist. (progressing-currently Max A)  2. Patient will perform Lower body ADLs with/without adaptive equipment with moderate assistance. (progressing-Max A)  3. Patient will perform toileting task with moderate assistance with Fair safety to reduce falls risk. (progressing-currently Max A)  4. Patient will perform functional transfers with Rolling Walker and moderate assistance. (progressing-currently Total A)  5. Patient will perform standing static/dynamic balance activities for improved ADL/IADL function with moderate assistance x 2 and Fair balance and safety awarenes. (progressing-currently Total A )  6. Patient will improve Barthel index scores to atleast 50/100 to improve functional mobility. (progressing)  7. Patient will improve standardized test score for [place] Sitting Balance Scale to 2 (progressing)    OCCUPATIONAL THERAPY LONG TERM GOALS   Initiated 4/3/2017 and to be accomplished within 3-4 Week(s)    1. Patient will perform Upper body ADLs with/without adaptive equipment with supervision/set-up. 2. Patient will perform Lower body ADLs with/without adaptive equipment with minimal assistance/contact guard assist .  3.  Patient will perform toileting task with minimal assistance/contact guard assist with Fair safety to reduce falls risk. 4. Patient will perform functional transfers with Edgardo Faster and minimal assistance/contact guard assist and Fair balance and safety awareness. 5. Patient will perform standing static/dynamic activity for improved ADL/IADL function with minimal assistance/contact guard assist an and Fair balance and safety awareness. 6. Patient will improve Barthel index score to 65/100 to improve independence with mobility. 7. Patient will improve standardized test score for [place] Sitting Balance to 3. Therapist: MARIPOSA Segovia 4/3/2017   [facility]   OCCUPATIONAL THERAPY DAILY TREATMENT NOTE        Patient:  Ho Baer (66 y.o. female)                      Date: 4/25/2017  Attending Physician: Queenie Chavez MD  Primary Diagnosis: cellulitis    Treatment Diagnosis  Treatment Diagnosis: dysarthria  Treatment Diagnosis 2: dysarthria   Precautions : Precautions at Admission: Fall, Skin  Vital Signs:  Vital Signs  Pulse (Heart Rate): 83  Heart Rate Source: Monitor  Resp Rate: 16  Level of Consciousness: Alert  BP: 159/83  MAP (Calculated): 108  BP 1 Method: Automatic  BP 1 Location: Left arm  BP Patient Position: At rest     Cognitive Status:  Mental Status  Neurologic State: Confused  Orientation Level: Oriented to person  Cognition: Memory loss  Pain:  Pain Screen  Pain Scale 1: Visual  Pain Intensity 1: 0  Patient Stated Pain Goal: 0  Pain Scale 1: Visual  Gross Assessment:     Coordination:     Bed Mobility:  Bed Mobility  Rolling: Minimum assistance  Supine to Sit: Moderate assistance  Sit to Supine: Contact guard assistance  Scooting: Minimum assistance  Transfers:        Balance:  Balance  Sitting: With support  Sitting - Static: Fair (occasional)  Sitting - Dynamic: Fair (occasional)  ADL Self Care:     ADL Intervention:              Grooming  Grooming Assistance: Stand-by assistance  Brushing Teeth: Stand-by assistance              Therapeutic Activities:  Assisted patient with bed mobility in order to assess safety and independence during task. See above for levels of A needed. Patient reports her back is bothering today. Educated patient on log rolling to assess with proper body mechanics during bed mobility however patient unable to follow commands due to increased confusion today. Patient was able to tolerate sitting EOB for 5 mins prior to constantly LOB and KAUR's recommendations and returning to supine. Assisted patient with toothbrushing routine in order to assess safety and independence during task. See above for levels of A needed. Patient required cueing to brush teeth due to initial setup was patient sucked on toothbrush. Notifed patient's nurse on increased confusion. Therapeutic Exercises:  UB strengthening with 2#, 2 sets x 5 reps, seated EOB in order to increase UB muscle strength needed for ADLS. Patient required cueing for slow pacing today. Patient demonstrated increased confusion today. Patient/Caregiver Education:    Pt. Mag Maya Education on see above.         ASSESSMENT:  Patient continues to demonstrate the need for skilled Occupational Therapy services to improve static standing balance needed for toileting transfer  Progression toward goals:  [ ]      Improving appropriately and progressing toward goals  [X]      Improving slowly and progressing toward goals  [ ]      Not making progress toward goals and plan of care will be adjusted      Treatment session:   30 minutes     Therapist:    NATASHA Garcia,  4/25/2017

## 2017-04-25 NOTE — PROGRESS NOTES
Problem: Mobility Impaired (Adult and Pediatric)  Goal: *Acute Goals and Plan of Care (Insert Text)  PHYSICAL THERAPY STG GOALS :  Initiated 4/1/2017 and to be accomplished within 1-2 Weeks (Updated 4/21/17)     1. Patient will move from supine to sit and sit to supine in bed with minimal assistance/contact guard assist. (Progressing; Mod A)   2. Patient will transfer from bed to chair and chair to bed with minimum assistance using 2WW. (Progressing; Max A x1, Mod A x2)   3. Patient will perform sit to stand with minimum assistance with Fair balance and safety awareness. (Progressing; Mod A x2)   4. Patient will ambulate with minimum assistance for 10 feet with 2WW on level surfaces with 1 turn. (Unable)   5. Patient will ascend/descend 1 step with bilateral handrail(s) withminimal assistance to allow for safe home access/exit. (Unable)   6. Patient will improve standardized test score for [facility] Standing Balance Scale to 1+ (Achieved)      PHYSICAL THERAPY STG GOALS :  Initiated 4/1/2017 and to be accomplished within 1-2 Weeks (Updated 4/14/17)     1. Patient will move from supine to sit and sit to supine in bed with minimal assistance/contact guard assist. (Maintained; max A to total A)   2. Patient will transfer from bed to chair and chair to bed with minimum assistance using 2WW. (No progress; total A transfer)   3. Patient will perform sit to stand with minimum assistance with Fair balance and safety awareness. (No progress; Max A x2 for attempt to stand)   4. Patient will ambulate with minimum assistance for 10 feet with 2WW on level surfaces with 1 turn. (Unable)   5. Patient will ascend/descend 1 step with bilateral handrail(s) withminimal assistance to allow for safe home access/exit. (Unable)   6.  Patient will improve standardized test score for [facility] Standing Balance Scale to 1+ (No progress; pt unable to stand)    PHYSICAL THERAPY STG GOALS :  Initiated 4/1/2017 and to be accomplished within 1-2 Weeks (Updated 4/7/17)     1. Patient will move from supine to sit and sit to supine in bed with minimal assistance/contact guard assist. (No progress)   2. Patient will transfer from bed to chair and chair to bed with minimum assistance using 2WW. (No progress; total A transfer)   3. Patient will perform sit to stand with minimum assistance with Fair balance and safety awareness. (No progress; Max A x2 for attempt to stand)   4. Patient will ambulate with minimum assistance for 10 feet with 2WW on level surfaces with 1 turn. (NT, pt unable)   5. Patient will ascend/descend 1 step with bilateral handrail(s) withminimal assistance to allow for safe home access/exit.(NT, pt unable)   6. Patient will improve standardized test score for [facility] Standing Balance Scale to 1+ (No progress; pt unable to stand)     PHYSICAL THERAPY LTG GOALS :  Initiated 4/1/2017 and to be accomplished within 2-4 Weeks    1. Patient will move from supine to sit and sit to supine in bed with supervision/set-up. 2. Patient will transfer from bed to chair and chair to bed with supervision/set-up using 2WW.  3. Patient will perform sit to stand with supervision/set-up with Good balance and safety awareness. 4. Patient will ambulate with supervision/set-up for 50 feet with CGA on level surfaces and be able to maneuver through narrow spaces and obstacles without loss of balance. 5. Patient will ascend/descend 2 steps with bilateral handrail(s) with contact guard assist to allow for safe home access/exit. 6. Patient will improve standardized test score for [facility] Balance Scale to 2    Physical Therapist: Monica Pacheco PT on 4/1/2017    [facility]   PHYSICAL THERAPY DAILY TREATMENT NOTE        Patient:  Geoff Colvin (80 y.o. female)               Date: 4/25/2017    Physician: Patrick Banda MD  Primary Diagnosis: cellulitis          Treatment Diagnosis  Treatment Diagnosis: dysarthria  Treatment Diagnosis 2: dysarthria  Precautions: Fall, Skin  Vital Signs  Vital Signs  Pulse (Heart Rate): 83  Heart Rate Source: Monitor  Resp Rate: 16  Level of Consciousness: Alert  BP: 159/83  MAP (Calculated): 108  BP 1 Method: Automatic  BP 1 Location: Left arm  BP Patient Position: At rest  Cognitive Status:  Mental Status  Neurologic State: Confused  Orientation Level: Oriented to person  Pain  Pain Screen  Pain Scale 1: Visual  Pain Intensity 1: 0  Patient Stated Pain Goal: 0  Bed Mobility Training  Bed Mobility Training  Rolling: Minimum assistance  Supine to Sit: Moderate assistance  Sit to Supine: Contact guard assistance  Scooting: Minimum assistance  Balance  Sitting: With support  Sitting - Static: Fair (occasional)  Sitting - Dynamic: Fair (occasional)  Transfer Training  Gait Training  Therapeutic Exercise: Pt presented supine in bed. Bed mobility training rendered as noted above. Pt required additional time and effort with verbal and tactile cues for use of bed rails for supine>sit. Sit>supine with CGA. Pt was able to get B LE's up onto bed, but was unable to scoot up or into middle of bed and required assistance. Pt sat EOB with 1 UE support and SBA for ~10' while completing B LE TE's: LAQ, hip flexion 2x10 with verbal and visual cues for proper technique and to stay on task. Pt with noted L lateral lean and required tactile cues for upright posture. At end of session; pt required assist x2 for repositioning in bed. Bed alarm set and call bell at side. Patient/Caregiver Education:   Pt /Caregiver Education on safety and fall prevention, upright posture was provided to reduce risk of falls. ASSESSMENT:  Patient continues to benefit from Skilled PT services to improve strength, balance, bed mobility, transfers, sit<>stand and gait.    Progression toward goals:  [ ]      Improving appropriately and progressing toward goals  [X]      Improving slowly and progressing toward goals  [ ]      Not making progress toward goals and plan of care will be adjusted      Treatment session: 30 minutes.   Therapist:   Wilfredo Del Cid PTA,          4/25/2017
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
## 2017-04-25 NOTE — PROGRESS NOTES
conducted a Follow up consultation and Spiritual Assessment for [facility], who is a 66 y.o.,female. The  provided the following Interventions:  Continued the relationship of care and support. Listened empathically. Offered prayer and assurance of continued prayer on patients behalf. Chart reviewed. The following outcomes were achieved:  Patient expressed gratitude for pastoral care visit. Assessment:  There are no further spiritual or [place] issues which require Spiritual Care Services interventions at this time. Plan:  Chaplains will continue to follow and will provide pastoral care on an as needed/requested basis.  recommends bedside caregivers page  on duty if patient shows signs of acute spiritual or emotional distress.      [address] [facility]   Staff [address] [facility]   [phone]

## 2017-04-26 PROCEDURE — 74011250637 HC RX REV CODE- 250/637: Performed by: INTERNAL MEDICINE

## 2017-04-26 PROCEDURE — 74011636637 HC RX REV CODE- 636/637: Performed by: INTERNAL MEDICINE

## 2017-04-26 RX ADMIN — MELOXICAM 7.5 MG: 7.5 TABLET ORAL at 10:53

## 2017-04-26 RX ADMIN — SIMVASTATIN 20 MG: 20 TABLET, FILM COATED ORAL at 21:17

## 2017-04-26 RX ADMIN — BIMATOPROST 1 DROP: 0.1 SOLUTION/ DROPS OPHTHALMIC at 17:10

## 2017-04-26 RX ADMIN — CLONAZEPAM 1 MG: 0.5 TABLET ORAL at 17:09

## 2017-04-26 RX ADMIN — PREDNISONE 30 MG: 10 TABLET ORAL at 10:53

## 2017-04-26 RX ADMIN — MIRTAZAPINE 15 MG: 15 TABLET, FILM COATED ORAL at 21:17

## 2017-04-26 RX ADMIN — LEVOTHYROXINE SODIUM 50 MCG: 50 TABLET ORAL at 10:53

## 2017-04-26 RX ADMIN — CLOPIDOGREL BISULFATE 75 MG: 75 TABLET, FILM COATED ORAL at 10:53

## 2017-04-26 RX ADMIN — FLUOXETINE HYDROCHLORIDE 20 MG: 20 CAPSULE ORAL at 10:53

## 2017-04-26 RX ADMIN — ROPINIROLE HYDROCHLORIDE 2 MG: 1 TABLET, FILM COATED ORAL at 21:16

## 2017-04-26 RX ADMIN — FOLIC ACID 1 MG: 1 TABLET ORAL at 10:53

## 2017-04-26 RX ADMIN — ASPIRIN 325 MG ORAL TABLET 325 MG: 325 PILL ORAL at 10:53

## 2017-04-26 RX ADMIN — PANTOPRAZOLE SODIUM 40 MG: 40 TABLET, DELAYED RELEASE ORAL at 10:53

## 2017-04-26 RX ADMIN — AMLODIPINE BESYLATE 2.5 MG: 2.5 TABLET ORAL at 10:53

## 2017-04-26 RX ADMIN — CLONAZEPAM 1 MG: 0.5 TABLET ORAL at 10:53

## 2017-04-26 NOTE — PROGRESS NOTES
Problem: Mobility Impaired (Adult and Pediatric)  Goal: *Acute Goals and Plan of Care (Insert Text)  PHYSICAL THERAPY STG GOALS :  Initiated 4/1/2017 and to be accomplished within 1-2 Weeks (Updated 4/21/17)     1. Patient will move from supine to sit and sit to supine in bed with minimal assistance/contact guard assist. (Progressing; Mod A)   2. Patient will transfer from bed to chair and chair to bed with minimum assistance using 2WW. (Progressing; Max A x1, Mod A x2)   3. Patient will perform sit to stand with minimum assistance with Fair balance and safety awareness. (Progressing; Mod A x2)   4. Patient will ambulate with minimum assistance for 10 feet with 2WW on level surfaces with 1 turn. (Unable)   5. Patient will ascend/descend 1 step with bilateral handrail(s) withminimal assistance to allow for safe home access/exit. (Unable)   6. Patient will improve standardized test score for [facility] Standing Balance Scale to 1+ (Achieved)      PHYSICAL THERAPY STG GOALS :  Initiated 4/1/2017 and to be accomplished within 1-2 Weeks (Updated 4/14/17)     1. Patient will move from supine to sit and sit to supine in bed with minimal assistance/contact guard assist. (Maintained; max A to total A)   2. Patient will transfer from bed to chair and chair to bed with minimum assistance using 2WW. (No progress; total A transfer)   3. Patient will perform sit to stand with minimum assistance with Fair balance and safety awareness. (No progress; Max A x2 for attempt to stand)   4. Patient will ambulate with minimum assistance for 10 feet with 2WW on level surfaces with 1 turn. (Unable)   5. Patient will ascend/descend 1 step with bilateral handrail(s) withminimal assistance to allow for safe home access/exit. (Unable)   6.  Patient will improve standardized test score for [facility] Standing Balance Scale to 1+ (No progress; pt unable to stand)    PHYSICAL THERAPY STG GOALS :  Initiated 4/1/2017 and to be accomplished within 1-2 Weeks (Updated 4/7/17)     1. Patient will move from supine to sit and sit to supine in bed with minimal assistance/contact guard assist. (No progress)   2. Patient will transfer from bed to chair and chair to bed with minimum assistance using 2WW. (No progress; total A transfer)   3. Patient will perform sit to stand with minimum assistance with Fair balance and safety awareness. (No progress; Max A x2 for attempt to stand)   4. Patient will ambulate with minimum assistance for 10 feet with 2WW on level surfaces with 1 turn. (NT, pt unable)   5. Patient will ascend/descend 1 step with bilateral handrail(s) withminimal assistance to allow for safe home access/exit.(NT, pt unable)   6. Patient will improve standardized test score for [facility] Standing Balance Scale to 1+ (No progress; pt unable to stand)     PHYSICAL THERAPY LTG GOALS :  Initiated 4/1/2017 and to be accomplished within 2-4 Weeks    1. Patient will move from supine to sit and sit to supine in bed with supervision/set-up. 2. Patient will transfer from bed to chair and chair to bed with supervision/set-up using 2WW.  3. Patient will perform sit to stand with supervision/set-up with Good balance and safety awareness. 4. Patient will ambulate with supervision/set-up for 50 feet with CGA on level surfaces and be able to maneuver through narrow spaces and obstacles without loss of balance. 5. Patient will ascend/descend 2 steps with bilateral handrail(s) with contact guard assist to allow for safe home access/exit. 6. Patient will improve standardized test score for [facility] Balance Scale to 2    Physical Therapist: Fran Ansari, PT on 4/1/2017    [facility]   PHYSICAL THERAPY DAILY TREATMENT NOTE        Patient:  Adryan Almanzar (7 y.o. female)               Date: 4/26/2017    Physician: Dianne Martinez MD  Primary Diagnosis: cellulitis          Treatment Diagnosis  Treatment Diagnosis: dysarthria  Treatment Diagnosis 2: dysarthria  Precautions: Fall, Skin  Vital Signs  Vital Signs  Pulse (Heart Rate): 86  Heart Rate Source: Monitor  Resp Rate: 15  O2 Sat (%): 100 %  Level of Consciousness: Alert  BP: 140/79  MAP (Calculated): 99  BP 1 Method: Automatic  BP 1 Location: Right arm  BP Patient Position: At rest     Cognitive Status:     Pain  Pain Screen  Pain Scale 1: Numeric (0 - 10)  Pain Intensity 1: 0  Patient Stated Pain Goal: 0  Bed Mobility Training  Bed Mobility Training  Rolling: Maximum assistance  Supine to Sit: Maximum assistance  Scooting: Stand-by asssistance  Balance  Sitting: Without support  Sitting - Static: Fair (occasional)  Sitting - Dynamic: Fair (occasional) (-)  Standing: With support  Standing - Static: Poor  Standing - Dynamic : Poor  Transfer Training  Transfer Training  Sit to Stand: Maximum assistance  Stand to Sit: Moderate assistance  Sit to Stand: Maximum assistance  Gait Training  Gait  Ambulation - Level of Assistance: Maximum assistance;Assist x2  Distance (ft): 3 Feet (ft)  Assistive Device: Gait belt; Other (comment) (parallel bars)        Right Side Weight Bearing: Full         With 0 turns. Therapeutic Exercise:   Pt presented asleep and was easily aroused. Levels of assistance for bed mobility noted above. Neuro re-ed rendered to address balance. Pt demonstrated posterior lean with static standing. Required extensive verbal cueing to shift weight anteriorly to improve YOKASTA. Gait training of 3 ft rendered to address gait and mobility. Extensive verbal and tactile cueing required for pt to lift RLE to improve step clearance and to facilitate reciprocal gait pattern. Patient/Caregiver Education:   Pt /Caregiver Education on safety and fall prevention to reduce fall risk. ASSESSMENT:  Patient continues to benefit from Skilled PT services to improve strength, endurance, balance, mobility, and gait.   Progression toward goals:  [ ]      Improving appropriately and progressing toward goals  [X]      Improving slowly and progressing toward goals  [ ]      Not making progress toward goals and plan of care will be adjusted      Treatment session: 30 minutes.   Therapist:   TRISTIAN Brice         4/26/2017

## 2017-04-26 NOTE — PROGRESS NOTES
Bedside and Verbal shift change report given to Salome Archuleta LPN (oncoming nurse) by Nick Simons RN (offgoing nurse). Report included the following information SBAR, Kardex and MAR.

## 2017-04-26 NOTE — ROUTINE PROCESS
Bedside and verbal shift report given to Jeremiah Keith RN (oncoing nurse) by Lyly Delacruz LPN (off going nurse) Report included SBAR, Kardex, Mars and recent results.

## 2017-04-26 NOTE — ROUTINE PROCESS
Bedside and Verbal shift change report given to MARKY Mann RN (oncoming nurse) by FLY Oneal (offgoing nurse).  Report included the following information SBAR, Kardex, MAR, Recent Results and Cardiac Rhythm

## 2017-04-26 NOTE — PROGRESS NOTES
Problem: Self Care Deficits Care Plan (Adult)  Goal: *Acute Goals and Plan of Care (Insert Text)  OCCUPATIONAL THERAPY SHORT TERM GOALS   Updated 4/21/2017:  1. Patient will perform Upper body ADLs with/without adaptive equipment with minimal assistance/contact guard assist.   2. Patient will perform Lower body ADLs with/without adaptive equipment with moderate assistance. 3. Patient will perform toileting task with moderate assistance with Fair safety to reduce falls risk. 4. Patient will perform functional transfers with Rolling Walker and MIN A X2.   6. Patient will improve Barthel index scores to atleast 50/100 to improve functional mobility. 7. Patient will improve standardized test score for [place] Sitting Balance Scale to 2+ OR MORE. Updated 4/14/17    1. Patient will perform Upper body ADLs with/without adaptive equipment with minimal assistance/contact guard assist. progressing   2. Patient will perform Lower body ADLs with/without adaptive equipment with moderate assistance. progressing  3. Patient will perform toileting task with moderate assistance with Fair safety to reduce falls risk. progressing  4. Patient will perform functional transfers with Rolling Walker and moderate assistance. GM, upg MIN A X2  5. Patient will perform standing static/dynamic balance activities for improved ADL/IADL function with moderate assistance x 2 and Fair balance and safety awarenes. D/C GOAL, N/A at this time  6. Patient will improve Barthel index scores to atleast 50/100 to improve functional mobility. progressing  7. Patient will improve standardized test score for [place] Sitting Balance Scale to 2 UPG 2+ OR MORE    Updated 4/07/17    1. Patient will perform Upper body ADLs with/without adaptive equipment with minimal assistance/contact guard assist. (progressing)  2. Patient will perform Lower body ADLs with/without adaptive equipment with moderate assistance. (progressing)  3.  Patient will perform toileting task with moderate assistance with Fair safety to reduce falls risk. (progressing)  4. Patient will perform functional transfers with Rolling Walker and moderate assistance. (progressing)  5. Patient will perform standing static/dynamic balance activities for improved ADL/IADL function with moderate assistance x 2 and Fair balance and safety awarenes. (progressing)  6. Patient will improve Barthel index scores to atleast 50/100 to improve functional mobility. (progressing)  7. Patient will improve standardized test score for [place] Sitting Balance Scale to 2 (goal met-continue for consistency)    Initiated 4/3/2017 and to be accomplished within 2 Week(s)    1. Patient will perform Upper body ADLs with/without adaptive equipment with minimal assistance/contact guard assist. (progressing-currently Max A)  2. Patient will perform Lower body ADLs with/without adaptive equipment with moderate assistance. (progressing-Max A)  3. Patient will perform toileting task with moderate assistance with Fair safety to reduce falls risk. (progressing-currently Max A)  4. Patient will perform functional transfers with Rolling Walker and moderate assistance. (progressing-currently Total A)  5. Patient will perform standing static/dynamic balance activities for improved ADL/IADL function with moderate assistance x 2 and Fair balance and safety awarenes. (progressing-currently Total A )  6. Patient will improve Barthel index scores to atleast 50/100 to improve functional mobility. (progressing)  7. Patient will improve standardized test score for [place] Sitting Balance Scale to 2 (progressing)    OCCUPATIONAL THERAPY LONG TERM GOALS   Initiated 4/3/2017 and to be accomplished within 3-4 Week(s)    1. Patient will perform Upper body ADLs with/without adaptive equipment with supervision/set-up. 2. Patient will perform Lower body ADLs with/without adaptive equipment with minimal assistance/contact guard assist .  3.  Patient will perform toileting task with minimal assistance/contact guard assist with Fair safety to reduce falls risk. 4. Patient will perform functional transfers with Suri Piper and minimal assistance/contact guard assist and Fair balance and safety awareness. 5. Patient will perform standing static/dynamic activity for improved ADL/IADL function with minimal assistance/contact guard assist an and Fair balance and safety awareness. 6. Patient will improve Barthel index score to 65/100 to improve independence with mobility. 7. Patient will improve standardized test score for [place] Sitting Balance to 3. Therapist: MARIPOSA Alcaraz 4/3/2017   [facility] [place]   OCCUPATIONAL THERAPY DAILY TREATMENT NOTE        Patient: Wisam Matos (66 y.o. female)                      Date: 4/26/2017  Attending Physician: Bharathi Roque MD  Primary Diagnosis: cellulitis    Treatment Diagnosis  Treatment Diagnosis: dysarthria  Treatment Diagnosis 2: dysarthria   Precautions : Precautions at Admission: Fall, Skin  Vital Signs:  Vital Signs  Pulse (Heart Rate): 86  Heart Rate Source: Monitor  Resp Rate: 15  O2 Sat (%): 100 %  Level of Consciousness: Alert  BP: 140/79  MAP (Calculated): 99  BP 1 Method: Automatic  BP 1 Location: Right arm  BP Patient Position: At rest  Cognitive Status:  Mental Status  Neurologic State: Alert  Orientation Level: Oriented to person  Cognition: Decreased command following  Perception: Cues to maintain midline in sitting;Cues to maintain midline in standing; Tactile;Verbal  Perseveration: No perseveration noted  Safety/Judgement: Fall prevention  Pain:  Pain Screen  Pain Scale 1: Numeric (0 - 10)  Pain Intensity 1: 0  Patient Stated Pain Goal: 0  Pain Scale 1: Numeric (0 - 10)  Bed Mobility:  Bed Mobility  Rolling: Maximum assistance  Supine to Sit: Maximum assistance  Scooting: Stand-by asssistance  Transfers:  Functional Transfers  Sit to Stand: Maximum assistance  Stand to Sit: Moderate assistance  Balance:  Balance  Sitting: With support  Sitting - Static: Fair (occasional)  Sitting - Dynamic: Fair (occasional)  Standing: Pull to stand; With support  Standing - Static: Poor  Standing - Dynamic : Poor  Therapeutic Activities:  Pt co-treated for 10 minutes with PT for safety with transfers. Pt required max verbal cues to maintain midline in standing and walking in parallel bars with max Ax2. Therapeutic Exercises:  UE strengthening exercises with 2# dowel, 3x15 in all planes. Minimum rest breaks required with occasional tactile cues for hand placement on dowel. digi-flex exerciser in 3lbs resistance, x35 at both hands. Patient/Caregiver Education:    Pt. Aria Mckoy Education on safety with ADLs and transfers. Pt verbalized understanding. ASSESSMENT:  Patient continues to demonstrate the need for skilled Occupational Therapy services to improve strength, endurance and balance.    Progression toward goals:  [ ]      Improving appropriately and progressing toward goals  [X]      Improving slowly and progressing toward goals  [ ]      Not making progress toward goals and plan of care will be adjusted      Treatment session:   30 minutes     Therapist:    MARIPOSA Hawkins    4/26/2017

## 2017-04-27 PROCEDURE — 74011250637 HC RX REV CODE- 250/637: Performed by: INTERNAL MEDICINE

## 2017-04-27 PROCEDURE — 74011636637 HC RX REV CODE- 636/637: Performed by: INTERNAL MEDICINE

## 2017-04-27 RX ADMIN — LEVOTHYROXINE SODIUM 50 MCG: 50 TABLET ORAL at 09:05

## 2017-04-27 RX ADMIN — PANTOPRAZOLE SODIUM 40 MG: 40 TABLET, DELAYED RELEASE ORAL at 09:05

## 2017-04-27 RX ADMIN — FOLIC ACID 1 MG: 1 TABLET ORAL at 09:05

## 2017-04-27 RX ADMIN — MELOXICAM 7.5 MG: 7.5 TABLET ORAL at 09:05

## 2017-04-27 RX ADMIN — BIMATOPROST 1 DROP: 0.1 SOLUTION/ DROPS OPHTHALMIC at 17:17

## 2017-04-27 RX ADMIN — AMLODIPINE BESYLATE 2.5 MG: 2.5 TABLET ORAL at 09:05

## 2017-04-27 RX ADMIN — ROPINIROLE HYDROCHLORIDE 2 MG: 1 TABLET, FILM COATED ORAL at 21:04

## 2017-04-27 RX ADMIN — ASPIRIN 325 MG ORAL TABLET 325 MG: 325 PILL ORAL at 09:05

## 2017-04-27 RX ADMIN — MIRTAZAPINE 15 MG: 15 TABLET, FILM COATED ORAL at 21:04

## 2017-04-27 RX ADMIN — SIMVASTATIN 20 MG: 20 TABLET, FILM COATED ORAL at 21:04

## 2017-04-27 RX ADMIN — PREDNISONE 30 MG: 10 TABLET ORAL at 09:05

## 2017-04-27 RX ADMIN — CLONAZEPAM 1 MG: 0.5 TABLET ORAL at 17:18

## 2017-04-27 RX ADMIN — CLOPIDOGREL BISULFATE 75 MG: 75 TABLET, FILM COATED ORAL at 09:05

## 2017-04-27 RX ADMIN — CLONAZEPAM 1 MG: 0.5 TABLET ORAL at 09:05

## 2017-04-27 RX ADMIN — FLUOXETINE HYDROCHLORIDE 20 MG: 20 CAPSULE ORAL at 09:05

## 2017-04-27 NOTE — PROGRESS NOTES
Problem: Self Care Deficits Care Plan (Adult)  Goal: *Acute Goals and Plan of Care (Insert Text)  OCCUPATIONAL THERAPY SHORT TERM GOALS   Updated 4/21/2017:  1. Patient will perform Upper body ADLs with/without adaptive equipment with minimal assistance/contact guard assist.   2. Patient will perform Lower body ADLs with/without adaptive equipment with moderate assistance. 3. Patient will perform toileting task with moderate assistance with Fair safety to reduce falls risk. 4. Patient will perform functional transfers with Rolling Walker and MIN A X2.   6. Patient will improve Barthel index scores to atleast 50/100 to improve functional mobility. 7. Patient will improve standardized test score for [place] Sitting Balance Scale to 2+ OR MORE. Updated 4/14/17    1. Patient will perform Upper body ADLs with/without adaptive equipment with minimal assistance/contact guard assist. progressing   2. Patient will perform Lower body ADLs with/without adaptive equipment with moderate assistance. progressing  3. Patient will perform toileting task with moderate assistance with Fair safety to reduce falls risk. progressing  4. Patient will perform functional transfers with Rolling Walker and moderate assistance. GM, upg MIN A X2  5. Patient will perform standing static/dynamic balance activities for improved ADL/IADL function with moderate assistance x 2 and Fair balance and safety awarenes. D/C GOAL, N/A at this time  6. Patient will improve Barthel index scores to atleast 50/100 to improve functional mobility. progressing  7. Patient will improve standardized test score for [place] Sitting Balance Scale to 2 UPG 2+ OR MORE    Updated 4/07/17    1. Patient will perform Upper body ADLs with/without adaptive equipment with minimal assistance/contact guard assist. (progressing)  2. Patient will perform Lower body ADLs with/without adaptive equipment with moderate assistance. (progressing)  3.  Patient will perform toileting task with moderate assistance with Fair safety to reduce falls risk. (progressing)  4. Patient will perform functional transfers with Rolling Walker and moderate assistance. (progressing)  5. Patient will perform standing static/dynamic balance activities for improved ADL/IADL function with moderate assistance x 2 and Fair balance and safety awarenes. (progressing)  6. Patient will improve Barthel index scores to atleast 50/100 to improve functional mobility. (progressing)  7. Patient will improve standardized test score for [place] Sitting Balance Scale to 2 (goal met-continue for consistency)    Initiated 4/3/2017 and to be accomplished within 2 Week(s)    1. Patient will perform Upper body ADLs with/without adaptive equipment with minimal assistance/contact guard assist. (progressing-currently Max A)  2. Patient will perform Lower body ADLs with/without adaptive equipment with moderate assistance. (progressing-Max A)  3. Patient will perform toileting task with moderate assistance with Fair safety to reduce falls risk. (progressing-currently Max A)  4. Patient will perform functional transfers with Rolling Walker and moderate assistance. (progressing-currently Total A)  5. Patient will perform standing static/dynamic balance activities for improved ADL/IADL function with moderate assistance x 2 and Fair balance and safety awarenes. (progressing-currently Total A )  6. Patient will improve Barthel index scores to atleast 50/100 to improve functional mobility. (progressing)  7. Patient will improve standardized test score for [place] Sitting Balance Scale to 2 (progressing)    OCCUPATIONAL THERAPY LONG TERM GOALS   Initiated 4/3/2017 and to be accomplished within 3-4 Week(s)    1. Patient will perform Upper body ADLs with/without adaptive equipment with supervision/set-up. 2. Patient will perform Lower body ADLs with/without adaptive equipment with minimal assistance/contact guard assist .  3.  Patient will perform toileting task with minimal assistance/contact guard assist with Fair safety to reduce falls risk. 4. Patient will perform functional transfers with [address] and minimal assistance/contact guard assist and Fair balance and safety awareness. 5. Patient will perform standing static/dynamic activity for improved ADL/IADL function with minimal assistance/contact guard assist an and Fair balance and safety awareness. 6. Patient will improve Barthel index score to 65/100 to improve independence with mobility. 7. Patient will [facility] to 3. Therapist: MARIPOSA Gloria 4/3/2017   [facility]   OCCUPATIONAL THERAPY DAILY TREATMENT NOTE        Patient: Sarabjit Anthony (88 y.o. female)                      Date: 4/27/2017  Attending Physician: Lisa Westbrook MD  Primary Diagnosis: cellulitis    Treatment Diagnosis  Treatment Diagnosis: dysarthria  Treatment Diagnosis 2: dysarthria   Precautions : Precautions at Admission: Fall, Skin  Vital Signs:  Vital Signs  Pulse (Heart Rate): 94  Heart Rate Source: Monitor  Resp Rate: 15  O2 Sat (%): 97 %  Level of Consciousness: Alert  BP: 147/67  MAP (Calculated): 94  BP 1 Method: Automatic  BP 1 Location: Right arm  BP Patient Position: At rest;Sitting  Cognitive Status:  Mental Status  Neurologic State: Alert  Orientation Level: Oriented X4  Cognition: Appropriate for age attention/concentration; Follows commands  Perception: Appears intact  Perseveration: No perseveration noted  Safety/Judgement: Fall prevention  Pain:  Pain Screen  Pain Scale 1: Numeric (0 - 10)  Pain Intensity 1: 0  Patient Stated Pain Goal: 0  Pain Scale 1: Numeric (0 - 10)  Bed Mobility:  Bed Mobility  Rolling: Moderate assistance  Supine to Sit: Maximum assistance  Sit to Supine: Moderate assistance  Scooting: Moderate assistance  Transfers:  Functional Transfers  Sit to Stand:  Total assistance;Maximum assistance  Stand to Sit: Maximum assistance;Assist x2  Bed to Chair: Maximum assistance;Assist x2  Balance:  Balance  Sitting: With support  Sitting - Static: Fair (occasional)  Sitting - Dynamic: Poor (constant support)  Standing: Pull to stand; With support  Standing - Static: Poor  Standing - Dynamic : Poor  Therapeutic Activities:  Pt adamant about returning to her room and get in the bed today throughout the session. Required frequent cues to continue with [place]. See above for levels of A required for w/c to bed transfers and bed mobility. Therapeutic Exercises:  UE strengthening exercises with 2# dowel, 2x20 in all planes. Frequent rest breaks required with frequent verbal cues to continue with exercises. Patient/Caregiver Education:    Pt. Michael Cluster Education on safety with ADLs and transfers. Pt verbalized understanding. ASSESSMENT:  Patient continues to demonstrate the need for skilled Occupational Therapy services to improve strength, endurance and balance.    Progression toward goals:  [ ]      Improving appropriately and progressing toward goals  [x]      Improving slowly and progressing toward goals  [ ]      Not making progress toward goals and plan of care will be adjusted      Treatment session:   30 minutes     Therapist:    MARIPOSA Melissa   4/27/2017

## 2017-04-27 NOTE — ROUTINE PROCESS
Bedside shift change report given to Jeremiah Tipton RN (oncoming nurse) by Lynne Dinero RN (offgoing nurse). Report included the following information SBAR, Kardex, Recent Results and Med Rec Status. VISUALLY CHECKED PT Q 1 HR BY NURSING STAFF. 24 hour order chart check done

## 2017-04-27 NOTE — PROGRESS NOTES
Problem: Mobility Impaired (Adult and Pediatric)  Goal: *Acute Goals and Plan of Care (Insert Text)  PHYSICAL THERAPY STG GOALS :  Initiated 4/1/2017 and to be accomplished within 1-2 Weeks (Updated 4/21/17)     1. Patient will move from supine to sit and sit to supine in bed with minimal assistance/contact guard assist. (Progressing; Mod A)   2. Patient will transfer from bed to chair and chair to bed with minimum assistance using 2WW. (Progressing; Max A x1, Mod A x2)   3. Patient will perform sit to stand with minimum assistance with Fair balance and safety awareness. (Progressing; Mod A x2)   4. Patient will ambulate with minimum assistance for 10 feet with 2WW on level surfaces with 1 turn. (Unable)   5. Patient will ascend/descend 1 step with bilateral handrail(s) withminimal assistance to allow for safe home access/exit. (Unable)   6. Patient will improve standardized test score for [facility] Standing Balance Scale to 1+ (Achieved)      PHYSICAL THERAPY STG GOALS :  Initiated 4/1/2017 and to be accomplished within 1-2 Weeks (Updated 4/14/17)     1. Patient will move from supine to sit and sit to supine in bed with minimal assistance/contact guard assist. (Maintained; max A to total A)   2. Patient will transfer from bed to chair and chair to bed with minimum assistance using 2WW. (No progress; total A transfer)   3. Patient will perform sit to stand with minimum assistance with Fair balance and safety awareness. (No progress; Max A x2 for attempt to stand)   4. Patient will ambulate with minimum assistance for 10 feet with 2WW on level surfaces with 1 turn. (Unable)   5. Patient will ascend/descend 1 step with bilateral handrail(s) withminimal assistance to allow for safe home access/exit. (Unable)   6.  Patient will improve standardized test score for [facility] Standing Balance Scale to 1+ (No progress; pt unable to stand)    PHYSICAL THERAPY STG GOALS :  Initiated 4/1/2017 and to be accomplished within 1-2 Weeks (Updated 4/7/17)     1. Patient will move from supine to sit and sit to supine in bed with minimal assistance/contact guard assist. (No progress)   2. Patient will transfer from bed to chair and chair to bed with minimum assistance using 2WW. (No progress; total A transfer)   3. Patient will perform sit to stand with minimum assistance with Fair balance and safety awareness. (No progress; Max A x2 for attempt to stand)   4. Patient will ambulate with minimum assistance for 10 feet with 2WW on level surfaces with 1 turn. (NT, pt unable)   5. Patient will ascend/descend 1 step with bilateral handrail(s) withminimal assistance to allow for safe home access/exit.(NT, pt unable)   6. Patient will improve standardized test score for [facility] Standing Balance Scale to 1+ (No progress; pt unable to stand)     PHYSICAL THERAPY LTG GOALS :  Initiated 4/1/2017 and to be accomplished within 2-4 Weeks    1. Patient will move from supine to sit and sit to supine in bed with supervision/set-up. 2. Patient will transfer from bed to chair and chair to bed with supervision/set-up using 2WW.  3. Patient will perform sit to stand with supervision/set-up with Good balance and safety awareness. 4. Patient will ambulate with supervision/set-up for 50 feet with CGA on level surfaces and be able to maneuver through narrow spaces and obstacles without loss of balance. 5. Patient will ascend/descend 2 steps with bilateral handrail(s) with contact guard assist to allow for safe home access/exit. 6. Patient will improve standardized test score for [employer] [facility] Balance Scale to 2    Physical Therapist: Suzi Ley, PT on 4/1/2017    [facility]   PHYSICAL THERAPY DAILY TREATMENT NOTE        Patient:  Melanie Bey (27 y.o. female)               Date: 4/27/2017    Physician: Rober Macario MD  Primary Diagnosis: cellulitis          Treatment Diagnosis  Treatment Diagnosis: dysarthria  Treatment Diagnosis 2: dysarthria  Precautions: Fall, Skin  Vital Signs  Cognitive Status:  Mental Status  Neurologic State: Alert;Confused  Orientation Level: Oriented to person  Cognition: Decreased attention/concentration  Pain  Bed Mobility Training  Bed Mobility Training  Rolling: Moderate assistance  Supine to Sit: Maximum assistance  Scooting: Moderate assistance  Balance  Sitting: With support  Sitting - Static: Fair (occasional)  Sitting - Dynamic: Poor (constant support)  Standing: Pull to stand; With support  Standing - Static: Poor  Standing - Dynamic : Poor  Transfer Training  Transfer Training  Sit to Stand: Maximum assistance;Assist x2  Stand to Sit: Maximum assistance;Assist x2  Stand Pivot Transfers: Maximum assistance  Bed to Chair: Maximum assistance;Assist x2  Interventions: Safety awareness training;Verbal cues  Sit to Stand: Maximum assistance;Assist x2  Gait Training  Therapeutic Exercise: Pt presented supine in bed. Pt required some encouragement for OOB therapy. Pt with some confusion today and required additional time and effort for all tasks. Bed mobility as noted above. Pt with much difficulty moving B LE's towards EOB today. Pt sat EOB for ~5' with CGA. Pt was unable to maintain upright seated posture for more than a few seconds at a time. Pt presented with severe posterior lean when unsupported. Sit>stand attempted from EOB. Pt with hyperextension of B knees and Max A x2 for standing. SPT from EOB>w/c with Max A x2. Pt remained sitting up in w/c for breakfast.       Patient/Caregiver Education:   Pt Alexus Keating Education on safety and fall prevention, and importance of OOB was provided to reduce risk of falls and maximize gains. ASSESSMENT:  Patient continues to benefit from Skilled PT services to improve strength, balance, bed mobility, transfers, and gait.    Progression toward goals:  [ ]      Improving appropriately and progressing toward goals  [ ] Improving slowly and progressing toward goals  [X]      Not making progress toward goals and plan of care will be adjusted      Treatment session: 30 minutes.   Therapist:   Tabatha Monson PTA,          4/27/2017

## 2017-04-27 NOTE — ROUTINE PROCESS
Bedside and Verbal shift change report given to SHUN Donohue RN (oncoming nurse) by MARKY Mann RN (offgoing nurse). Report included the following information SBAR, Kardex and MAR.

## 2017-04-28 PROCEDURE — 74011250637 HC RX REV CODE- 250/637: Performed by: INTERNAL MEDICINE

## 2017-04-28 PROCEDURE — 74011636637 HC RX REV CODE- 636/637: Performed by: INTERNAL MEDICINE

## 2017-04-28 RX ADMIN — BIMATOPROST 1 DROP: 0.1 SOLUTION/ DROPS OPHTHALMIC at 17:20

## 2017-04-28 RX ADMIN — CLONAZEPAM 1 MG: 0.5 TABLET ORAL at 17:20

## 2017-04-28 RX ADMIN — AMLODIPINE BESYLATE 2.5 MG: 2.5 TABLET ORAL at 10:34

## 2017-04-28 RX ADMIN — LEVOTHYROXINE SODIUM 50 MCG: 50 TABLET ORAL at 07:53

## 2017-04-28 RX ADMIN — PREDNISONE 30 MG: 10 TABLET ORAL at 10:32

## 2017-04-28 RX ADMIN — FOLIC ACID 1 MG: 1 TABLET ORAL at 10:32

## 2017-04-28 RX ADMIN — ROPINIROLE HYDROCHLORIDE 2 MG: 1 TABLET, FILM COATED ORAL at 21:31

## 2017-04-28 RX ADMIN — MIRTAZAPINE 15 MG: 15 TABLET, FILM COATED ORAL at 21:31

## 2017-04-28 RX ADMIN — CLONAZEPAM 1 MG: 0.5 TABLET ORAL at 10:32

## 2017-04-28 RX ADMIN — ASPIRIN 325 MG ORAL TABLET 325 MG: 325 PILL ORAL at 10:32

## 2017-04-28 RX ADMIN — MELOXICAM 7.5 MG: 7.5 TABLET ORAL at 10:32

## 2017-04-28 RX ADMIN — PANTOPRAZOLE SODIUM 40 MG: 40 TABLET, DELAYED RELEASE ORAL at 07:53

## 2017-04-28 RX ADMIN — FLUOXETINE HYDROCHLORIDE 20 MG: 20 CAPSULE ORAL at 10:32

## 2017-04-28 RX ADMIN — CLOPIDOGREL BISULFATE 75 MG: 75 TABLET, FILM COATED ORAL at 10:32

## 2017-04-28 RX ADMIN — SIMVASTATIN 20 MG: 20 TABLET, FILM COATED ORAL at 21:31

## 2017-04-28 NOTE — ROUTINE PROCESS
Bedside and verbal shift report given to ROSMERY Franco LPN (oncoming nurse) by GOYO Hugo LPN (off going nurse). Report included SBAR, MAR and Kardex.

## 2017-04-28 NOTE — PROGRESS NOTES
Problem: Mobility Impaired (Adult and Pediatric)  Goal: *Acute Goals and Plan of Care (Insert Text)  PHYSICAL THERAPY STG GOALS :  Initiated 4/1/2017 and to be accomplished within 1-2 Weeks (Updated 4/28/17)     1. Patient will move from supine to sit and sit to supine in bed with minimal assistance/contact guard assist. (Achieved)   2. Patient will transfer from bed to chair and chair to bed with minimum assistance using 2WW. (Maintained; Max A x1)   3. Patient will perform sit to stand with minimum assistance with Fair balance and safety awareness. (Regressed; Max x1)   4. Patient will ambulate with minimum assistance for 10 feet with 2WW on level surfaces with 1 turn. (Progressed; 3ft in parallel bars Max x2)   5. Patient will ascend/descend 1 step with bilateral handrail(s) withminimal assistance to allow for safe home access/exit. (Unable)   6. Patient will improve standardized test score for [facility] Standing Balance Scale to 1+ (Achieved)    PHYSICAL THERAPY STG GOALS :  Initiated 4/1/2017 and to be accomplished within 1-2 Weeks (Updated 4/21/17)     1. Patient will move from supine to sit and sit to supine in bed with minimal assistance/contact guard assist. (Progressing; Mod A)   2. Patient will transfer from bed to chair and chair to bed with minimum assistance using 2WW. (Progressing; Max A x1, Mod A x2)   3. Patient will perform sit to stand with minimum assistance with Fair balance and safety awareness. (Progressing; Mod A x2)   4. Patient will ambulate with minimum assistance for 10 feet with 2WW on level surfaces with 1 turn. (Unable)   5. Patient will ascend/descend 1 step with bilateral handrail(s) withminimal assistance to allow for safe home access/exit. (Unable)   6. Patient will improve standardized test score for [facility] Standing Balance Scale to 1+ (Achieved)      PHYSICAL THERAPY STG GOALS :  Initiated 4/1/2017 and to be accomplished within 1-2 Weeks (Updated 4/14/17)     1. Patient will move from supine to sit and sit to supine in bed with minimal assistance/contact guard assist. (Maintained; max A to total A)   2. Patient will transfer from bed to chair and chair to bed with minimum assistance using 2WW. (No progress; total A transfer)   3. Patient will perform sit to stand with minimum assistance with Fair balance and safety awareness. (No progress; Max A x2 for attempt to stand)   4. Patient will ambulate with minimum assistance for 10 feet with 2WW on level surfaces with 1 turn. (Unable)   5. Patient will ascend/descend 1 step with bilateral handrail(s) withminimal assistance to allow for safe home access/exit. (Unable)   6. Patient will improve standardized test score for [facility] Standing Balance Scale to 1+ (No progress; pt unable to stand)    PHYSICAL THERAPY STG GOALS :  Initiated 4/1/2017 and to be accomplished within 1-2 Weeks (Updated 4/7/17)     1. Patient will move from supine to sit and sit to supine in bed with minimal assistance/contact guard assist. (No progress)   2. Patient will transfer from bed to chair and chair to bed with minimum assistance using 2WW. (No progress; total A transfer)   3. Patient will perform sit to stand with minimum assistance with Fair balance and safety awareness. (No progress; Max A x2 for attempt to stand)   4. Patient will ambulate with minimum assistance for 10 feet with 2WW on level surfaces with 1 turn. (NT, pt unable)   5. Patient will ascend/descend 1 step with bilateral handrail(s) withminimal assistance to allow for safe home access/exit.(NT, pt unable)   6. Patient will improve standardized test score for [facility] Standing Balance Scale to 1+ (No progress; pt unable to stand)     PHYSICAL THERAPY LTG GOALS :  Initiated 4/1/2017 and to be accomplished within 2-4 Weeks    1. Patient will move from supine to sit and sit to supine in bed with supervision/set-up.    2. Patient will transfer from bed to chair and chair to bed with supervision/set-up using 2WW.  3. Patient will perform sit to stand with supervision/set-up with Good balance and safety awareness. 4. Patient will ambulate with supervision/set-up for 50 feet with CGA on level surfaces and be able to maneuver through narrow spaces and obstacles without loss of balance. 5. Patient will ascend/descend 2 steps with bilateral handrail(s) with contact guard assist to allow for safe home access/exit. 6. Patient will improve standardized test score for [employer] Balance Scale to 2    Physical Therapist: Monica Pacheco PT on 4/1/2017    [facility]   PHYSICAL THERAPY WEEKLY PROGRESS REPORT  Reporting Period:  Date:   4/21/17  to 4/28        Patient: Geoff Colvin (16 y.o. female)                         Date: 4/28/2017    Primary Diagnosis: cellulitis                      Attending Physician: Patrick Banda MD   Treatment Diagnosis  Treatment Diagnosis: dysarthria  Treatment Diagnosis 2: dysarthria  Precautions:  Fall, Skin  Rehab Potential : Good:     Skill interventions and education provided with clinical rationale (include individualized treatment techniques and standardized tests):   Skilled Physical Therapy services were provided with therapeutic exercises to address strength and endurance, therapeutic activities to address transfers and pt education, neuro re-ed to address balance, and gait training to address gait and mobility. Using a comparative statement, summarize significant progress toward goals as a result of skilled intervention provided:  Patient has made Fair progress towards their Physical Therapy goals in the areas of bed mobility, transfers, and balance. Pt has achieved 2/6 STG at this time.   Identify remaining functional areas, impairments limiting progress and/or barriers to improvement:  Patient would benefit from continues PT services to address the following functional deficits in strength, endurance, mobility, gait, and stair negotiation. OBJECTIVE DATA SUMMARY:       INITIAL ASSESSMENT WEEKLY ASSESSMENT   COGNITIVE STATUS COGNITIVE STATUS   Neurologic State: Alert  Orientation Level: Oriented to person  Cognition: Follows commands  Perception: Cues to maintain midline in sitting, Tactile, Verbal  Perseveration: No perseveration noted  Safety/Judgement: Fall prevention Neurologic State: Confused  Orientation Level: Oriented to person  Cognition: Decreased attention/concentration  Perception: Appears intact  Perseveration: No perseveration noted  Safety/Judgement: Fall prevention   PAIN PAIN   Pain Scale 1: Numeric (0 - 10)  Pain Intensity 1: 0  Pain Onset 1: movement  Pain Location 1: Abdomen  Pain Orientation 1: Mid  Pain Description 1: Sharp  Pain Intervention(s) 1: Medication (see MAR), Rest, Repositioned, Position  Patient Stated Pain Goal: 0  Pain Reassessment 1: Yes Pain Scale 1: Numeric (0 - 10)  Pain Intensity 1: 0  Pain Onset 1: movement  Pain Location 1: Back  Pain Orientation 1: Lower  Pain Description 1: Aching  Pain Intervention(s) 1: Medication (see MAR), Repositioned  Patient Stated Pain Goal: 0  Pain Reassessment 1: Yes   GROSS ASSESSMENT GROSS ASSESSMENT   AROM: Generally decreased, functional (BLE)  PROM: Generally decreased, functional  Strength: Generally decreased, functional (BLE)  Coordination: Generally decreased, functional  Tone: Normal  Sensation: Intact AROM: Generally decreased, functional  PROM: Generally decreased, functional  Strength: Generally decreased, functional  Coordination: Generally decreased, functional  Tone: Normal  Sensation: Intact   BED MOBILITY BED MOBILITY   Rolling: Minimum assistance, Additional time  Supine to Sit: Moderate assistance  Sit to Supine: Minimum assistance, Additional time  Scooting: Minimum assistance Rolling: Moderate assistance  Supine to Sit: Minimum assistance  Sit to Supine:  Moderate assistance  Scooting: Contact guard assistance   GAIT GAIT   Ambulation - Level of Assistance: Maximum assistance, Assist x2  Distance (ft): 3 Feet (ft)  Assistive Device: Gait belt, Other (comment) (parallel bars) Ambulation - Level of Assistance: Maximum assistance, Assist x2  Distance (ft): 3 Feet (ft)  Assistive Device: Gait belt, Other (comment) (parallel bars)         Full Full   Full Full   TRANSFERS TRANSFERS   Sit to Stand: Maximum assistance  Stand to Sit: Maximum assistance  Bed to Chair: Total assistance Sit to Stand: Maximum assistance  Stand to Sit: Maximum assistance, Assist x2  Bed to Chair: Maximum assistance, Assist x2   BALANCE BALANCE   Sitting: With support  Sitting - Static: Poor (constant support)  Sitting - Dynamic: Poor (constant support)  Standing: Impaired, Pull to stand, With support  Standing - Static: Poor  Standing - Dynamic : None Sitting: Without support  Sitting - Static: Fair (occasional)  Sitting - Dynamic: Poor (constant support)  Standing: With support  Standing - Static: Poor  Standing - Dynamic : Poor   WHEELCHAIR MOBILITY/MGMT WHEELCHAIR MOBILITY/MGMT         Activity Tolerance:   Activity Tolerance: Fair   Visual/Perceptual   Tracking: Able to track stimulus in all quadrants w/o difficulty (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 Levels of assistance for bed mobility noted above. Attempted gait training within parallel bars however pt unable to balance on BLE or progress LLE even with extensive verbal cueing. Noted hyperextension of RLE and flexion of LLE during static standing.  TE rendered to address strength and endurance including LAQ, and heel/to raises 15 x 1. Patient's response to treatment rendered:  Fair     Patient expected Discharge Location:  [X]Private Residence  [ ] FDC/ILF  [ [facility]  [ ]Other:      Plan: Continue Skilled PT services as established by the Plan of Care for 5-6 times a week. PT and Assistant have had a weekly case conference regarding the above treatment:  [X] Yes     [ ] No        Treatment session:  33 minutes. Therapist: Jon Abrams SPT       Date:4/28/2017  Forward to PT for co-signature when completed.

## 2017-04-28 NOTE — PROGRESS NOTES
Problem: Self Care Deficits Care Plan (Adult)  Goal: *Acute Goals and Plan of Care (Insert Text)  OCCUPATIONAL THERAPY SHORT TERM GOALS   Updated 4/28/2017:  1. Patient will perform Upper body ADLs with/without adaptive equipment with minimal assistance/contact guard assist.   2. Patient will perform Lower body ADLs with/without adaptive equipment with moderate assistance. 3. Patient will perform toileting task with moderate assistance with Fair safety to reduce falls risk. 4. Patient will perform functional transfers with Rolling Walker and MIN A X2.  6. Patient will improve Barthel index scores to atleast 50/100 to improve functional mobility. 7. Patient will improve standardized test score for [place] Sitting Balance Scale to 2+ OR MORE. Updated 4/21/2017:  1. Patient will perform Upper body ADLs with/without adaptive equipment with minimal assistance/contact guard assist. progressing  2. Patient will perform Lower body ADLs with/without adaptive equipment with moderate assistance. progressing  3. Patient will perform toileting task with moderate assistance with Fair safety to reduce falls risk. progressing  4. Patient will perform functional transfers with Rolling Walker and MIN A X2. progressing  6. Patient will improve Barthel index scores to atleast 50/100 to improve functional mobility. progressing  7. Patient will improve standardized test score for [place] Sitting Balance Scale to 2+ OR MORE. progressing    Updated 4/14/17    1. Patient will perform Upper body ADLs with/without adaptive equipment with minimal assistance/contact guard assist. progressing   2. Patient will perform Lower body ADLs with/without adaptive equipment with moderate assistance. progressing  3. Patient will perform toileting task with moderate assistance with Fair safety to reduce falls risk. progressing  4. Patient will perform functional transfers with Rolling Walker and moderate assistance. GM, upg MIN A X2  5. Patient will perform standing static/dynamic balance activities for improved ADL/IADL function with moderate assistance x 2 and Fair balance and safety awarenes. D/C GOAL, N/A at this time  6. Patient will improve Barthel index scores to atleast 50/100 to improve functional mobility. progressing  7. Patient will improve standardized test score for [place] Sitting Balance Scale to 2 UPG 2+ OR MORE    Updated 4/07/17    1. Patient will perform Upper body ADLs with/without adaptive equipment with minimal assistance/contact guard assist. (progressing)  2. Patient will perform Lower body ADLs with/without adaptive equipment with moderate assistance. (progressing)  3. Patient will perform toileting task with moderate assistance with Fair safety to reduce falls risk. (progressing)  4. Patient will perform functional transfers with Rolling Walker and moderate assistance. (progressing)  5. Patient will perform standing static/dynamic balance activities for improved ADL/IADL function with moderate assistance x 2 and Fair balance and safety awarenes. (progressing)  6. Patient will improve Barthel index scores to atleast 50/100 to improve functional mobility. (progressing)  7. Patient will improve standardized test score for [place] Sitting Balance Scale to 2 (goal met-continue for consistency)    Initiated 4/3/2017 and to be accomplished within 2 Week(s)    1. Patient will perform Upper body ADLs with/without adaptive equipment with minimal assistance/contact guard assist. (progressing-currently Max A)  2. Patient will perform Lower body ADLs with/without adaptive equipment with moderate assistance. (progressing-Max A)  3. Patient will perform toileting task with moderate assistance with Fair safety to reduce falls risk. (progressing-currently Max A)  4. Patient will perform functional transfers with Rolling Walker and moderate assistance. (progressing-currently Total A)  5.  Patient will perform standing static/dynamic balance activities for improved ADL/IADL function with moderate assistance x 2 and Fair balance and safety awarenes. (progressing-currently Total A )  6. Patient will improve Barthel index scores to atleast 50/100 to improve functional mobility. (progressing)  7. Patient will improve standardized test score for [place] Sitting Balance Scale to 2 (progressing)    OCCUPATIONAL THERAPY LONG TERM GOALS   Initiated 4/3/2017 and to be accomplished within 3-4 Week(s)    1. Patient will perform Upper body ADLs with/without adaptive equipment with supervision/set-up. 2. Patient will perform Lower body ADLs with/without adaptive equipment with minimal assistance/contact guard assist .  3. Patient will perform toileting task with minimal assistance/contact guard assist with Fair safety to reduce falls risk. 4. Patient will perform functional transfers with Myrene Birmingham and minimal assistance/contact guard assist and Fair balance and safety awareness. 5. Patient will perform standing static/dynamic activity for improved ADL/IADL function with minimal assistance/contact guard assist an and Fair balance and safety awareness. 6. Patient will improve Barthel index score to 65/100 to improve independence with mobility. 7. Patient will improve standardized test score for [place] Sitting Balance to 3. Therapist: Emory Valerio 79. 4/3/2017   [facility]  OCCUPATIONAL THERAPY WEEKLY SUMMARY   Reporting period:  from 4/21/2017 through 4/28/2017         Patient:  Libra Mckeon (60 y.o. female)                             Date: 4/28/2017    Primary Diagnosis: cellulitis                                  Attending Physician: Kong Marte MD Treatment Diagnosis  Treatment Diagnosis: dysarthria  Treatment Diagnosis 2: dysarthria  Precautions: Fall, Skin  Rehab Potential : Fair     Skill interventions and education provided with clinical rationale (include individualized treatment techniques and standardized tests):  Skilled Occupational services were provided utilizing Therapeutic activities, therapeutic exercises, ADLs training, functional mobility, functional transfers training and EC/WS. Using a comparative statement, summarize significant progress toward goals as a result of skilled intervention provided:  Patient has made Poor progress towards their Occupational Therapy goals in the following areas: Pt has met 0/7 ST goals this week. Identify remaining functional areas, impairments limiting progress and/or barriers to improvement:  Patient would benefit from continued skilled Occupational Therapy Services to address the following functional deficits in strength, endurance and balance.     OBJECTIVE DATA SUMMARY:          INITIAL ASSESSMENT WEEKLY PROGRESS   COGNITIVE STATUS: COGNITIVE STATUS:   Neurologic State: Alert  Orientation Level: Oriented to person  Cognition: Follows commands  Perception: Cues to maintain midline in sitting, Tactile, Verbal  Perseveration: No perseveration noted  Safety/Judgement: Fall prevention Neurologic State: Alert, Confused  Orientation Level: Oriented to person  Cognition: Decreased command following, Poor safety awareness  Perception: Cues to maintain midline in sitting, Cues to maintain midline in standing, Tactile, Verbal  Perseveration: No perseveration noted  Safety/Judgement: Fall prevention   PAIN: PAIN:   Pain Scale 1: Numeric (0 - 10)  Pain Intensity 1: 0  Pain Onset 1: movement  Pain Location 1: Abdomen  Pain Orientation 1: Mid  Pain Description 1: Sharp  Pain Intervention(s) 1: Medication (see MAR), Rest, Repositioned, Position  Patient Stated Pain Goal: 0  Pain Reassessment 1: Yes       Pain Scale 1: Numeric (0 - 10)  Pain Intensity 1: 0  Pain Onset 1: movement  Pain Location 1: Back  Pain Orientation 1: Lower  Pain Description 1: Aching  Pain Intervention(s) 1: Medication (see MAR), Repositioned  Patient Stated Pain Goal: 0  Pain Reassessment 1: Yes   BED MOBILITY BED MOBILITY   Rolling: Minimum assistance, Additional time  Supine to Sit: Moderate assistance  Sit to Supine: Minimum assistance, Additional time  Scooting: Minimum assistance Rolling: Moderate assistance  Supine to Sit: Maximum assistance  Sit to Supine: Moderate assistance  Scooting: Moderate assistance   ADL SELF CARE ADL SELF CARE   Feeding: Setup  Oral Facial Hygiene/Grooming: Contact guard assistance  Bathing: Moderate assistance  Upper Body Dressing: Minimum assistance  Lower Body Dressing: Maximum assistance  Toileting: Maximum assistance Bathing Assistance: Maximum assistance  Position Performed: Seated edge of bed     Dressing Assistance: Minimum assistance  Hospital Gown: Moderate assistance  Pullover Shirt: Minimum assistance  Cues: Physical assistance, Visual cues provided     Bathing Assistance: Total assistance(dependent)  Perineal  : Total assistance (dependent)  Position Performed: Seated in chair     Toileting Assistance: Maximum assistance  Bladder Hygiene: Maximum assistance  Clothing Management: Maximum assistance     Grooming Assistance: Stand-by assistance  Washing Hands: Minimum assistance  Brushing Teeth: Stand-by assistance  Brushing/Combing Hair: Minimum assistance  Cues: Verbal cues provided     Dressing Assistance: Maximum assistance  Underpants: Maximum assistance  Pants With Elastic Waist: Maximum assistance  Leg Crossed Method Used: No  Position Performed: Long sitting on bed, Standing  Cues: Don     Feeding Assistance: Supervision/set-up  Container Management: Contact guard assistance  Cutting Food: Minimum assistance  Utensil Management: Supervision/set-up  Food to Mouth: Supervision/set-up  Drink to Mouth: Stand-by assistance  Cues: Verbal cues provided   TRANSFERS TRANSFERS   Sit to Stand: Maximum assistance  Stand to Sit: Maximum assistance  Bed to Chair: Total assistance Sit to Stand:  Total assistance  Stand to Sit: Maximum assistance, Assist x2  Bed to Chair: Total assistance BALANCE BALANCE   Sitting: With support  Sitting - Static: Poor (constant support)  Sitting - Dynamic: Poor (constant support)  Standing: Impaired, Pull to stand, With support  Standing - Static: Poor  Standing - Dynamic : None Sitting: Without support  Sitting - Static: Poor (constant support)  Sitting - Dynamic: Poor (constant support)  Standing: Impaired  Standing - Static: Poor  Standing - Dynamic : Poor   GROSS ASSESSMENT  GROSS ASSESSMENT   AROM: Generally decreased, functional (BLE)  PROM: Generally decreased, functional  Strength: Generally decreased, functional (BLE)  Coordination: Generally decreased, functional  Tone: Normal  Sensation: Intact AROM: Generally decreased, functional  PROM: Generally decreased, functional  Strength: Generally decreased, functional  Coordination: Generally decreased, functional  Tone: Normal  Sensation: Intact   COORDINATION COORDINATION   Fine Motor Skills-Upper: Left Intact, Right Intact  Gross Motor Skills-Upper: Left Intact, Right Intact Fine Motor Skills-Upper: Left Intact, Right Intact  Gross Motor Skills-Upper: Left Intact, Right Intact   VISUAL/PERCEPTUAL VISUAL/PERCEPTUAL   Tracking: Able to track stimulus in all quadrants w/o difficulty (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)   Tracking: Able to track stimulus in all quadrants w/o difficulty (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)     AUDITORY: AUDITORY:   Auditory Impairment: Hard of hearing, bilateral Auditory Impairment: Hard of hearing, bilateral      THE BARTHEL INDEX  ACTIVITY    SCORE   FEEDING  0=unable  5=needs help cutting,spreading butter,etc., or modified diet  10= independent    5   BATHING  0=dependent  5=independent (or in shower    0   GROOMING  0=needs help  5=independent face/hair/teeth/shaving (implements provided)    0   DRESSING  0=dependent  5=needs help but can do about half unaided  10=independent(including buttons, zips,laces etc.)    0   BOWELS  0=incontinent  5=occasional accident  10=continent    0   BLADDER  0=incontinent, or catheterized and unable to manage alone  5=occasional accident  10=continent    0   TOILET USE  0=dependent  5=needs some help, but can do something alone  10=independent (on and off, dressing, wiping)    0   TRANSFER (BED TO CHAIR AND BACK)  0=unable, no sitting balance  5=major help(one or two people,physical), can sit  10=minor help(verbal or physical)  15=independent    5   MOBILITY (ON LEVEL SURFACES)  0=immobile or <50 yards  5=wheelchair independent,including corners,>50 yards  10=walkes with help of one person (verbal or physical) >50 yards  15=independent(but may use any aid; for example, stick) >50 yards    0   STAIRS  0=unable  5=needs help (verbal, physical, carrying aid)  10=independent    0             TOTAL:                  10/100      Treatment:  UE strengthening exercises with arm restorator for 12 minutes. Pt requires frequent verbal cues and verbal redirection to continue with exercises. Partial ADLs performed and bed to w/c transfers. See above for levels of A needed. Patient's response to Treatment rendered:  Pt left seated in w/c at the end of session. Patient expected Discharge Location:  [X]Private Residence  [ ] PARAM/ILF  [ [facility]  [ ]Other:     Plan: Continue OT services as established on the Plan of Care for 5 times a week.   Treatment Minutes:  35  OT and Assistant have had a weekly case conference regarding the above treatment:  [X] Yes     [ ] No    Therapist:   Emory Us 79.           Date:4/28/2017      Forward to OT for co-signature when completed

## 2017-04-29 PROCEDURE — 74011250637 HC RX REV CODE- 250/637: Performed by: INTERNAL MEDICINE

## 2017-04-29 PROCEDURE — 74011636637 HC RX REV CODE- 636/637: Performed by: INTERNAL MEDICINE

## 2017-04-29 RX ADMIN — ROPINIROLE HYDROCHLORIDE 2 MG: 1 TABLET, FILM COATED ORAL at 21:29

## 2017-04-29 RX ADMIN — ASPIRIN 325 MG ORAL TABLET 325 MG: 325 PILL ORAL at 09:08

## 2017-04-29 RX ADMIN — FOLIC ACID 1 MG: 1 TABLET ORAL at 09:09

## 2017-04-29 RX ADMIN — MELOXICAM 7.5 MG: 7.5 TABLET ORAL at 09:09

## 2017-04-29 RX ADMIN — SIMVASTATIN 20 MG: 20 TABLET, FILM COATED ORAL at 21:29

## 2017-04-29 RX ADMIN — FLUOXETINE HYDROCHLORIDE 20 MG: 20 CAPSULE ORAL at 09:00

## 2017-04-29 RX ADMIN — PANTOPRAZOLE SODIUM 40 MG: 40 TABLET, DELAYED RELEASE ORAL at 09:09

## 2017-04-29 RX ADMIN — BIMATOPROST 1 DROP: 0.1 SOLUTION/ DROPS OPHTHALMIC at 18:01

## 2017-04-29 RX ADMIN — CLONAZEPAM 1 MG: 0.5 TABLET ORAL at 18:02

## 2017-04-29 RX ADMIN — CLONAZEPAM 1 MG: 0.5 TABLET ORAL at 09:09

## 2017-04-29 RX ADMIN — AMLODIPINE BESYLATE 2.5 MG: 2.5 TABLET ORAL at 09:08

## 2017-04-29 RX ADMIN — MIRTAZAPINE 15 MG: 15 TABLET, FILM COATED ORAL at 21:29

## 2017-04-29 RX ADMIN — PREDNISONE 30 MG: 10 TABLET ORAL at 09:09

## 2017-04-29 RX ADMIN — CLOPIDOGREL BISULFATE 75 MG: 75 TABLET, FILM COATED ORAL at 09:09

## 2017-04-29 RX ADMIN — LEVOTHYROXINE SODIUM 50 MCG: 50 TABLET ORAL at 09:08

## 2017-04-29 NOTE — ROUTINE PROCESS
Bedside and Verbal shift change report given to [address] [place] [address] (oncoming nurse) by Delmar Broussard RN (offgoing nurse). Report included thellowing information SBAR. Q VISUAL CHECKS DONE.

## 2017-04-29 NOTE — ROUTINE PROCESS
Bedside and Verbal shift change report given to Juan R Del Rosario RN (oncoming nurse) by Nicky Colin RN (offgoing nurse). Report included the following information SBAR, Kardex and MAR.

## 2017-04-29 NOTE — ROUTINE PROCESS
Bedside and Verbal shift change report given to GENARO Coker (oncoming nurse) by MARKY Mann RN (offgoing nurse). Report included the following information SBAR, Kardex and MAR.

## 2017-04-29 NOTE — PROGRESS NOTES
Problem: Mobility Impaired (Adult and Pediatric)  Goal: *Acute Goals and Plan of Care (Insert Text)  PHYSICAL THERAPY STG GOALS :  Initiated 4/1/2017 and to be accomplished within 1-2 Weeks (Updated 4/28/17)     1. Patient will move from supine to sit and sit to supine in bed with minimal assistance/contact guard assist. (Achieved)   2. Patient will transfer from bed to chair and chair to bed with minimum assistance using 2WW. (Maintained; Max A x1)   3. Patient will perform sit to stand with minimum assistance with Fair balance and safety awareness. (Regressed; Max x1)   4. Patient will ambulate with minimum assistance for 10 feet with 2WW on level surfaces with 1 turn. (Progressed; 3ft in parallel bars Max x2)   5. Patient will ascend/descend 1 step with bilateral handrail(s) withminimal assistance to allow for safe home access/exit. (Unable)   6. Patient will improve standardized test score for [facility] Standing Balance Scale to 1+ (Achieved)    PHYSICAL THERAPY STG GOALS :  Initiated 4/1/2017 and to be accomplished within 1-2 Weeks (Updated 4/21/17)     1. Patient will move from supine to sit and sit to supine in bed with minimal assistance/contact guard assist. (Progressing; Mod A)   2. Patient will transfer from bed to chair and chair to bed with minimum assistance using 2WW. (Progressing; Max A x1, Mod A x2)   3. Patient will perform sit to stand with minimum assistance with Fair balance and safety awareness. (Progressing; Mod A x2)   4. Patient will ambulate with minimum assistance for 10 feet with 2WW on level surfaces with 1 turn. (Unable)   5. Patient will ascend/descend 1 step with bilateral handrail(s) withminimal assistance to allow for safe home access/exit. (Unable)   6. Patient will improve standardized test score for [facility] Standing Balance Scale to 1+ (Achieved)      PHYSICAL THERAPY STG GOALS :  Initiated 4/1/2017 and to be accomplished within 1-2 Weeks (Updated 4/14/17)     1. Patient will move from supine to sit and sit to supine in bed with minimal assistance/contact guard assist. (Maintained; max A to total A)   2. Patient will transfer from bed to chair and chair to bed with minimum assistance using 2WW. (No progress; total A transfer)   3. Patient will perform sit to stand with minimum assistance with Fair balance and safety awareness. (No progress; Max A x2 for attempt to stand)   4. Patient will ambulate with minimum assistance for 10 feet with 2WW on level surfaces with 1 turn. (Unable)   5. Patient will ascend/descend 1 step with bilateral handrail(s) withminimal assistance to allow for safe home access/exit. (Unable)   6. Patient will improve standardized test score for [facility] Standing Balance Scale to 1+ (No progress; pt unable to stand)    PHYSICAL THERAPY STG GOALS :  Initiated 4/1/2017 and to be accomplished within 1-2 Weeks (Updated 4/7/17)     1. Patient will move from supine to sit and sit to supine in bed with minimal assistance/contact guard assist. (No progress)   2. Patient will transfer from bed to chair and chair to bed with minimum assistance using 2WW. (No progress; total A transfer)   3. Patient will perform sit to stand with minimum assistance with Fair balance and safety awareness. (No progress; Max A x2 for attempt to stand)   4. Patient will ambulate with minimum assistance for 10 feet with 2WW on level surfaces with 1 turn. (NT, pt unable)   5. Patient will ascend/descend 1 step with bilateral handrail(s) withminimal assistance to allow for safe home access/exit.(NT, pt unable)   6. Patient will improve standardized test score for [facility] Standing Balance Scale to 1+ (No progress; pt unable to stand)     PHYSICAL THERAPY LTG GOALS :  Initiated 4/1/2017 and to be accomplished within 2-4 Weeks    1. Patient will move from supine to sit and sit to supine in bed with supervision/set-up.    2. Patient will transfer from bed to chair and chair to bed with supervision/set-up using 2WW.  3. Patient will perform sit to stand with supervision/set-up with Good balance and safety awareness. 4. Patient will ambulate with supervision/set-up for 50 feet with CGA on level surfaces and be able to maneuver through narrow spaces and obstacles without loss of balance. 5. Patient will ascend/descend 2 steps with bilateral handrail(s) with contact guard assist to allow for safe home access/exit. 6. Patient will improve standardized test score for [facility] Balance Scale to 2    Physical Therapist: Prieto Barboza PT on 4/1/2017    [facility]   PHYSICAL THERAPY DAILY TREATMENT NOTE        Patient: Sarabjit Anthony (89 y.o. female)               Date: 4/29/2017    Physician: Lisa Westborok MD  Primary Diagnosis: cellulitis          Treatment Diagnosis  Treatment Diagnosis: dysarthria  Treatment Diagnosis 2: dysarthria  Precautions: Fall, Skin  Vital Signs  Vital Signs  Pulse (Heart Rate): 98  BP: 128/78     Cognitive Status:  Mental Status  Neurologic State: Confused  Orientation Level: Oriented to person  Cognition: Decreased attention/concentration; Impaired decision making;Memory loss;Poor safety awareness  Pain  Pain Screen  Pain Scale 1: Numeric (0 - 10)  Pain Intensity 1: 0  Patient Stated Pain Goal: 0  Bed Mobility Training  Bed Mobility Training  Supine to Sit: Moderate assistance  Sit to Supine: Moderate assistance  Balance  Sitting - Static: Fair (occasional)  Sitting - Dynamic: Fair (occasional)  Standing - Static: Fair (-)  Standing - Dynamic : Poor  Transfer Training  Transfer Training  Sit to Stand: Maximum assistance  Bed to Chair: Maximum assistance  Sit to Stand: Maximum assistance  Therapeutic Exercise:    Patient completed seated thera-ex: hip flexion (AAROM), LAQ's, ankle PF-DF x 15 reps, vc's for max ROM.  Sit<->stand between parallel bars x 3 reps, max A of 1-2; patient was able to maintain stance with B UE support 2-3 minutes, SBA-min A, vc's for symmetrical weightbearing, vc's to sustain b knee extension. Patient/Caregiver Education:   Pt /Caregiver Education on safety and fall prevention was provided to  Pt/CG. ASSESSMENT:  Patient continues to benefit from Skilled PT services to improve strength, balance, activity tolerance, (I) with bed mobility, transfers, and ambulation  Progression toward goals:  [X]      Improving appropriately and progressing toward goals  [ ]      Improving slowly and progressing toward goals  [ ]      Not making progress toward goals and plan of care will be adjusted      Treatment session: 40 minutes.   Therapist:   Phillip Lema PT,          4/29/2017

## 2017-04-30 PROCEDURE — 74011636637 HC RX REV CODE- 636/637: Performed by: INTERNAL MEDICINE

## 2017-04-30 PROCEDURE — 74011250637 HC RX REV CODE- 250/637: Performed by: INTERNAL MEDICINE

## 2017-04-30 RX ADMIN — PREDNISONE 30 MG: 10 TABLET ORAL at 10:00

## 2017-04-30 RX ADMIN — MIRTAZAPINE 15 MG: 15 TABLET, FILM COATED ORAL at 21:05

## 2017-04-30 RX ADMIN — FOLIC ACID 1 MG: 1 TABLET ORAL at 10:00

## 2017-04-30 RX ADMIN — ASPIRIN 325 MG ORAL TABLET 325 MG: 325 PILL ORAL at 10:01

## 2017-04-30 RX ADMIN — PANTOPRAZOLE SODIUM 40 MG: 40 TABLET, DELAYED RELEASE ORAL at 10:01

## 2017-04-30 RX ADMIN — CLOPIDOGREL BISULFATE 75 MG: 75 TABLET, FILM COATED ORAL at 10:01

## 2017-04-30 RX ADMIN — CLONAZEPAM 1 MG: 0.5 TABLET ORAL at 10:00

## 2017-04-30 RX ADMIN — TRAMADOL HYDROCHLORIDE 100 MG: 50 TABLET, FILM COATED ORAL at 23:47

## 2017-04-30 RX ADMIN — ROPINIROLE HYDROCHLORIDE 2 MG: 1 TABLET, FILM COATED ORAL at 21:05

## 2017-04-30 RX ADMIN — LEVOTHYROXINE SODIUM 50 MCG: 50 TABLET ORAL at 10:01

## 2017-04-30 RX ADMIN — BIMATOPROST 1 DROP: 0.1 SOLUTION/ DROPS OPHTHALMIC at 17:02

## 2017-04-30 RX ADMIN — MELOXICAM 7.5 MG: 7.5 TABLET ORAL at 10:01

## 2017-04-30 RX ADMIN — CLONAZEPAM 1 MG: 0.5 TABLET ORAL at 17:02

## 2017-04-30 RX ADMIN — SIMVASTATIN 20 MG: 20 TABLET, FILM COATED ORAL at 21:05

## 2017-04-30 RX ADMIN — AMLODIPINE BESYLATE 2.5 MG: 2.5 TABLET ORAL at 10:00

## 2017-04-30 RX ADMIN — FLUOXETINE HYDROCHLORIDE 20 MG: 20 CAPSULE ORAL at 10:01

## 2017-04-30 NOTE — ROUTINE PROCESS
Bedside shift change report given to Marcel Heller RN (oncoming nurse) by Cindy Melton RN (offgoing nurse). Report included the following information SBAR, Kardex, MAR and Med Rec Status. VISUALLY CHECKED PT Q 1 HR BY NURSING STAFF. 24 hour order chart check done

## 2017-04-30 NOTE — ROUTINE PROCESS
Bedside shift change report given to Marisabel Latham RN (oncoming nurse) by Stefany Gabriel RN (offgoing nurse). Report included the following information SBAR, Kardex, MAR and Med Rec Status. VISUALLY CHECKED PT Q 1 HR BY NURSING STAFF. 24 hour order chart check done

## 2017-04-30 NOTE — ROUTINE PROCESS
Bedside and Verbal shift change report given to Josemanuel Barrientos RN (oncoming nurse) by Abilio Wolff RN (offgoing nurse). Report included the following information SBAR, Kardex and MAR. Hourly rounds made.

## 2017-04-30 NOTE — ROUTINE PROCESS
Bedside and Verbal shift change report given to Maki Ceballos (oncoming nurse) by Delmar Broussard RN (offgoing nurse). Report included the following information SBAR. QH VISUAL CHECKS DONE.

## 2017-05-01 LAB — ERYTHROCYTE [SEDIMENTATION RATE] IN BLOOD: 45 MM/HR (ref 0–30)

## 2017-05-01 PROCEDURE — 74011250637 HC RX REV CODE- 250/637: Performed by: INTERNAL MEDICINE

## 2017-05-01 PROCEDURE — 85652 RBC SED RATE AUTOMATED: CPT | Performed by: INTERNAL MEDICINE

## 2017-05-01 PROCEDURE — 74011636637 HC RX REV CODE- 636/637: Performed by: INTERNAL MEDICINE

## 2017-05-01 PROCEDURE — 36415 COLL VENOUS BLD VENIPUNCTURE: CPT | Performed by: INTERNAL MEDICINE

## 2017-05-01 RX ORDER — FACIAL-BODY WIPES
10 EACH TOPICAL DAILY PRN
Status: DISCONTINUED | OUTPATIENT
Start: 2017-05-01 | End: 2017-05-08 | Stop reason: HOSPADM

## 2017-05-01 RX ADMIN — MIRTAZAPINE 15 MG: 15 TABLET, FILM COATED ORAL at 21:11

## 2017-05-01 RX ADMIN — PREDNISONE 30 MG: 10 TABLET ORAL at 09:27

## 2017-05-01 RX ADMIN — AMLODIPINE BESYLATE 2.5 MG: 2.5 TABLET ORAL at 09:26

## 2017-05-01 RX ADMIN — FLUOXETINE HYDROCHLORIDE 20 MG: 20 CAPSULE ORAL at 09:27

## 2017-05-01 RX ADMIN — LEVOTHYROXINE SODIUM 50 MCG: 50 TABLET ORAL at 09:27

## 2017-05-01 RX ADMIN — CLONAZEPAM 1 MG: 0.5 TABLET ORAL at 17:09

## 2017-05-01 RX ADMIN — MELOXICAM 7.5 MG: 7.5 TABLET ORAL at 09:27

## 2017-05-01 RX ADMIN — SIMVASTATIN 20 MG: 20 TABLET, FILM COATED ORAL at 21:11

## 2017-05-01 RX ADMIN — FOLIC ACID 1 MG: 1 TABLET ORAL at 09:27

## 2017-05-01 RX ADMIN — CLONAZEPAM 1 MG: 0.5 TABLET ORAL at 09:27

## 2017-05-01 RX ADMIN — ROPINIROLE HYDROCHLORIDE 2 MG: 1 TABLET, FILM COATED ORAL at 21:11

## 2017-05-01 RX ADMIN — PANTOPRAZOLE SODIUM 40 MG: 40 TABLET, DELAYED RELEASE ORAL at 09:26

## 2017-05-01 RX ADMIN — ASPIRIN 325 MG ORAL TABLET 325 MG: 325 PILL ORAL at 09:27

## 2017-05-01 RX ADMIN — BIMATOPROST 1 DROP: 0.1 SOLUTION/ DROPS OPHTHALMIC at 21:33

## 2017-05-01 RX ADMIN — CLOPIDOGREL BISULFATE 75 MG: 75 TABLET, FILM COATED ORAL at 09:27

## 2017-05-01 NOTE — PROGRESS NOTES
Roni Bill. [address] Oneil Hoff MD, [address] [place] [address] [address] [facility],  [address]   [phone] fax         Mansoor Valdez [id number] 2017, 7:22 AM    SUBJECTIVE:  Awake but not able to communicate clearly. 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 No upper extremity arthritis. No scalp sores. Labs: Results:   Chemistry No results for input(s): GLU, NA, K, CL, CO2, BUN, CREA, MG, CA, AGAP, BUCR, TBIL, GPT, AP, TP, ALB, GLOB, AGRAT in the last 72 hours. CrCl cannot be calculated (Patient has no serum creatinine result on file.). CBC w/Diff No results for input(s): WBC, RBC, HGB, HCT, PLT, GRANS, LYMPH, EOS, HGBEXT, HCTEXT, PLTEXT in the last 72 hours. Cardiac Enzymes No results for input(s): CPK, CKND1, GONZALO in the last 72 hours.     No lab exists for component: CKRMB, TROIP, PBNP   Coagulation No results for input(s): PTP, INR, APTT in the last 72 hours. No lab exists for component: INREXT    Limited Lipid Panel No results for input(s): CHOL, LDLC, VLDL, TGL, CHHD in the last 72 hours. No lab exists for component: HDLC   Full Lipid Panel No results for input(s): CHOL, CHOLPOCT, CHOLX, CHLST, CHOLV, [id number], HDL, LDL, NLDLCT, DLDL, LDLC, DLDLP, [id number], VLDLC, VLDL, TGL, TGLX, TRIGL, [id number], TRIGP, TGLPOCT, [id number], CHHD, CHHDX in the last 72 hours. No lab exists for component: [id number], [id number], [id number], CHOLP, HDLPOCT, [id number], NHDLCT, [id number], HDLC, HDLP, LDLPOCT, 395025, [id number]   BMP No results for input(s): NA, K, CL, CO2, AGAP, GLU, BUN, CREA, GFRAA, GFRNA in the last 72 hours. CMP No results for input(s): NA, K, CL, CO2, AGAP, GLU, BUN, CREA, GFRAA, GFRNA, CA, MG, PHOS, ALB, TBIL, TP, ALB, GLOB, AGRAT, SGOT, ALT, GPT in the last 72 hours. BNP No lab exists for component: PBNP   Liver Enzymes No results for input(s): TP, ALB, TBIL, AP, SGOT, GPT in the last 72 hours.     No lab exists for component: DBIL       ASSESSMENT:    GCA  Long term prednisone  CVA  ASPVD       PLAN:     1) ESR repeat  2) Consider off label Orencia and Actemra

## 2017-05-01 NOTE — PROGRESS NOTES
I have reviewed this patient's current medication list and recent laboratory results. At this time, I do not suggest any drug therapy adjustments or additional laboratory monitoring. Thank you,  Beatriz NEGRO  Ph. M. S.  5/1/2017

## 2017-05-01 NOTE — ROUTINE PROCESS
Changed skin tear dressings to bilateral lower extremities. Areas were cleansed with dermal wound cleanser, covered with xeroform,non-adhesive guaze. Skin tears present to bilateral extremities were cleansed with dermal wound cleanser and Telfa. All extremities were covered with tubi  for protection.

## 2017-05-01 NOTE — PROGRESS NOTES
Problem: Self Care Deficits Care Plan (Adult)  Goal: *Acute Goals and Plan of Care (Insert Text)  OCCUPATIONAL THERAPY SHORT TERM GOALS   Updated 4/28/2017:  1. Patient will perform Upper body ADLs with/without adaptive equipment with minimal assistance/contact guard assist.   2. Patient will perform Lower body ADLs with/without adaptive equipment with moderate assistance. 3. Patient will perform toileting task with moderate assistance with Fair safety to reduce falls risk. 4. Patient will perform functional transfers with Rolling Walker and MIN A X2.  6. Patient will improve Barthel index scores to atleast 50/100 to improve functional mobility. 7. Patient will improve standardized test score for [place] S[place] Balance Scale to 2+ OR MORE. Updated 4/21/2017:  1. Patient will perform Upper body ADLs with/without adaptive equipment with minimal assistance/contact guard assist. progressing  2. Patient will perform Lower body ADLs with/without adaptive equipment with moderate assistance. progressing  3. Patient will perform toileting task with moderate assistance with Fair safety to reduce falls risk. progressing  4. Patient will perform functional transfers with Rolling Walker and MIN A X2. progressing  6. Patient will improve Barthel index scores to atleast 50/100 to improve functional mobility. progressing  7. Patient will improve standardized test score for [place] Sitting Balance Scale to 2+ OR MORE. progressing    Updated 4/14/17    1. Patient will perform Upper body ADLs with/without adaptive equipment with minimal assistance/contact guard assist. progressing   2. Patient will perform Lower body ADLs with/without adaptive equipment with moderate assistance. progressing  3. Patient will perform toileting task with moderate assistance with Fair safety to reduce falls risk. progressing  4. Patient will perform functional transfers with Rolling Walker and moderate assistance. GM, upg MIN A X2  5. Patient will perform standing static/dynamic balance activities for improved ADL/IADL function with moderate assistance x 2 and Fair balance and safety awarenes. D/C GOAL, N/A at this time  6. Patient will improve Barthel index scores to atleast 50/100 to improve functional mobility. progressing  7. Patient will improve standardized test score for [place] Sitting Balance Scale to 2 UPG 2+ OR MORE    Updated 4/07/17    1. Patient will perform Upper body ADLs with/without adaptive equipment with minimal assistance/contact guard assist. (progressing)  2. Patient will perform Lower body ADLs with/without adaptive equipment with moderate assistance. (progressing)  3. Patient will perform toileting task with moderate assistance with Fair safety to reduce falls risk. (progressing)  4. Patient will perform functional transfers with Rolling Walker and moderate assistance. (progressing)  5. Patient will perform standing static/dynamic balance activities for improved ADL/IADL function with moderate assistance x 2 and Fair balance and safety awarenes. (progressing)  6. Patient will improve Barthel index scores to atleast 50/100 to improve functional mobility. (progressing)  7. Patient will improve standardized test score for [place] Sitting Balance Scale to 2 (goal met-continue for consistency)    Initiated 4/3/2017 and to be accomplished within 2 Week(s)    1. Patient will perform Upper body ADLs with/without adaptive equipment with minimal assistance/contact guard assist. (progressing-currently Max A)  2. Patient will perform Lower body ADLs with/without adaptive equipment with moderate assistance. (progressing-Max A)  3. Patient will perform toileting task with moderate assistance with Fair safety to reduce falls risk. (progressing-currently Max A)  4. Patient will perform functional transfers with Rolling Walker and moderate assistance. (progressing-currently Total A)  5.  Patient will perform standing static/dynamic balance activities for improved ADL/IADL function with moderate assistance x 2 and Fair balance and safety awarenes. (progressing-currently Total A )  6. Patient will improve Barthel index scores to atleast 50/100 to improve functional mobility. (progressing)  7. Patient will improve standardized test score for [place] Sitting Balance Scale to 2 (progressing)    OCCUPATIONAL THERAPY LONG TERM GOALS   Initiated 4/3/2017 and to be accomplished within 3-4 Week(s)    1. Patient will perform Upper body ADLs with/without adaptive equipment with supervision/set-up. 2. Patient will perform Lower body ADLs with/without adaptive equipment with minimal assistance/contact guard assist .  3. Patient will perform toileting task with minimal assistance/contact guard assist with Fair safety to reduce falls risk. 4. Patient will perform functional transfers with Jose A Estrin and minimal assistance/contact guard assist and Fair balance and safety awareness. 5. Patient will perform standing static/dynamic activity for improved ADL/IADL function with minimal assistance/contact guard assist an and Fair balance and safety awareness. 6. Patient will improve Barthel index score to 65/100 to improve independence with mobility. 7. Patient will improve standardized test score for [place] Sitting Balance to 3. Therapist: MARIPOSA Durand 4/3/2017   [facility] [place]   OCCUPATIONAL THERAPY DAILY TREATMENT NOTE        Patient:  Rafia Jacob (75 y.o. female)                      Date: 5/1/2017  Attending Physician: Basilia Wise MD  Primary Diagnosis: cellulitis    Treatment Diagnosis  Treatment Diagnosis: dysarthria  Treatment Diagnosis 2: dysarthria   Precautions : Precautions at Admission: Fall, Skin  Vital Signs:  Vital Signs  Pulse (Heart Rate): 81  BP: 146/79     Cognitive Status:  Mental Status  Neurologic State: Confused  Orientation Level: Oriented to person;Oriented to place  Cognition: Decreased attention/concentration  Pain:  Pain Screen  Pain Scale 1: Numeric (0 - 10)  Pain Intensity 1: 0  Pain Reassessment 1: Patient sleeping  Pain Scale 1: Numeric (0 - 10)  Gross Assessment:     Coordination:     Bed Mobility:  Bed Mobility  Supine to Sit: Maximum assistance  Sit to Supine: Maximum assistance  Transfers:        Balance:  Balance  Sitting: With support  Sitting - Static: Fair (occasional)  Sitting - Dynamic: Fair (occasional)  ADL Self Care:     ADL Intervention:                    Feeding  Feeding Assistance: Minimum assistance  Utensil Management: Minimum assistance  Food to Mouth: Stand-by assistance  Drink to Mouth: Stand-by assistance;Minimum assistance           Therapeutic Activities:  Assisted patient with bed mobility and self feeding, seated EOB, in order to increase static sitting balance as well as increase independence and performance with ADLS. See above for levels of A needed. Patient demonstrated increased difficulty with locating food on plate today due to low vision. NATASHA removed white paper cover from tray to increase color contrast during self feeding for optimal independence. Patient demonstrated some increased performance with modification. Patient/Caregiver Education:    Pt. Viviane Found Education on safe hand placement during sitting EOB for optimal performance.    ASSESSMENT:  Patient continues to demonstrate the need for skilled Occupational Therapy services to improve static standing balance needed for toilet transfer  Progression toward goals:  [ ]      Improving appropriately and progressing toward goals  [ ]      Improving slowly and progressing toward goals  [X]      Not making progress toward goals and plan of care will be adjusted      Treatment session:   39 minutes     Therapist:    NATASHA Mancilla,  5/1/2017

## 2017-05-01 NOTE — PROGRESS NOTES
Problem: Mobility Impaired (Adult and Pediatric)  Goal: *Acute Goals and Plan of Care (Insert Text)  PHYSICAL THERAPY STG GOALS :  Initiated 4/1/2017 and to be accomplished within 1-2 Weeks (Updated 4/28/17)     1. Patient will move from supine to sit and sit to supine in bed with minimal assistance/contact guard assist. (Achieved)   2. Patient will transfer from bed to chair and chair to bed with minimum assistance using 2WW. (Maintained; Max A x1)   3. Patient will perform sit to stand with minimum assistance with Fair balance and safety awareness. (Regressed; Max x1)   4. Patient will ambulate with minimum assistance for 10 feet with 2WW on level surfaces with 1 turn. (Progressed; 3ft in parallel bars Max x2)   5. Patient will ascend/descend 1 step with bilateral handrail(s) withminimal assistance to allow for safe home access/exit. (Unable)   6. Patient will improve standardized test score for [facility] Standing Balance Scale to 1+ (Achieved)    PHYSICAL THERAPY STG GOALS :  Initiated 4/1/2017 and to be accomplished within 1-2 Weeks (Updated 4/21/17)     1. Patient will move from supine to sit and sit to supine in bed with minimal assistance/contact guard assist. (Progressing; Mod A)   2. Patient will transfer from bed to chair and chair to bed with minimum assistance using 2WW. (Progressing; Max A x1, Mod A x2)   3. Patient will perform sit to stand with minimum assistance with Fair balance and safety awareness. (Progressing; Mod A x2)   4. Patient will ambulate with minimum assistance for 10 feet with 2WW on level surfaces with 1 turn. (Unable)   5. Patient will ascend/descend 1 step with bilateral handrail(s) withminimal assistance to allow for safe home access/exit. (Unable)   6. Patient will improve standardized test score for [facility] Standing Balance Scale to 1+ (Achieved)      PHYSICAL THERAPY STG GOALS :  Initiated 4/1/2017 and to be accomplished within 1-2 Weeks (Updated 4/14/17)     1. Patient will move from supine to sit and sit to supine in bed with minimal assistance/contact guard assist. (Maintained; max A to total A)   2. Patient will transfer from bed to chair and chair to bed with minimum assistance using 2WW. (No progress; total A transfer)   3. Patient will perform sit to stand with minimum assistance with Fair balance and safety awareness. (No progress; Max A x2 for attempt to stand)   4. Patient will ambulate with minimum assistance for 10 feet with 2WW on level surfaces with 1 turn. (Unable)   5. Patient will ascend/descend 1 step with bilateral handrail(s) withminimal assistance to allow for safe home access/exit. (Unable)   6. Patient will improve standardized test score for [facility] Standing Balance Scale to 1+ (No progress; pt unable to stand)    PHYSICAL THERAPY STG GOALS :  Initiated 4/1/2017 and to be accomplished within 1-2 Weeks (Updated 4/7/17)     1. Patient will move from supine to sit and sit to supine in bed with minimal assistance/contact guard assist. (No progress)   2. Patient will transfer from bed to chair and chair to bed with minimum assistance using 2WW. (No progress; total A transfer)   3. Patient will perform sit to stand with minimum assistance with Fair balance and safety awareness. (No progress; Max A x2 for attempt to stand)   4. Patient will ambulate with minimum assistance for 10 feet with 2WW on level surfaces with 1 turn. (NT, pt unable)   5. Patient will ascend/descend 1 step with bilateral handrail(s) withminimal assistance to allow for safe home access/exit.(NT, pt unable)   6. Patient will improve standardized test score for [facility] Standing Balance Scale to 1+ (No progress; pt unable to stand)     PHYSICAL THERAPY LTG GOALS :  Initiated 4/1/2017 and to be accomplished within 2-4 Weeks    1. Patient will move from supine to sit and sit to supine in bed with supervision/set-up.    2. Patient will transfer from bed to chair and chair to bed with supervision/set-up using 2WW.  3. Patient will perform sit to stand with supervision/set-up with Good balance and safety awareness. 4. Patient will ambulate with supervision/set-up for 50 feet with CGA on level surfaces and be able to maneuver through narrow spaces and obstacles without loss of balance. 5. Patient will ascend/descend 2 steps with bilateral handrail(s) with contact guard assist to allow for safe home access/exit. 6. Patient will improve standardized test score for [facility] Balance Scale to 2    Physical Therapist: Rajinder العلي PT on 4/1/2017    [facility]   PHYSICAL THERAPY DAILY TREATMENT NOTE        Patient: Ho Baer (89 y.o. female)               Date: 5/1/2017    Physician: Queenie Chavez MD  Primary Diagnosis: cellulitis          Treatment Diagnosis  Treatment Diagnosis: dysarthria  Treatment Diagnosis 2: dysarthria  Precautions: Fall, Skin  Vital Signs  Vital Signs  Pulse (Heart Rate): 81  BP: 146/79     Cognitive Status:  Mental Status  Neurologic State: Confused  Orientation Level: Oriented to person;Oriented to place  Cognition: Decreased attention/concentration  Pain     Bed Mobility Training  Bed Mobility Training  Rolling: Minimum assistance  Supine to Sit: Minimum assistance  Sit to Supine: Maximum assistance  Scooting: Minimum assistance  Balance  Sitting: Impaired; With support  Sitting - Static: Fair (occasional)  Sitting - Dynamic: Fair (occasional) (-)  Standing: Impaired; With support  Standing - Static: Poor (+)  Standing - Dynamic : Poor  Transfer Training  Transfer Training  Sit to Stand: Moderate assistance;Maximum assistance  Stand to Sit: Moderate assistance;Maximum assistance  Bed to Chair: Moderate assistance;Assist x2  Sit to Stand: Moderate assistance;Maximum assistance  Gait Training                       Therapeutic Exercise:    Pt presented in bed with legs hanging over the railing.  When therapist inquired, she replied, \"I want to get out of this bed. \" Bed mobility with min A. Bed to w/c transfer with mod A using stand pivot transfer method, pt requiring manual cueing with weight shifting in order to advance LEs. Sit <> stand transfer training completed x 5 reps with mod-max A, initially using push to stand methods but using pull to stand for latter few reps due to fatigue; rest breaks also rendered to address fatigue. Static standing balance to improve weight bearing through B LEs and improve impaired balance, facilitation of reaching inside [place] and with single UE support x 1 min 45 sec, minimal swaying increased to moderate swaying towards end. TE rendered to include resisted hip add x 15 reps, pt unable to follow visual and verbal cueing for LAQ. Confusion persists and evident through conversation throughout session. Patient/Caregiver Education:   Pt /Caregiver Education on safety and fall prevention to reduce fall risk. ASSESSMENT:  Patient continues to benefit from Skilled PT services to improve strength, endurance, balance, mobility. Progression toward goals:  [ ]      Improving appropriately and progressing toward goals  [X]      Improving slowly and progressing toward goals  [X]      Not making progress toward goals and plan of care will be adjusted  Progression is inconsistent and very slow. Levels of participation vary based on confusion and fatigue. Treatment session: 39 minutes.   Therapist:   Jaylin Oconnell PT,          5/1/2017

## 2017-05-02 PROCEDURE — 74011250637 HC RX REV CODE- 250/637: Performed by: INTERNAL MEDICINE

## 2017-05-02 PROCEDURE — A6209 FOAM DRSG <=16 SQ IN W/O BDR: HCPCS

## 2017-05-02 PROCEDURE — 74011636637 HC RX REV CODE- 636/637: Performed by: INTERNAL MEDICINE

## 2017-05-02 RX ADMIN — MELOXICAM 7.5 MG: 7.5 TABLET ORAL at 09:15

## 2017-05-02 RX ADMIN — SIMVASTATIN 20 MG: 20 TABLET, FILM COATED ORAL at 21:05

## 2017-05-02 RX ADMIN — AMLODIPINE BESYLATE 2.5 MG: 2.5 TABLET ORAL at 09:16

## 2017-05-02 RX ADMIN — MIRTAZAPINE 15 MG: 15 TABLET, FILM COATED ORAL at 21:05

## 2017-05-02 RX ADMIN — CLOPIDOGREL BISULFATE 75 MG: 75 TABLET, FILM COATED ORAL at 09:16

## 2017-05-02 RX ADMIN — FLUOXETINE HYDROCHLORIDE 20 MG: 20 CAPSULE ORAL at 09:16

## 2017-05-02 RX ADMIN — PREDNISONE 30 MG: 10 TABLET ORAL at 09:15

## 2017-05-02 RX ADMIN — ASPIRIN 325 MG ORAL TABLET 325 MG: 325 PILL ORAL at 09:00

## 2017-05-02 RX ADMIN — LEVOTHYROXINE SODIUM 50 MCG: 50 TABLET ORAL at 09:16

## 2017-05-02 RX ADMIN — CLONAZEPAM 1 MG: 0.5 TABLET ORAL at 17:38

## 2017-05-02 RX ADMIN — ROPINIROLE HYDROCHLORIDE 2 MG: 1 TABLET, FILM COATED ORAL at 21:05

## 2017-05-02 RX ADMIN — CLONAZEPAM 1 MG: 0.5 TABLET ORAL at 09:15

## 2017-05-02 RX ADMIN — PANTOPRAZOLE SODIUM 40 MG: 40 TABLET, DELAYED RELEASE ORAL at 09:16

## 2017-05-02 RX ADMIN — FOLIC ACID 1 MG: 1 TABLET ORAL at 09:16

## 2017-05-02 RX ADMIN — BIMATOPROST 1 DROP: 0.1 SOLUTION/ DROPS OPHTHALMIC at 17:43

## 2017-05-02 NOTE — PROGRESS NOTES
Problem: Self Care Deficits Care Plan (Adult)  Goal: *Acute Goals and Plan of Care (Insert Text)  OCCUPATIONAL THERAPY SHORT TERM GOALS   Updated 4/28/2017:  1. Patient will perform Upper body ADLs with/without adaptive equipment with minimal assistance/contact guard assist.   2. Patient will perform Lower body ADLs with/without adaptive equipment with moderate assistance. 3. Patient will perform toileting task with moderate assistance with Fair safety to reduce falls risk. 4. Patient will perform functional transfers with Rolling Walker and MIN A X2.  6. Patient will improve Barthel index scores to atleast 50/100 to improve functional mobility. 7. Patient will improve standardized test score for [place] Balance Scale to 2+ OR MORE. Updated 4/21/2017:  1. Patient will perform Upper body ADLs with/without adaptive equipment with minimal assistance/contact guard assist. progressing  2. Patient will perform Lower body ADLs with/without adaptive equipment with moderate assistance. progressing  3. Patient will perform toileting task with moderate assistance with Fair safety to reduce falls risk. progressing  4. Patient will perform functional transfers with Rolling Walker and MIN A X2. progressing  6. Patient will improve Barthel index scores to atleast 50/100 to improve functional mobility. progressing  7. Patient will improve standardized test score for [place] Balance Scale to 2+ OR MORE. progressing    Updated 4/14/17    1. Patient will perform Upper body ADLs with/without adaptive equipment with minimal assistance/contact guard assist. progressing   2. Patient will perform Lower body ADLs with/without adaptive equipment with moderate assistance. progressing  3. Patient will perform toileting task with moderate assistance with Fair safety to reduce falls risk. progressing  4. Patient will perform functional transfers with Rolling Walker and moderate assistance. GM, upg MIN A X2  5. Patient will perform standing static/dynamic balance activities for improved ADL/IADL function with moderate assistance x 2 and Fair balance and safety awarenes. D/C GOAL, N/A at this time  6. Patient will improve Barthel index scores to atleast 50/100 to improve functional mobility. progressing  7. Patient will improve standardized test score for [place] Balance Scale to 2 UPG 2+ OR MORE    Updated 4/07/17    1. Patient will perform Upper body ADLs with/without adaptive equipment with minimal assistance/contact guard assist. (progressing)  2. Patient will perform Lower body ADLs with/without adaptive equipment with moderate assistance. (progressing)  3. Patient will perform toileting task with moderate assistance with Fair safety to reduce falls risk. (progressing)  4. Patient will perform functional transfers with Rolling Walker and moderate assistance. (progressing)  5. Patient will perform standing static/dynamic balance activities for improved ADL/IADL function with moderate assistance x 2 and Fair balance and safety awarenes. (progressing)  6. Patient will improve Barthel index scores to atleast 50/100 to improve functional mobility. (progressing)  7. Patient will improve standardized test score for [place] Balance Scale to 2 (goal met-continue for consistency)    Initiated 4/3/2017 and to be accomplished within 2 Week(s)    1. Patient will perform Upper body ADLs with/without adaptive equipment with minimal assistance/contact guard assist. (progressing-currently Max A)  2. Patient will perform Lower body ADLs with/without adaptive equipment with moderate assistance. (progressing-Max A)  3. Patient will perform toileting task with moderate assistance with Fair safety to reduce falls risk. (progressing-currently Max A)  4. Patient will perform functional transfers with Rolling Walker and moderate assistance. (progressing-currently Total A)  5.  Patient will perform standing static/dynamic balance activities for improved ADL/IADL function with moderate assistance x 2 and Fair balance and safety awarenes. (progressing-currently Total A )  6. Patient will improve Barthel index scores to atleast 50/100 to improve functional mobility. (progressing)  7. Patient will improve standardized test score for [place] S[place] Balance Scale to 2 (progressing)    OCCUPATIONAL THERAPY LONG TERM GOALS   Initiated 4/3/2017 and to be accomplished within 3-4 Week(s)    1. Patient will perform Upper body ADLs with/without adaptive equipment with supervision/set-up. 2. Patient will perform Lower body ADLs with/without adaptive equipment with minimal assistance/contact guard assist .  3. Patient will perform toileting task with minimal assistance/contact guard assist with Fair safety to reduce falls risk. 4. Patient will perform functional transfers with Jose A Estrin and minimal assistance/contact guard assist and Fair balance and safety awareness. 5. Patient will perform standing static/dynamic activity for improved ADL/IADL function with minimal assistance/contact guard assist an and Fair balance and safety awareness. 6. Patient will improve Barthel index score to 65/100 to improve independence with mobility. 7. Patient will improve standardized test score for [place] Sitting Balance to 3. Therapist: MARIPOSA Durand 4/3/2017   [facility]   OCCUPATIONAL THERAPY DAILY TREATMENT NOTE        Patient:  Rafia Jacob (45 y.o. female)                      Date: 5/2/2017  Attending Physician: Basilia Wise MD  Primary Diagnosis: cellulitis    Treatment Diagnosis  Treatment Diagnosis: dysarthria  Treatment Diagnosis 2: dysarthria   Precautions : Precautions at Admission: Fall, Skin  Vital Signs:       Cognitive Status:  Mental Status  Neurologic State: Confused  Orientation Level: Oriented to person  Cognition: Decreased attention/concentration  Pain:        Gross Assessment:     Coordination: Bed Mobility:     Transfers:        Balance:     ADL Self Care:     ADL Intervention:              Grooming  Grooming Assistance: Minimum assistance  Washing Face: Minimum assistance  Brushing Teeth: Stand-by assistance  Brushing/Combing Hair: Stand-by assistance  Cues: Verbal cues provided; Tactile cues provided                 Therapeutic Activities:  Patient presents in bed, attempting to undress. Assisted patient with oral hygiene and grooming task in order to assess independence and performance with task. Patient continues to required cueing for attention to task as well as step by step sequencing for thoroughly washing face, independently. Patient demonstrated increased cueing for attention to task during all activities today due to increase fixation on undressing underpants when not participating with activities. Therapeutic Exercises:  Attempted UB strengthening with #2 however patient unable to complete today due to increased confusion. Patient/Caregiver Education:    Pt. Mag Maya Education on see above.         ASSESSMENT:  Patient continues to demonstrate the need for skilled Occupational Therapy services to improve independence with grooming  Progression toward goals:  [X]      Improving appropriately and progressing toward goals  [ ]      Improving slowly and progressing toward goals  [ ]      Not making progress toward goals and plan of care will be adjusted      Treatment session:   30 minutes     Therapist:    NATASHA Garcia,  5/2/2017

## 2017-05-02 NOTE — ROUTINE PROCESS
Bedside and Verbal shift change report given to Camilla Rao (oncoming nurse) by Travon Honeycutt RN (offgoing nurse). Report included the following information SBAR.  Q VISUAL CHECKS DONE

## 2017-05-02 NOTE — PROGRESS NOTES
ZULY spoke with pt's  re: d/c date of 5/8/17. Pt's  informed SW that he hasn't heard from DME provider. Pt's  requesting that order for highback w/c be cancelled since pt has a standard w/c already at home. [facility] and spoke with Patricia Oneal and she informed SW that they haven't been able to speak with pt's . ZULY provided the correct phone number for pt's . ZULY called pt's  to provide him with the phone number to dme provider.

## 2017-05-02 NOTE — PROGRESS NOTES
Nutrition follow up-[facility]/  Plan of care      RECOMMENDATIONS:     1. Dental Soft diet  2. Ensure BID  3. Monitor weight and PO intake  4. RD to follow     GOALS:     1. Met/Ongoing: PO intake meets >75% of protein/calorie needs by 5/9  2. Ongoing: Weight Maintenance (+/- 1-2 lb by 5/9)    ASSESSMENT:     Weight status is classified as normal per BMI of 21. However, patient is at nutrition risk due to BMI below 23 with patient above 70years of age. Improved PO intake. Continue Ensure TID for additional calories/protein. New weight and labs n/a on record. Nutrition recommendations listed. RD to follow. Nutrition Risk:  [] High  [x] Moderate []  Low    SUBJECTIVE/OBJECTIVE:      Transferred from  Cardiac to [facility] on 3/31/17. Patient with AMS and right arm cellulitis. She has h/o COPD, CVAs with residual left sided weakness. Patient reports having a good appetite. Observed 75% intake of breakfast meal and drinking Ensure supplement during visit. Will monitor. 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 9XXA    Cerebral aneurysm M85.4    LICA cavernous severe stenosis with chronic infarct I65.22    Dehydration E86.0    COPD (chronic obstructive pulmonary disease) ([facility]) J44.9    Acute encephalopathy G93.40    Altered mental state R41.82  Herpes labialis B00.1    Cellulitis of arm, right L03.113     Past Medical History:   Diagnosis Date    Bipolar 1 disorder ([facility]     Cerebral aneurysm 12/11/2015    RPCoA, RAChA,, RMCA bifurcation, and RM2      Cervical spine fracture ([facility] 08/20/2014    C2 and C3    COPD     Coronary artery disease     Giant cell arteritis ([facility] 11/15/2014    [place]     Hyperlipidemia     Hypertension     Hypothyroidism     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    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    Menopause     Psychiatric disorder bipolar    Respiratory abnormalities     Restless leg syndrome     Thyroid nodule 4/23/2014    Abnormal biopsy        Labs:    Lab Results   Component Value Date/Time    Sodium 142 03/31/2017 05:15 AM    Potassium 3.8 03/31/2017 05:15 AM    Chloride 108 03/31/2017 05:15 AM    CO2 27 03/31/2017 05:15 AM    Anion gap 7 03/31/2017 05:15 AM    Glucose 124 03/31/2017 05:15 AM    BUN 20 03/31/2017 05:15 AM    Creatinine 0.61 03/31/2017 05:15 AM    Calcium 8.1 03/31/2017 05:15 AM    Albumin 3.3 02/06/2017 11:30 AM     Anthropometrics: BMI (calculated): 21  Last 3 Recorded Weights in this Encounter    04/11/17 1343 04/19/17 0929 04/26/17 0953   Weight: 49.5 kg (109 lb 3.2 oz) 46.8 kg (103 lb 3.2 oz) 48.8 kg (107 lb 9.6 oz)      Ht Readings from Last 1 Encounters:   04/26/17 5' (1.524 m)     No data found.    [] Weight Loss   [] Weight Gain   [x] New weight n/a on record    Nutrition Needs:   Calories: 2826-3315 Kcal    Protein:   55-65 g      [x] No Cultural, Jain or ethnic dietary need identified.     [] Cultural, Jain and ethnic food preferences identified and addressed     Wt Status:  [x] Normal (18.6 - 24.9) [] Underweight (<18.5) [] Overweight (25 - 29.9) [] Mild Obesity (30 - 34.9)  [] Moderate Obesity (35 - 39.9) [] Morbid Obesity (40+)     Nutrition Problems Identified:   [] Suboptimal PO intake   [] Food Allergies  [] Difficulty chewing/swallowing/poor dentition  [] Constipation/Diarrhea   [] Nausea/Vomiting   [x] None  [] Other:     Plan:   [] Therapeutic Diet  [x]  Obtained/adjusted food preferences/tolerances and/or snacks options   [x]  Continue supplements as prescribed  [] Occupational therapy following for feeding techniques  []  HS snack added   []  Modify diet texture   []  Modify diet for food allergies   []  Assist with menu selection   [x]  Monitor PO intake on meal rounds   [x]  Continue inpatient monitoring and intervention   [x]  Participated in discharge planning/Interdisciplinary rounds/Team meetings   []  Other:     Education Needs:   [] Not appropriate for teaching at this time due to:   [x] Identified and addressed    Nutrition Monitoring and Evaluation:  [x] Continue ongoing monitoring and intervention  [] Other    Lillette Fouzia

## 2017-05-02 NOTE — ROUTINE PROCESS
Bedside shift change report given to Mago Roman Lpn (oncoming nurse) by Marifer Levy RN (offgoing nurse). Report included the following information SBAR, Kardex, MAR and Recent Results. VISUALLY CHECKED PT Q 1 HR BY NURSING STAFF. 24 hour order chart check

## 2017-05-02 NOTE — PROGRESS NOTES
RT attempted to take pt outside. Pt remained for the duration of 5 minutes then stated \"Lets go in\". RT took pt back to own room.

## 2017-05-02 NOTE — PROGRESS NOTES
Patient is unable to communicate at this time.  offered prayer and left Spiritual Care brochure. Chaplains will continue to follow and will provide pastoral care on an as needed/requested basis.     [address] [facility]   Staff [address] [facility]   [phone]

## 2017-05-03 PROCEDURE — 74011250637 HC RX REV CODE- 250/637: Performed by: INTERNAL MEDICINE

## 2017-05-03 PROCEDURE — 74011636637 HC RX REV CODE- 636/637: Performed by: INTERNAL MEDICINE

## 2017-05-03 RX ADMIN — MIRTAZAPINE 15 MG: 15 TABLET, FILM COATED ORAL at 21:01

## 2017-05-03 RX ADMIN — AMLODIPINE BESYLATE 2.5 MG: 2.5 TABLET ORAL at 08:40

## 2017-05-03 RX ADMIN — BIMATOPROST 1 DROP: 0.1 SOLUTION/ DROPS OPHTHALMIC at 17:20

## 2017-05-03 RX ADMIN — TRAMADOL HYDROCHLORIDE 100 MG: 50 TABLET, FILM COATED ORAL at 17:00

## 2017-05-03 RX ADMIN — ROPINIROLE HYDROCHLORIDE 2 MG: 1 TABLET, FILM COATED ORAL at 21:01

## 2017-05-03 RX ADMIN — ASPIRIN 325 MG ORAL TABLET 325 MG: 325 PILL ORAL at 08:39

## 2017-05-03 RX ADMIN — PANTOPRAZOLE SODIUM 40 MG: 40 TABLET, DELAYED RELEASE ORAL at 08:40

## 2017-05-03 RX ADMIN — SIMVASTATIN 20 MG: 20 TABLET, FILM COATED ORAL at 21:01

## 2017-05-03 RX ADMIN — PREDNISONE 30 MG: 10 TABLET ORAL at 08:39

## 2017-05-03 RX ADMIN — CLONAZEPAM 1 MG: 0.5 TABLET ORAL at 17:20

## 2017-05-03 RX ADMIN — CLOPIDOGREL BISULFATE 75 MG: 75 TABLET, FILM COATED ORAL at 08:39

## 2017-05-03 RX ADMIN — LEVOTHYROXINE SODIUM 50 MCG: 50 TABLET ORAL at 08:40

## 2017-05-03 RX ADMIN — FLUOXETINE HYDROCHLORIDE 20 MG: 20 CAPSULE ORAL at 08:40

## 2017-05-03 RX ADMIN — FOLIC ACID 1 MG: 1 TABLET ORAL at 08:39

## 2017-05-03 RX ADMIN — MELOXICAM 7.5 MG: 7.5 TABLET ORAL at 08:39

## 2017-05-03 RX ADMIN — CLONAZEPAM 1 MG: 0.5 TABLET ORAL at 08:39

## 2017-05-03 NOTE — PROGRESS NOTES
Problem: Mobility Impaired (Adult and Pediatric)  Goal: *Acute Goals and Plan of Care (Insert Text)  PHYSICAL THERAPY STG GOALS :  Initiated 4/1/2017 and to be accomplished within 1-2 Weeks (Updated 4/28/17)     1. Patient will move from supine to sit and sit to supine in bed with minimal assistance/contact guard assist. (Achieved)   2. Patient will transfer from bed to chair and chair to bed with minimum assistance using 2WW. (Maintained; Max A x1)   3. Patient will perform sit to stand with minimum assistance with Fair balance and safety awareness. (Regressed; Max x1)   4. Patient will ambulate with minimum assistance for 10 feet with 2WW on level surfaces with 1 turn. (Progressed; 3ft in parallel bars Max x2)   5. Patient will ascend/descend 1 step with bilateral handrail(s) withminimal assistance to allow for safe home access/exit. (Unable)   6. Patient will improve standardized test score for [facility] Standing Balance Scale to 1+ (Achieved)    PHYSICAL THERAPY STG GOALS :  Initiated 4/1/2017 and to be accomplished within 1-2 Weeks (Updated 4/21/17)     1. Patient will move from supine to sit and sit to supine in bed with minimal assistance/contact guard assist. (Progressing; Mod A)   2. Patient will transfer from bed to chair and chair to bed with minimum assistance using 2WW. (Progressing; Max A x1, Mod A x2)   3. Patient will perform sit to stand with minimum assistance with Fair balance and safety awareness. (Progressing; Mod A x2)   4. Patient will ambulate with minimum assistance for 10 feet with 2WW on level surfaces with 1 turn. (Unable)   5. Patient will ascend/descend 1 step with bilateral handrail(s) withminimal assistance to allow for safe home access/exit. (Unable)   6. Patient will improve standardized test score for [facility] Standing Balance Scale to 1+ (Achieved)      PHYSICAL THERAPY STG GOALS :  Initiated 4/1/2017 and to be accomplished within 1-2 Weeks (Updated 4/14/17)     1. Patient will move from supine to sit and sit to supine in bed with minimal assistance/contact guard assist. (Maintained; max A to total A)   2. Patient will transfer from bed to chair and chair to bed with minimum assistance using 2WW. (No progress; total A transfer)   3. Patient will perform sit to stand with minimum assistance with Fair balance and safety awareness. (No progress; Max A x2 for attempt to stand)   4. Patient will ambulate with minimum assistance for 10 feet with 2WW on level surfaces with 1 turn. (Unable)   5. Patient will ascend/descend 1 step with bilateral handrail(s) withminimal assistance to allow for safe home access/exit. (Unable)   6. Patient will improve standardized test score for [facility] Standing Balance Scale to 1+ (No progress; pt unable to stand)    PHYSICAL THERAPY STG GOALS :  Initiated 4/1/2017 and to be accomplished within 1-2 Weeks (Updated 4/7/17)     1. Patient will move from supine to sit and sit to supine in bed with minimal assistance/contact guard assist. (No progress)   2. Patient will transfer from bed to chair and chair to bed with minimum assistance using 2WW. (No progress; total A transfer)   3. Patient will perform sit to stand with minimum assistance with Fair balance and safety awareness. (No progress; Max A x2 for attempt to stand)   4. Patient will ambulate with minimum assistance for 10 feet with 2WW on level surfaces with 1 turn. (NT, pt unable)   5. Patient will ascend/descend 1 step with bilateral handrail(s) withminimal assistance to allow for safe home access/exit.(NT, pt unable)   6. Patient will improve standardized test score for [facility] Standing Balance Scale to 1+ (No progress; pt unable to stand)     PHYSICAL THERAPY LTG GOALS :  Initiated 4/1/2017 and to be accomplished within 2-4 Weeks    1. Patient will move from supine to sit and sit to supine in bed with supervision/set-up.    2. Patient will transfer from bed to chair and chair to bed with supervision/set-up using 2WW.  3. Patient will perform sit to stand with supervision/set-up with Good balance and safety awareness. 4. Patient will ambulate with supervision/set-up for 50 feet with CGA on level surfaces and be able to maneuver through narrow spaces and obstacles without loss of balance. 5. Patient will ascend/descend 2 steps with bilateral handrail(s) with contact guard assist to allow for safe home access/exit. 6. Patient will improve standardized test score for [facility] Balance Scale to 2    Physical Therapist: Rajinder العلي PT on 4/1/2017    [facility]   PHYSICAL THERAPY DAILY TREATMENT NOTE        Patient: Ho Baer (89 y.o. female)               Date: 5/3/2017    Physician: Queenie Chavez MD  Primary Diagnosis: cellulitis          Treatment Diagnosis  Treatment Diagnosis: dysarthria  Treatment Diagnosis 2: dysarthria  Precautions: Fall, Skin  Vital Signs  Cognitive Status:  Mental Status  Neurologic State: Sleeping;Confused  Orientation Level: Oriented to person  Cognition: Poor safety awareness  Pain  Bed Mobility Training  Balance  Sitting: Impaired; With support  Sitting - Static: Fair (occasional)  Sitting - Dynamic: Fair (occasional) (-)  Standing: Impaired;Pull to stand; With support  Standing - Static: Poor  Standing - Dynamic : Poor  Transfer Training  Transfer Training  Sit to Stand: Maximum assistance; Additional time  Stand to Sit: Maximum assistance; Additional time  Sit to Stand: Maximum assistance; Additional time  Gait Training  Gait  Ambulation - Level of Assistance: Maximum assistance  Distance (ft): 3 Feet (ft)  Assistive Device: Other (comment);Gait belt (at // with B UE support )    With 0 turns. Therapeutic Exercise: pt presented sitting up in w/c at bedside. Pt more alert today and willing to participate in therapy session. Sit<>stand x2 from w/c at // with pull-to-stand technique.  Pt required Max A with verbal and tactile cues to complete sit<>stand. Pt stood twice for 1' each time. Pt stood with B UE support and Mod A to maintain balance. Pt with noted hyperextension of L knee and decreased WB on L LE. Pt ambulated 3ft at // with max A and close w/c follow for safety. Pt with buckling at L knee and required much cueing for L LE advancement. Seated B LE TE rendered to promote strength and endurance for improved functional mobility: HR/TR, LAQ, hip flexion, ball squeezes, resisted hip abd (yellow band) x15. Patient/Caregiver Education:   Pt /Caregiver Education on safety and fall prevention was provided to reduce risk of falls. ASSESSMENT:  Patient continues to benefit from Skilled PT services to improve strength, balance, bed mobility, transfers, gait. Progression toward goals:  [ ]      Improving appropriately and progressing toward goals  [X]      Improving slowly and progressing toward goals  [X]      Not making progress toward goals and plan of care will be adjusted      Treatment session: 30 minutes.   Therapist:   Liana Yi PTA,          5/3/2017

## 2017-05-03 NOTE — PROGRESS NOTES
Problem: Self Care Deficits Care Plan (Adult)  Goal: *Acute Goals and Plan of Care (Insert Text)  OCCUPATIONAL THERAPY SHORT TERM GOALS   Updated 4/28/2017:  1. Patient will perform Upper body ADLs with/without adaptive equipment with minimal assistance/contact guard assist.   2. Patient will perform Lower body ADLs with/without adaptive equipment with moderate assistance. 3. Patient will perform toileting task with moderate assistance with Fair safety to reduce falls risk. 4. Patient will perform functional transfers with Rolling Walker and MIN A X2.  6. Patient will improve Barthel index scores to atleast 50/100 to improve functional mobility. 7. Patient will improve standardized test score for [place] S[place] Balance Scale to 2+ OR MORE. Updated 4/21/2017:  1. Patient will perform Upper body ADLs with/without adaptive equipment with minimal assistance/contact guard assist. progressing  2. Patient will perform Lower body ADLs with/without adaptive equipment with moderate assistance. progressing  3. Patient will perform toileting task with moderate assistance with Fair safety to reduce falls risk. progressing  4. Patient will perform functional transfers with Rolling Walker and MIN A X2. progressing  6. Patient will improve Barthel index scores to atleast 50/100 to improve functional mobility. progressing  7. Patient will improve standardized test score for [place] Sitting Balance Scale to 2+ OR MORE. progressing    Updated 4/14/17    1. Patient will perform Upper body ADLs with/without adaptive equipment with minimal assistance/contact guard assist. progressing   2. Patient will perform Lower body ADLs with/without adaptive equipment with moderate assistance. progressing  3. Patient will perform toileting task with moderate assistance with Fair safety to reduce falls risk. progressing  4. Patient will perform functional transfers with Rolling Walker and moderate assistance. GM, upg MIN A X2  5. Patient will perform standing static/dynamic balance activities for improved ADL/IADL function with moderate assistance x 2 and Fair balance and safety awarenes. D/C GOAL, N/A at this time  6. Patient will improve Barthel index scores to atleast 50/100 to improve functional mobility. progressing  7. Patient will improve standardized test score for [place] Sitting Balance Scale to 2 UPG 2+ OR MORE    Updated 4/07/17    1. Patient will perform Upper body ADLs with/without adaptive equipment with minimal assistance/contact guard assist. (progressing)  2. Patient will perform Lower body ADLs with/without adaptive equipment with moderate assistance. (progressing)  3. Patient will perform toileting task with moderate assistance with Fair safety to reduce falls risk. (progressing)  4. Patient will perform functional transfers with Rolling Walker and moderate assistance. (progressing)  5. Patient will perform standing static/dynamic balance activities for improved ADL/IADL function with moderate assistance x 2 and Fair balance and safety awarenes. (progressing)  6. Patient will improve Barthel index scores to atleast 50/100 to improve functional mobility. (progressing)  7. Patient will improve standardized test score for [place] S[place] Balance Scale to 2 (goal met-continue for consistency)    Initiated 4/3/2017 and to be accomplished within 2 Week(s)    1. Patient will perform Upper body ADLs with/without adaptive equipment with minimal assistance/contact guard assist. (progressing-currently Max A)  2. Patient will perform Lower body ADLs with/without adaptive equipment with moderate assistance. (progressing-Max A)  3. Patient will perform toileting task with moderate assistance with Fair safety to reduce falls risk. (progressing-currently Max A)  4. Patient will perform functional transfers with Rolling Walker and moderate assistance. (progressing-currently Total A)  5.  Patient will perform standing static/dynamic balance activities for improved ADL/IADL function with moderate assistance x 2 and Fair balance and safety awarenes. (progressing-currently Total A )  6. Patient will improve Barthel index scores to atleast 50/100 to improve functional mobility. (progressing)  7. Patient will improve standardized test score for [place] Sitting Balance Scale to 2 (progressing)    OCCUPATIONAL THERAPY LONG TERM GOALS   Initiated 4/3/2017 and to be accomplished within 3-4 Week(s)    1. Patient will perform Upper body ADLs with/without adaptive equipment with supervision/set-up. 2. Patient will perform Lower body ADLs with/without adaptive equipment with minimal assistance/contact guard assist .  3. Patient will perform toileting task with minimal assistance/contact guard assist with Fair safety to reduce falls risk. 4. Patient will perform functional transfers with Crystal Harvey and minimal assistance/contact guard assist and Fair balance and safety awareness. 5. Patient will perform standing static/dynamic activity for improved ADL/IADL function with minimal assistance/contact guard assist an and Fair balance and safety awareness. 6. Patient will improve Barthel index score to 65/100 to improve independence with mobility. 7. Patient will improve standardized test score for [place] Sitting Balance to 3. Therapist: MARIPOSA Helm 4/3/2017   [facility]   OCCUPATIONAL THERAPY DAILY TREATMENT NOTE        Patient:  Corky Harrison (21 y.o. female)                      Date: 5/3/2017  Attending Physician: Dorothy Hodgkin, MD  Primary Diagnosis: cellulitis    Treatment Diagnosis  Treatment Diagnosis: dysarthria  Treatment Diagnosis 2: dysarthria   Precautions : Precautions at Admission: Fall, Skin  Vital Signs:        Cognitive Status:  Mental Status  Neurologic State: Sleeping;Confused  Orientation Level: Oriented to person  Cognition: Poor safety awareness  Pain:        Gross Assessment:     Coordination: Bed Mobility:     Transfers:  Functional Transfers  Sit to Stand: Maximum assistance; Additional time  Stand to Sit: Maximum assistance; Additional time     Balance:  Balance  Sitting: Impaired; With support  Sitting - Static: Fair (occasional)  Sitting - Dynamic: Fair (occasional) (-)  Standing: Impaired;Pull to stand; With support  Standing - Static: Poor  Standing - Dynamic : Poor  Therapeutic Activities:  Pink T-Putty exercises in order to increase hand dexterity and  strengthening needed for ADLS. Minimum rest breaks needed. Patient is able to follow commands and cueing better today as well as attention to task. Therapeutic Exercises:  UB restorator x 5 mins, 2 intervals in order to increase functional activity tolerance needed for ADL tasks and transfers. Patient/Caregiver Education:    Yury Desai Half Education on self pacing during UB restorator was provided for optimal energy consveration.         ASSESSMENT:  Patient continues to demonstrate the need for skilled Occupational Therapy services to improve independence with self-feeding  Progression toward goals:  [ ]      Improving appropriately and progressing toward goals  [ ]      Improving slowly and progressing toward goals  Bekah ]      Not making progress toward goals and plan of care will be adjusted      Treatment session:   32 minutes     Therapist:    Ata Neil 18[facility],  5/3/2017

## 2017-05-03 NOTE — ROUTINE PROCESS
Bedside and Verbal shift change report given to Noah Pak (oncoming nurse) by Luke Pendleton RN (offgoing nurse). Report included the following information SBAR. Q VISUAL CHECKS DONE.

## 2017-05-04 PROCEDURE — 74011250637 HC RX REV CODE- 250/637: Performed by: INTERNAL MEDICINE

## 2017-05-04 PROCEDURE — 74011636637 HC RX REV CODE- 636/637: Performed by: INTERNAL MEDICINE

## 2017-05-04 RX ADMIN — AMLODIPINE BESYLATE 2.5 MG: 2.5 TABLET ORAL at 09:26

## 2017-05-04 RX ADMIN — LEVOTHYROXINE SODIUM 50 MCG: 50 TABLET ORAL at 09:26

## 2017-05-04 RX ADMIN — BISACODYL 10 MG: 10 SUPPOSITORY RECTAL at 06:34

## 2017-05-04 RX ADMIN — PANTOPRAZOLE SODIUM 40 MG: 40 TABLET, DELAYED RELEASE ORAL at 09:26

## 2017-05-04 RX ADMIN — PREDNISONE 30 MG: 10 TABLET ORAL at 09:26

## 2017-05-04 RX ADMIN — FLUOXETINE HYDROCHLORIDE 20 MG: 20 CAPSULE ORAL at 09:26

## 2017-05-04 RX ADMIN — ROPINIROLE HYDROCHLORIDE 2 MG: 1 TABLET, FILM COATED ORAL at 22:37

## 2017-05-04 RX ADMIN — SIMVASTATIN 20 MG: 20 TABLET, FILM COATED ORAL at 22:37

## 2017-05-04 RX ADMIN — TRAMADOL HYDROCHLORIDE 100 MG: 50 TABLET, FILM COATED ORAL at 04:58

## 2017-05-04 RX ADMIN — BIMATOPROST 1 DROP: 0.1 SOLUTION/ DROPS OPHTHALMIC at 18:00

## 2017-05-04 RX ADMIN — CLONAZEPAM 1 MG: 0.5 TABLET ORAL at 17:11

## 2017-05-04 RX ADMIN — CLONAZEPAM 1 MG: 0.5 TABLET ORAL at 09:26

## 2017-05-04 RX ADMIN — MIRTAZAPINE 15 MG: 15 TABLET, FILM COATED ORAL at 22:37

## 2017-05-04 RX ADMIN — MELOXICAM 7.5 MG: 7.5 TABLET ORAL at 09:26

## 2017-05-04 RX ADMIN — ASPIRIN 325 MG ORAL TABLET 325 MG: 325 PILL ORAL at 09:26

## 2017-05-04 RX ADMIN — FOLIC ACID 1 MG: 1 TABLET ORAL at 09:26

## 2017-05-04 RX ADMIN — CLOPIDOGREL BISULFATE 75 MG: 75 TABLET, FILM COATED ORAL at 09:26

## 2017-05-04 NOTE — PROGRESS NOTES
Problem: Self Care Deficits Care Plan (Adult)  Goal: *Acute Goals and Plan of Care (Insert Text)  OCCUPATIONAL THERAPY SHORT TERM GOALS     Updated 5/04/17    1. Patient will perform Upper body ADLs with/without adaptive equipment with contact guard assist.   2. Patient will perform Lower body ADLs with/without adaptive equipment with moderate assistance. 3. Patient will perform toileting task with moderate assistance with Fair safety to reduce falls risk. 4. Patient will perform functional transfers with Rolling Walker and MIN A X2.   6. Patient will improve Barthel index scores to atleast 50/100 to improve functional mobility. 7. Patient will improve standardized test score for [place] S[place] Balance Scale to 2+ OR MORE. Updated 4/28/2017:    1. Patient will perform Upper body ADLs with/without adaptive equipment with minimal assistance/contact guard assist. (goal met-[facility] CGA)  2. Patient will perform Lower body ADLs with/without adaptive equipment with moderate assistance. (progressing)  3. Patient will perform toileting task with moderate assistance with Fair safety to reduce falls risk. (progressing)  4. Patient will perform functional transfers with Rolling Walker and MIN A X2. (progressing)  6. Patient will improve Barthel index scores to atleast 50/100 to improve functional mobility.(progressing)  7. Patient will improve standardized test score for [place] S[place] Balance Scale to 2+ OR MORE. (progressing-currently 2)    Updated 4/21/2017:  1. Patient will perform Upper body ADLs with/without adaptive equipment with minimal assistance/contact guard assist. progressing  2. Patient will perform Lower body ADLs with/without adaptive equipment with moderate assistance. progressing  3. Patient will perform toileting task with moderate assistance with Fair safety to reduce falls risk. progressing  4. Patient will perform functional transfers with Rolling Walker and MIN A X2. progressing  6.  Patient will improve Barthel index scores to atleast 50/100 to improve functional mobility. progressing  7. Patient will improve standardized test score for [place] S[place] Balance Scale to 2+ OR MORE. progressing    Updated 4/14/17    1. Patient will perform Upper body ADLs with/without adaptive equipment with minimal assistance/contact guard assist. progressing   2. Patient will perform Lower body ADLs with/without adaptive equipment with moderate assistance. progressing  3. Patient will perform toileting task with moderate assistance with Fair safety to reduce falls risk. progressing  4. Patient will perform functional transfers with Rolling Walker and moderate assistance. GM, upg MIN A X2  5. Patient will perform standing static/dynamic balance activities for improved ADL/IADL function with moderate assistance x 2 and Fair balance and safety awarenes. D/C GOAL, N/A at this time  6. Patient will improve Barthel index scores to atleast 50/100 to improve functional mobility. progressing  7. Patient will improve standardized test score for [place] Balance Scale to 2 [facility] 2+ OR MORE    Updated 4/07/17    1. Patient will perform Upper body ADLs with/without adaptive equipment with minimal assistance/contact guard assist. (progressing)  2. Patient will perform Lower body ADLs with/without adaptive equipment with moderate assistance. (progressing)  3. Patient will perform toileting task with moderate assistance with Fair safety to reduce falls risk. (progressing)  4. Patient will perform functional transfers with Rolling Walker and moderate assistance. (progressing)  5. Patient will perform standing static/dynamic balance activities for improved ADL/IADL function with moderate assistance x 2 and Fair balance and safety awarenes. (progressing)  6. Patient will improve Barthel index scores to atleast 50/100 to improve functional mobility. (progressing)  7.  Patient will improve standardized test score for [facility] Balance Scale to 2 (goal met-continue for consistency)    Initiated 4/3/2017 and to be accomplished within 2 Week(s)    1. Patient will perform Upper body ADLs with/without adaptive equipment with minimal assistance/contact guard assist. (progressing-currently Max A)  2. Patient will perform Lower body ADLs with/without adaptive equipment with moderate assistance. (progressing-Max A)  3. Patient will perform toileting task with moderate assistance with Fair safety to reduce falls risk. (progressing-currently Max A)  4. Patient will perform functional transfers with Rolling Walker and moderate assistance. (progressing-currently Total A)  5. Patient will perform standing static/dynamic balance activities for improved ADL/IADL function with moderate assistance x 2 and Fair balance and safety awarenes. (progressing-currently Total A )  6. Patient will improve Barthel index scores to atleast 50/100 to improve functional mobility. (progressing)  7. Patient will improve standardized test score for [place] S[place] Balance Scale to 2 (progressing)    OCCUPATIONAL THERAPY LONG TERM GOALS   Initiated 4/3/2017 and to be accomplished within 3-4 Week(s)    1. Patient will perform Upper body ADLs with/without adaptive equipment with supervision/set-up. 2. Patient will perform Lower body ADLs with/without adaptive equipment with minimal assistance/contact guard assist .  3. Patient will perform toileting task with minimal assistance/contact guard assist with Fair safety to reduce falls risk. 4. Patient will perform functional transfers with Atlee Cordon and minimal assistance/contact guard assist and Fair balance and safety awareness. 5. Patient will perform standing static/dynamic activity for improved ADL/IADL function with minimal assistance/contact guard assist an and Fair balance and safety awareness. 6. Patient will improve Barthel index score to 65/100 to improve independence with mobility.   7. Patient will improve standardized test score for [place] Sitting Balance to 3. Therapist: Emory Anderson 77. 4/3/2017   [facility]  OCCUPATIONAL THERAPY WEEKLY SUMMARY   Reporting period:  from 4/28/17 through 5/2/17        Patient: Blanca Carlos (60 y.o. female)                             Date: 5/4/2017    Primary Diagnosis: cellulitis                                  Attending Physician: Roni Douglas MD Treatment Diagnosis  Treatment Diagnosis: dysarthria  Treatment Diagnosis 2: dysarthria  Precautions: Fall, Skin  Rehab Potential : Guarded     Skill interventions and education provided with clinical rationale (include individualized treatment techniques and standardized tests):  Skilled Occupational services were provided utilizing therapeutic exercises, therapeutic activities, functional mobility, functional activities, and EC/WS. Using a comparative statement, summarize significant progress toward goals as a result of skilled intervention provided:  Patient has made Fair progress towards their Occupational Therapy goals in the following areas: increased independence and sitting balance needed for self-feeding and upper body dressing. Patient has met 1/6 STGS and making slow progression towards LTGS. Identify remaining functional areas, impairments limiting progress and/or barriers to improvement:  Patient would benefit from continued skilled Occupational Therapy Services to address the following functional deficits in decreased dynamic sitting and dynamic standing balance and tolerance needed for ADLS. Barriers to improvement are confusion.             OBJECTIVE DATA SUMMARY:          INITIAL ASSESSMENT WEEKLY PROGRESS   COGNITIVE STATUS: COGNITIVE STATUS:   Neurologic State: Alert  Orientation Level: Oriented to person  Cognition: Follows commands  Perception: Cues to maintain midline in sitting, Tactile, Verbal  Perseveration: No perseveration noted  Safety/Judgement: Fall prevention Neurologic State: Confused, Alert  Orientation Level: Oriented to person  Cognition: Impulsive, Impaired decision making, Poor safety awareness  Perception: Cues to maintain midline in sitting, Cues to maintain midline in standing, Tactile, Verbal  Perseveration: No perseveration noted  Safety/Judgement: Fall prevention   PAIN: PAIN:   Pain Scale 1: Numeric (0 - 10)  Pain Intensity 1: 0  Pain Onset 1: movement  Pain Location 1: Abdomen  Pain Orientation 1: Mid  Pain Description 1: Sharp  Pain Intervention(s) 1: Medication (see MAR), Rest, Repositioned, Position  Patient Stated Pain Goal: 0  Pain Reassessment 1: Yes       Pain Scale 1: Numeric (0 - 10)  Pain Intensity 1: 0  Pain Onset 1: movement  Pain Location 1: Generalized  Pain Orientation 1: Other (comment)  Pain Description 1: Aching  Pain Intervention(s) 1: Medication (see MAR)  Patient Stated Pain Goal: 0  Pain Reassessment 1: Yes   BED MOBILITY BED MOBILITY   Rolling: Minimum assistance, Additional time  Supine to Sit: Moderate assistance  Sit to Supine: Minimum assistance, Additional time  Scooting: Minimum assistance Rolling: Minimum assistance, Moderate assistance  Supine to Sit: Contact guard assistance  Sit to Supine: Maximum assistance  Scooting: Minimum assistance, Contact guard assistance   ADL SELF CARE ADL SELF CARE   Feeding: Setup  Oral Facial Hygiene/Grooming: Contact guard assistance  Bathing: Moderate assistance  Upper Body Dressing: Minimum assistance  Lower Body Dressing: Maximum assistance  Toileting: Maximum assistance Bathing Assistance: Maximum assistance  Position Performed: Seated edge of bed     Dressing Assistance: Minimum assistance  Hospital Gown: Moderate assistance  Pullover Shirt: Minimum assistance  Cues: Physical assistance, Visual cues provided     Bathing Assistance:  Total assistance(dependent)  Perineal  : Total assistance (dependent)  Position Performed: Seated in chair     Toileting Assistance: Maximum assistance  Bladder Hygiene: Maximum assistance  Clothing Management: Maximum assistance     Grooming Assistance: Minimum assistance  Washing Face: Minimum assistance  Washing Hands: Minimum assistance  Brushing Teeth: Stand-by assistance  Brushing/Combing Hair: Stand-by assistance  Cues: Verbal cues provided, Tactile cues provided     Dressing Assistance: Maximum assistance  Underpants: Maximum assistance  Pants With Elastic Waist: Maximum assistance  Leg Crossed Method Used: No  Position Performed: Long sitting on bed, Standing  Cues: Don     Feeding Assistance: Minimum assistance  Container Management: Contact guard assistance  Cutting Food: Minimum assistance  Utensil Management: Minimum assistance  Food to Mouth: Stand-by assistance  Drink to Mouth: Stand-by assistance, Minimum assistance  Cues: Verbal cues provided   TRANSFERS TRANSFERS   Sit to Stand: Maximum assistance  Stand to Sit: Maximum assistance  Bed to Chair: Total assistance Sit to Stand: Maximum assistance, Additional time  Stand to Sit: Maximum assistance, Additional time  Bed to Chair: Maximum assistance, Additional time         BALANCE BALANCE   Sitting: With support  Sitting - Static: Poor (constant support)  Sitting - Dynamic: Poor (constant support)  Standing: Impaired, Pull to stand, With support  Standing - Static: Poor  Standing - Dynamic : None Sitting: With support  Sitting - Static: Fair (occasional)  Sitting - Dynamic: Fair (occasional)  Standing: Impaired, With support  Standing - Static: Poor  Standing - Dynamic : Poor         GROSS ASSESSMENT  GROSS ASSESSMENT   AROM: Generally decreased, functional (BLE)  PROM: Generally decreased, functional  Strength: Generally decreased, functional (BLE)  Coordination: Generally decreased, functional  Tone: Normal  Sensation: Intact AROM: Generally decreased, functional  PROM: Generally decreased, functional  Strength: Generally decreased, functional  Coordination: Generally decreased, functional  Tone: Normal  Sensation: Intact   COORDINATION COORDINATION   Fine Motor Skills-Upper: Left Intact, Right Intact  Gross Motor Skills-Upper: Left Intact, Right Intact Fine Motor Skills-Upper: Left Intact, Right Intact  Gross Motor Skills-Upper: Left Intact, Right Intact   VISUAL/PERCEPTUAL VISUAL/PERCEPTUAL   Tracking: Able to track stimulus in all quadrants w/o difficulty (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)   Tracking: Able to track stimulus in all quadrants w/o difficulty (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)    5   BOWELS  0=incontinent  5=occasional accident  10=continent    5   BLADDER  0=incontinent, or catheterized and unable to manage alone  5=occasional accident  10=continent    0   TOILET USE  0=dependent  5=needs some help, but can do something alone  10=independent (on and off, dressing, wiping)    0   TRANSFER (BED TO CHAIR AND BACK)  0=unable, no sitting balance  5=major help(one or two people,physical), can sit  10=minor help(verbal or physical)  15=independent    5   MOBILITY (ON LEVEL SURFACES)  0=immobile or <50 yards  5=wheelchair independent,including corners,>50 yards  10=walkes with help of one person (verbal or physical) >50 yards  15=independent(but may use any aid; for example, stick) >50 yards    0   STAIRS  0=unable  5=needs help (verbal, physical, carrying aid)  10=independent 0             TOTAL:                  [id number]      Treatment:  Completed family training with patient's  and granddaughter in order to increase safety and independence with all ADL tasks and transfers upon return home. KAUR informed patient's family regarding occasional assistance needed for self feeding due to low vision and possible color contrast plates to increase independence. KAUR also educated patient's family on importance of increasing patient's participation and independence with task for optimal independence. Patient's family verbalized understanding. Patient's  informed KAUR he placed patient on bedside commode before. KAUR informed patient's  she wouldn't recommend due to patient's occasion deficits with balance and antiness. KAUR and PTA informed patient's family we recommend 24 hour supervision for optimal safety. Patient's response to Treatment rendered:  Patient's family is pleasant and receptive to therapist cueing and recommendations. Patient expected Discharge Location:  [ [facility]  [ ] [facility]  [ [facility]  [ ]Other:     Plan: Continue OT services as established on the Plan of Care for 3 times a week.   Treatment Minutes:  26  OT and Assistant have had a weekly case conference regarding the above treatment:  [X] Yes     [ ] No    Therapist:   Indira Pelayo,         Date:5/4/2017      Forward to OT for co-signature when completed

## 2017-05-04 NOTE — PROGRESS NOTES
Problem: Mobility Impaired (Adult and Pediatric)  Goal: *Acute Goals and Plan of Care (Insert Text)  PHYSICAL THERAPY STG GOALS :  Initiated 4/1/2017 and to be accomplished within 1-2 Weeks (Updated /4/17)     1. Patient will move from supine to sit and sit to supine in bed with minimal assistance/contact guard assist. (Achieved)   2. Patient will transfer from bed to chair and chair to bed with minimum assistance using 2WW. (Maintained; Max A x1)   3. Patient will perform sit to stand with minimum assistance with Fair balance and safety awareness. (Maintainted; Max x1)   4. Patient will ambulate with minimum assistance for 10 feet with 2WW on level surfaces with 1 turn. (Maintainted; 3ft in parallel bars Max x2)   5. Patient will ascend/descend 1 step with bilateral handrail(s) withminimal assistance to allow for safe home access/exit. (Unable)   6. Patient will improve standardized test score for [facility] Standing Balance Scale to 1+ (Achieved)    PHYSICAL THERAPY STG GOALS :  Initiated 4/1/2017 and to be accomplished within 1-2 Weeks (Updated 4/28/17)     1. Patient will move from supine to sit and sit to supine in bed with minimal assistance/contact guard assist. (Achieved)   2. Patient will transfer from bed to chair and chair to bed with minimum assistance using 2WW. (Maintained; Max A x1)   3. Patient will perform sit to stand with minimum assistance with Fair balance and safety awareness. (Regressed; Max x1)   4. Patient will ambulate with minimum assistance for 10 feet with 2WW on level surfaces with 1 turn. (Progressed; 3ft in parallel bars Max x2)   5. Patient will ascend/descend 1 step with bilateral handrail(s) withminimal assistance to allow for safe home access/exit. (Unable)   6.  Patient will improve standardized test score for [facility] Standing Balance Scale to 1+ (Achieved)    PHYSICAL THERAPY STG GOALS :  Initiated 4/1/2017 and to be accomplished within 1-2 Weeks (Updated 4/21/17) 1. Patient will move from supine to sit and sit to supine in bed with minimal assistance/contact guard assist. (Progressing; Mod A)   2. Patient will transfer from bed to chair and chair to bed with minimum assistance using 2WW. (Progressing; Max A x1, Mod A x2)   3. Patient will perform sit to stand with minimum assistance with Fair balance and safety awareness. (Progressing; Mod A x2)   4. Patient will ambulate with minimum assistance for 10 feet with 2WW on level surfaces with 1 turn. (Unable)   5. Patient will ascend/descend 1 step with bilateral handrail(s) withminimal assistance to allow for safe home access/exit. (Unable)   6. Patient will improve standardized test score for [facility] Standing Balance Scale to 1+ (Achieved)      PHYSICAL THERAPY STG GOALS :  Initiated 4/1/2017 and to be accomplished within 1-2 Weeks (Updated 4/14/17)     1. Patient will move from supine to sit and sit to supine in bed with minimal assistance/contact guard assist. (Maintained; max A to total A)   2. Patient will transfer from bed to chair and chair to bed with minimum assistance using 2WW. (No progress; total A transfer)   3. Patient will perform sit to stand with minimum assistance with Fair balance and safety awareness. (No progress; Max A x2 for attempt to stand)   4. Patient will ambulate with minimum assistance for 10 feet with 2WW on level surfaces with 1 turn. (Unable)   5. Patient will ascend/descend 1 step with bilateral handrail(s) withminimal assistance to allow for safe home access/exit. (Unable)   6. Patient will improve standardized test score for [facility] Standing Balance Scale to 1+ (No progress; pt unable to stand)    PHYSICAL THERAPY STG GOALS :  Initiated 4/1/2017 and to be accomplished within 1-2 Weeks (Updated 4/7/17)     1. Patient will move from supine to sit and sit to supine in bed with minimal assistance/contact guard assist. (No progress)   2.  Patient will transfer from bed to chair and chair to bed with minimum assistance using 2WW. (No progress; total A transfer)   3. Patient will perform sit to stand with minimum assistance with Fair balance and safety awareness. (No progress; Max A x2 for attempt to stand)   4. Patient will ambulate with minimum assistance for 10 feet with 2WW on level surfaces with 1 turn. (NT, pt unable)   5. Patient will ascend/descend 1 step with bilateral handrail(s) withminimal assistance to allow for safe home access/exit.(NT, pt unable)   6. Patient will improve standardized test score for [facility] Standing Balance Scale to 1+ (No progress; pt unable to stand)     PHYSICAL THERAPY LTG GOALS :  Initiated 4/1/2017 and to be accomplished within 2-4 Weeks    1. Patient will move from supine to sit and sit to supine in bed with supervision/set-up. 2. Patient will transfer from bed to chair and chair to bed with supervision/set-up using 2WW.  3. Patient will perform sit to stand with supervision/set-up with Good balance and safety awareness. 4. Patient will ambulate with supervision/set-up for 50 feet with CGA on level surfaces and be able to maneuver through narrow spaces and obstacles without loss of balance. 5. Patient will ascend/descend 2 steps with bilateral handrail(s) with contact guard assist to allow for safe home access/exit. 6. Patient will improve standardized test score for [facility] Balance Scale to 2    Physical Therapist: Prieto Barboza PT on 4/1/2017    [facility]   PHYSICAL THERAPY WEEKLY PROGRESS REPORT  Reporting Period:  Date: 4/28/17 to 5/4/17        Patient:  Sarabjit Anthony (44 y.o. female)                         Date: 5/4/2017    Primary Diagnosis: cellulitis                      Attending Physician: Lisa Westbrook MD   Treatment Diagnosis  Treatment Diagnosis: dysarthria  Treatment Diagnosis 2: dysarthria  Precautions:  Fall, Skin  Rehab Potential : Guarded:     Skill interventions and education provided with clinical rationale (include individualized treatment techniques and standardized tests):   Skilled Physical Therapy services were provided with TA to promote greater independence with bed mobility and transfers, TE to build strength, endurance and promote flexibility for improved functional mobility, Gait training to address deficits and allow pt to return to PLOF, Neuromuscular reeducation of movement, coordination and balance for reduced risk of falls, pt had been able to participate in stair training. Using a comparative statement, summarize significant progress toward goals as a result of skilled intervention provided:  Patient has made Poor progress towards their Physical Therapy goals in the areas of bed mobility, transfers,gait and balance. Identify remaining functional areas, impairments limiting progress and/or barriers to improvement:  Patient would benefit from continues PT services to address the following functional deficits in bed mobility, strength, balance, gait and stair negotiation.        OBJECTIVE DATA SUMMARY:       INITIAL ASSESSMENT WEEKLY ASSESSMENT   COGNITIVE STATUS COGNITIVE STATUS   Neurologic State: Alert  Orientation Level: Oriented to person  Cognition: Follows commands  Perception: Cues to maintain midline in sitting, Tactile, Verbal  Perseveration: No perseveration noted  Safety/Judgement: Fall prevention Neurologic State: Confused, Alert  Orientation Level: Oriented to person  Cognition: Impulsive, Impaired decision making, Poor safety awareness  Perception: Cues to maintain midline in sitting, Cues to maintain midline in standing, Tactile, Verbal  Perseveration: No perseveration noted  Safety/Judgement: Fall prevention   PAIN PAIN   Pain Scale 1: Numeric (0 - 10)  Pain Intensity 1: 0  Pain Onset 1: movement  Pain Location 1: Abdomen  Pain Orientation 1: Mid  Pain Description 1: Sharp  Pain Intervention(s) 1: Medication (see MAR), Rest, Repositioned, Position  Patient Stated Pain Goal: 0  Pain Reassessment 1: Yes Pain Scale 1: Numeric (0 - 10)  Pain Intensity 1: 0  Pain Onset 1: movement  Pain Location 1: Generalized  Pain Orientation 1: Other (comment)  Pain Description 1: Aching  Pain Intervention(s) 1: Medication (see MAR)  Patient Stated Pain Goal: 0  Pain Reassessment 1: Yes   GROSS ASSESSMENT GROSS ASSESSMENT   AROM: Generally decreased, functional (BLE)  PROM: Generally decreased, functional  Strength: Generally decreased, functional (BLE)  Coordination: Generally decreased, functional  Tone: Normal  Sensation: Intact AROM: Generally decreased, functional  PROM: Generally decreased, functional  Strength: Generally decreased, functional  Coordination: Generally decreased, functional  Tone: Normal  Sensation: Intact   BED MOBILITY BED MOBILITY   Rolling: Minimum assistance, Additional time  Supine to Sit: Moderate assistance  Sit to Supine: Minimum assistance, Additional time  Scooting: Minimum assistance Rolling: Minimum assistance, Moderate assistance  Supine to Sit: Contact guard assistance  Sit to Supine: Maximum assistance  Scooting: Minimum assistance, Contact guard assistance   GAIT GAIT   Ambulation - Level of Assistance: Maximum assistance, Assist x2  Distance (ft): 3 Feet (ft)  Assistive Device: Gait belt, Other (comment) (parallel bars) Ambulation - Level of Assistance: Maximum assistance  Distance (ft): 3 Feet (ft)  Assistive Device: Other (comment), Gait belt (at // with B UE support )         Full Full   Full Full   TRANSFERS TRANSFERS   Sit to Stand: Maximum assistance  Stand to Sit: Maximum assistance  Bed to Chair: Total assistance Sit to Stand: Maximum assistance, Additional time  Stand to Sit: Maximum assistance, Additional time  Bed to Chair: Maximum assistance, Additional time   BALANCE BALANCE   Sitting: With support  Sitting - Static: Poor (constant support)  Sitting - Dynamic: Poor (constant support)  Standing: Impaired, Pull to stand, With support  Standing - Static: Poor  Standing - Dynamic : None Sitting: With support  Sitting - Static: Fair (occasional)  Sitting - Dynamic: Fair (occasional)  Standing: Impaired, With support  Standing - Static: Poor  Standing - Dynamic : Poor   WHEELCHAIR MOBILITY/MGMT WHEELCHAIR MOBILITY/MGMT         Activity Tolerance:  Poor Activity Tolerance: Poor   Visual/Perceptual   Tracking: Able to track stimulus in all quadrants w/o difficulty (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 Following demonstration, pt's spouse was able to correctly complete SPT from w/c<>EOB. Pt's niece required verbal cues for proper technique and body mechanics with transfers. Therapist provided pt's spouse with handout for seated B LE TE's. Patient's response to treatment rendered:  Fair      Patient expected Discharge Location:  [X]Private Residence  [ ] correction/ILF  [ Art Eva  [ Scar Gey:     Plan: Continue Skilled PT services as established by the Plan of Care for 3-4 additional sessions with DC planned for 5/8/17     PT and Assistant have had a weekly case conference regarding the above treatment:  [X] Yes     [ ] No        Treatment session:  29 minutes. Therapist: Shanita Barillas PTA       Date:5/4/2017  Forward to PT for co-signature when completed.

## 2017-05-04 NOTE — ROUTINE PROCESS
Post Fall Documentation      Blanca [place] witnessed/unwitnessed fall occurred on 05/03/17 (Date) at 2115 (Time). The answers to the following questions summarize the fall:     · In the patient's own words,:  · What was he/she doing when he/she fell? Sitting on the floor @ bedside    · What are his/her complaints? I'm trying to get up    · Nurse:  · Document observation, treatment, conversation, follow-up, and patient response. Patient found sitting on the floor about to cry waiting for  to come back. · What was the patient's condition when found (i.e., pain, symptoms, cuts, bruises)? No c/o pain,no bruises on buttocks noted/apperas restless. · What specific complaints did the patient have? none     · What did the staff do when patient was found (i.e., vital signs, returned to bed with fall alarm, side rails up)? V/s jfplo=587/86=p=98,temp=98 resp=18,02 sat RA=95% Returned to bed with bed alarm and 3 siderails up. Dr Myron Perez notified. Message faxed to him via office. Nursing danika Coello notified.  came back and was notified. Nurse manager Agata Wild notified.  Luke Pendleton RN

## 2017-05-04 NOTE — ROUTINE PROCESS
Post Fall Documentation      Meghan Valdes witnessed/unwitnessed fall occurred on 05/03/17 (Date) at 2115 (Time). The answers to the following questions summarize the fall:     · In the patient's own words,:  · What was he/she doing when he/she fell? Sitting on the floor @ bedside    · What are his/her complaints? I'm trying to get up    · Nurse:  · Document observation, treatment, conversation, follow-up, and patient response. Patient found sitting on the floor about to cry waiting for  to come back. · What was the patient's condition when found (i.e., pain, symptoms, cuts, bruises)? No c/o pain,no bruises on buttocks noted/apperas restless. · What specific complaints did the patient have? none     · What did the staff do when patient was found (i.e., vital signs, returned to bed with fall alarm, side rails up)? V/s wnivc=023/86=p=98,temp=98 resp=18,02 sat RA=95% Returned to bed with bed alarm and 3 siderails up. Dr Aida Baker notified. Message faxed to him via office. Nursing danika Coello notified.  came back and was notified. Nurse manager Almas Tobar notified.  Luann Ulrich RN

## 2017-05-04 NOTE — PROGRESS NOTES
Ronna Aviles. Deepika Peters MD, [address] [place] [address] [address]   [phone] fax         Willian Olvera [id number] 5/4/2017, 7:23 AM    SUBJECTIVE:  Cannot respond meaningfully. 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 Not communicating but is awake  MUSCULOSKELETAL: Moving more this AM but still minimal active ROM. Labs: Results:   Chemistry No results for input(s): GLU, NA, K, CL, CO2, BUN, CREA, MG, CA, AGAP, BUCR, TBIL, GPT, AP, TP, ALB, GLOB, AGRAT in the last 72 hours. CrCl cannot be calculated (Patient has no serum creatinine result on file.). CBC w/Diff No results for input(s): WBC, RBC, HGB, HCT, PLT, GRANS, LYMPH, EOS, HGBEXT, HCTEXT, PLTEXT in the last 72 hours. Cardiac Enzymes No results for input(s): CPK, CKND1, GONZALO in the last 72 hours.     No lab exists for component: CKRMB, TROIP, PBNP   Coagulation No results for input(s): PTP, INR, APTT in the last 72 hours. No lab exists for component: INREXT    Limited Lipid Panel No results for input(s): CHOL, LDLC, VLDL, TGL, CHHD in the last 72 hours. No lab exists for component: HDLC   Full Lipid Panel No results for input(s): CHOL, CHOLPOCT, CHOLX, CHLST, CHOLV, [id number], HDL, LDL, NLDLCT, DLDL, LDLC, DLDLP, [id number], VLDLC, VLDL, TGL, TGLX, TRIGL, [id number], TRIGP, TGLPOCT, [id number], CHHD, CHHDX in the last 72 hours. No lab exists for component: [id number], [id number], [id number], CHOLP, HDLPOCT, [id number], NHDLCT, [id number], HDLC, HDLP, LDLPOCT, [id number], [id number]   BMP No results for input(s): NA, K, CL, CO2, AGAP, GLU, BUN, CREA, GFRAA, GFRNA in the last 72 hours. CMP No results for input(s): NA, K, CL, CO2, AGAP, GLU, BUN, CREA, GFRAA, GFRNA, CA, MG, PHOS, ALB, TBIL, TP, ALB, GLOB, AGRAT, SGOT, ALT, GPT in the last 72 hours. BNP No lab exists for component: PBNP   Liver Enzymes No results for input(s): TP, ALB, TBIL, AP, SGOT, GPT in the last 72 hours. No lab exists for component: DBIL       ASSESSMENT:    1) GCA on high dose cortisone. Hope to use either Orencia or Actemra to reduce does of prednisone. ESR is lower at 43 (89), not back to baseline of normal in 2014. This hospitalization event has some effect on ESR as well. May be best to try steroid sparing outpatient instead of in [facility]. 2) Long term cortisone  3) Risk Osteoporosis  PLAN:     1) Office to send information to Mr. Marroquin about Orencia and Actemra.

## 2017-05-04 NOTE — ROUTINE PROCESS
Bedside and Verbal shift change report given to Di Barrera (oncoming nurse) by Louann Alford RN (offgoing nurse). Report included the following information SBAR QH VISUAL  CHECKS DONE. Sergio Carson

## 2017-05-04 NOTE — ROUTINE PROCESS
Bedside shift change report given to [address] [place] [address] (oncoming nurse) by Nando Frazier RN (offgoing nurse). Report included the following information SBAR, Kardex, MAR and Recent Results. VISUALLY CHECKED PT Q 1 HR BY NURSING STAFF. 24 hour order chart check done

## 2017-05-05 PROCEDURE — 74011250637 HC RX REV CODE- 250/637: Performed by: INTERNAL MEDICINE

## 2017-05-05 PROCEDURE — 74011636637 HC RX REV CODE- 636/637: Performed by: INTERNAL MEDICINE

## 2017-05-05 RX ADMIN — PANTOPRAZOLE SODIUM 40 MG: 40 TABLET, DELAYED RELEASE ORAL at 10:27

## 2017-05-05 RX ADMIN — FLUOXETINE HYDROCHLORIDE 20 MG: 20 CAPSULE ORAL at 10:27

## 2017-05-05 RX ADMIN — FOLIC ACID 1 MG: 1 TABLET ORAL at 10:27

## 2017-05-05 RX ADMIN — AMLODIPINE BESYLATE 2.5 MG: 2.5 TABLET ORAL at 10:27

## 2017-05-05 RX ADMIN — SIMVASTATIN 20 MG: 20 TABLET, FILM COATED ORAL at 21:06

## 2017-05-05 RX ADMIN — BIMATOPROST 1 DROP: 0.1 SOLUTION/ DROPS OPHTHALMIC at 17:30

## 2017-05-05 RX ADMIN — MIRTAZAPINE 15 MG: 15 TABLET, FILM COATED ORAL at 21:06

## 2017-05-05 RX ADMIN — CLONAZEPAM 1 MG: 0.5 TABLET ORAL at 17:29

## 2017-05-05 RX ADMIN — CLONAZEPAM 1 MG: 0.5 TABLET ORAL at 10:27

## 2017-05-05 RX ADMIN — ROPINIROLE HYDROCHLORIDE 2 MG: 1 TABLET, FILM COATED ORAL at 21:06

## 2017-05-05 RX ADMIN — ACETAMINOPHEN 650 MG: 325 TABLET, FILM COATED ORAL at 10:27

## 2017-05-05 RX ADMIN — MELOXICAM 7.5 MG: 7.5 TABLET ORAL at 10:27

## 2017-05-05 RX ADMIN — ASPIRIN 325 MG ORAL TABLET 325 MG: 325 PILL ORAL at 10:27

## 2017-05-05 RX ADMIN — LEVOTHYROXINE SODIUM 50 MCG: 50 TABLET ORAL at 10:27

## 2017-05-05 RX ADMIN — PREDNISONE 30 MG: 10 TABLET ORAL at 10:27

## 2017-05-05 RX ADMIN — CLOPIDOGREL BISULFATE 75 MG: 75 TABLET, FILM COATED ORAL at 10:27

## 2017-05-05 NOTE — PROGRESS NOTES
SW spoke with pt's  re: transportation for pt's d/c home on Monday. Pt's  informed SW that he plans to transport pt home by car. Stated that he has 2 steps to enter and that he plans to use the w/c to bump pt up the steps and that SW doesn't need to arrange transportation.

## 2017-05-05 NOTE — PROGRESS NOTES
ZULY faxed clinical update to Nyla Pereira RN at [employer]. ZULY left message that ZULY didn't receive request for update until today since ZULY left early on 5/4/17.

## 2017-05-05 NOTE — PROGRESS NOTES
Problem: Self Care Deficits Care Plan (Adult)  Goal: *Acute Goals and Plan of Care (Insert Text)  OCCUPATIONAL THERAPY SHORT TERM GOALS     Updated 5/04/17    1. Patient will perform Upper body ADLs with/without adaptive equipment with contact guard assist.   2. Patient will perform Lower body ADLs with/without adaptive equipment with moderate assistance. 3. Patient will perform toileting task with moderate assistance with Fair safety to reduce falls risk. 4. Patient will perform functional transfers with Rolling Walker and MIN A X2.   6. Patient will improve Barthel index scores to atleast 50/100 to improve functional mobility. 7. Patient will improve standardized test score for [place] Sitting Balance Scale to 2+ OR MORE. Updated 4/28/2017:    1. Patient will perform Upper body ADLs with/without adaptive equipment with minimal assistance/contact guard assist. (goal met-UPG CGA)  2. Patient will perform Lower body ADLs with/without adaptive equipment with moderate assistance. (progressing)  3. Patient will perform toileting task with moderate assistance with Fair safety to reduce falls risk. (progressing)  4. Patient will perform functional transfers with Rolling Walker and MIN A X2. (progressing)  6. Patient will improve Barthel index scores to atleast 50/100 to improve functional mobility.(progressing)  7. Patient will improve standardized test score for [place] Sitting Balance Scale to 2+ OR MORE. (progressing-currently 2)    Updated 4/21/2017:  1. Patient will perform Upper body ADLs with/without adaptive equipment with minimal assistance/contact guard assist. progressing  2. Patient will perform Lower body ADLs with/without adaptive equipment with moderate assistance. progressing  3. Patient will perform toileting task with moderate assistance with Fair safety to reduce falls risk. progressing  4. Patient will perform functional transfers with Rolling Walker and MIN A X2. progressing  6.  Patient will improve Barthel index scores to atleast 50/100 to improve functional mobility. progressing  7. Patient will improve standardized test score for [place] Sitting Balance Scale to 2+ OR MORE. progressing    Updated 4/14/17    1. Patient will perform Upper body ADLs with/without adaptive equipment with minimal assistance/contact guard assist. progressing   2. Patient will perform Lower body ADLs with/without adaptive equipment with moderate assistance. progressing  3. Patient will perform toileting task with moderate assistance with Fair safety to reduce falls risk. progressing  4. Patient will perform functional transfers with Rolling Walker and moderate assistance. GM, upg MIN A X2  5. Patient will perform standing static/dynamic balance activities for improved ADL/IADL function with moderate assistance x 2 and Fair balance and safety awarenes. D/C GOAL, N/A at this time  6. Patient will improve Barthel index scores to atleast 50/100 to improve functional mobility. progressing  7. Patient will improve standardized test score for [place] Sitting Balance Scale to 2 UPG 2+ OR MORE    Updated 4/07/17    1. Patient will perform Upper body ADLs with/without adaptive equipment with minimal assistance/contact guard assist. (progressing)  2. Patient will perform Lower body ADLs with/without adaptive equipment with moderate assistance. (progressing)  3. Patient will perform toileting task with moderate assistance with Fair safety to reduce falls risk. (progressing)  4. Patient will perform functional transfers with Rolling Walker and moderate assistance. (progressing)  5. Patient will perform standing static/dynamic balance activities for improved ADL/IADL function with moderate assistance x 2 and Fair balance and safety awarenes. (progressing)  6. Patient will improve Barthel index scores to atleast 50/100 to improve functional mobility. (progressing)  7.  Patient will improve standardized test score for [facility] Sitting Balance Scale to 2 (goal met-continue for consistency)    Initiated 4/3/2017 and to be accomplished within 2 Week(s)    1. Patient will perform Upper body ADLs with/without adaptive equipment with minimal assistance/contact guard assist. (progressing-currently Max A)  2. Patient will perform Lower body ADLs with/without adaptive equipment with moderate assistance. (progressing-Max A)  3. Patient will perform toileting task with moderate assistance with Fair safety to reduce falls risk. (progressing-currently Max A)  4. Patient will perform functional transfers with Rolling Walker and moderate assistance. (progressing-currently Total A)  5. Patient will perform standing static/dynamic balance activities for improved ADL/IADL function with moderate assistance x 2 and Fair balance and safety awarenes. (progressing-currently Total A )  6. Patient will improve Barthel index scores to atleast 50/100 to improve functional mobility. (progressing)  7. Patient will improve standardized test score for [place] Sitting Balance Scale to 2 (progressing)    OCCUPATIONAL THERAPY LONG TERM GOALS   Initiated 4/3/2017 and to be accomplished within 3-4 Week(s)    1. Patient will perform Upper body ADLs with/without adaptive equipment with supervision/set-up. 2. Patient will perform Lower body ADLs with/without adaptive equipment with minimal assistance/contact guard assist .  3. Patient will perform toileting task with minimal assistance/contact guard assist with Fair safety to reduce falls risk. 4. Patient will perform functional transfers with [employer] and minimal assistance/contact guard assist and Fair balance and safety awareness. 5. Patient will perform standing static/dynamic activity for improved ADL/IADL function with minimal assistance/contact guard assist an and Fair balance and safety awareness. 6. Patient will improve Barthel index score to 65/100 to improve independence with mobility.   7. Patient will improve standardized test score for [place] Sitting Balance to 3. Therapist: MARIPOSA Roa 4/3/2017   [facility]  OCCUPATIONAL THERAPY DISCHARGE SUMMARY        Patient: Irma Wall (41 y.o. female)                  Date: 2017                     Attending Physician: Pako Brady MD  Primary Diagnosis: cellulitis       Treatment Diagnosis  Treatment Diagnosis: dysarthria  Treatment Diagnosis 2: dysarthria         Precautions: Fall, Skin     Medical History:   Past Medical History:   Diagnosis Date    [facility]ipolar 1 disorder [facility] [address]      Cerebral aneurysm 2015     RPCoA, RAChA,, RMCA bifurcation, and RM2      Cervical spine fracture ([facility] [address] 2014     C2 and C3    COPD      Coronary artery disease      Giant cell arteritis ([facility] [address] 11/15/2014    [place]      Hyperlipidemia      Hypertension      Hypothyroidism      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 Discharge Location:  [X]Private Residence  [ ] [facility]  [ ] [address] [ ] 24 hr. Supervision for safety      Summarize skilled services provided and significant progress attained since last daily/weekly note (include individualized treatment techniques and standardized tests):   Patient has received skilled OT services from 4/3/2017 to 2017 and has made Fair progress towards their OT goals.   Improvements noted in: grooming and upper body dressing     Summary of education/recommendation  provided to Patient/Caregivers in the following areas: Remove barriers/rugs       Objective Data:        INITIAL ASSESSMENT DISCHARGE ASSESSMENT   COGNITIVE STATUS: COGNITIVE STATUS:   Neurologic State: Alert  Orientation Level: Oriented to person  Cognition: Follows commands  Perception: Cues to maintain midline in sitting, Tactile, Verbal  Perseveration: No perseveration noted  Safety/Judgement: Fall prevention Mental Status  Neurologic State: Confused  Orientation Level: Oriented to person  Cognition: Decreased command following, Decreased attention/concentration, Impulsive, Poor safety awareness  Perception: Cues to maintain midline in sitting, Cues to maintain midline in standing, Tactile, Verbal  Perseveration: No perseveration noted  Safety/Judgement: Fall prevention   PAIN: PAIN:   Pain Scale 1: Numeric (0 - 10)  Pain Intensity 1: 0  Pain Onset 1: movement  Pain Location 1: Abdomen  Pain Orientation 1: Mid  Pain Description 1: Sharp  Pain Intervention(s) 1: Medication (see MAR), Rest, Repositioned, Position  Patient Stated Pain Goal: 0  Pain Reassessment 1: Yes Pain Screen  Pain Scale 1: Numeric (0 - 10)  Pain Intensity 1: 0  Pain Onset 1: movement  Pain Location 1: Generalized  Pain Orientation 1: Other (comment)  Pain Description 1: Aching  Pain Intervention(s) 1: Medication (see MAR)  Patient Stated Pain Goal: 0  Pain Reassessment 1: Yes   BED MOBILITY BED MOBILITY   Rolling: Minimum assistance, Additional time  Supine to Sit: Moderate assistance  Sit to Supine: Minimum assistance, Additional time  Scooting: Minimum assistance Bed Mobility  Rolling: Minimum assistance, Moderate assistance  Supine to Sit: Contact guard assistance  Sit to Supine: Maximum assistance  Scooting: Minimum assistance, Contact guard assistance   ADL SELF CARE ADL SELF CARE   Feeding: Setup  Oral Facial Hygiene/Grooming: Contact guard assistance  Bathing:  Moderate assistance  Upper Body Dressing: Minimum assistance  Lower Body Dressing: Maximum assistance  Toileting: Maximum assistance Basic ADL  Feeding: Setup  Oral Facial Hygiene/Grooming: Contact guard assistance  Bathing: Moderate assistance  Upper Body Dressing: Minimum assistance  Lower Body Dressing: Maximum assistance  Toileting: Maximum assistance   ADL INTERVENTION ADL INTERVENTION   Bathing Assistance: Maximum assistance  Position Performed: Seated edge of bed Upper Body Bathing  Bathing Assistance: Maximum assistance  Position Performed: Seated edge of bed     Upper Body Dressing Assistance  Dressing Assistance: [address] [place]: Moderate assistance  Pullover Shirt: Minimum assistance  Cues: Physical assistance, Visual cues provided     Lower Body Bathing  Bathing Assistance:  Total assistance(dependent)  Perineal  : Total assistance (dependent)  Position Performed: Seated in chair     Toileting  Toileting Assistance: Maximum assistance  Bladder Hygiene: Maximum assistance  Clothing Management: Maximum assistance     Grooming  Grooming Assistance: Minimum assistance  Washing Face: Minimum assistance  Washing Hands: Minimum assistance  Brushing Teeth: Stand-by assistance  Brushing/Combing Hair: Stand-by assistance  Cues: Verbal cues provided, Tactile cues provided     Feeding  Feeding Assistance: Minimum assistance  Container Management: Contact guard assistance  Cutting Food: Minimum assistance  Utensil Management: Minimum assistance  Food to Mouth: Stand-by assistance  Drink to Mouth: Stand-by assistance, Minimum assistance  Cues: Verbal cues provided   TRANSFERS TRANSFERS   Sit to Stand: Maximum assistance  Stand to Sit: Maximum assistance  Bed to Chair: Total assistance Functional Transfers  Sit to Stand: Maximum assistance, Additional time  Stand to Sit: Maximum assistance, Additional time  Bed to Chair: Maximum assistance, Additional time   BALANCE BALANCE   Sitting: With support  Sitting - Static: Poor (constant support)  Sitting - Dynamic: Poor (constant support)  Standing: Impaired, Pull to stand, With support  Standing - Static: Poor  Standing - Dynamic : None Balance  Sitting: With support  Sitting - Static: Fair (occasional)  Sitting - Dynamic: Fair (occasional)  Standing: Impaired, With support  Standing - Static: Poor  Standing - Dynamic : Poor   GROSS ASSESSMENT  GROSS ASSESSMENT   AROM: Generally decreased, functional (BLE)  PROM: Generally decreased, functional  Strength: Generally decreased, functional (BLE)  Coordination: Generally decreased, functional  Tone: Normal  Sensation: Intact Gross Assessment  AROM: Generally decreased, functional  PROM: Generally decreased, functional  Strength: Generally decreased, functional  Coordination: Generally decreased, functional  Tone: Normal  Sensation: Intact   COORDINATION COORDINATION   Fine Motor Skills-Upper: Left Intact, Right Intact  Gross Motor Skills-Upper: Left Intact, Right Intact Coordination  Fine Motor Skills-Upper: Left Intact, Right Intact  Gross Motor Skills-Upper: Left Intact, Right Intact   VISUAL/PERCEPTUAL VISUAL/PERCEPTUAL   Tracking: Able to track stimulus in all quadrants w/o difficulty (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)   Vision  Tracking: Able to track stimulus in all quadrants w/o difficulty (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)     AUDITORY: AUDITORY:   Auditory Impairment: Hard of hearing, bilateral Auditory  Auditory Impairment: Hard of hearing, bilateral   I ADL'S:  I ADL'S:            THE BARTHEL INDEX      ACTIVITY    SCORE   FEEDING  0=unable  5=needs help cutting,spreading butter,etc., or modified diet  10= independent    5   BATHING  0=dependent  5=independent (or in shower    0   GROOMING  0=needs help  5=independent face/hair/teeth/shaving (implements provided)    0   DRESSING  0=dependent  5=needs help but can do about half unaided  10=independent(including buttons, zips,laces etc.)    0   BOWELS  0=incontinent  5=occasional accident  10=continent    0   BLADDER  0=incontinent, or catheterized and unable to manage alone  5=occasional accident  10=continent    0   TOILET USE  0=dependent  5=needs some help, but can do something alone  10=independent (on and off, dressing, wiping)    0   TRANSFER (BED TO CHAIR AND BACK)  0=unable, no sitting balance  5=major help(one or two people,physical), can sit  10=minor help(verbal or physical)  15=independent    5   MOBILITY (ON LEVEL SURFACES)  0=immobile or <50 yards  5=wheelchair independent,including corners,>50 yards  10=walkes with help of one person (verbal or physical) >50 yards  15=independent(but may use any aid; for example, stick) >50 yards    0   STAIRS  0=unable  5=needs help (verbal, physical, carrying aid)  10=independent    0               TOTAL:               5/100      Treatment :  HEP rendered for UE strengthening exercises. Pt's spouse verbalized understanding for the same. Discharge Recommendations:  [X] Home Exercise Program                        [ ] Elevated toilet seat/3 in 1 commode      [ ] Marjorie Philippe      [ Betzy Half                     [ ] Life Line/alert          [ ] Splint/orthotic application/schedule  [ ] Grab Bars: Location   [X] Home Health OT          Treatment session:  15 minutes.      Therapist: MARIPOSA Willis      Date:5/5/2017

## 2017-05-05 NOTE — ROUTINE PROCESS
Bedside and Verbal shift change report given to GENARO Patiño (oncoming nurse) by FLY Oneal (offgoing nurse). Report included the following information SBAR, Kardex, Intake/Output, MAR and Recent Results. Visually checked on patient every hour by staff.

## 2017-05-06 PROCEDURE — 74011636637 HC RX REV CODE- 636/637: Performed by: INTERNAL MEDICINE

## 2017-05-06 PROCEDURE — A6209 FOAM DRSG <=16 SQ IN W/O BDR: HCPCS

## 2017-05-06 PROCEDURE — 74011250637 HC RX REV CODE- 250/637: Performed by: INTERNAL MEDICINE

## 2017-05-06 RX ADMIN — ROPINIROLE HYDROCHLORIDE 2 MG: 1 TABLET, FILM COATED ORAL at 21:05

## 2017-05-06 RX ADMIN — CLONAZEPAM 1 MG: 0.5 TABLET ORAL at 08:59

## 2017-05-06 RX ADMIN — FOLIC ACID 1 MG: 1 TABLET ORAL at 08:58

## 2017-05-06 RX ADMIN — LEVOTHYROXINE SODIUM 50 MCG: 50 TABLET ORAL at 08:58

## 2017-05-06 RX ADMIN — CLONAZEPAM 1 MG: 0.5 TABLET ORAL at 17:02

## 2017-05-06 RX ADMIN — PANTOPRAZOLE SODIUM 40 MG: 40 TABLET, DELAYED RELEASE ORAL at 08:59

## 2017-05-06 RX ADMIN — SIMVASTATIN 20 MG: 20 TABLET, FILM COATED ORAL at 21:04

## 2017-05-06 RX ADMIN — BIMATOPROST 1 DROP: 0.1 SOLUTION/ DROPS OPHTHALMIC at 17:03

## 2017-05-06 RX ADMIN — PREDNISONE 30 MG: 10 TABLET ORAL at 08:59

## 2017-05-06 RX ADMIN — AMLODIPINE BESYLATE 2.5 MG: 2.5 TABLET ORAL at 08:59

## 2017-05-06 RX ADMIN — CLOPIDOGREL BISULFATE 75 MG: 75 TABLET, FILM COATED ORAL at 08:58

## 2017-05-06 RX ADMIN — ASPIRIN 325 MG ORAL TABLET 325 MG: 325 PILL ORAL at 08:58

## 2017-05-06 RX ADMIN — FLUOXETINE HYDROCHLORIDE 20 MG: 20 CAPSULE ORAL at 08:58

## 2017-05-06 RX ADMIN — MIRTAZAPINE 15 MG: 15 TABLET, FILM COATED ORAL at 21:04

## 2017-05-06 RX ADMIN — MELOXICAM 7.5 MG: 7.5 TABLET ORAL at 08:59

## 2017-05-06 NOTE — ROUTINE PROCESS
Bedside and verbal shift change report given to Thu Kitchen LPN (oncoming nurse) by Hilary Joyce LPN (off going nurse) Report included SBAR, Kardex, Mars and recent results

## 2017-05-06 NOTE — PROGRESS NOTES
Problem: Mobility Impaired (Adult and Pediatric)  Goal: *Acute Goals and Plan of Care (Insert Text)  PHYSICAL THERAPY STG GOALS :  Initiated 4/1/2017 and to be accomplished within 1-2 Weeks (Updated /4/17)     1. Patient will move from supine to sit and sit to supine in bed with minimal assistance/contact guard assist. (Achieved)   2. Patient will transfer from bed to chair and chair to bed with minimum assistance using 2WW. (Maintained; Max A x1)   3. Patient will perform sit to stand with minimum assistance with Fair balance and safety awareness. (Maintainted; Max x1)   4. Patient will ambulate with minimum assistance for 10 feet with 2WW on level surfaces with 1 turn. (Maintainted; 3ft in parallel bars Max x2)   5. Patient will ascend/descend 1 step with bilateral handrail(s) withminimal assistance to allow for safe home access/exit. (Unable)   6. Patient will improve standardized test score for [facility] Standing Balance Scale to 1+ (Achieved)    PHYSICAL THERAPY STG GOALS :  Initiated 4/1/2017 and to be accomplished within 1-2 Weeks (Updated 4/28/17)     1. Patient will move from supine to sit and sit to supine in bed with minimal assistance/contact guard assist. (Achieved)   2. Patient will transfer from bed to chair and chair to bed with minimum assistance using 2WW. (Maintained; Max A x1)   3. Patient will perform sit to stand with minimum assistance with Fair balance and safety awareness. (Regressed; Max x1)   4. Patient will ambulate with minimum assistance for 10 feet with 2WW on level surfaces with 1 turn. (Progressed; 3ft in parallel bars Max x2)   5. Patient will ascend/descend 1 step with bilateral handrail(s) withminimal assistance to allow for safe home access/exit. (Unable)   6.  Patient will improve standardized test score for [facility] Standing Balance Scale to 1+ (Achieved)    PHYSICAL THERAPY STG GOALS :  Initiated 4/1/2017 and to be accomplished within 1-2 Weeks (Updated 4/21/17) 1. Patient will move from supine to sit and sit to supine in bed with minimal assistance/contact guard assist. (Progressing; Mod A)   2. Patient will transfer from bed to chair and chair to bed with minimum assistance using 2WW. (Progressing; Max A x1, Mod A x2)   3. Patient will perform sit to stand with minimum assistance with Fair balance and safety awareness. (Progressing; Mod A x2)   4. Patient will ambulate with minimum assistance for 10 feet with 2WW on level surfaces with 1 turn. (Unable)   5. Patient will ascend/descend 1 step with bilateral handrail(s) withminimal assistance to allow for safe home access/exit. (Unable)   6. Patient will improve standardized test score for [facility] Standing Balance Scale to 1+ (Achieved)      PHYSICAL THERAPY STG GOALS :  Initiated 4/1/2017 and to be accomplished within 1-2 Weeks (Updated 4/14/17)     1. Patient will move from supine to sit and sit to supine in bed with minimal assistance/contact guard assist. (Maintained; max A to total A)   2. Patient will transfer from bed to chair and chair to bed with minimum assistance using 2WW. (No progress; total A transfer)   3. Patient will perform sit to stand with minimum assistance with Fair balance and safety awareness. (No progress; Max A x2 for attempt to stand)   4. Patient will ambulate with minimum assistance for 10 feet with 2WW on level surfaces with 1 turn. (Unable)   5. Patient will ascend/descend 1 step with bilateral handrail(s) withminimal assistance to allow for safe home access/exit. (Unable)   6. Patient will improve standardized test score for [facility] Standing Balance Scale to 1+ (No progress; pt unable to stand)    PHYSICAL THERAPY STG GOALS :  Initiated 4/1/2017 and to be accomplished within 1-2 Weeks (Updated 4/7/17)     1. Patient will move from supine to sit and sit to supine in bed with minimal assistance/contact guard assist. (No progress)   2.  Patient will transfer from bed to chair and chair to bed with minimum assistance using 2WW. (No progress; total A transfer)   3. Patient will perform sit to stand with minimum assistance with Fair balance and safety awareness. (No progress; Max A x2 for attempt to stand)   4. Patient will ambulate with minimum assistance for 10 feet with 2WW on level surfaces with 1 turn. (NT, pt unable)   5. Patient will ascend/descend 1 step with bilateral handrail(s) withminimal assistance to allow for safe home access/exit.(NT, pt unable)   6. Patient will improve standardized test score for [facility] Standing Balance Scale to 1+ (No progress; pt unable to stand)     PHYSICAL THERAPY LTG GOALS :  Initiated 4/1/2017 and to be accomplished within 2-4 Weeks    1. Patient will move from supine to sit and sit to supine in bed with supervision/set-up. 2. Patient will transfer from bed to chair and chair to bed with supervision/set-up using 2WW.  3. Patient will perform sit to stand with supervision/set-up with Good balance and safety awareness. 4. Patient will ambulate with supervision/set-up for 50 feet with CGA on level surfaces and be able to maneuver through narrow spaces and obstacles without loss of balance. 5. Patient will ascend/descend 2 steps with bilateral handrail(s) with contact guard assist to allow for safe home access/exit. 6. Patient will improve standardized test score for [facility] Balance Scale to 2    Physical Therapist: Ti Mccullough PT on 4/1/2017    [facility]ARE [facility]ENTER   PHYSICAL THERAPY DAILY TREATMENT NOTE        Patient:  Don Bach (89 y.o. female)               Date: 5/6/2017    Physician: Birdie Skaggs MD  Primary Diagnosis: cellulitis          Treatment Diagnosis  Treatment Diagnosis: dysarthria  Treatment Diagnosis 2: dysarthria  Precautions: Fall, Skin  Vital Signs  Vital Signs  Temp: 99.1 °F (37.3 °C)  Temp Source: Oral  Pulse (Heart Rate): 88  Heart Rate Source: Monitor  Resp Rate: 18  O2 Sat (%): 98 %  BP: 120/82  MAP (Calculated): 95     Cognitive Status:  Mental Status  Neurologic State: Confused  Orientation Level: Oriented to person  Cognition: Impulsive  Pain  Pain Screen  Pain Scale 1: Numeric (0 - 10)  Pain Intensity 1: 0  Patient Stated Pain Goal: 0  Pain Reassessment 1: Yes  Bed Mobility Training  Bed Mobility Training  Supine to Sit: Contact guard assistance  Balance  Sitting - Static: Fair (occasional)  Sitting - Dynamic: Fair (occasional)  Standing - Static: Fair (-)  Standing - Dynamic : Poor  Transfer Training  Transfer Training  Sit to Stand: Maximum assistance  Stand to Sit: Maximum assistance  Bed to Chair: Maximum assistance  Sit to Stand: Maximum assistance  Gait Training  Therapeutic Exercise:   Static standing between parallel bars x ~10 sec x 2, ~100 sec x 1, CGA-min A, needed encouragement to stay standing longer. Attempted B LE exercises: initially able to perform hip flexion, however, patient refused to complete other LE exercises, appeared irritable at this time. Patient/Caregiver Education:   Pt /Caregiver Education on safety and fall prevention was provided to  Pt/CG. ASSESSMENT:  Patient continues to benefit from Skilled PT services to improve strength, standing balance/tolerance, (I) with bed mobility/transfers  Progression toward goals:  [ ]      Improving appropriately and progressing toward goals  [X]      Improving slowly and progressing toward goals  [ ]      Not making progress toward goals and plan of care will be adjusted      Treatment session: 30 minutes.   Therapist:   Gisella Medeiros, PT,          5/6/2017

## 2017-05-06 NOTE — ROUTINE PROCESS
Bedside and Verbal shift change report given to Kenya Johnson LPN (oncoming nurse) by Elana Escobedo RN (offgoing nurse). Report included the following information SBAR, Kardex and MAR.

## 2017-05-07 PROCEDURE — A6209 FOAM DRSG <=16 SQ IN W/O BDR: HCPCS

## 2017-05-07 PROCEDURE — 74011636637 HC RX REV CODE- 636/637: Performed by: INTERNAL MEDICINE

## 2017-05-07 PROCEDURE — 74011250637 HC RX REV CODE- 250/637: Performed by: INTERNAL MEDICINE

## 2017-05-07 RX ADMIN — ROPINIROLE HYDROCHLORIDE 2 MG: 1 TABLET, FILM COATED ORAL at 21:02

## 2017-05-07 RX ADMIN — MELOXICAM 7.5 MG: 7.5 TABLET ORAL at 08:52

## 2017-05-07 RX ADMIN — MIRTAZAPINE 15 MG: 15 TABLET, FILM COATED ORAL at 21:02

## 2017-05-07 RX ADMIN — CLONAZEPAM 1 MG: 0.5 TABLET ORAL at 08:50

## 2017-05-07 RX ADMIN — PANTOPRAZOLE SODIUM 40 MG: 40 TABLET, DELAYED RELEASE ORAL at 08:51

## 2017-05-07 RX ADMIN — FLUOXETINE HYDROCHLORIDE 20 MG: 20 CAPSULE ORAL at 08:50

## 2017-05-07 RX ADMIN — CLOPIDOGREL BISULFATE 75 MG: 75 TABLET, FILM COATED ORAL at 08:52

## 2017-05-07 RX ADMIN — SIMVASTATIN 20 MG: 20 TABLET, FILM COATED ORAL at 21:02

## 2017-05-07 RX ADMIN — FOLIC ACID 1 MG: 1 TABLET ORAL at 08:51

## 2017-05-07 RX ADMIN — CLONAZEPAM 1 MG: 0.5 TABLET ORAL at 17:25

## 2017-05-07 RX ADMIN — AMLODIPINE BESYLATE 2.5 MG: 2.5 TABLET ORAL at 08:51

## 2017-05-07 RX ADMIN — BIMATOPROST 1 DROP: 0.1 SOLUTION/ DROPS OPHTHALMIC at 21:41

## 2017-05-07 RX ADMIN — PREDNISONE 30 MG: 10 TABLET ORAL at 08:51

## 2017-05-07 RX ADMIN — ASPIRIN 325 MG ORAL TABLET 325 MG: 325 PILL ORAL at 08:50

## 2017-05-07 RX ADMIN — LEVOTHYROXINE SODIUM 50 MCG: 50 TABLET ORAL at 08:51

## 2017-05-07 NOTE — ROUTINE PROCESS
Bedside and Verbal shift change report given to 29 Blanche Kelley lpn (oncoming nurse) by victor manuel Castillo lpn (offgoing nurse). Report included the following information SBAR, Kardex and MAR.  Hourly rounds

## 2017-05-07 NOTE — ROUTINE PROCESS
Bedside and verbal shift report given to RADHA Castillo LPN (oncoming nurse) by GOYO Estevez LPN (off going nurse). Report included SBAR, MAR and Kardex. Hourly rounds completed.

## 2017-05-08 ENCOUNTER — HOME HEALTH ADMISSION (OUTPATIENT)
Dept: HOME HEALTH SERVICES | Facility: HOME HEALTH | Age: 72
End: 2017-05-08
Payer: COMMERCIAL

## 2017-05-08 VITALS
RESPIRATION RATE: 18 BRPM | HEART RATE: 74 BPM | TEMPERATURE: 97.8 F | DIASTOLIC BLOOD PRESSURE: 89 MMHG | WEIGHT: 106.4 LBS | HEIGHT: 60 IN | OXYGEN SATURATION: 98 % | BODY MASS INDEX: 20.89 KG/M2 | SYSTOLIC BLOOD PRESSURE: 174 MMHG

## 2017-05-08 PROCEDURE — A6209 FOAM DRSG <=16 SQ IN W/O BDR: HCPCS

## 2017-05-08 PROCEDURE — 74011636637 HC RX REV CODE- 636/637: Performed by: INTERNAL MEDICINE

## 2017-05-08 PROCEDURE — 74011250637 HC RX REV CODE- 250/637: Performed by: INTERNAL MEDICINE

## 2017-05-08 RX ADMIN — AMLODIPINE BESYLATE 2.5 MG: 2.5 TABLET ORAL at 08:29

## 2017-05-08 RX ADMIN — LEVOTHYROXINE SODIUM 50 MCG: 50 TABLET ORAL at 08:29

## 2017-05-08 RX ADMIN — MELOXICAM 7.5 MG: 7.5 TABLET ORAL at 08:29

## 2017-05-08 RX ADMIN — ASPIRIN 325 MG ORAL TABLET 325 MG: 325 PILL ORAL at 08:29

## 2017-05-08 RX ADMIN — CLONAZEPAM 1 MG: 0.5 TABLET ORAL at 08:29

## 2017-05-08 RX ADMIN — FOLIC ACID 1 MG: 1 TABLET ORAL at 08:30

## 2017-05-08 RX ADMIN — CLOPIDOGREL BISULFATE 75 MG: 75 TABLET, FILM COATED ORAL at 08:29

## 2017-05-08 RX ADMIN — PANTOPRAZOLE SODIUM 40 MG: 40 TABLET, DELAYED RELEASE ORAL at 08:29

## 2017-05-08 RX ADMIN — PREDNISONE 30 MG: 10 TABLET ORAL at 08:29

## 2017-05-08 RX ADMIN — FLUOXETINE HYDROCHLORIDE 20 MG: 20 CAPSULE ORAL at 08:30

## 2017-05-08 NOTE — ROUTINE PROCESS
I have reviewed discharge instructions with the patient and spouse. The patient and spouse verbalized understanding. Provided copy of electronic discharge paperwork. Discharge medications reviewed with patient and spouse and appropriate educational materials and side effects teaching were provided. Educated patient spouse about dressing changes. No questions, comments or concerns at this time. Patient left by wheelchair with aid and  by her side. No c/o of pain, SOB or discomfort noted.

## 2017-05-08 NOTE — DISCHARGE INSTRUCTIONS
Altered Mental Status: Care Instructions  Your Care Instructions  Altered mental status is a change in how well your brain is working. As a result, you may be confused, be less alert than usual, or act in odd ways. This may include seeing or hearing things that aren't really there (hallucinations). A mental status change has many possible causes. For example, it may be the result of an infection, an imbalance of chemicals in the body, or a chronic disease such as diabetes or COPD. It can also be caused by things such as a head injury, taking certain medicines, or using alcohol or drugs. The doctor may do tests to look for the cause. These tests may include urine tests, blood tests, and imaging tests such as a CT scan. Sometimes a clear cause isn't found. But tests can help the doctor rule out a serious cause of your symptoms. A change in mental status can be scary. But mental status will often return to normal when the cause is treated. So it is important to get any follow-up testing or treatment the doctor has suggested. The doctor has checked you carefully, but problems can develop later. If you notice any problems or new symptoms, get medical treatment right away. Follow-up care is a key part of your treatment and safety. Be sure to make and go to all appointments, and call your doctor if you are having problems. It's also a good idea to know your test results and keep a list of the medicines you take. How can you care for yourself at home? · Be safe with medicines. Take your medicines exactly as prescribed. Call your doctor if you think you are having a problem with your medicine. · Have another adult stay with you until you are better. This can help keep you safe. Ask that person to watch for signs that your mental status is getting worse. When should you call for help? Call 911 anytime you think you may need emergency care. For example, call if:  · You passed out (lost consciousness).   Call your doctor now or seek immediate medical care if:  · Your mental status is getting worse. · You have new symptoms, such as a fever, chills, or shortness of breath. · You do not feel safe. Watch closely for changes in your health, and be sure to contact your doctor if:  · You do not get better as expected. Where can you learn more? Go to http://emanuel-williams.info/. Enter A197 in the search box to learn more about \"Altered Mental Status: Care Instructions. \"  Current as of: October 14, 2016  Content Version: 11.2  © 9466-8440 [employer]. Care instructions adapted under license by [employer] (which disclaims liability or warranty for this information). If you have questions about a medical condition or this instruction, always ask your healthcare professional. [address] any warranty or liability for your use of this information.

## 2017-05-08 NOTE — ROUTINE PROCESS
Bedside and Verbal shift change report given to tamika ventura lpn (oncoming nurse) by victor manuel Castillo lpn (offgoing nurse). Report included the following information SBAR, Kardex and MAR.  Hourly rounds

## 2017-05-08 NOTE — DISCHARGE SUMMARY
[facility] SUMMARY    Name:  Leonel Lazaro  MR#:  [id number]  :  1945  Account #:  [de-identified]  Date of Adm:  2017  Date of Discharge:  2017      DISCHARGE DIAGNOSES:  1. Deconditioning. 2. Forearm cellulitis, resolved. 3. Acute bronchitis, resolved. 4. Severe atherosclerotic cerebrovascular disease with multiple CVA,  residual left-sided weakness, gait disorder, ataxia. Vascular right eye  blindness. 5. Vascular dementia. 6. Giant cell arteritis on high-dose steroids. 7. Hypertension. 8. Hypercholesterolemia. 9. Chronic obstructive pulmonary disease. 10. Hypothyroidism. 11. Old cervical spine fracture. 12. Restless leg syndrome. 13. Depression. 14. Osteoarthritis. 15. Osteoporosis. 16. Thyroid nodule, status post equivocal biopsy by Dr. Abdirahman Morales. 17. ALLERGIC TO SULFA. 18. Status post multiple surgeries including hysterectomy, left breast  biopsy, thyroid biopsy, temporal artery biopsy and lower extremity  revascularization for peripheral vascular disease. DISCHARGE MEDICATIONS:  1. Albuterol and Atrovent nebulized treatments every 6 hours as  needed. 2. Norvasc 2.5 mg daily. 3. Aspirin 325 mg daily. 4. Lumigan eyedrops 0.01% one drop left eye daily. 5. Klonopin 1 mg twice daily. 6. Plavix 75 mg daily. 7. Prozac 20 mg daily. 8. Folic acid 1 mg daily. 9. Levothyroxine 50 mcg daily. 10. Mobic 7.5 mg daily. 11. Remeron 15 mg at bedtime. 12. Zofran 4 mg every 8 hours as needed for nausea and vomiting. 13. Protonix 40 mg daily. 14. Zocor 20 mg daily. DISPOSITION: Home with home health, follow-up in our office in 2  weeks. REASON FOR ADMISSION: The patient was admitted to the acute  side with right forearm cellulitis, acute bronchitis, COPD exacerbation,  etc. She was very deconditioned. She was admitted here for continued  management. She was followed by Rheumatology, Dr. Mayco Haider.   She developed herpes labialis,   UTI, etc. Those were treated  appropriately. She received multi-modality rehabilitation with physical  therapy, occupational therapy and speech therapy. She  improved modestly. She remained stable from the hemodynamic and  cardiopulmonary standpoint. Her prednisone dose is currently at 30 mg  daily as above. The patient will continue to follow-up with Dr. Stephanie Diaz as  outpatient. She will get home health for further rehabilitation. She is felt to have achieved maximum benefits of her stay and will be  discharged for outpatient followup. The patient is a FULL CODE.         MD CHIDI Arrington / LEONIE  D:  05/08/2017   09:07  T:  05/08/2017   10:31  Job #:  [id number]

## 2017-05-08 NOTE — PROGRESS NOTES
DISCHARGE SUMMARY from Nurse    The following personal items are in your possession at time of discharge:    Dental Appliances: Lowers, Uppers  Visual Aid: Glasses     Home Medications: None  Jewelry: Ring  Clothing: None  Other Valuables: Eyeglasses             PATIENT INSTRUCTIONS:    After general anesthesia or intravenous sedation, for 24 hours or while taking prescription Narcotics:  · Limit your activities  · Do not drive and operate hazardous machinery  · Do not make important personal or business decisions  · Do  not drink alcoholic beverages  · If you have not urinated within 8 hours after discharge, please contact your surgeon on call. Report the following to your surgeon:  · Excessive pain, swelling, redness or odor of or around the surgical area  · Temperature over 100.5  · Nausea and vomiting lasting longer than 4 hours or if unable to take medications  · Any signs of decreased circulation or nerve impairment to extremity: change in color, persistent  numbness, tingling, coldness or increase pain  · Any questions        What to do at Home:  Recommended activity: Activity as tolerated,     If you experience any of the following symptoms SOB, Chest pain, please follow up with PCP. *  Please give a list of your current medications to your Primary Care Provider. *  Please update this list whenever your medications are discontinued, doses are      changed, or new medications (including over-the-counter products) are added. *  Please carry medication information at all times in case of emergency situations. These are general instructions for a healthy lifestyle:    No smoking/ No tobacco products/ Avoid exposure to second hand smoke    Surgeon General's Warning:  Quitting smoking now greatly reduces serious risk to your health.     Obesity, smoking, and sedentary lifestyle greatly increases your risk for illness    A healthy diet, regular physical exercise & weight monitoring are important for maintaining a healthy lifestyle    You may be retaining fluid if you have a history of heart failure or if you experience any of the following symptoms:  Weight gain of 3 pounds or more overnight or 5 pounds in a week, increased swelling in our hands or feet or shortness of breath while lying flat in bed. Please call your doctor as soon as you notice any of these symptoms; do not wait until your next office visit. Recognize signs and symptoms of STROKE:    F-face looks uneven    A-arms unable to move or move unevenly    S-speech slurred or non-existent    T-time-call 911 as soon as signs and symptoms begin-DO NOT go       Back to bed or wait to see if you get better-TIME IS BRAIN. Warning Signs of HEART ATTACK     Call 911 if you have these symptoms:   Chest discomfort. Most heart attacks involve discomfort in the center of the chest that lasts more than a few minutes, or that goes away and comes back. It can feel like uncomfortable pressure, squeezing, fullness, or pain.  Discomfort in other areas of the upper body. Symptoms can include pain or discomfort in one or both arms, the back, neck, jaw, or stomach.  Shortness of breath with or without chest discomfort.  Other signs may include breaking out in a cold sweat, nausea, or lightheadedness. Don't wait more than five minutes to call 911 - MINUTES MATTER! Fast action can save your life. Calling 911 is almost always the fastest way to get lifesaving treatment. Emergency Medical Services staff can begin treatment when they arrive -- up to an hour sooner than if someone gets to the hospital by car. The discharge information has been reviewed with the patient and spouse. The patient and spouse verbalized understanding. Discharge medications reviewed with the patient and spouse and appropriate educational materials and side effects teaching were provided.

## 2017-05-08 NOTE — PROGRESS NOTES
SW called pt's  re: home health referral. Pt's  requested a referral to [facility]. SW obtained verbal consent to contact agency re: the referral. SW agreed to contact pt's  once d/c orders are written. ZULY spoke with [address] [place] [address] liaison re: the referral and pt's d/c today.

## 2017-05-08 NOTE — ROUTINE PROCESS
Bedside and verbal shift report given to RADHA Castillo LPN (oncoming nurse) by GOYO Sanders LPN (off going nurse). Report included SBAR, MAR and Kardex. Hourly rounds completed.

## 2017-05-09 ENCOUNTER — HOME CARE VISIT (OUTPATIENT)
Dept: SCHEDULING | Facility: HOME HEALTH | Age: 72
End: 2017-05-09
Payer: COMMERCIAL

## 2017-05-09 VITALS — DIASTOLIC BLOOD PRESSURE: 75 MMHG | TEMPERATURE: 97.1 F | SYSTOLIC BLOOD PRESSURE: 122 MMHG

## 2017-05-09 PROCEDURE — G0151 HHCP-SERV OF PT,EA 15 MIN: HCPCS

## 2017-05-09 PROCEDURE — 400013 HH SOC

## 2017-05-10 ENCOUNTER — HOME CARE VISIT (OUTPATIENT)
Dept: HOME HEALTH SERVICES | Facility: HOME HEALTH | Age: 72
End: 2017-05-10
Payer: COMMERCIAL

## 2017-05-10 ENCOUNTER — HOME CARE VISIT (OUTPATIENT)
Dept: SCHEDULING | Facility: HOME HEALTH | Age: 72
End: 2017-05-10
Payer: COMMERCIAL

## 2017-05-10 VITALS — DIASTOLIC BLOOD PRESSURE: 57 MMHG | SYSTOLIC BLOOD PRESSURE: 108 MMHG | HEART RATE: 67 BPM | OXYGEN SATURATION: 93 %

## 2017-05-10 PROCEDURE — G0157 HHC PT ASSISTANT EA 15: HCPCS

## 2017-05-10 PROCEDURE — G0152 HHCP-SERV OF OT,EA 15 MIN: HCPCS

## 2017-05-11 VITALS — DIASTOLIC BLOOD PRESSURE: 45 MMHG | HEART RATE: 79 BPM | SYSTOLIC BLOOD PRESSURE: 146 MMHG

## 2017-05-12 ENCOUNTER — HOME CARE VISIT (OUTPATIENT)
Dept: SCHEDULING | Facility: HOME HEALTH | Age: 72
End: 2017-05-12
Payer: COMMERCIAL

## 2017-05-12 VITALS — SYSTOLIC BLOOD PRESSURE: 123 MMHG | DIASTOLIC BLOOD PRESSURE: 63 MMHG | HEART RATE: 56 BPM

## 2017-05-12 PROCEDURE — G0157 HHC PT ASSISTANT EA 15: HCPCS

## 2017-05-15 ENCOUNTER — HOME CARE VISIT (OUTPATIENT)
Dept: SCHEDULING | Facility: HOME HEALTH | Age: 72
End: 2017-05-15
Payer: COMMERCIAL

## 2017-05-15 VITALS — DIASTOLIC BLOOD PRESSURE: 65 MMHG | HEART RATE: 87 BPM | OXYGEN SATURATION: 94 % | SYSTOLIC BLOOD PRESSURE: 126 MMHG

## 2017-05-15 PROCEDURE — G0157 HHC PT ASSISTANT EA 15: HCPCS

## 2017-05-15 PROCEDURE — G0152 HHCP-SERV OF OT,EA 15 MIN: HCPCS

## 2017-05-16 ENCOUNTER — HOME CARE VISIT (OUTPATIENT)
Dept: SCHEDULING | Facility: HOME HEALTH | Age: 72
End: 2017-05-16
Payer: COMMERCIAL

## 2017-05-16 VITALS — HEART RATE: 90 BPM | SYSTOLIC BLOOD PRESSURE: 129 MMHG | OXYGEN SATURATION: 97 % | DIASTOLIC BLOOD PRESSURE: 93 MMHG

## 2017-05-16 PROCEDURE — G0299 HHS/HOSPICE OF RN EA 15 MIN: HCPCS

## 2017-05-17 ENCOUNTER — HOME CARE VISIT (OUTPATIENT)
Dept: SCHEDULING | Facility: HOME HEALTH | Age: 72
End: 2017-05-17
Payer: COMMERCIAL

## 2017-05-17 VITALS — DIASTOLIC BLOOD PRESSURE: 60 MMHG | SYSTOLIC BLOOD PRESSURE: 109 MMHG | HEART RATE: 87 BPM | OXYGEN SATURATION: 93 %

## 2017-05-17 PROCEDURE — G0157 HHC PT ASSISTANT EA 15: HCPCS

## 2017-05-17 PROCEDURE — G0156 HHCP-SVS OF AIDE,EA 15 MIN: HCPCS

## 2017-05-18 ENCOUNTER — HOME CARE VISIT (OUTPATIENT)
Dept: SCHEDULING | Facility: HOME HEALTH | Age: 72
End: 2017-05-18
Payer: COMMERCIAL

## 2017-05-18 ENCOUNTER — HOME CARE VISIT (OUTPATIENT)
Dept: HOME HEALTH SERVICES | Facility: HOME HEALTH | Age: 72
End: 2017-05-18
Payer: COMMERCIAL

## 2017-05-18 PROCEDURE — G0155 HHCP-SVS OF CSW,EA 15 MIN: HCPCS

## 2017-05-18 PROCEDURE — G0152 HHCP-SERV OF OT,EA 15 MIN: HCPCS

## 2017-05-18 NOTE — PROGRESS NOTES
[address] PHYSICAL THERAPY  [address] [place] [address] [place], [address] [employer] [address] - Phone: [phone]  Fax: [phone] SUMMARY  Patient Name: Izzy Simmons : 1945   Treatment/Medical Diagnosis: Generalized muscle weakness [M62.81]   Referral Source: Narcisa Chavira MD     Date of Initial Visit: 3/2/2017 Attended Visits: 5 Missed Visits: 1     SUMMARY OF TREATMENT  Pt was seen in out-patient PT for initial evaluation and 4 treatment sessions that consisted of of LE strengthening exercises, gait and balance training, pt education and instruction in HEP. On 3/28/17 we were advised that pt had been admitted to hospital and placed on hold. Pt has been discharged from hospital and is currently receiving in home therapy. RECOMMENDATIONS  Discharging at this time secondary to pt having in home PT. If you have any questions/comments please contact us directly at [phone]. Thank you for allowing us to assist in the care of your patient.     LPTA Signature: Kaya Mann PTA Date: 17   Therapist Signature:  Time: 1:14 PM     Physician Signature:        Date:       Time:

## 2017-05-19 ENCOUNTER — HOME CARE VISIT (OUTPATIENT)
Dept: SCHEDULING | Facility: HOME HEALTH | Age: 72
End: 2017-05-19
Payer: COMMERCIAL

## 2017-05-19 VITALS — SYSTOLIC BLOOD PRESSURE: 142 MMHG | DIASTOLIC BLOOD PRESSURE: 73 MMHG

## 2017-05-19 VITALS — OXYGEN SATURATION: 92 % | DIASTOLIC BLOOD PRESSURE: 94 MMHG | HEART RATE: 97 BPM | SYSTOLIC BLOOD PRESSURE: 166 MMHG

## 2017-05-19 PROCEDURE — G0157 HHC PT ASSISTANT EA 15: HCPCS

## 2017-05-19 PROCEDURE — G0299 HHS/HOSPICE OF RN EA 15 MIN: HCPCS

## 2017-05-20 ENCOUNTER — HOME CARE VISIT (OUTPATIENT)
Dept: HOME HEALTH SERVICES | Facility: HOME HEALTH | Age: 72
End: 2017-05-20
Payer: COMMERCIAL

## 2017-05-22 ENCOUNTER — HOME CARE VISIT (OUTPATIENT)
Dept: SCHEDULING | Facility: HOME HEALTH | Age: 72
End: 2017-05-22
Payer: COMMERCIAL

## 2017-05-22 ENCOUNTER — HOME CARE VISIT (OUTPATIENT)
Dept: HOME HEALTH SERVICES | Facility: HOME HEALTH | Age: 72
End: 2017-05-22
Payer: COMMERCIAL

## 2017-05-22 VITALS — TEMPERATURE: 97.2 F | DIASTOLIC BLOOD PRESSURE: 80 MMHG | SYSTOLIC BLOOD PRESSURE: 144 MMHG

## 2017-05-22 PROCEDURE — G0299 HHS/HOSPICE OF RN EA 15 MIN: HCPCS

## 2017-05-22 PROCEDURE — G0151 HHCP-SERV OF PT,EA 15 MIN: HCPCS

## 2017-05-23 ENCOUNTER — APPOINTMENT (OUTPATIENT)
Dept: GENERAL RADIOLOGY | Age: 72
DRG: 563 | End: 2017-05-23
Attending: PHYSICIAN ASSISTANT
Payer: COMMERCIAL

## 2017-05-23 ENCOUNTER — APPOINTMENT (OUTPATIENT)
Dept: GENERAL RADIOLOGY | Age: 72
DRG: 563 | End: 2017-05-23
Attending: EMERGENCY MEDICINE
Payer: COMMERCIAL

## 2017-05-23 ENCOUNTER — HOME CARE VISIT (OUTPATIENT)
Dept: SCHEDULING | Facility: HOME HEALTH | Age: 72
End: 2017-05-23
Payer: COMMERCIAL

## 2017-05-23 ENCOUNTER — HOSPITAL ENCOUNTER (INPATIENT)
Age: 72
LOS: 3 days | Discharge: HOME | DRG: 563 | End: 2017-05-26
Attending: EMERGENCY MEDICINE | Admitting: INTERNAL MEDICINE
Payer: COMMERCIAL

## 2017-05-23 ENCOUNTER — HOME CARE VISIT (OUTPATIENT)
Dept: HOME HEALTH SERVICES | Facility: HOME HEALTH | Age: 72
End: 2017-05-23
Payer: COMMERCIAL

## 2017-05-23 VITALS — SYSTOLIC BLOOD PRESSURE: 123 MMHG | DIASTOLIC BLOOD PRESSURE: 64 MMHG | OXYGEN SATURATION: 97 % | HEART RATE: 90 BPM

## 2017-05-23 VITALS
OXYGEN SATURATION: 98 % | SYSTOLIC BLOOD PRESSURE: 144 MMHG | RESPIRATION RATE: 16 BRPM | TEMPERATURE: 97.8 F | HEART RATE: 90 BPM | DIASTOLIC BLOOD PRESSURE: 72 MMHG

## 2017-05-23 DIAGNOSIS — S82.832A CLOSED FRACTURE OF PROXIMAL END OF LEFT FIBULA, UNSPECIFIED FRACTURE MORPHOLOGY, INITIAL ENCOUNTER: Primary | ICD-10-CM

## 2017-05-23 DIAGNOSIS — S82.392A OTHER CLOSED FRACTURE OF DISTAL END OF LEFT TIBIA, INITIAL ENCOUNTER: ICD-10-CM

## 2017-05-23 PROBLEM — S82.209A FRACTURE TIBIA/FIBULA: Status: ACTIVE | Noted: 2017-05-23

## 2017-05-23 PROBLEM — S82.409A FRACTURE TIBIA/FIBULA: Status: ACTIVE | Noted: 2017-05-23

## 2017-05-23 PROBLEM — S82.209A FRACTURE, FIBULA, WITH TIBIA: Status: ACTIVE | Noted: 2017-05-23

## 2017-05-23 PROBLEM — S82.409A FRACTURE, FIBULA, WITH TIBIA: Status: ACTIVE | Noted: 2017-05-23

## 2017-05-23 LAB
ANION GAP BLD CALC-SCNC: 7 MMOL/L (ref 3–18)
APPEARANCE UR: CLEAR
APTT PPP: 20 SEC (ref 23–36.4)
BASOPHILS # BLD AUTO: 0 K/UL (ref 0–0.06)
BASOPHILS # BLD: 0 % (ref 0–2)
BILIRUB UR QL: NEGATIVE
BUN SERPL-MCNC: 21 MG/DL (ref 7–18)
BUN/CREAT SERPL: 47 (ref 12–20)
CALCIUM SERPL-MCNC: 8.8 MG/DL (ref 8.5–10.1)
CHLORIDE SERPL-SCNC: 107 MMOL/L (ref 100–108)
CO2 SERPL-SCNC: 27 MMOL/L (ref 21–32)
COLOR UR: YELLOW
CREAT SERPL-MCNC: 0.45 MG/DL (ref 0.6–1.3)
DIFFERENTIAL METHOD BLD: ABNORMAL
EOSINOPHIL # BLD: 0 K/UL (ref 0–0.4)
EOSINOPHIL NFR BLD: 0 % (ref 0–5)
ERYTHROCYTE [DISTWIDTH] IN BLOOD BY AUTOMATED COUNT: 17.5 % (ref 11.6–14.5)
GLUCOSE SERPL-MCNC: 124 MG/DL (ref 74–99)
GLUCOSE UR STRIP.AUTO-MCNC: NEGATIVE MG/DL
HCT VFR BLD AUTO: 34.5 % (ref 35–45)
HGB BLD-MCNC: 10.4 G/DL (ref 12–16)
HGB UR QL STRIP: NEGATIVE
INR PPP: 1 (ref 0.8–1.2)
KETONES UR QL STRIP.AUTO: NEGATIVE MG/DL
LEUKOCYTE ESTERASE UR QL STRIP.AUTO: NEGATIVE
LYMPHOCYTES # BLD AUTO: 13 % (ref 21–52)
LYMPHOCYTES # BLD: 1.9 K/UL (ref 0.9–3.6)
MAGNESIUM SERPL-MCNC: 2.2 MG/DL (ref 1.6–2.6)
MCH RBC QN AUTO: 30.1 PG (ref 24–34)
MCHC RBC AUTO-ENTMCNC: 30.1 G/DL (ref 31–37)
MCV RBC AUTO: 99.7 FL (ref 74–97)
MONOCYTES # BLD: 0.6 K/UL (ref 0.05–1.2)
MONOCYTES NFR BLD AUTO: 4 % (ref 3–10)
NEUTS SEG # BLD: 12.1 K/UL (ref 1.8–8)
NEUTS SEG NFR BLD AUTO: 83 % (ref 40–73)
NITRITE UR QL STRIP.AUTO: NEGATIVE
PH UR STRIP: 7 [PH] (ref 5–8)
PLATELET # BLD AUTO: 443 K/UL (ref 135–420)
PMV BLD AUTO: 10.7 FL (ref 9.2–11.8)
POTASSIUM SERPL-SCNC: 4.5 MMOL/L (ref 3.5–5.5)
PROT UR STRIP-MCNC: NEGATIVE MG/DL
PROTHROMBIN TIME: 13.1 SEC (ref 11.5–15.2)
RBC # BLD AUTO: 3.46 M/UL (ref 4.2–5.3)
SODIUM SERPL-SCNC: 141 MMOL/L (ref 136–145)
SP GR UR REFRACTOMETRY: 1.01 (ref 1–1.03)
UROBILINOGEN UR QL STRIP.AUTO: 0.2 EU/DL (ref 0.2–1)
WBC # BLD AUTO: 14.7 K/UL (ref 4.6–13.2)

## 2017-05-23 PROCEDURE — 74011250636 HC RX REV CODE- 250/636: Performed by: INTERNAL MEDICINE

## 2017-05-23 PROCEDURE — 74011000250 HC RX REV CODE- 250: Performed by: PHYSICIAN ASSISTANT

## 2017-05-23 PROCEDURE — G0157 HHC PT ASSISTANT EA 15: HCPCS

## 2017-05-23 PROCEDURE — 74011250637 HC RX REV CODE- 250/637: Performed by: PHYSICIAN ASSISTANT

## 2017-05-23 PROCEDURE — 73590 X-RAY EXAM OF LOWER LEG: CPT

## 2017-05-23 PROCEDURE — 83735 ASSAY OF MAGNESIUM: CPT | Performed by: PHYSICIAN ASSISTANT

## 2017-05-23 PROCEDURE — 99284 EMERGENCY DEPT VISIT MOD MDM: CPT

## 2017-05-23 PROCEDURE — 74011000250 HC RX REV CODE- 250: Performed by: INTERNAL MEDICINE

## 2017-05-23 PROCEDURE — 73502 X-RAY EXAM HIP UNI 2-3 VIEWS: CPT

## 2017-05-23 PROCEDURE — 73564 X-RAY EXAM KNEE 4 OR MORE: CPT

## 2017-05-23 PROCEDURE — 74011250636 HC RX REV CODE- 250/636: Performed by: PHYSICIAN ASSISTANT

## 2017-05-23 PROCEDURE — 81003 URINALYSIS AUTO W/O SCOPE: CPT | Performed by: PHYSICIAN ASSISTANT

## 2017-05-23 PROCEDURE — 85025 COMPLETE CBC W/AUTO DIFF WBC: CPT | Performed by: PHYSICIAN ASSISTANT

## 2017-05-23 PROCEDURE — 80048 BASIC METABOLIC PNL TOTAL CA: CPT | Performed by: PHYSICIAN ASSISTANT

## 2017-05-23 PROCEDURE — 65660000000 HC RM CCU STEPDOWN

## 2017-05-23 PROCEDURE — 72170 X-RAY EXAM OF PELVIS: CPT

## 2017-05-23 PROCEDURE — 85610 PROTHROMBIN TIME: CPT | Performed by: EMERGENCY MEDICINE

## 2017-05-23 PROCEDURE — 74011250637 HC RX REV CODE- 250/637: Performed by: INTERNAL MEDICINE

## 2017-05-23 PROCEDURE — 85730 THROMBOPLASTIN TIME PARTIAL: CPT | Performed by: EMERGENCY MEDICINE

## 2017-05-23 RX ORDER — LEVOTHYROXINE SODIUM 50 UG/1
50 TABLET ORAL
Status: DISCONTINUED | OUTPATIENT
Start: 2017-05-24 | End: 2017-05-26 | Stop reason: HOSPADM

## 2017-05-23 RX ORDER — AMLODIPINE BESYLATE 5 MG/1
2.5 TABLET ORAL DAILY
Status: DISCONTINUED | OUTPATIENT
Start: 2017-05-24 | End: 2017-05-26 | Stop reason: HOSPADM

## 2017-05-23 RX ORDER — IPRATROPIUM BROMIDE AND ALBUTEROL SULFATE 2.5; .5 MG/3ML; MG/3ML
3 SOLUTION RESPIRATORY (INHALATION)
Status: DISCONTINUED | OUTPATIENT
Start: 2017-05-23 | End: 2017-05-26 | Stop reason: HOSPADM

## 2017-05-23 RX ORDER — ASPIRIN 325 MG
325 TABLET ORAL DAILY
Status: DISCONTINUED | OUTPATIENT
Start: 2017-05-24 | End: 2017-05-26 | Stop reason: HOSPADM

## 2017-05-23 RX ORDER — FOLIC ACID 1 MG/1
1 TABLET ORAL DAILY
Status: DISCONTINUED | OUTPATIENT
Start: 2017-05-24 | End: 2017-05-26 | Stop reason: HOSPADM

## 2017-05-23 RX ORDER — MORPHINE SULFATE 2 MG/ML
1 INJECTION, SOLUTION INTRAMUSCULAR; INTRAVENOUS
Status: COMPLETED | OUTPATIENT
Start: 2017-05-23 | End: 2017-05-23

## 2017-05-23 RX ORDER — MORPHINE SULFATE 2 MG/ML
2-4 INJECTION, SOLUTION INTRAMUSCULAR; INTRAVENOUS
Status: DISCONTINUED | OUTPATIENT
Start: 2017-05-23 | End: 2017-05-26 | Stop reason: HOSPADM

## 2017-05-23 RX ORDER — ONDANSETRON 4 MG/1
4 TABLET, ORALLY DISINTEGRATING ORAL
Status: DISCONTINUED | OUTPATIENT
Start: 2017-05-23 | End: 2017-05-26 | Stop reason: HOSPADM

## 2017-05-23 RX ORDER — DOCUSATE SODIUM 100 MG/1
100 CAPSULE, LIQUID FILLED ORAL
Status: DISCONTINUED | OUTPATIENT
Start: 2017-05-23 | End: 2017-05-26 | Stop reason: HOSPADM

## 2017-05-23 RX ORDER — PANTOPRAZOLE SODIUM 40 MG/1
40 TABLET, DELAYED RELEASE ORAL
Status: DISCONTINUED | OUTPATIENT
Start: 2017-05-24 | End: 2017-05-26 | Stop reason: HOSPADM

## 2017-05-23 RX ORDER — FLUOXETINE HYDROCHLORIDE 20 MG/1
20 CAPSULE ORAL DAILY
Status: DISCONTINUED | OUTPATIENT
Start: 2017-05-24 | End: 2017-05-25

## 2017-05-23 RX ORDER — SIMVASTATIN 5 MG/1
20 TABLET, FILM COATED ORAL
Status: DISCONTINUED | OUTPATIENT
Start: 2017-05-23 | End: 2017-05-26 | Stop reason: HOSPADM

## 2017-05-23 RX ORDER — METHOTREXATE 2.5 MG/1
15 TABLET ORAL
Status: DISCONTINUED | OUTPATIENT
Start: 2017-05-28 | End: 2017-05-26 | Stop reason: HOSPADM

## 2017-05-23 RX ORDER — OXYCODONE AND ACETAMINOPHEN 5; 325 MG/1; MG/1
1 TABLET ORAL
Status: COMPLETED | OUTPATIENT
Start: 2017-05-23 | End: 2017-05-23

## 2017-05-23 RX ORDER — ACETAMINOPHEN 325 MG/1
650 TABLET ORAL
Status: DISCONTINUED | OUTPATIENT
Start: 2017-05-23 | End: 2017-05-26 | Stop reason: HOSPADM

## 2017-05-23 RX ORDER — MIRTAZAPINE 15 MG/1
30 TABLET, FILM COATED ORAL
Status: DISCONTINUED | OUTPATIENT
Start: 2017-05-23 | End: 2017-05-26 | Stop reason: HOSPADM

## 2017-05-23 RX ORDER — HEPARIN SODIUM 5000 [USP'U]/ML
5000 INJECTION, SOLUTION INTRAVENOUS; SUBCUTANEOUS EVERY 12 HOURS
Status: DISCONTINUED | OUTPATIENT
Start: 2017-05-23 | End: 2017-05-26 | Stop reason: HOSPADM

## 2017-05-23 RX ORDER — CLONAZEPAM 0.5 MG/1
0.25 TABLET ORAL
Status: DISCONTINUED | OUTPATIENT
Start: 2017-05-23 | End: 2017-05-26 | Stop reason: HOSPADM

## 2017-05-23 RX ORDER — POLYETHYLENE GLYCOL 3350 17 G/17G
17 POWDER, FOR SOLUTION ORAL DAILY PRN
Status: DISCONTINUED | OUTPATIENT
Start: 2017-05-24 | End: 2017-05-26 | Stop reason: HOSPADM

## 2017-05-23 RX ORDER — HYDRALAZINE HYDROCHLORIDE 20 MG/ML
10 INJECTION INTRAMUSCULAR; INTRAVENOUS
Status: DISCONTINUED | OUTPATIENT
Start: 2017-05-23 | End: 2017-05-26 | Stop reason: HOSPADM

## 2017-05-23 RX ORDER — BRIMONIDINE TARTRATE 2 MG/ML
1 SOLUTION/ DROPS OPHTHALMIC EVERY 12 HOURS
Status: DISCONTINUED | OUTPATIENT
Start: 2017-05-23 | End: 2017-05-26 | Stop reason: HOSPADM

## 2017-05-23 RX ORDER — ROPINIROLE 1 MG/1
2 TABLET, FILM COATED ORAL
Status: DISCONTINUED | OUTPATIENT
Start: 2017-05-23 | End: 2017-05-26 | Stop reason: HOSPADM

## 2017-05-23 RX ORDER — TIMOLOL MALEATE 5 MG/ML
1 SOLUTION/ DROPS OPHTHALMIC EVERY 12 HOURS
Status: DISCONTINUED | OUTPATIENT
Start: 2017-05-23 | End: 2017-05-26 | Stop reason: HOSPADM

## 2017-05-23 RX ADMIN — MORPHINE SULFATE 2 MG: 2 INJECTION, SOLUTION INTRAMUSCULAR; INTRAVENOUS at 22:31

## 2017-05-23 RX ADMIN — BIMATOPROST 1 DROP: 0.1 SOLUTION/ DROPS OPHTHALMIC at 22:30

## 2017-05-23 RX ADMIN — MORPHINE SULFATE 1 MG: 2 INJECTION, SOLUTION INTRAMUSCULAR; INTRAVENOUS at 15:00

## 2017-05-23 RX ADMIN — MIRTAZAPINE 30 MG: 15 TABLET, FILM COATED ORAL at 22:19

## 2017-05-23 RX ADMIN — CLONAZEPAM 0.25 MG: 0.5 TABLET ORAL at 22:19

## 2017-05-23 RX ADMIN — ROPINIROLE HYDROCHLORIDE 2 MG: 1 TABLET, FILM COATED ORAL at 22:19

## 2017-05-23 RX ADMIN — BRIMONIDINE TARTRATE 1 DROP: 2 SOLUTION OPHTHALMIC at 22:30

## 2017-05-23 RX ADMIN — OXYCODONE HYDROCHLORIDE AND ACETAMINOPHEN 1 TABLET: 5; 325 TABLET ORAL at 13:35

## 2017-05-23 RX ADMIN — SIMVASTATIN 20 MG: 5 TABLET, FILM COATED ORAL at 22:19

## 2017-05-23 RX ADMIN — HEPARIN SODIUM 5000 UNITS: 5000 INJECTION, SOLUTION INTRAVENOUS; SUBCUTANEOUS at 18:34

## 2017-05-23 RX ADMIN — MORPHINE SULFATE 2 MG: 2 INJECTION, SOLUTION INTRAMUSCULAR; INTRAVENOUS at 18:35

## 2017-05-23 RX ADMIN — TIMOLOL MALEATE 1 DROP: 5 SOLUTION OPHTHALMIC at 22:30

## 2017-05-23 NOTE — PROGRESS NOTES
Paged Dr. Thomasina Sacks through answering service. Pt and family inquiring about lynne. Pt wishes to not use bed pan as it hurts her leg. Pt cleaned on incont urine.  Awaiting call back from

## 2017-05-23 NOTE — ROUTINE PROCESS
Pt's BP elevated per Stanford Mas CNA. Pt's /92 manually. Primary nurse Crow Chandler RN/Carolyn Quinn RN aware.

## 2017-05-23 NOTE — ED NOTES
Assisted patient onto bedpan with help of her . Patient's left leg externally fixated. Pedal pulses intact and strong at this time. Patient placed in hospital gown and made comfortable.

## 2017-05-23 NOTE — PROGRESS NOTES
conducted an initial consultation and Spiritual Assessment for [facility], who is a 66 y.o.,female. Patients Primary Language is: [place]. According to the patients EMR Sikh Affiliation is: [place]. 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 12/11/2015    Stroke ([facility] 11/15/2014    Giant cell arteritis ([facility] 11/15/2014    RLS (restless legs syndrome) 11/15/2014    Hypothyroid 11/15/2014    Drug-induced hepatic toxicity 04/24/2014    CVA (cerebrovascular accident) ([facility] 04/23/2014    HTN (hypertension) 04/23/2014    Hypercholesteremia 04/23/2014    Thyroid nodule 04/23/2014    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 Explored issues of shereen, belief, spirituality and Hinduism/ritual needs while hospitalized. Listened empathically. Provided chaplaincy education. Provided information about Spiritual Care Services. Offered prayer and assurance of continued prayers on patient's behalf. Chart reviewed. The following outcomes were achieved:  Patient shared limited information about both their medical narrative and spiritual journey/beliefs. Patient processed feeling about current hospitalization. Patient expressed gratitude for the 's visit.     Assessment:  Patient does not have any Orthodoxy/cultural needs that will affect patients preferences in health care. Patient did not indicate any spiritual or Orthodoxy issues which require Spiritual Care Services interventions at this time. Plan:  Chaplains will continue to follow and will provide pastoral care on an as needed/requested basis.  recommends bedside caregivers page  on duty if patient shows signs of acute spiritual or emotional distress.     [facility]   Staff [address] [facility]   [phone]

## 2017-05-23 NOTE — IP AVS SNAPSHOT
[address] [address] 
[phone] Patient: Pham Hancock MRN: [id number] OC You are allergic to the following Allergen Reactions Sulfa (Sulfonamide Antibiotics) Hives Sulfamethoxazole-Trimethoprim Rash Recent Documentation Height Weight Breastfeeding? BMI OB Status Smoking Status 1.524 m 48.1 kg No 20.7 kg/m2 Hysterectomy Former Smoker Emergency Contacts Name Discharge Info Relation Home Work Mobile Stephan Marroquin DISCHARGE CAREGIVER [3] Spouse [3]   [phone] Katelyn Carroll  Sister [23]   [phone] About your hospitalization You were admitted on:  May 23, 2017 You last received care in the:  [address] You were discharged on:  May 26, 2017 Unit phone number:  [phone] Why you were hospitalized Your primary diagnosis was:  Not on File Your diagnoses also included:  Fracture, Fibula, With Tibia, Fracture Tibia/Fibula, Copd (Chronic Obstructive Pulmonary Disease) (Hcc), Cerebral Vascular Disease, Giant Cell Arteritis (Hcc), Htn (Hypertension), Acquired Hypothyroidism Providers Seen During Your Hospitalizations Provider Role Specialty Primary office phone Mando Hamilton DO Attending Provider Emergency Medicine [phone] Your Primary Care Physician (PCP) Primary Care Physician Office Phone Office Fax Liv Flores [phone] Follow-up Information Follow up With Details Comments Contact Info Siomara White MD   [address] [facility] [address] [address] 
[phone] Current Discharge Medication List  
  
CONTINUE these medications which have CHANGED Dose & Instructions Dispensing Information Comments Morning Noon Evening Bedtime  
 amLODIPine 2.5 mg tablet Commonly known as:  Harrison Garcia What changed:  how much to take Your last dose was: Your next dose is:    
   
   
 Dose:  2.5 mg Take 1 tablet by mouth daily. Quantity:  90 tablet Refills:  0  
     
   
   
   
  
 mirtazapine 30 mg tablet Commonly known as:  Radha Puente What changed:  medication strength Your last dose was: Your next dose is:    
   
   
 Dose:  30 mg Take 1 Tab by mouth nightly. Quantity:  30 Tab Refills:  0  
     
   
   
   
  
 rOPINIRole 2 mg tablet Commonly known as:  Lucia Del Cid What changed:   
- how much to take - when to take this 
- additional instructions Your last dose was: Your next dose is:    
   
   
 Dose:  2 mg Take 1 Tab by mouth nightly. Quantity:  60 Tab Refills:  0 CONTINUE these medications which have NOT CHANGED Dose & Instructions Dispensing Information Comments Morning Noon Evening Bedtime  
 albuterol-ipratropium 2.5 mg-0.5 mg/3 ml Nebu Commonly known as:  Cayetano Vidal Your last dose was: Your next dose is:    
   
   
 Dose:  3 mL  
3 mL by Nebulization route every six (6) hours as needed. Quantity:  30 Nebule Refills:  1  
     
   
   
   
  
 aspirin 325 mg tablet Commonly known as:  ASPIRIN Your last dose was: Your next dose is:    
   
   
 Dose:  325 mg Take 1 Tab by mouth daily. Quantity:  30 Tab Refills:  0  
     
   
   
   
  
 bimatoprost 0.01 % ophthalmic drops Commonly known as:  LUMIGAN Your last dose was: Your next dose is:    
   
   
 Dose:  1 Drop Administer 1 Drop to left eye every evening. Refills:  0  
     
   
   
   
  
 clonazePAM 1 mg tablet Commonly known as:  Lyndsey Brad Your last dose was: Your next dose is:    
   
   
 Dose:  0.25 mg Take 0.25 mg by mouth nightly. Refills:  0  
     
   
   
   
  
 clopidogrel 75 mg Tab Commonly known as:  PLAVIX Your last dose was: Your next dose is:    
   
   
 Dose:  75 mg Take 1 Tab by mouth daily. Quantity:  30 Tab Refills:  5 COMBIGAN 0.2-0.5 % Drop ophthalmic solution Generic drug:  brimonidine-timolol Your last dose was: Your next dose is:    
   
   
 Dose:  1 Drop Administer 1 Drop to left eye every twelve (12) hours. Refills:  0  
     
   
   
   
  
 docusate sodium 100 mg capsule Commonly known as:  [place] Given Your last dose was: Your next dose is:    
   
   
 Dose:  100 mg Take 100 mg by mouth two (2) times daily as needed for Constipation. Refills:  0  
     
   
   
   
  
 * FLUoxetine 20 mg capsule Commonly known as:  PROzac Your last dose was: Your next dose is:    
   
   
 Dose:  20 mg Take 20 mg by mouth daily. Refills:  0  
     
   
   
   
  
 * FLUoxetine 10 mg capsule Commonly known as:  PROzac Your last dose was: Your next dose is:    
   
   
 Dose:  10 mg Take 10 mg by mouth daily. Refills:  0  
     
   
   
   
  
 FOLIC ACID PO Your last dose was: Your next dose is:    
   
   
 Dose:  1 Tab Take 1 Tab by mouth daily. 1 mg tablet Refills:  0  
     
   
   
   
  
 levothyroxine 50 mcg tablet Commonly known as:  synthroid Your last dose was: Your next dose is:    
   
   
 Dose:  50 mcg Take 1 Tab by mouth Daily (before breakfast). Quantity:  30 Tab Refills:  5  
     
   
   
   
  
 meloxicam 7.5 mg tablet Commonly known as:  MOBIC Your last dose was: Your next dose is: Take  by mouth daily. Refills:  0  
     
   
   
   
  
 methotrexate 2.5 mg tablet Commonly known as:  Ramu Knott Your last dose was: Your next dose is:    
   
   
 Dose:  15 mg Take 15 mg by mouth every Sunday. Refills:  0  
     
   
   
   
  
 ondansetron 4 mg disintegrating tablet Commonly known as:  ZOFRAN ODT  
   
 Your last dose was: Your next dose is:    
   
   
 Dose:  4 mg Take 1 Tab by mouth every eight (8) hours as needed for Nausea. Quantity:  20 Tab Refills:  0  
     
   
   
   
  
 pantoprazole 40 mg tablet Commonly known as:  PROTONIX Your last dose was: Your next dose is:    
   
   
 Dose:  40 mg Take 1 Tab by mouth Daily (before breakfast). Quantity:  30 Tab Refills:  0  
     
   
   
   
  
 polyethylene glycol 17 gram/dose powder Commonly known as:  Harinder Inderjit Your last dose was: Your next dose is:    
   
   
 Dose:  17 g Take 17 g by mouth as needed (for constipation). Refills:  0  
     
   
   
   
  
 predniSONE 10 mg tablet Commonly known as:  Jamila Sake Your last dose was: Your next dose is:    
   
   
 Dose:  30 mg Take 3 Tabs by mouth daily. Quantity:  90 Tab Refills:  0  
     
   
   
   
  
 simvastatin 20 mg tablet Commonly known as:  ZOCOR Your last dose was: Your next dose is:    
   
   
 Dose:  20 mg Take 1 Tab by mouth nightly. Quantity:  30 Tab Refills:  0  
     
   
   
   
  
 traMADol 50 mg tablet Commonly known as:  ULTRAM  
   
Your last dose was: Your next dose is:    
   
   
 Dose:  50 mg Take 1 Tab by mouth every six (6) hours as needed for Pain. Max Daily Amount: 200 mg. Quantity:  30 Tab Refills:  0  
     
   
   
   
  
 * Notice: This list has 2 medication(s) that are the same as other medications prescribed for you. Read the directions carefully, and ask your doctor or other care provider to review them with you. STOP taking these medications   
 diclofenac EC 50 mg EC tablet Commonly known as:  VOLTAREN  
   
  
 escitalopram oxalate 10 mg tablet Commonly known as:  Geoff Gabriel IBANDRONATE PO  
   
  
 oxybutynin 5 mg tablet Commonly known as:  UYAJOHNKLD Where to Get Your Medications Information on where to get these meds will be given to you by the nurse or doctor. ! Ask your nurse or doctor about these medications  
  mirtazapine 30 mg tablet  
 traMADol 50 mg tablet Discharge Instructions FOLLOW UP VISIT Appointment in: 3 - 5 Days orthopedics DISCHARGE SUMMARY from Nurse The following personal items are in your possession at time of discharge: 
 
Dental Appliances: None Visual Aid: Glasses, With patient Home Medications: Sent home Jewelry: Ring (2 rings) Clothing: Shirt Other Valuables: None PATIENT INSTRUCTIONS: 
 
 
F-face looks uneven A-arms unable to move or move unevenly S-speech slurred or non-existent T-time-call 911 as soon as signs and symptoms begin-DO NOT go Back to bed or wait to see if you get better-TIME IS BRAIN. Warning Signs of HEART ATTACK Call 911 if you have these symptoms: 
? Chest discomfort. Most heart attacks involve discomfort in the center of the chest that lasts more than a few minutes, or that goes away and comes back. It can feel like uncomfortable pressure, squeezing, fullness, or pain. ? Discomfort in other areas of the upper body. Symptoms can include pain or discomfort in one or both arms, the back, neck, jaw, or stomach. ? Shortness of breath with or without chest discomfort. ? Other signs may include breaking out in a cold sweat, nausea, or lightheadedness. Don't wait more than five minutes to call [address]! Fast action can save your life. Calling 911 is almost always the fastest way to get lifesaving treatment.  Emergency Medical Services staff can begin treatment when they arrive  up to an hour sooner than if someone gets to the hospital by car. The discharge information has been reviewed with the patient. The patient verbalized understanding. Discharge medications reviewed with the patient and appropriate educational materials and side effects teaching were provided. Patient armband removed and shredded. MyChart Activation Thank you for enrolling in [address] Please follow the instructions below to securely access your online medical record. [employer] allows you to send messages to your doctor, view your test results, renew your prescriptions, schedule appointments, and more. How Do I Sign Up? 1. In your internet browser, go to [email or web address]. 2. Click on the First Time User? Click Here link in the Sign In box. You will see the New Member Sign Up page. 3. Enter your [employer] Access Code exactly as it appears below. You will not need to use this code after youve completed the sign-up process. If you do not sign up before the expiration date, you must request a new code. [employer] Access Code: [email or web address] Expires: 8/2/2017  9:00 PM  
 
4. Enter the last four digits of your Social Security Number (xxxx) and Date of Birth (mm/dd/yyyy) as indicated and click Submit. You will be taken to the next sign-up page. 5. Create a [employer] ID. This will be your [employer] login ID and cannot be changed, so think of one that is secure and easy to remember. 6. Create a [employer] password. You can change your password at any time. 7. Enter your Password Reset Question and Answer. This can be used at a later time if you forget your password. 8. Enter your e-mail address. You will receive e-mail notification when new information is available in [address] 9. Click Sign Up. You can now view your medical record. Additional Information Remember, [employer] is NOT to be used for urgent needs.  For medical emergencies, dial 911. Now available from your iPhone and Android! Broken Lower Leg: Care Instructions Your Care Instructions Treatment for your broken leg will depend on how bad the break is. Your doctor may have put your lower leg in a splint or a cast to allow it to heal or keep it stable until you see another doctor. It may take weeks or months for your leg to heal. You can help it heal with some care at home. You heal best when you take good care of yourself. Eat a variety of healthy foods, and don't smoke. Follow-up care is a key part of your treatment and safety. Be sure to make and go to all appointments, and call your doctor if you are having problems. It's also a good idea to know your test results and keep a list of the medicines you take. How can you care for yourself at home? · Put ice or a cold pack on your lower leg for 10 to 20 minutes at a time. Try to do this every 1 to 2 hours for the next 3 days (when you are awake). Put a thin cloth between the ice and your cast or splint. Keep your cast or splint dry. · Follow the cast care instructions your doctor gives you. If you have a splint, do not take it off unless your doctor tells you to. · Be safe with medicines. Take pain medicines exactly as directed. ¨ If the doctor gave you a prescription medicine for pain, take it as prescribed. ¨ If you are not taking a prescription pain medicine, ask your doctor if you can take an over-the-counter medicine. · Do not put weight on your leg unless your doctor tells you to. Use crutches to walk. · Prop up your leg on pillows when you sit or lie down in the first few days after the injury. Keep your leg higher than the level of your heart. This will help reduce swelling. · Follow instructions for exercises to keep your leg strong. · Wiggle your toes often to reduce swelling and stiffness. When should you call for help? Call 911 anytime you think you may need emergency care. For example, call if: 
· You have sudden chest pain and shortness of breath, or you cough up blood. Call your doctor now or seek immediate medical care if: 
· You have increased or severe pain. · Your foot is cool or pale or changes color. · You have tingling, weakness, or numbness in your toes. · Your cast or splint feels too tight. · You cannot move your toes. · You have signs of a blood clot, such as: 
¨ Pain in your calf, back of the knee, thigh, or groin. ¨ Redness and swelling in your leg or groin. · The skin under your cast or splint is burning or stinging. Watch closely for changes in your health, and be sure to contact your doctor if: 
· You do not get better as expected. Where can you learn more? Go to http://emanuel-williams.info/. Enter L198 in the search box to learn more about \"Broken Lower Leg: Care Instructions. \" Current as of: May 27, 2016 Content Version: 11.2 © 0871-0097 [employer]. Care instructions adapted under license by [employer] (which disclaims liability or warranty for this information). If you have questions about a medical condition or this instruction, always ask your healthcare professional. [address] any warranty or liability for your use of this information. Bones of the Leg: Anatomy Sketch Current as of: January 5, 2017 Content Version: 11.2 © 5284-3063 [employer]. Care instructions adapted under license by [employer] (which disclaims liability or warranty for this information). If you have questions about a medical condition or this instruction, always ask your healthcare professional. [address] any warranty or liability for your use of this information. High Blood Pressure: Care Instructions Your Care Instructions If your blood pressure is usually above 140/90, you have high blood pressure, or hypertension. That means the top number is 140 or higher or the bottom number is 90 or higher, or both. Despite what a lot of people think, high blood pressure usually doesn't cause headaches or make you feel dizzy or lightheaded. It usually has no symptoms. But it does increase your risk for heart attack, stroke, and kidney or eye damage. The higher your blood pressure, the more your risk increases. Your doctor will give you a goal for your blood pressure. Your goal will be based on your health and your age. An example of a goal is to keep your blood pressure below 140/90. Lifestyle changes, such as eating healthy and being active, are always important to help lower blood pressure. You might also take medicine to reach your blood pressure goal. 
Follow-up care is a key part of your treatment and safety. Be sure to make and go to all appointments, and call your doctor if you are having problems. It's also a good idea to know your test results and keep a list of the medicines you take. How can you care for yourself at home? Medical treatment · If you stop taking your medicine, your blood pressure will go back up. You may take one or more types of medicine to lower your blood pressure. Be safe with medicines. Take your medicine exactly as prescribed. Call your doctor if you think you are having a problem with your medicine. · Talk to your doctor before you start taking aspirin every day. Aspirin can help certain people lower their risk of a heart attack or stroke. But taking aspirin isn't right for everyone, because it can cause serious bleeding. · See your doctor regularly. You may need to see the doctor more often at first or until your blood pressure comes down. · If you are taking blood pressure medicine, talk to your doctor before you take decongestants or anti-inflammatory medicine, such as ibuprofen. Some of these medicines can raise blood pressure. · Learn how to check your blood pressure at home. Lifestyle changes · Stay at a healthy weight. This is especially important if you put on weight around the waist. Losing even 10 pounds can help you lower your blood pressure. · If your doctor recommends it, get more exercise. Walking is a good choice. Bit by bit, increase the amount you walk every day. Try for at least 30 minutes on most days of the week. You also may want to swim, bike, or do other activities. · Avoid or limit alcohol. Talk to your doctor about whether you can drink any alcohol. · Try to limit how much sodium you eat to less than 2,300 milligrams (mg) a day. Your doctor may ask you to try to eat less than 1,500 mg a day. · Eat plenty of fruits (such as bananas and oranges), vegetables, legumes, whole grains, and low-fat dairy products. · Lower the amount of saturated fat in your diet. Saturated fat is found in animal products such as milk, cheese, and meat. Limiting these foods may help you lose weight and also lower your risk for heart disease. · Do not smoke. Smoking increases your risk for heart attack and stroke. If you need help quitting, talk to your doctor about stop-smoking programs and medicines. These can increase your chances of quitting for good. When should you call for help? Call 911 anytime you think you may need emergency care. This may mean having symptoms that suggest that your blood pressure is causing a serious heart or blood vessel problem. Your blood pressure may be over 180/110. For example, call 911 if: 
· You have symptoms of a heart attack. These may include: ¨ Chest pain or pressure, or a strange feeling in the chest. 
¨ Sweating. ¨ Shortness of breath. ¨ Nausea or vomiting. ¨ Pain, pressure, or a strange feeling in the back, neck, jaw, or upper belly or in one or both shoulders or arms. ¨ Lightheadedness or sudden weakness. ¨ A fast or irregular heartbeat. · You have symptoms of a stroke. These may include: 
¨ Sudden numbness, tingling, weakness, or loss of movement in your face, arm, or leg, especially on only one side of your body. ¨ Sudden vision changes. ¨ Sudden trouble speaking. ¨ Sudden confusion or trouble understanding simple statements. ¨ Sudden problems with walking or balance. ¨ A sudden, severe headache that is different from past headaches. · You have severe back or belly pain. Do not wait until your blood pressure comes down on its own. Get help right away. Call your doctor now or seek immediate care if: 
· Your blood pressure is much higher than normal (such as 180/110 or higher), but you don't have symptoms. · You think high blood pressure is causing symptoms, such as: ¨ Severe headache. ¨ Blurry vision. Watch closely for changes in your health, and be sure to contact your doctor if: 
· Your blood pressure measures 140/90 or higher at least 2 times. That means the top number is 140 or higher or the bottom number is 90 or higher, or both. · You think you may be having side effects from your blood pressure medicine. · Your blood pressure is usually normal, but it goes above normal at least 2 times. Where can you learn more? Go to http://emanuel-williams.info/. Enter E016 in the search box to learn more about \"High Blood Pressure: Care Instructions. \" Current as of: August 8, 2016 Content Version: 11.2 © 0924-0944 [employer]. Care instructions adapted under license by [employer] (which disclaims liability or warranty for this information). If you have questions about a medical condition or this instruction, always ask your healthcare professional. [address] [address] any warranty or liability for your use of this information. Discharge Orders None Introducing [facility]! Gena Ross introduces [employer] patient portal. Now you can access parts of your medical record, email your doctor's office, and request medication refills online. 1. In your internet browser, go to [email or web address] 2. Click on the First Time User? Click Here link in the Sign In box. You will see the New Member Sign Up page. 3. Enter your [employer] Access Code exactly as it appears below. You will not need to use this code after youve completed the sign-up process. If you do not sign up before the expiration date, you must request a new code. · [employer] Access Code: [email or web address] Expires: 8/2/2017  9:00 PM 
 
4. Enter the last four digits of your Social Security Number (xxxx) and Date of Birth (mm/dd/yyyy) as indicated and click Submit. You will be taken to the next sign-up page. 5. Create a [employer] ID. This will be your [employer] login ID and cannot be changed, so think of one that is secure and easy to remember. 6. Create a [employer] password. You can change your password at any time. 7. Enter your Password Reset Question and Answer. This can be used at a later time if you forget your password. 8. Enter your e-mail address. You will receive e-mail notification when new information is available in [address] 9. Click Sign Up. You can now view and download portions of your medical record. 10. Click the Download Summary menu link to download a portable copy of your medical information. If you have questions, please visit the Frequently Asked Questions section of the [employer] website. Remember, [employer] is NOT to be used for urgent needs. For medical emergencies, dial 911. Now available from your iPhone and Android! General Information Please provide this summary of care documentation to your next provider. Patient Signature:  ____________________________________________________________ Date:  ____________________________________________________________  
  
Jackelin Josie Provider Signature:  ____________________________________________________________ Date:  ____________________________________________________________

## 2017-05-23 NOTE — IP AVS SNAPSHOT
Current Discharge Medication List  
  
CONTINUE these medications which have CHANGED Dose & Instructions Dispensing Information Comments Morning Noon Evening Bedtime  
 amLODIPine 2.5 mg tablet Commonly known as:  Lila Cee What changed:  how much to take Your last dose was: Your next dose is:    
   
   
 Dose:  2.5 mg Take 1 tablet by mouth daily. Quantity:  90 tablet Refills:  0  
     
   
   
   
  
 mirtazapine 30 mg tablet Commonly known as:  Neo Boyer What changed:  medication strength Your last dose was: Your next dose is:    
   
   
 Dose:  30 mg Take 1 Tab by mouth nightly. Quantity:  30 Tab Refills:  0  
     
   
   
   
  
 rOPINIRole 2 mg tablet Commonly known as:  Maximo Asencio What changed:   
- how much to take - when to take this 
- additional instructions Your last dose was: Your next dose is:    
   
   
 Dose:  2 mg Take 1 Tab by mouth nightly. Quantity:  60 Tab Refills:  0 CONTINUE these medications which have NOT CHANGED Dose & Instructions Dispensing Information Comments Morning Noon Evening Bedtime  
 albuterol-ipratropium 2.5 mg-0.5 mg/3 ml Nebu Commonly known as:  Torsten August Your last dose was: Your next dose is:    
   
   
 Dose:  3 mL  
3 mL by Nebulization route every six (6) hours as needed. Quantity:  30 Nebule Refills:  1  
     
   
   
   
  
 aspirin 325 mg tablet Commonly known as:  ASPIRIN Your last dose was: Your next dose is:    
   
   
 Dose:  325 mg Take 1 Tab by mouth daily. Quantity:  30 Tab Refills:  0  
     
   
   
   
  
 bimatoprost 0.01 % ophthalmic drops Commonly known as:  LUMIGAN Your last dose was: Your next dose is:    
   
   
 Dose:  1 Drop Administer 1 Drop to left eye every evening. Refills:  0  
     
   
   
   
  
 clonazePAM 1 mg tablet Commonly known as:  Wayland Fabry  
 Your last dose was: Your next dose is:    
   
   
 Dose:  0.25 mg Take 0.25 mg by mouth nightly. Refills:  0  
     
   
   
   
  
 clopidogrel 75 mg Tab Commonly known as:  PLAVIX Your last dose was: Your next dose is:    
   
   
 Dose:  75 mg Take 1 Tab by mouth daily. Quantity:  30 Tab Refills:  5 COMBIGAN 0.2-0.5 % Drop ophthalmic solution Generic drug:  brimonidine-timolol Your last dose was: Your next dose is:    
   
   
 Dose:  1 Drop Administer 1 Drop to left eye every twelve (12) hours. Refills:  0  
     
   
   
   
  
 docusate sodium 100 mg capsule Commonly known as:  Harvy Rape Your last dose was: Your next dose is:    
   
   
 Dose:  100 mg Take 100 mg by mouth two (2) times daily as needed for Constipation. Refills:  0  
     
   
   
   
  
 * FLUoxetine 20 mg capsule Commonly known as:  PROzac Your last dose was: Your next dose is:    
   
   
 Dose:  20 mg Take 20 mg by mouth daily. Refills:  0  
     
   
   
   
  
 * FLUoxetine 10 mg capsule Commonly known as:  PROzac Your last dose was: Your next dose is:    
   
   
 Dose:  10 mg Take 10 mg by mouth daily. Refills:  0  
     
   
   
   
  
 FOLIC ACID PO Your last dose was: Your next dose is:    
   
   
 Dose:  1 Tab Take 1 Tab by mouth daily. 1 mg tablet Refills:  0  
     
   
   
   
  
 levothyroxine 50 mcg tablet Commonly known as:  synthroid Your last dose was: Your next dose is:    
   
   
 Dose:  50 mcg Take 1 Tab by mouth Daily (before breakfast). Quantity:  30 Tab Refills:  5  
     
   
   
   
  
 meloxicam 7.5 mg tablet Commonly known as:  MOBIC Your last dose was: Your next dose is: Take  by mouth daily. Refills:  0  
     
   
   
   
  
 methotrexate 2.5 mg tablet Commonly known as:  Teodora Muhammad Your last dose was: Your next dose is:    
   
   
 Dose:  15 mg Take 15 mg by mouth every Sunday. Refills:  0  
     
   
   
   
  
 ondansetron 4 mg disintegrating tablet Commonly known as:  ZOFRAN ODT Your last dose was: Your next dose is:    
   
   
 Dose:  4 mg Take 1 Tab by mouth every eight (8) hours as needed for Nausea. Quantity:  20 Tab Refills:  0  
     
   
   
   
  
 pantoprazole 40 mg tablet Commonly known as:  PROTONIX Your last dose was: Your next dose is:    
   
   
 Dose:  40 mg Take 1 Tab by mouth Daily (before breakfast). Quantity:  30 Tab Refills:  0  
     
   
   
   
  
 polyethylene glycol 17 gram/dose powder Commonly known as:  Jeniffer Victor Hugo Your last dose was: Your next dose is:    
   
   
 Dose:  17 g Take 17 g by mouth as needed (for constipation). Refills:  0  
     
   
   
   
  
 predniSONE 10 mg tablet Commonly known as:  Lesvia Friedwell Your last dose was: Your next dose is:    
   
   
 Dose:  30 mg Take 3 Tabs by mouth daily. Quantity:  90 Tab Refills:  0  
     
   
   
   
  
 simvastatin 20 mg tablet Commonly known as:  ZOCOR Your last dose was: Your next dose is:    
   
   
 Dose:  20 mg Take 1 Tab by mouth nightly. Quantity:  30 Tab Refills:  0  
     
   
   
   
  
 traMADol 50 mg tablet Commonly known as:  ULTRAM  
   
Your last dose was: Your next dose is:    
   
   
 Dose:  50 mg Take 1 Tab by mouth every six (6) hours as needed for Pain. Max Daily Amount: 200 mg. Quantity:  30 Tab Refills:  0  
     
   
   
   
  
 * Notice: This list has 2 medication(s) that are the same as other medications prescribed for you. Read the directions carefully, and ask your doctor or other care provider to review them with you. STOP taking these medications   
 diclofenac EC 50 mg EC tablet Commonly known as:  VOLTAREN  
   
  
 escitalopram oxalate 10 mg tablet Commonly known as:  Salli Chua IBANDRONATE PO  
   
  
 oxybutynin 5 mg tablet Commonly known as:  PNVNFRVL Where to Get Your Medications Information on where to get these meds will be given to you by the nurse or doctor. ! Ask your nurse or doctor about these medications  
  mirtazapine 30 mg tablet  
 traMADol 50 mg tablet
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
## 2017-05-23 NOTE — ROUTINE PROCESS
Bedside shift change report given to PAOLA Neely (oncoming nurse) by Yoav Gore (offgoing nurse). Report included the following information SBAR.

## 2017-05-24 PROBLEM — G93.40 ACUTE ENCEPHALOPATHY: Status: RESOLVED | Noted: 2017-03-26 | Resolved: 2017-05-24

## 2017-05-24 PROBLEM — I10 HTN (HYPERTENSION): Status: ACTIVE | Noted: 2017-05-24

## 2017-05-24 PROBLEM — L03.113 CELLULITIS OF ARM, RIGHT: Status: RESOLVED | Noted: 2017-04-08 | Resolved: 2017-05-24

## 2017-05-24 PROBLEM — E86.0 DEHYDRATION: Status: RESOLVED | Noted: 2017-03-26 | Resolved: 2017-05-24

## 2017-05-24 PROBLEM — S82.209A FRACTURE, FIBULA, WITH TIBIA: Status: RESOLVED | Noted: 2017-05-23 | Resolved: 2017-05-24

## 2017-05-24 PROBLEM — J44.9 COPD (CHRONIC OBSTRUCTIVE PULMONARY DISEASE) (HCC): Status: RESOLVED | Noted: 2017-03-26 | Resolved: 2017-05-24

## 2017-05-24 PROBLEM — M31.6 GIANT CELL ARTERITIS (HCC): Status: ACTIVE | Noted: 2017-05-24

## 2017-05-24 PROBLEM — R41.82 ALTERED MENTAL STATE: Status: RESOLVED | Noted: 2017-04-08 | Resolved: 2017-05-24

## 2017-05-24 PROBLEM — J44.9 COPD (CHRONIC OBSTRUCTIVE PULMONARY DISEASE) (HCC): Status: ACTIVE | Noted: 2017-05-24

## 2017-05-24 PROBLEM — S82.409A FRACTURE, FIBULA, WITH TIBIA: Status: RESOLVED | Noted: 2017-05-23 | Resolved: 2017-05-24

## 2017-05-24 PROBLEM — E03.9 ACQUIRED HYPOTHYROIDISM: Status: ACTIVE | Noted: 2017-05-24

## 2017-05-24 PROBLEM — I67.9 CEREBRAL VASCULAR DISEASE: Status: ACTIVE | Noted: 2017-05-24

## 2017-05-24 PROBLEM — B00.1 HERPES LABIALIS: Status: RESOLVED | Noted: 2017-04-08 | Resolved: 2017-05-24

## 2017-05-24 LAB
BASOPHILS # BLD AUTO: 0 K/UL (ref 0–0.06)
BASOPHILS # BLD: 0 % (ref 0–2)
DIFFERENTIAL METHOD BLD: ABNORMAL
EOSINOPHIL # BLD: 0.1 K/UL (ref 0–0.4)
EOSINOPHIL NFR BLD: 1 % (ref 0–5)
ERYTHROCYTE [DISTWIDTH] IN BLOOD BY AUTOMATED COUNT: 17.6 % (ref 11.6–14.5)
HCT VFR BLD AUTO: 31.9 % (ref 35–45)
HGB BLD-MCNC: 9.5 G/DL (ref 12–16)
LYMPHOCYTES # BLD AUTO: 28 % (ref 21–52)
LYMPHOCYTES # BLD: 4.4 K/UL (ref 0.9–3.6)
MCH RBC QN AUTO: 29.7 PG (ref 24–34)
MCHC RBC AUTO-ENTMCNC: 29.8 G/DL (ref 31–37)
MCV RBC AUTO: 99.7 FL (ref 74–97)
MONOCYTES # BLD: 1.2 K/UL (ref 0.05–1.2)
MONOCYTES NFR BLD AUTO: 8 % (ref 3–10)
NEUTS SEG # BLD: 9.7 K/UL (ref 1.8–8)
NEUTS SEG NFR BLD AUTO: 63 % (ref 40–73)
PLATELET # BLD AUTO: 409 K/UL (ref 135–420)
PMV BLD AUTO: 11.1 FL (ref 9.2–11.8)
RBC # BLD AUTO: 3.2 M/UL (ref 4.2–5.3)
WBC # BLD AUTO: 15.5 K/UL (ref 4.6–13.2)

## 2017-05-24 PROCEDURE — 74011250636 HC RX REV CODE- 250/636: Performed by: INTERNAL MEDICINE

## 2017-05-24 PROCEDURE — 74011636637 HC RX REV CODE- 636/637: Performed by: INTERNAL MEDICINE

## 2017-05-24 PROCEDURE — 85025 COMPLETE CBC W/AUTO DIFF WBC: CPT | Performed by: INTERNAL MEDICINE

## 2017-05-24 PROCEDURE — 36415 COLL VENOUS BLD VENIPUNCTURE: CPT | Performed by: INTERNAL MEDICINE

## 2017-05-24 PROCEDURE — 77030011256 HC DRSG MEPILEX <16IN NO BORD MOLN -A

## 2017-05-24 PROCEDURE — 74011250637 HC RX REV CODE- 250/637: Performed by: INTERNAL MEDICINE

## 2017-05-24 PROCEDURE — 65660000000 HC RM CCU STEPDOWN

## 2017-05-24 RX ADMIN — MORPHINE SULFATE 2 MG: 2 INJECTION, SOLUTION INTRAMUSCULAR; INTRAVENOUS at 11:46

## 2017-05-24 RX ADMIN — HEPARIN SODIUM 5000 UNITS: 5000 INJECTION, SOLUTION INTRAVENOUS; SUBCUTANEOUS at 17:41

## 2017-05-24 RX ADMIN — CLONAZEPAM 0.25 MG: 0.5 TABLET ORAL at 21:54

## 2017-05-24 RX ADMIN — LEVOTHYROXINE SODIUM 50 MCG: 50 TABLET ORAL at 07:00

## 2017-05-24 RX ADMIN — MORPHINE SULFATE 4 MG: 2 INJECTION, SOLUTION INTRAMUSCULAR; INTRAVENOUS at 20:28

## 2017-05-24 RX ADMIN — BIMATOPROST 1 DROP: 0.1 SOLUTION/ DROPS OPHTHALMIC at 17:44

## 2017-05-24 RX ADMIN — PREDNISONE 30 MG: 10 TABLET ORAL at 09:26

## 2017-05-24 RX ADMIN — TIMOLOL MALEATE 1 DROP: 5 SOLUTION OPHTHALMIC at 21:57

## 2017-05-24 RX ADMIN — TIMOLOL MALEATE 1 DROP: 5 SOLUTION OPHTHALMIC at 09:34

## 2017-05-24 RX ADMIN — BRIMONIDINE TARTRATE 1 DROP: 2 SOLUTION OPHTHALMIC at 22:05

## 2017-05-24 RX ADMIN — FOLIC ACID 1 MG: 1 TABLET ORAL at 09:26

## 2017-05-24 RX ADMIN — MIRTAZAPINE 30 MG: 15 TABLET, FILM COATED ORAL at 21:54

## 2017-05-24 RX ADMIN — PANTOPRAZOLE SODIUM 40 MG: 40 TABLET, DELAYED RELEASE ORAL at 07:00

## 2017-05-24 RX ADMIN — SIMVASTATIN 20 MG: 5 TABLET, FILM COATED ORAL at 21:55

## 2017-05-24 RX ADMIN — MORPHINE SULFATE 2 MG: 2 INJECTION, SOLUTION INTRAMUSCULAR; INTRAVENOUS at 15:44

## 2017-05-24 RX ADMIN — FLUOXETINE 20 MG: 20 CAPSULE ORAL at 09:27

## 2017-05-24 RX ADMIN — ASPIRIN 325 MG ORAL TABLET 325 MG: 325 PILL ORAL at 09:27

## 2017-05-24 RX ADMIN — MORPHINE SULFATE 2 MG: 2 INJECTION, SOLUTION INTRAMUSCULAR; INTRAVENOUS at 06:59

## 2017-05-24 RX ADMIN — ROPINIROLE HYDROCHLORIDE 2 MG: 1 TABLET, FILM COATED ORAL at 21:54

## 2017-05-24 RX ADMIN — BRIMONIDINE TARTRATE 1 DROP: 2 SOLUTION OPHTHALMIC at 09:34

## 2017-05-24 RX ADMIN — HEPARIN SODIUM 5000 UNITS: 5000 INJECTION, SOLUTION INTRAVENOUS; SUBCUTANEOUS at 05:51

## 2017-05-24 RX ADMIN — AMLODIPINE BESYLATE 2.5 MG: 5 TABLET ORAL at 09:26

## 2017-05-24 NOTE — ROUTINE PROCESS
Bedside shift change report given to PAOLA Neely (oncoming nurse) by Kerry Mendosa (offgoing nurse). Report included the following information SBAR.

## 2017-05-24 NOTE — PROGRESS NOTES
Physical Therapy Screening:  Services are indicated once patient is stable from an orthopedicstandpoint and therapy order is required. [facility] therapy based on results obtained during the nursing admission assessment. The patients chart was reviewed and the patient is appropriate for a skilled therapy evaluation. Please order a consult for physical therapy if you are in agreement and would like an evaluation to be completed. Thank you.     Hannah Jackson, PTA
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
## 2017-05-24 NOTE — PROGRESS NOTES
Pain medication administered prior to dressing change. Patient LLE is in splint. Phalanges are visible and warm, with capillary refill less than three seconds. Patient states she can feel toes. Skin tear to RLE. Wound cleaned with wound cleanser, vaseline gauze applied, abd applied, wound wrapped with kerlex. Same procedure for RUE skin tear. Nurse driven wound consult placed. Linen and gown changed at this time. Patient is able to mobilize in bed, although significant pain is noted upon movement.

## 2017-05-24 NOTE — PROGRESS NOTES
General Internal Medicine/Geriatrics    Patient: Miguel Hansen MRN: [id number]  CSN: [id number]    YOB: 1945  Age: 66 y.o. Sex: female    DOA: 5/23/2017 LOS:  LOS: 1 day                    Subjective:     Awake, alert  Pain controlled  No new c/o  Review of Systems:  Eyes: negative  Ears, Nose, Mouth, Throat, and Face: negative  Respiratory: negative for dyspnea on exertion  Cardiovascular: negative for chest pain  Gastrointestinal: negative for nausea, vomiting and diarrhea  Genitourinary:negative for dysuria and hematuria  Integument/Breast: negative  Hematologic/Lymphatic: negative for bleeding  Musculoskeletal:Lt. LE pain  Neurological: negative for weakness  Behavioral/Psychiatric: negative for behavior problems  Endocrine: negative for temperature intolerance  Allergic/Immunologic: negative for hay fever    Objective:     Physical Exam:  Patient Vitals for the past 24 hrs:   Temp Pulse Resp BP SpO2   05/24/17 0700 99.2 °F (37.3 °C) 88 20 152/73 92 %   05/23/17 2159 98 °F (36.7 °C) 92 20 148/84 90 %   05/23/17 1856 98.4 °F (36.9 °C) 84 20 164/88 92 %   05/23/17 1607 - - - (!) 188/92 -   05/23/17 1556 98.1 °F (36.7 °C) 76 20 (!) 183/98 94 %   05/23/17 1500 - - - (!) 157/95 99 %   05/23/17 1400 - - - 172/80 100 %   05/23/17 1336 - - - - 100 %   05/23/17 1333 - - - (!) 161/107 -   05/23/17 1120 98.7 °F (37.1 °C) 88 18 179/87 98 %     BP improved  HEENT: wnl  NECK:  No venous distention or adenopathy   HEART: RRR, no rubs or gallops  LUNGS: Clear to auscultation  ABDOMEN: soft with active BS.  No tenderness, no distention, no organomegaly  EXT: warm,no edema  SKIN: Normal turgor, no breaks  NEURO: Awake, alert, baseline  LLE NV intact    Intake and Output:  Current Shift:     Last three shifts:  05/22 1901 - 05/24 0700  In: 340 [P.O.:340]  Out: -     Recent Results (from the past 24 hour(s))   CBC WITH AUTOMATED DIFF    Collection Time: 05/23/17  1:50 PM   Result Value Ref Range    WBC 14.7 (H) 4.6 - 13.2 K/uL    RBC 3.46 (L) 4.20 - 5.30 M/uL    HGB 10.4 (L) 12.0 - 16.0 g/dL    HCT 34.5 (L) 35.0 - 45.0 %    MCV 99.7 (H) 74.0 - 97.0 FL    MCH 30.1 24.0 - 34.0 PG    MCHC 30.1 (L) 31.0 - 37.0 g/dL    RDW 17.5 (H) 11.6 - 14.5 %    PLATELET 558 (H) 769 - 420 K/uL    MPV 10.7 9.2 - 11.8 FL    NEUTROPHILS 83 (H) 40 - 73 %    LYMPHOCYTES 13 (L) 21 - 52 %    MONOCYTES 4 3 - 10 %    EOSINOPHILS 0 0 - 5 %    BASOPHILS 0 0 - 2 %    ABS. NEUTROPHILS 12.1 (H) 1.8 - 8.0 K/UL    ABS. LYMPHOCYTES 1.9 0.9 - 3.6 K/UL    ABS. MONOCYTES 0.6 0.05 - 1.2 K/UL    ABS. EOSINOPHILS 0.0 0.0 - 0.4 K/UL    ABS.  BASOPHILS 0.0 0.0 - 0.06 K/UL    DF AUTOMATED     METABOLIC PANEL, BASIC    Collection Time: 05/23/17  1:50 PM   Result Value Ref Range    Sodium 141 136 - 145 mmol/L    Potassium 4.5 3.5 - 5.5 mmol/L    Chloride 107 100 - 108 mmol/L    CO2 27 21 - 32 mmol/L    Anion gap 7 3.0 - 18 mmol/L    Glucose 124 (H) 74 - 99 mg/dL    BUN 21 (H) 7.0 - 18 MG/DL    Creatinine 0.45 (L) 0.6 - 1.3 MG/DL    BUN/Creatinine ratio 47 (H) 12 - 20      GFR est AA >60 >60 ml/min/1.73m2    GFR est non-AA >60 >60 ml/min/1.73m2    Calcium 8.8 8.5 - 10.1 MG/DL   MAGNESIUM    Collection Time: 05/23/17  1:50 PM   Result Value Ref Range    Magnesium 2.2 1.6 - 2.6 mg/dL   URINALYSIS W/ RFLX MICROSCOPIC    Collection Time: 05/23/17  2:50 PM   Result Value Ref Range    Color YELLOW      Appearance CLEAR      Specific gravity 1.011 1.005 - 1.030      pH (UA) 7.0 5.0 - 8.0      Protein NEGATIVE  NEG mg/dL    Glucose NEGATIVE  NEG mg/dL    Ketone NEGATIVE  NEG mg/dL    Bilirubin NEGATIVE  NEG      Blood NEGATIVE  NEG      Urobilinogen 0.2 0.2 - 1.0 EU/dL    Nitrites NEGATIVE  NEG      Leukocyte Esterase NEGATIVE  NEG     PROTHROMBIN TIME + INR    Collection Time: 05/23/17  3:30 PM   Result Value Ref Range    Prothrombin time 13.1 11.5 - 15.2 sec    INR 1.0 0.8 - 1.2     PTT    Collection Time: 05/23/17  3:30 PM   Result Value Ref Range    aPTT 20.0 (L) 23.0 - 36.4 SEC   CBC WITH AUTOMATED DIFF    Collection Time: 05/24/17  4:50 AM   Result Value Ref Range    WBC 15.5 (H) 4.6 - 13.2 K/uL    RBC 3.20 (L) 4.20 - 5.30 M/uL    HGB 9.5 (L) 12.0 - 16.0 g/dL    HCT 31.9 (L) 35.0 - 45.0 %    MCV 99.7 (H) 74.0 - 97.0 FL    MCH 29.7 24.0 - 34.0 PG    MCHC 29.8 (L) 31.0 - 37.0 g/dL    RDW 17.6 (H) 11.6 - 14.5 %    PLATELET 093 655 - 167 K/uL    MPV 11.1 9.2 - 11.8 FL    NEUTROPHILS 63 40 - 73 %    LYMPHOCYTES 28 21 - 52 %    MONOCYTES 8 3 - 10 %    EOSINOPHILS 1 0 - 5 %    BASOPHILS 0 0 - 2 %    ABS. NEUTROPHILS 9.7 (H) 1.8 - 8.0 K/UL    ABS. LYMPHOCYTES 4.4 (H) 0.9 - 3.6 K/UL    ABS. MONOCYTES 1.2 0.05 - 1.2 K/UL    ABS. EOSINOPHILS 0.1 0.0 - 0.4 K/UL    ABS. BASOPHILS 0.0 0.0 - 0.06 K/UL    DF AUTOMATED         Xr Hip Lt W Or Wo Pelv 2-3 Vws    Result Date: 5/23/2017  History: Left hip pain from fall at physical therapy AP and lateral views of the left hip and AP view of the pelvis demonstrate normal alignment without evidence of an acute fracture or dislocation. Aorta iliac stent graft noted. Bowel gas pattern is unremarkable. Impression: No evidence of an acute abnormality left hip. Degenerative changes are seen involving both hips. Xr Knee Lt Min 4 V    Result Date: 5/23/2017  History: Catheryn Pardon down last night. Comparison: None AP, lateral, and bilateral oblique views of the left knee are submitted for evaluation. There is oblique nondisplaced fracture through the proximal fibular shaft. No joint effusion. No visualized tibia or femur fracture. Joint spaces appear normal.  The soft tissue structures are unremarkable. Osseous mineralization is depressed. Impression: 1. Acute nondisplaced proximal shaft left fibular fracture. 2. Osteopenia. 3. No other fractures. No joint effusion. Xr Tib/fib Lt    Result Date: 5/23/2017  History: Homern Pardon down AP and lateral views of the left tibia and fibula are submitted for evaluation.  There is oblique fracture through the distal shaft of the tibia which is essentially nondisplaced. There is some subtle overriding of the fracture fragment seen only on the. There is a nondisplaced oblique fracture through the proximal shaft of the fibula. Regional soft tissue structures are normal. Patient appears mildly osteopenic. Impression: 1. Distal shaft oblique acute nondisplaced left tibial fracture. 2. Nondisplaced oblique proximal fibular fracture. 3. Osteopenia. Xr Pelv 1 Or 2 V    Result Date: 5/23/2017  History: Left hip pain from fall at physical therapy AP and lateral views of the left hip and AP view of the pelvis demonstrate normal alignment without evidence of an acute fracture or dislocation. Aorta iliac stent graft noted. Bowel gas pattern is unremarkable. Impression: No evidence of an acute abnormality left hip. Degenerative changes are seen involving both hips.       Current Facility-Administered Medications   Medication Dose Route Frequency    albuterol-ipratropium (DUO-NEB) 2.5 MG-0.5 MG/3 ML  3 mL Nebulization Q6H PRN    amLODIPine (NORVASC) tablet 2.5 mg  2.5 mg Oral DAILY    aspirin (ASPIRIN) tablet 325 mg  325 mg Oral DAILY    bimatoprost (LUMIGAN) 0.01 % ophthalmic drops 1 Drop  1 Drop Left Eye QPM    clonazePAM (KlonoPIN) tablet 0.25 mg  0.25 mg Oral QHS    docusate sodium (COLACE) capsule 100 mg  100 mg Oral BID PRN    FLUoxetine (PROzac) capsule 20 mg  20 mg Oral DAILY    folic acid (FOLVITE) tablet 1 mg  1 mg Oral DAILY    levothyroxine (SYNTHROID) tablet 50 mcg  50 mcg Oral ACB    [START ON 5/28/2017] methotrexate (RHEUMATREX) tablet 15 mg  15 mg Oral every Sunday    mirtazapine (REMERON) tablet 30 mg  30 mg Oral QHS    ondansetron (ZOFRAN ODT) tablet 4 mg  4 mg Oral Q8H PRN    pantoprazole (PROTONIX) tablet 40 mg  40 mg Oral ACB    polyethylene glycol (MIRALAX) packet 17 g  17 g Oral DAILY PRN    simvastatin (ZOCOR) tablet 20 mg  20 mg Oral QHS    rOPINIRole (REQUIP) tablet 2 mg  2 mg Oral QHS    predniSONE (DELTASONE) tablet 30 mg  30 mg Oral DAILY    brimonidine (ALPHAGAN) 0.2 % ophthalmic solution 1 Drop  1 Drop Left Eye Q12H    morphine injection 2-4 mg  2-4 mg IntraVENous Q4H PRN    acetaminophen (TYLENOL) tablet 650 mg  650 mg Oral Q6H PRN    heparin (porcine) injection 5,000 Units  5,000 Units SubCUTAneous Q12H    hydrALAZINE (APRESOLINE) 20 mg/mL injection 10 mg  10 mg IntraVENous Q6H PRN    timolol (TIMOPTIC) 0.5 % ophthalmic solution 1 Drop  1 Drop Left Eye Q12H       Lab Results   Component Value Date/Time    Glucose 124 05/23/2017 01:50 PM    Glucose 124 03/31/2017 05:15 AM    Glucose 153 03/29/2017 03:30 AM    Glucose 211 03/28/2017 03:35 AM    Glucose 73 03/27/2017 03:35 AM        Assessment     Active Problems:    Fracture tibia/fibula (5/23/2017)      COPD (chronic obstructive pulmonary disease) ([facility] [address] (5/24/2017)      Cerebral vascular disease (5/24/2017)      Giant cell arteritis ([facility] [address] (5/24/2017)      HTN (hypertension) (5/24/2017)      Acquired hypothyroidism (5/24/2017)        Plan     Ortho consult pending  Plavix on hold  Elevated WBC , ?  Reactive  Same meds  monitor          Leopoldo Fend, MD  5/24/2017, 8:38 AM

## 2017-05-24 NOTE — PROGRESS NOTES
[facility]/[address]   Discharge Planning/ Assessment    Reasons for Intervention: Chart reviewed. Met with pt/spouse, verified all demographics. [facility] has [facility] ins. [facility] Dr. Eileen Ca is her PCP. NOK: Eddie Ho, spouse, with whom she lives with & designates can participate in her discharge process. Has the following DME: hospital bed, (2)walkers, wheel chair, cane BSC, & shower bench. [facility] is active with [facility]. Has had recent stay on [facility], asked about returning if insurance would auth, [facility] would prefer to return home with home health. PLAN: home with her spouse & home health when medically stable. Will need resume home care orders @ discharge, thanks. Will cont to follow for further needs. Bibi Parsons,PAOLAe,xt. [phone].       High Risk Criteria  [x] Yes  []No   Physician Referral  [] Yes  [x]No        Date    Nursing Referral  [] Yes  [x]No        Date    Patient/Family Request  [] Yes  [x]No        Date       Resources:    Medicare  [] Yes  [x]No   Medicaid  [] Yes  [x]No   No Resources  [] Yes  [x]No   Private Insurance  [x] Yes  []No    Name/Phone Number    Other  [] Yes  [x]No        (i.e. Workman's Comp)         Prior Services:    Prior Services  [] Yes  [x]No   Home Health  [] Yes  [x]No   [address]  [] Yes  [x]No        Number of [address]  [] Yes  [x]No       Meals on Wheels  [] Yes  [x]No   Office on Aging  [] Yes  [x]No   Transportation Services  [] Yes  [x]No   Nursing Home  [] Yes  [x]No        Nursing Home Name    [address]  [] Yes  [x]No        [address] Name    Other       Information Source:      Information obtained from  [x] Patient  [] Parent   [] [address]  [] Child  [x] Spouse   [] Significant Other/Partner   [] Friend      [] EMS    [] Nursing Home Chart          [] Other:   Chart Review  [x] Yes  []No     Family/Support System:    Patient lives with  [] Alone    [x] Spouse   [] Significant Other  [] Children  [] Caretaker   [] Parent  [] Sibling     [] Other       Other Support System:    Is the patient responsible for care of others  [] Yes  [x]No   Information of person caring for patient on  discharge    Managers financial affairs independently  [] Yes  [x]No   If no, explain:      Status Prior to Admission:    Mental Status  [x] Awake  [x] Alert  [x] Oriented  [x] Quiet/Calm [] Lethargic/Sedated   [] Disoriented  [] Restless/Anxious  [] Combative   Personal Care  [] Dependent  [] [address]  [x] Requires Assistance   Meal Preparation Ability  [] Independent   [] Standby Assistance   [] Minimal Assistance   [] Moderate Assistance  [x] Maximum Assistance     [] Total Assistance   Chores  [] Independent with Chores   [] N/A Nursing Home Resident   [x] Requires Assistance   Bowel/Bladder  [x] Continent  [] Catheter  [] Incontinent  [] Ostomy Self-Care    [] Urine Diversion Self-Care  [] Maximum Assistance     [] Total Assistance   Number of Persons needed for assistance    DME at home  [] Tariq Thomas  [x] Carson Thomas   [x] Commode    [] Bathroom/Grab Bars  [x] Hospital Bed  [] Nebulizer  [] Oxygen           [] Raised Toilet Seat  [x] Shower Chair  [] Side Rails for Bed   [] Tub Transfer Bench   [x] Nellene Lipoma  [] Walker, Standard      [x] Other: hospital bed.    Vendor      Treatment Presently Receiving:    Current Treatments  [] Chemotherapy  [] Dialysis  [] Insulin  [] IVAB [x] IVF   [] O2  [] PCA   [] PT   [] RT   [] Tube Feedings   [] Wound Care     Psychosocial Evaluation:    Verbalized Knowledge of Disease Process  [] Patient  []Family   Coping with Disease Process  [] Patient  []Family   Requires Further Counseling Coping with Disease Process  [] Patient  []Family     Identified Projected Needs:    Home Health Aid  [] Yes  [x]No   Transportation  [] Yes  [x]No   Education  [] Yes  [x]No        Specific Education     Financial Counseling  [] Yes  [x]No Inability to Care for Self/Will Require 24 hour care  [] Yes  [x]No   Pain Management  [] Yes  [x]No   Home Infusion Therapy  [] Yes  [x]No   Oxygen Therapy  [] Yes  [x]No   DME  [] Yes  [x]No   Long Term Care Placement  [] Yes  [x]No   Rehab  [] Yes  [x]No   Physical Therapy  [x] Yes  []No   Needs Anticipated At This Time  [x] Yes  []No     Intra-Hospital Referral:    [facility]  [] Yes  [x]No     [] Yes  [x]No   Patient Representative  [] Yes  [x]No   Staff for Teaching Needs  [] Yes  [x]No   Specialty Teaching Needs     Diabetic Educator  [] Yes  [x]No   Referral for Diabetic Educator Needed  [] Yes  [x]No  If Yes, place order for Nutritionist or Diabetic Consult     Tentative Discharge Plan:    Home with No Services  [] Yes  [x]No   Home with Home Health Follow-up  [x] Yes  []No        If Yes, specify type    Home Care Program  [] Yes  [x]No        If Yes, specify type    Meals on Wheels  [] Yes  [x]No   Office of Aging  [] Yes  [x]No   NHP  [] Yes  [x]No   Return to the Nursing Home  [] Yes  [x]No   Rehab Therapy  [] Yes  [x]No   Acute Rehab  [] Yes  [x]No   Subacute Rehab  [] Yes  [x]No   Private Care  [] Yes  [x]No   Substance Abuse Referral  [] Yes  [x]No   Transportation  [] Yes  [x]No   Chore Service  [] Yes  [x]No   Inpatient Hospice  [] Yes  [x]No   OP RT  [] Yes  [x] No   OP Hemo  [] Yes  [x] No   OP PT  [] Yes  [x]No   Support Group  [] Yes  [x]No   Reach to Recovery  [] Yes  [x]No   OP Oncology Clinic  [] Yes  [x]No   Clinic Appointment  [] Yes  [x]No   DME  [] Yes  [x]No   Comments    Name of D/C Planner or  Given to Patient or Family Lachelle Infante   Phone Number Pager: [phone]        Extension Ext. [phone]. VM [phone]   Date 05-   Time    If you are discharged home, whom do you designate to participate in your discharge plan and receive any information needed?      Enter name of Hussain LEBLANC Tez Brown        Phone # of designee [phone]        Address of Rashid Maya.  [id number]        Updated         Patient refused to designate any           individual

## 2017-05-24 NOTE — WOUND CARE
General Progress Note    Patient: Laurence Garcia MRN: [id number] LOS: 1     YOB: 1945  Age: 66 y.o. Sex: female        Admit Date: 5/23/2017      Subjective:   Patient has no complaint of pain with wound care assessment/treatment. Spa team at bedside performing spa bath. Patient assisted with turning. Report to Sofía Bone; orders received, Dr. Eileen Ca    Objective:     Vitals  Patient Vitals for the past 8 hrs:   BP Temp Pulse Resp SpO2 Height Weight   05/24/17 1405 156/85 97.5 °F (36.4 °C) 68 24 96 % - -   05/24/17 0800 162/83 98.1 °F (36.7 °C) 98 18 95 % - -   05/24/17 0700 152/73 99.2 °F (37.3 °C) 88 20 92 % 5' (1.524 m) 48.1 kg (106 lb)       I/O Current Shift  05/24 0701 - 05/24 1900  In: 300 [P.O.:300]  Out: -     I/O Last Three Shifts  05/22 1901 - 05/24 0700  In: 340 [P.O.:340]  Out: -                 Assessment:     Wound Presentation:                Active Problems:    Fracture tibia/fibula (5/23/2017)      COPD (chronic obstructive pulmonary disease) ([facility] [address] (5/24/2017)      Cerebral vascular disease (5/24/2017)      [facility] (5/24/2017)      HTN (hypertension) (5/24/2017)      Acquired hypothyroidism (5/24/2017)           bed low and locked, lights dimmed for comfort, and call bell within patient's reach. Patient verbalized understanding to all instructions provided but needs additional instruction.   Plan:     Nursing to Perform Daily  Wound Care/Dressing change to Right Lower Arm and Right Lower Lateral Leg:  Cleanse wound with normal saline or dermal wound cleanser, apply Petroleum Gauze to wound base/sides, cover with ABD pad and Roll Gauze    Continue to turn patient every 2hours, float heels for pressure ulcer prevention    Taylor Tran RN, [address] [place] [address],[address]

## 2017-05-24 NOTE — H&P
[address]    Name:  Alo Boone  MR#:  [id number]  :  1945  Account #:  [de-identified]  Date of Adm:  2017      CHIEF COMPLAINT: Left leg pain. HISTORY OF PRESENT ILLNESS: This is a 75-year-old white female  with multiple medical problems, who fell at home, during her physical  therapy session, injured her left leg. She was transferred to the ER and  x-rays revealed a fracture of the left tibia and fibula, and the patient  was referred for admission for further management. The patient had a splint put in place. Orthopedic consultation is  pending. PAST MEDICAL HISTORY  1. Severe atherosclerotic cerebrovascular disease with multiple CVAs,  with residual left-sided weakness, gait disorder, ataxia. 2. Vascular right eye blindness. 3. Vascular dementia. 4. Giant cell arteritis, on high-dose steroids. 5. Hypertension. 6. Hypercholesterolemia. 7. Chronic obstructive pulmonary disease. 8. Hypothyroidism. 9. Old cervical spine fracture. 10. Restless leg syndrome. 11. Depression. 12. Osteoarthritis. 13. Osteoporosis. 14. Thyroid nodule, status post equivocal biopsy. PAST SURGICAL HISTORY: Hysterectomy, left breast biopsy, thyroid  biopsy, temporal artery biopsy, lower extremity revascularization for  peripheral vascular disease. ALLERGIES: SULFA. MEDICATIONS  1. Albuterol and Atrovent nebulized treatments every 6 hours as  needed. 2. Norvasc 2.5 mg daily. 3. Aspirin 325 mg daily. 4. Lumigan eye drops 1 drop left eye daily. 5. Combigan eyedrops as directed. 6. Klonopin 1 mg twice a day. 7. Plavix 75 mg daily. 8. Prozac 20 mg daily. 9. Folic acid 1 mg daily. 10. Synthroid 50 mcg daily. 11. Mobic 7.5 mg daily. 12. Remeron 15 mg at bedtime. 13. Protonix 40 mg daily. 14. Zocor 20 mg daily. SOCIAL HISTORY: The patient is  and lives with her   in the home. She is a former smoker and social drinker.  She is a FULL  CODE. FAMILY HISTORY: Positive only for hypertension. REVIEW OF SYSTEMS: The patient denies any new neurologic  symptoms. No syncope. No seizure activity. System review is as per  history of the present illness. PHYSICAL EXAMINATION  GENERAL: Elderly female in no acute distress. VITAL SIGNS: Temperature 98, pulse 92, respirations 20, blood  pressure 148/84. HEENT: Atraumatic, normocephalic. Sclerae are anicteric. NECK: No venous distention or adenopathy. LUNGS: Equal air entry. Clear to auscultation. HEART: Regular rate and rhythm. No rubs or gallops appreciated. ABDOMEN: Soft, nontender, nondistended. EXTREMITIES: Left lower extremity in splint. Distal neurovascular  intact, able to use and move her toes. NEUROLOGIC: She is oriented to person and place only. Minimal left-  sided weakness. Gait not tested. ANCILLARY STUDIES: CBC: WBC 14.7, hemoglobin and hematocrit  10.4/34.5. Urinalysis negative. Coags within normal limits. Chemistry,  slightly elevated blood sugar 124, BUN 21, creatinine 0.45. X-ray pelvis with no acute abnormalities, only arthritic changes both  hips. Left hip x-ray again with no acute abnormalities. Left tibia/fibula showing distal shaft oblique, acute nondisplaced left  tibia fracture in addition to nondisplaced oblique proximal fibular  fracture. There is osteopenia. IMPRESSION  1. Left tibia/fibula fracture. 2. Osteopenia. 3. Severe atherosclerotic cerebrovascular disease. 4. Temporal arteritis. 5. Hypertension. 6. As per past medical history. 7. Leukocytosis. PLAN: The patient will be admitted to telemetry for close monitoring. Orthopedic consultation is pending. Repeat CBC in a.m. Resume usual  medications, in addition to DVT prophylaxis. Further management  according to Orthopedic recommendations.         MD CHIDI Quevedo / Sony  D:  05/23/2017   23:09  T:  05/23/2017   23:48  Job #:  [id number]

## 2017-05-24 NOTE — PROGRESS NOTES
Nutrition initial assessment/  Plan of care      RECOMMENDATIONS:     1. Cardiac diet  2. Ensure BID-strawberry  3. Monitor weight, labs and PO intake  4. RD to follow     GOALS:     1. PO intake meets >75% of protein/calorie needs by 5/31  2. Weight Maintenance (+/- 1-2 lb by 5/31)    ASSESSMENT:     Weight status is classified as normal per BMI of 20.7. However, patient is at nutrition risk due to BMI below 23 with patient above 70years of age. PO intake is adequate. Labs noted. Nutrition recommendations listed. RD to follow. Nutrition Diagnoses:   No nutrition diagnosis at this time. Nutrition Risk:  [] High  [] Moderate [x]  Low    SUBJECTIVE/OBJECTIVE:      Patient with fracture of the left tibia and fibula. She has h/o COPD, CVAs with residual left sided weakness. UBW around 110 lb. No known food allergies. 100% intake of lunch meal. Patient drinks strawberry Ensure at home. Will add to diet order. Encouraged adequate intake. Will monitor. Information Obtained from:    [x] Chart Review   [x] Patient   [] Family/Caregiver   [] Nurse/Physician   [] Interdisciplinary Meeting/Rounds    Diet: Cardiac diet  Medications: [x] Reviewed    Allergies: [x] Reviewed   Encounter Diagnoses     ICD-10-CM ICD-9-CM   1. Closed fracture of proximal end of left fibula, unspecified fracture morphology, initial encounter S82.832A 823.01   2.  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    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    Menopause     Psychiatric disorder bipolar    Respiratory abnormalities     Restless leg syndrome     Thyroid nodule 4/23/2014    Abnormal biopsy        Labs:  Lab Results   Component Value Date/Time    Sodium 141 05/23/2017 01:50 PM    Potassium 4.5 05/23/2017 01:50 PM    Chloride 107 05/23/2017 01:50 PM    CO2 27 05/23/2017 01:50 PM    Anion gap 7 05/23/2017 01:50 PM    Glucose 124 05/23/2017 01:50 PM    BUN 21 05/23/2017 01:50 PM    Creatinine 0.45 05/23/2017 01:50 PM    Calcium 8.8 05/23/2017 01:50 PM    Magnesium 2.2 05/23/2017 01:50 PM    Albumin 3.3 02/06/2017 11:30 AM     Anthropometrics: BMI (calculated): 20.7  Last 3 Recorded Weights in this Encounter    05/23/17 1842 05/24/17 0700   Weight: 48.1 kg (106 lb) 48.1 kg (106 lb)    Ht Readings from Last 1 Encounters:   05/24/17 5' (1.524 m)     Patient Vitals for the past 100 hrs:   % Diet Eaten   05/23/17 1859 100 %     IBW: 100 lb %IBW: 106% UBW: 110 lb %UBW: 96%   [] Weight Loss [] Weight Gain [x] Weight Stable    Estimated Nutrition Needs: [x] MSJ  [] Other:  Calories 1200 Kcal Based on:   [x] Actual BW    Protein:   60 g Based on:   [x] Actual BW    Fluid:       7848-5893 ml Based on:   [x] Actual BW      [x] No Cultural, Quaker or ethnic dietary need identified.     [] Cultural, Quaker and ethnic food preferences identified and addressed     Wt Status:  [x] Normal (18.6 - 24.9) [] Underweight (<18.5) [] Overweight (25 - 29.9) [] Mild Obesity (30 - 34.9)  [] Moderate Obesity (35 - 39.9) [] Morbid Obesity (40+)     Nutrition Problems Identified:   [] Suboptimal PO intake   [] Food Allergies  [] Difficulty chewing/swallowing/poor dentition  [] Constipation/Diarrhea   [] Nausea/Vomiting   [x] None  [] Other:     Plan:   [x] Therapeutic Diet  []  Obtained/adjusted food preferences/tolerances and/or snacks options   [x]  Supplements added   [] Occupational therapy following for feeding techniques  []  HS snack added   []  Modify diet texture   []  Modify diet for food allergies   []  Assist with menu selection   [x]  Monitor PO intake on meal rounds   [x]  Continue inpatient monitoring and intervention   []  Participated in discharge planning/Interdisciplinary rounds/Team meetings   []  Other:     Education Needs:   [] Not appropriate for teaching at this time due to:   [x] Identified and addressed    Nutrition Monitoring and Evaluation:  [x] Continue ongoing monitoring and intervention  [] Sherry Soni

## 2017-05-24 NOTE — PROGRESS NOTES
Physical Exam   Skin: Bruising and ecchymosis noted. There is pallor. Skin tear to RUE (forearm)  Skin tear to RLE (lateral calf). Scattered ecchymosis to BUE and BLE. Skin is overall very thin and fragile.

## 2017-05-24 NOTE — ACP (ADVANCE CARE PLANNING)
Patient has designated ___her spouse_____________________ to participate in his/her discharge plan and to receive any needed information.      Name: Eddie Ho  Address: [address] [place] [address] Dr. Vasiliy Woods [address]  Phone number:  [phone]

## 2017-05-25 ENCOUNTER — APPOINTMENT (OUTPATIENT)
Dept: GENERAL RADIOLOGY | Age: 72
DRG: 563 | End: 2017-05-25
Attending: INTERNAL MEDICINE
Payer: COMMERCIAL

## 2017-05-25 PROCEDURE — 97530 THERAPEUTIC ACTIVITIES: CPT

## 2017-05-25 PROCEDURE — 65660000000 HC RM CCU STEPDOWN

## 2017-05-25 PROCEDURE — 74011250636 HC RX REV CODE- 250/636: Performed by: INTERNAL MEDICINE

## 2017-05-25 PROCEDURE — 97162 PT EVAL MOD COMPLEX 30 MIN: CPT

## 2017-05-25 PROCEDURE — 74011250637 HC RX REV CODE- 250/637: Performed by: INTERNAL MEDICINE

## 2017-05-25 PROCEDURE — 71010 XR CHEST PORT: CPT

## 2017-05-25 PROCEDURE — 74011636637 HC RX REV CODE- 636/637: Performed by: INTERNAL MEDICINE

## 2017-05-25 RX ORDER — CLOPIDOGREL BISULFATE 75 MG/1
75 TABLET ORAL DAILY
Status: DISCONTINUED | OUTPATIENT
Start: 2017-05-25 | End: 2017-05-26 | Stop reason: HOSPADM

## 2017-05-25 RX ORDER — FLUOXETINE 10 MG/1
30 CAPSULE ORAL DAILY
Status: DISCONTINUED | OUTPATIENT
Start: 2017-05-25 | End: 2017-05-26 | Stop reason: HOSPADM

## 2017-05-25 RX ADMIN — PREDNISONE 30 MG: 10 TABLET ORAL at 08:04

## 2017-05-25 RX ADMIN — TIMOLOL MALEATE 1 DROP: 5 SOLUTION OPHTHALMIC at 22:46

## 2017-05-25 RX ADMIN — MORPHINE SULFATE 2 MG: 2 INJECTION, SOLUTION INTRAMUSCULAR; INTRAVENOUS at 05:56

## 2017-05-25 RX ADMIN — CLONAZEPAM 0.25 MG: 0.5 TABLET ORAL at 23:48

## 2017-05-25 RX ADMIN — BIMATOPROST 1 DROP: 0.1 SOLUTION/ DROPS OPHTHALMIC at 17:00

## 2017-05-25 RX ADMIN — ROPINIROLE HYDROCHLORIDE 2 MG: 1 TABLET, FILM COATED ORAL at 22:47

## 2017-05-25 RX ADMIN — TIMOLOL MALEATE 1 DROP: 5 SOLUTION OPHTHALMIC at 07:53

## 2017-05-25 RX ADMIN — ASPIRIN 325 MG ORAL TABLET 325 MG: 325 PILL ORAL at 07:54

## 2017-05-25 RX ADMIN — FLUOXETINE 20 MG: 20 CAPSULE ORAL at 07:54

## 2017-05-25 RX ADMIN — SIMVASTATIN 20 MG: 5 TABLET, FILM COATED ORAL at 22:47

## 2017-05-25 RX ADMIN — LEVOTHYROXINE SODIUM 50 MCG: 50 TABLET ORAL at 07:41

## 2017-05-25 RX ADMIN — MORPHINE SULFATE 2 MG: 2 INJECTION, SOLUTION INTRAMUSCULAR; INTRAVENOUS at 15:43

## 2017-05-25 RX ADMIN — BRIMONIDINE TARTRATE 1 DROP: 2 SOLUTION OPHTHALMIC at 22:44

## 2017-05-25 RX ADMIN — PANTOPRAZOLE SODIUM 40 MG: 40 TABLET, DELAYED RELEASE ORAL at 07:41

## 2017-05-25 RX ADMIN — MIRTAZAPINE 30 MG: 15 TABLET, FILM COATED ORAL at 22:47

## 2017-05-25 RX ADMIN — HEPARIN SODIUM 5000 UNITS: 5000 INJECTION, SOLUTION INTRAVENOUS; SUBCUTANEOUS at 16:48

## 2017-05-25 RX ADMIN — MORPHINE SULFATE 2 MG: 2 INJECTION, SOLUTION INTRAMUSCULAR; INTRAVENOUS at 22:39

## 2017-05-25 RX ADMIN — AMLODIPINE BESYLATE 2.5 MG: 5 TABLET ORAL at 07:54

## 2017-05-25 RX ADMIN — FOLIC ACID 1 MG: 1 TABLET ORAL at 07:54

## 2017-05-25 RX ADMIN — HEPARIN SODIUM 5000 UNITS: 5000 INJECTION, SOLUTION INTRAVENOUS; SUBCUTANEOUS at 05:56

## 2017-05-25 RX ADMIN — CLOPIDOGREL BISULFATE 75 MG: 75 TABLET, FILM COATED ORAL at 10:11

## 2017-05-25 RX ADMIN — BRIMONIDINE TARTRATE 1 DROP: 2 SOLUTION OPHTHALMIC at 07:52

## 2017-05-25 NOTE — PROGRESS NOTES
General Internal Medicine/Geriatrics    Patient: Zachariah Yung MRN: [id number]  CSN: [id number]    YOB: 1945  Age: 66 y.o. Sex: female    DOA: 5/23/2017 LOS:  LOS: 2 days                    Subjective:     Awake, alert  Pain controlled  No new c/o  Review of Systems:  Eyes: negative  Ears, Nose, Mouth, Throat, and Face: negative  Respiratory: negative for dyspnea on exertion  Cardiovascular: negative for chest pain  Gastrointestinal: negative for nausea, vomiting and diarrhea  Genitourinary:negative for dysuria and hematuria  Integument/Breast: negative  Hematologic/Lymphatic: negative for bleeding  Musculoskeletal:Lt. LE pain  Neurological: negative for weakness  Behavioral/Psychiatric: negative for behavior problems  Endocrine: negative for temperature intolerance  Allergic/Immunologic: negative for hay fever    Objective:     Physical Exam:  Patient Vitals for the past 24 hrs:   Temp Pulse Resp BP SpO2   05/25/17 0629 98.6 °F (37 °C) 89 18 145/81 95 %   05/25/17 0213 98.4 °F (36.9 °C) 84 17 131/77 98 %   05/24/17 2202 98.4 °F (36.9 °C) 88 20 141/82 95 %   05/24/17 1725 97.9 °F (36.6 °C) 97 20 145/71 92 %   05/24/17 1405 97.5 °F (36.4 °C) 68 24 156/85 96 %     BP improved  HEENT: wnl  NECK:  No venous distention or adenopathy   HEART: RRR, no rubs or gallops  LUNGS: Clear to auscultation  ABDOMEN: soft with active BS. No tenderness, no distention, no organomegaly  EXT: warm,no edema  SKIN: Normal turgor, no breaks  NEURO: Awake, alert, baseline  LLE NV intact    Intake and Output:  Current Shift:     Last three shifts:  05/23 1901 - 05/25 0700  In: 820 [P.O.:820]  Out: -     No results found for this or any previous visit (from the past 24 hour(s)). No results found.     Current Facility-Administered Medications   Medication Dose Route Frequency    albuterol-ipratropium (DUO-NEB) 2.5 MG-0.5 MG/3 ML  3 mL Nebulization Q6H PRN    amLODIPine (NORVASC) tablet 2.5 mg  2.5 mg Oral DAILY    aspirin (ASPIRIN) tablet 325 mg  325 mg Oral DAILY    bimatoprost (LUMIGAN) 0.01 % ophthalmic drops 1 Drop  1 Drop Left Eye QPM    clonazePAM (KlonoPIN) tablet 0.25 mg  0.25 mg Oral QHS    docusate sodium (COLACE) capsule 100 mg  100 mg Oral BID PRN    FLUoxetine (PROzac) capsule 20 mg  20 mg Oral DAILY    folic acid (FOLVITE) tablet 1 mg  1 mg Oral DAILY    levothyroxine (SYNTHROID) tablet 50 mcg  50 mcg Oral ACB    [START ON 5/28/2017] methotrexate (RHEUMATREX) tablet 15 mg  15 mg Oral every Sunday    mirtazapine (REMERON) tablet 30 mg  30 mg Oral QHS    ondansetron (ZOFRAN ODT) tablet 4 mg  4 mg Oral Q8H PRN    pantoprazole (PROTONIX) tablet 40 mg  40 mg Oral ACB    polyethylene glycol (MIRALAX) packet 17 g  17 g Oral DAILY PRN    simvastatin (ZOCOR) tablet 20 mg  20 mg Oral QHS    rOPINIRole (REQUIP) tablet 2 mg  2 mg Oral QHS    predniSONE (DELTASONE) tablet 30 mg  30 mg Oral DAILY    brimonidine (ALPHAGAN) 0.2 % ophthalmic solution 1 Drop  1 Drop Left Eye Q12H    morphine injection 2-4 mg  2-4 mg IntraVENous Q4H PRN    acetaminophen (TYLENOL) tablet 650 mg  650 mg Oral Q6H PRN    heparin (porcine) injection 5,000 Units  5,000 Units SubCUTAneous Q12H    hydrALAZINE (APRESOLINE) 20 mg/mL injection 10 mg  10 mg IntraVENous Q6H PRN    timolol (TIMOPTIC) 0.5 % ophthalmic solution 1 Drop  1 Drop Left Eye Q12H       Lab Results   Component Value Date/Time    Glucose 124 05/23/2017 01:50 PM    Glucose 124 03/31/2017 05:15 AM    Glucose 153 03/29/2017 03:30 AM    Glucose 211 03/28/2017 03:35 AM    Glucose 73 03/27/2017 03:35 AM        Assessment     Active Problems:    Fracture tibia/fibula (5/23/2017)      COPD (chronic obstructive pulmonary disease) (HCC) (5/24/2017)      Cerebral vascular disease (5/24/2017)      Giant cell arteritis ([address] (5/24/2017)      HTN (hypertension) (5/24/2017)      Acquired hypothyroidism (5/24/2017)        Plan     Ortho consult appreciated  Plavix resumed  PT  Elevated WBC , ?  Reactive  Same meds  monitor          Swathi Gee MD  5/25/2017,

## 2017-05-25 NOTE — PROGRESS NOTES
Evaluation completed formal note to follow patient  Unable to maintain non weight on left leg may benefit from rehab at a snf has equipment at home

## 2017-05-25 NOTE — PROGRESS NOTES
Chart reviewed. Plan remains home with spouse & home health when medically stable, will need orders, thanks. Will cont to follow for further needs. Bibi Parsons RN,ext. [phone].

## 2017-05-25 NOTE — PROGRESS NOTES
Problem: Mobility Impaired (Adult and Pediatric)  Goal: *Acute Goals and Plan of Care (Insert Text)  Physical Therapy Goals  Initiated 5/25/2017 and to be accomplished within 7 day(s) maintaining Non weight on left leg . 1. Patient will move from supine to sit and sit to supine , scoot up and down and roll side to side in bed with minimal assistance/contact guard assist.   2. Patient will transfer from bed to chair and chair to bed with moderate assistance using the least restrictive device. 3. Patient will perform sit to stand with moderate assistance . 4. Patient will ambulate with moderate assistance for 5 feet with the least restrictive device. 5. Patient will ascend/descend 4 stairs with handrail(s) with moderate assistance with best and safest technique . Outcome: Progressing Towards Goal  PHYSICAL THERAPY EVALUATION     Patient: Wisam Matos (66 y.o. female)  Date: 5/25/2017  Primary Diagnosis: Fracture, fibula, with tibia, left, closed, initial encounter  Fracture tibia/fibula        Precautions:   Fall, NWB      PROBLEM LIST:  Patient presents with the following problems:   Bed Mobility, Transfers, Gait, Strength, Balance, Precautions and maintaining non weight bearing on left leg   ASSESSMENT :   Patient requires between moderate assistance  and maximal assistance for bed mobility, transfers  unable to maintain non weight bearing and unable to take steps    Left in bed with left leg elevated  education on plan of care and weight bearing status needs reinforcement. Call to clarify non weight bearing with MARIAELENA Gonzalez and wrote order stating non weight on left. Patient will benefit from skilled intervention to address the above impairments.   Patients rehabilitation potential is considered to be Fair  Factors which may influence rehabilitation potential include:   [ ]         None noted  [ ]         Mental ability/status  [X]         Medical condition  [ ]         Home/family situation and support systems  [ ]         Safety awareness  [ ]         Pain tolerance/management  [ ]         Other:        PLAN :  Recommendations and Planned Interventions:  [X]           Bed Mobility Training             [X]    Neuromuscular Re-Education  [X]           Transfer Training                   [ ]    Orthotic/Prosthetic Training  [X]           Gait Training                          [ ]    Modalities  [X]           Therapeutic Exercises          [ ]    Edema Management/Control  [X]           Therapeutic Activities            [X]    Patient and Family Training/Education  [ ]           Other (comment):     Frequency/Duration: Patient will be followed by physical therapy 1-2 times per day/4-7 days per week to address goals. Discharge Recommendations: Rehab and [facility]  Further Equipment Recommendations for Discharge: N/A       SUBJECTIVE:   Patient stated .      OBJECTIVE DATA SUMMARY:       Past Medical History:   Diagnosis Date    [facility]      Cerebral aneurysm 12/11/2015     RPCoA, RAChA,, RMCA bifurcation, and RM2      Cervical spine fracture ([facility] 08/20/2014     C2 and C3    COPD      Coronary artery disease      Giant cell arteritis ([facility] 11/15/2014    [place]      Hyperlipidemia      Hypertension      Hypothyroidism      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   The severity rating is based on the Other [address] Standing Balance Scale1/5   [facility] Balance Scale1/5  0: Pt performs 25% or less of standing activity (Max assist) CN, 100% impaired. 1: Pt supports self with upper extremities but requires therapist assistance. Pt performs 25-50% of effort (Mod assist) CM, 80% to <100% impaired. 1+: Pt supports self with upper extremities but requires therapist assistance. Pt performs >50% effort. (Min assist). CL, 60% to <80% impaired. 2: Pt supports self independently with both upper extremities (walker, crutches, parallel bars). CL, 60% to <80% impaired. 2+: Pt support self independently with 1 upper extremity (cane, crutch, 1 parallel bar). CK, 40% to <60% impaired. 3: Pt stands without upper extremity support for up to 30 seconds. CK, 40% to <60% impaired. 3+: Pt stands without upper extremity support for 30 seconds or greater. CJ, 20% to <40% impaired. 4: Pt independently moves and returns center of gravity 1-2 inches in one plane. CJ, 20% to <40% impaired. 4+: Pt independently moves and returns center of gravity 1-2 inches in multiple planes. CI, 1% to <20% impaired. 5: Pt independently moves and returns center of gravity in all planes greater than 2 inches. CH, 0% impaired.      Eval Complexity: History: HIGH Complexity :3+ comorbidities / personal factors will impact the outcome/ POC Exam:MEDIUM Complexity : 3 Standardized tests and measures addressing body structure, function, activity limitation and / or participation in recreation  Presentation: MEDIUM Complexity : Evolving with changing characteristics  Clinical Decision Making:Medium Complexity [facility] Standing Balance Scale1/5 Overall Complexity:MEDIUM     Prior Level of Function/Home Situation: lives with  using rolling walker at home   [address]: Private residence  # Steps to Enter: 3  One/Two Story Residence: Two story  Living Alone: No  Support Systems: Family member(s), Spouse/Significant Other/Partner  Patient Expects to be Discharged to[de-identified] Private residence  Current DME Used/Available at Home: Wheelchair, Walker, rolling, Commode, bedside  Critical Behavior:  Neurologic State: Alert  Orientation Level: Oriented to person;Oriented to place;Oriented to situation  Cognition: Follows commands;Poor safety awareness  Safety/Judgement: Fall prevention  Psychosocial  Patient Behaviors: Calm; Cooperative  Needs Expressed: Educational  Purposeful Interaction: Yes  Pt Identified Daily Priority: Clinical issues (comment)  Caritas Process: Nurture loving kindness;Establish trust;Teaching/learning; Attend basic human needs  Caring Interventions: Reassure  Reassure: Therapeutic listening; Informing; Acceptance  Skin Condition/Temp: Fragile; Warm     Skin Integrity: Abrasion;Tear (RUE and RLE)  Skin Integumentary  Skin Color: Ecchymosis (comment); Other (comment) (skin Tears)  Skin Condition/Temp: Fragile; Warm  Skin Integrity: Abrasion;Tear (RUE and RLE)  Turgor: Tenting  Hair Growth: Present  Varicosities: Absent  Strength:    Strength: Generally decreased, functional (3/5)  Tone & Sensation:   Tone: Normal  Range Of Motion:  AROM: Generally decreased, functional  PROM: Generally decreased, functional  Functional Mobility:  Bed Mobility:  Rolling: Moderate assistance; Additional time;Assist x1;Assist x2  Supine to Sit: Moderate assistance; Additional time;Assist x1;Assist x2  Sit to Supine: Moderate assistance; Additional time;Assist x2  Scooting: Maximum assistance; Additional time;Assist x2  Transfers:  Sit to Stand: Moderate assistance;Maximum assistance; Additional time;Assist x2  Stand to Sit: Moderate assistance;Maximum assistance;Assist x2; Additional time  Balance:   Sitting: Impaired  Sitting - Static: Fair (occasional); Poor (constant support)  Sitting - Dynamic: Poor (constant support)  Standing: Impaired  Standing - Static: Poor  Standing - Dynamic : None  Ambulation/Gait Training:  Gait Description (WDL):  (uanble tot est )  Pain:  Pre treatment pain level:4  Post treatment pain level:4  Pain Scale 1: Visual  Activity Tolerance:   Fair   Please refer to the flowsheet for vital signs taken during this treatment. After treatment:   [ ]         Patient left in no apparent distress sitting up in chair  [X]         Patient left in no apparent distress in bed  [X]         Call bell left within reach  [X]         Nursing notified  [ ]         Caregiver present  [ ]         Bed alarm activated      COMMUNICATION/EDUCATION:   [X]         Fall prevention education was provided and the patient/caregiver indicated understanding. [X]         Patient/family have participated as able in goal setting and plan of care. [X]         Patient/family agree to work toward stated goals and plan of care. [ ]         Patient understands intent and goals of therapy, but is neutral about his/her participation. [ ]         Patient is unable to participate in goal setting and plan of care. Patient educated on the role of physical therapy during the acute stay  and the importance of mobility. VU. Needs reinforcement.        Thank you for this referral.  Parth Angel, PT   Time Calculation: 26 mins

## 2017-05-25 NOTE — PROGRESS NOTES
Assumed care of patient who is awake, in bed, with family support present. Patient and staff informed this nurse that pain has not been controlled. Discussed with patient and family that pain management orders range from 2mg-4mg. Patient has been receiving 2 mg, without relief. Plan is to administer 4 mg at next available time, then utilize whiteboard for medication schedule. New IV inserted to left wrist. See flowsheet. Wound care provided to MESERET at this time, per family request.     All needs met, patient is without any further complaints. Will continue to monitor patient. 2028- 4 mg of Morphine administered. Patient rested well all night. 1274- Patient in pain. Rates pain 9/10. Morphine 2mg administered IV. Pain reassessed. Patient resting comfortably.

## 2017-05-25 NOTE — PROGRESS NOTES
1920  Received bedside verbal shift report. Pt lying in bed granddaughter at bedside. piv # 22 intact. nsr on telemetry box # 3. Left leg splint elevated on pillow with ice pack in place. Right arm and leg dressing dry and intact. Call bell within reach, side rails up x 3 bed low and locked. No complaints offered, pt instructed to call for assistance. 2042  Assisted pt on bed pan voided 300 ml clear yellow urine. 0030  piv to left lower arm infiltrated d/c'd at this time. New piv # 24 started to left wrist.      2239  Medicated for pain.  and granddaughter at bedside. 8650  Pt incontinent of urine cleaned at this time. New gown and bed pad placed. 0720 Bedside and Verbal shift change report given to swapnil jackson rn  (oncoming nurse) by Ariel Rizvi rn  (offgoing nurse). Report included the following information SBAR, Kardex, Intake/Output, MAR, Recent Results and Cardiac Rhythm nsr.

## 2017-05-26 VITALS
WEIGHT: 106 LBS | TEMPERATURE: 99.3 F | HEART RATE: 100 BPM | HEIGHT: 60 IN | DIASTOLIC BLOOD PRESSURE: 62 MMHG | RESPIRATION RATE: 18 BRPM | SYSTOLIC BLOOD PRESSURE: 146 MMHG | OXYGEN SATURATION: 96 % | BODY MASS INDEX: 20.81 KG/M2

## 2017-05-26 VITALS
SYSTOLIC BLOOD PRESSURE: 122 MMHG | TEMPERATURE: 97.4 F | HEART RATE: 79 BPM | RESPIRATION RATE: 16 BRPM | DIASTOLIC BLOOD PRESSURE: 80 MMHG

## 2017-05-26 PROCEDURE — 97530 THERAPEUTIC ACTIVITIES: CPT

## 2017-05-26 PROCEDURE — 74011250636 HC RX REV CODE- 250/636: Performed by: INTERNAL MEDICINE

## 2017-05-26 PROCEDURE — 74011636637 HC RX REV CODE- 636/637: Performed by: INTERNAL MEDICINE

## 2017-05-26 PROCEDURE — 74011250637 HC RX REV CODE- 250/637: Performed by: INTERNAL MEDICINE

## 2017-05-26 RX ORDER — MIRTAZAPINE 30 MG/1
30 TABLET, FILM COATED ORAL
Qty: 30 TAB | Refills: 0 | Status: SHIPPED
Start: 2017-05-26 | End: 2017-08-30

## 2017-05-26 RX ORDER — TRAMADOL HYDROCHLORIDE 50 MG/1
50 TABLET ORAL
Qty: 30 TAB | Refills: 0 | Status: SHIPPED | OUTPATIENT
Start: 2017-05-26 | End: 2017-08-30

## 2017-05-26 RX ORDER — TRAMADOL HYDROCHLORIDE 50 MG/1
50 TABLET ORAL
Status: COMPLETED | OUTPATIENT
Start: 2017-05-26 | End: 2017-05-26

## 2017-05-26 RX ADMIN — HEPARIN SODIUM 5000 UNITS: 5000 INJECTION, SOLUTION INTRAVENOUS; SUBCUTANEOUS at 06:00

## 2017-05-26 RX ADMIN — AMLODIPINE BESYLATE 2.5 MG: 5 TABLET ORAL at 10:05

## 2017-05-26 RX ADMIN — BRIMONIDINE TARTRATE 1 DROP: 2 SOLUTION OPHTHALMIC at 10:10

## 2017-05-26 RX ADMIN — FLUOXETINE 30 MG: 10 CAPSULE ORAL at 10:05

## 2017-05-26 RX ADMIN — PREDNISONE 30 MG: 10 TABLET ORAL at 10:05

## 2017-05-26 RX ADMIN — FOLIC ACID 1 MG: 1 TABLET ORAL at 10:05

## 2017-05-26 RX ADMIN — CLOPIDOGREL BISULFATE 75 MG: 75 TABLET, FILM COATED ORAL at 10:05

## 2017-05-26 RX ADMIN — PANTOPRAZOLE SODIUM 40 MG: 40 TABLET, DELAYED RELEASE ORAL at 10:05

## 2017-05-26 RX ADMIN — LEVOTHYROXINE SODIUM 50 MCG: 50 TABLET ORAL at 10:05

## 2017-05-26 RX ADMIN — ASPIRIN 325 MG ORAL TABLET 325 MG: 325 PILL ORAL at 10:05

## 2017-05-26 RX ADMIN — TRAMADOL HYDROCHLORIDE 50 MG: 50 TABLET, FILM COATED ORAL at 14:11

## 2017-05-26 RX ADMIN — TIMOLOL MALEATE 1 DROP: 5 SOLUTION OPHTHALMIC at 10:10

## 2017-05-26 NOTE — ROUTINE PROCESS
Bedside, Verbal and Written shift change report given to Kurt Lieberman RN (oncoming nurse) by Concha Bush RN (offgoing nurse). Report included the following information SBAR, Kardex, Intake/Output, MAR, Recent Results, Med Rec Status, Procedure Summary and Cardiac Rhythm NSR.     0730 - Shift assessment completed. Pt alert and oriented x3 to self, place, and situation. No respiratory distress noted. No c/o pain reported. Call bell within reach, bed in low position. Will continue to monitor.      1230 - Shift re-assessment completed, no change in pt condition. 1300 - Pt's IV had been pulled out. Pt requesting pain medication, unable to administer PRN IV Morphine due to pt pulling out IV line and pt does not want new IV line. Pt being prescribed PRN Tramadol at discharge but not listed on MAR. Will notify Dr Lele Crow. 1310 - Paged Dr Lele Crow, awaiting return call back. 80 - Dr Lele Crow returned call, notified pt requesting pain medication, unable to administer PRN IV Morphine due to pt pulling out IV line and pt does not want new IV line. Pt being prescribed PRN Tramadol at discharge but not listed on MAR. Dr Lele Crow ordered one time dose of Tramadol 50 mg to be given before discharge. 1411 - Pt c/o pain to LLE, PRN Tramadol administered.      1430 - Discharge medications reviewed with patient and spouse and appropriate educational materials and side effects teaching were provided. I have reviewed discharge instructions with the patient and spouse. The patient and spouse verbalized understanding. IV catheter discontinued intact. Site without signs and symptoms of complications. Dressing and pressure applied. Patient armband removed and shredded. 1630 - Shift re-assessment completed, no change in pt condition.      1640 - Pt discharged to home via [facility]. All pt belongings went with her.

## 2017-05-26 NOTE — PROGRESS NOTES
conducted a Follow up consultation and Spiritual Assessment for [facility], who is a 66 y.o.,female. The  provided the following Interventions:  Continued the relationship of care and support. Listened empathically. Offered prayer and assurance of continued prayer on patients behalf. Chart reviewed. The following outcomes were achieved:  Patient expressed gratitude for pastoral care visit. Assessment:  There are no further spiritual or Zoroastrianism issues which require Spiritual Care Services interventions at this time. Plan:  Chaplains will continue to follow and will provide pastoral care on an as needed/requested basis.  recommends bedside caregivers page  on duty if patient shows signs of acute spiritual or emotional distress.      [facility]   Staff [address] [place]   [phone]

## 2017-05-26 NOTE — PROGRESS NOTES
Pt needing transport to get home and up into house. [facility] transportation set up for earliest time of 1600. Family aware. Envelope placed in chart.

## 2017-05-26 NOTE — PROGRESS NOTES
Chart reviewed and talked to pt. She is discharged and [facility] skilled RN and PT/OT has been ordered. Pt to go home with spouse.

## 2017-05-26 NOTE — PROGRESS NOTES
Problem: Mobility Impaired (Adult and Pediatric)  Goal: *Acute Goals and Plan of Care (Insert Text)  Physical Therapy Goals  Initiated 5/25/2017 and to be accomplished within 7 day(s) maintaining Non weight on left leg . 1. Patient will move from supine to sit and sit to supine , scoot up and down and roll side to side in bed with minimal assistance/contact guard assist.   2. Patient will transfer from bed to chair and chair to bed with moderate assistance using the least restrictive device. 3. Patient will perform sit to stand with moderate assistance . 4. Patient will ambulate with moderate assistance for 5 feet with the least restrictive device. 5. Patient will ascend/descend 4 stairs with handrail(s) with moderate assistance with best and safest technique . Outcome: Resolved/Met Date Met:  05/26/17  PHYSICAL THERAPY TREATMENT     Patient: Pham Hancock (66 y.o. female)  Date: 5/26/2017  Diagnosis: Fracture, fibula, with tibia, left, closed, initial encounter  Fracture tibia/fibula <principal problem not specified>       Precautions: Fall, NWB  Chart, physical therapy assessment, plan of care and goals were reviewed. ASSESSMENT:  Pt seen first visit for family  Education & dc planning.  at bed side reports pt going home today. Coordinated w Binu Newsome who Arranged for medical transport as pt scheduled for dc today home w  however non ambulatory and L LE NWB.  had questions regarding ROM, positioning and how he was going to get pt in/out of car for transport. Pt presents w L LE ER and slightly abducted; able to reposition in neutral alignment and educated  regarding positioning and use of L Nard boot. 2nd visit: returned w L'Nard boot and WC . Demo'ed set up of WC for transfer however pt dependent for bed to  .  Attempted sit to stand from EOB however unable  w 2 person assist.  Advised  to obtain sliding board for transfer once home as he stated he knows pt will want to get up to [facility] in the next few days. Worked on rolling R<>L x 3 trials and required MOD A for this and dependent sit <>  Supine at this time    EDUCATION: see assess  Progression toward goals:          Improving slowly and progressing toward goals       PLAN:  Patient continues to benefit from skilled intervention to address the above impairments. Continue treatment per established plan of care. Discharge Recommendations: pt is going home w hh however feel would benefit from SNF however  reports recently dc'd from SNF stay and realized more improvement in the home setting w home PT. Further Equipment Recommendations for Discharge:  has needed equipment to incl gait belt. Issued L Nard boot and educated pt  on don/ doff, wear schedule and rationale. SUBJECTIVE:   Patient stated .      OBJECTIVE DATA SUMMARY:   Critical Behavior:  Neurologic State: Alert  Orientation Level: Oriented to person, Oriented to place, Oriented to situation, Disoriented to time  Cognition: Appropriate decision making, Appropriate for age attention/concentration, Appropriate safety awareness, Follows commands  Safety/Judgement: Fall prevention     G CODE:na  Functional Mobility Training:  Bed Mobility:     Supine to Sit: Maximum assistance; Additional time;Assist x1  Sit to Supine: Maximum assistance;Assist x2  Scooting: Maximum assistance; Total assistance;Assist x2  Transfers: dependent  Balance:  Sitting: Impaired  Sitting - Static: Fair (occasional); Good (unsupported)  Sitting - Dynamic: Fair (occasional)  Standing:  (unable)  Therapeutic Exercises:   R LE SLR x 20. Vital Signs  Temp: 99.3 °F (37.4 °C)     Pulse (Heart Rate): 100     BP: 146/62     Resp Rate: 18     O2 Sat (%): 96 %  Pain:   Pre treatment pain level:5     Post treatment pain level: 8  Pain Scale 1: Numeric (0 - 10)  Pain Intensity 1: 8  Pain Location 1: Leg  Pain Orientation 1: Left  Pain Description 1: Aching; Sore  Pain Intervention(s) 1: Medication (see MAR)  Activity Tolerance:   Poor.       After treatment:   Patient left in no apparent distress in bed  Call bell left within reach  Nursing Marlene notified  [address] [place] [address]   Time Calculation: 31 mins

## 2017-05-26 NOTE — DISCHARGE SUMMARY
[facility] SUMMARY    Name:  Mary Ann Bridges  MR#:  [id number]  :  1945  Account #:  [de-identified]  Date of Adm:  2017  Date of Discharge:  2017      DISCHARGE DIAGNOSES  1. Acute left tibia/fibula fracture. 2. Osteopenia. 3. Severe atherosclerotic cerebrovascular disease with multiple prior  cerebrovascular accident. 4. Giant cell arteritis, on high-dose steroids. 5. Hypertension. 6. Chronic obstructive pulmonary disease. 7. Hypothyroidism. 8. As per past medical history. DISCHARGE MEDICATIONS: The patient will resume all pre-admit  medications including Plavix, etc.    The patient will followup with orthopedic surgeon in a few days for cast  application. She was given a prescription for Tramadol 1 every 6 hours  as needed for pain, #30. The patient has a left lower extremity splint at  this time in place. She will have physical therapy, home health per her  wish and 's request.    REASON FOR ADMISSION: A 66-year-old female with the above  problems admitted after a non-syncopal fall at home during physical  therapy, where she sprained her left wrist with negative x-rays and  fractured her left fibula and distal tibial fracture. The patient is very high  risk for recurrent CVA off Plavix. She was seen by Orthopedics and  conservative therapy was recommended with a splint to be followed by  cast in the office a few days after swelling improves. The patient was  put back on her usual medications. Pain was controlled initially with  morphine, now will be switched to Tramadol. She has remained stable  from the hemodynamic and cardiopulmonary standpoint. Today, she is  awake, alert, pleasant. Vital signs stable. Lungs clear. Heart is regular  and she is felt to be stable for discharge.         Macarena Jurado MD    NT / TB  D:  2017   11:29  T:  2017   18:12  Job #:  [id number]

## 2017-05-26 NOTE — DISCHARGE INSTRUCTIONS
FOLLOW UP VISIT Appointment in: 3 - 5 Days orthopedics      DISCHARGE SUMMARY from Nurse    The following personal items are in your possession at time of discharge:    Dental Appliances: None  Visual Aid: Glasses, With patient     Home Medications: Sent home  Jewelry: Ring (2 rings)  Clothing: Shirt  Other Valuables: None             PATIENT INSTRUCTIONS:    After general anesthesia or intravenous sedation, for 24 hours or while taking prescription Narcotics:  · Limit your activities  · Do not drive and operate hazardous machinery  · Do not make important personal or business decisions  · Do  not drink alcoholic beverages  · If you have not urinated within 8 hours after discharge, please contact your surgeon on call. Report the following to your surgeon:  · Excessive pain, swelling, redness or odor of or around the surgical area  · Temperature over 100.5  · Nausea and vomiting lasting longer than 4 hours or if unable to take medications  · Any signs of decreased circulation or nerve impairment to extremity: change in color, persistent  numbness, tingling, coldness or increase pain  · Any questions        What to do at Home:  Recommended activity: Activity as tolerated,     If you experience any of the following symptoms shortness of breath, chest pain, or any other concerns, please follow up with primary care physician. *  Please give a list of your current medications to your Primary Care Provider. *  Please update this list whenever your medications are discontinued, doses are      changed, or new medications (including over-the-counter products) are added. *  Please carry medication information at all times in case of emergency situations. These are general instructions for a healthy lifestyle:    No smoking/ No tobacco products/ Avoid exposure to second hand smoke    Surgeon General's Warning:  Quitting smoking now greatly reduces serious risk to your health.     Obesity, smoking, and sedentary lifestyle greatly increases your risk for illness    A healthy diet, regular physical exercise & weight monitoring are important for maintaining a healthy lifestyle    You may be retaining fluid if you have a history of heart failure or if you experience any of the following symptoms:  Weight gain of 3 pounds or more overnight or 5 pounds in a week, increased swelling in our hands or feet or shortness of breath while lying flat in bed. Please call your doctor as soon as you notice any of these symptoms; do not wait until your next office visit. Recognize signs and symptoms of STROKE:    F-face looks uneven    A-arms unable to move or move unevenly    S-speech slurred or non-existent    T-time-call 911 as soon as signs and symptoms begin-DO NOT go       Back to bed or wait to see if you get better-TIME IS BRAIN. Warning Signs of HEART ATTACK     Call 911 if you have these symptoms:   Chest discomfort. Most heart attacks involve discomfort in the center of the chest that lasts more than a few minutes, or that goes away and comes back. It can feel like uncomfortable pressure, squeezing, fullness, or pain.  Discomfort in other areas of the upper body. Symptoms can include pain or discomfort in one or both arms, the back, neck, jaw, or stomach.  Shortness of breath with or without chest discomfort.  Other signs may include breaking out in a cold sweat, nausea, or lightheadedness. Don't wait more than five minutes to call 911 - MINUTES MATTER! Fast action can save your life. Calling 911 is almost always the fastest way to get lifesaving treatment. Emergency Medical Services staff can begin treatment when they arrive -- up to an hour sooner than if someone gets to the hospital by car. The discharge information has been reviewed with the patient. The patient verbalized understanding.     Discharge medications reviewed with the patient and appropriate educational materials and side effects teaching were provided. Patient armband removed and shredded. MyChart Activation    Thank you for enrolling in [address] Please follow the instructions below to securely access your online medical record. [employer] Device allows you to send messages to your doctor, view your test results, renew your prescriptions, schedule appointments, and more. How Do I Sign Up? 1. In your internet browser, go to [email or web address]. 2. Click on the First Time User? Click Here link in the Sign In box. You will see the New Member Sign Up page. 3. Enter your [employer] Device Access Code exactly as it appears below. You will not need to use this code after youve completed the sign-up process. If you do not sign up before the expiration date, you must request a new code. [employer] Device Access Code: [email or web address]  Expires: 8/2/2017  9:00 PM     4. Enter the last four digits of your Social Security Number (xxxx) and Date of Birth (mm/dd/yyyy) as indicated and click Submit. You will be taken to the next sign-up page. 5. Create a [employer] Device ID. This will be your [employer] login ID and cannot be changed, so think of one that is secure and easy to remember. 6. Create a [employer] password. You can change your password at any time. 7. Enter your Password Reset Question and Answer. This can be used at a later time if you forget your password. 8. Enter your e-mail address. You will receive e-mail notification when new information is available in [address] 9. Click Sign Up. You can now view your medical record. Additional Information    Remember, [employer] Device is NOT to be used for urgent needs. For medical emergencies, dial 911. Now available from your iPhone and Android! Broken Lower Leg: Care Instructions  Your Care Instructions    Treatment for your broken leg will depend on how bad the break is.  Your doctor may have put your lower leg in a splint or a cast to allow it to heal or keep it stable until you see another doctor. It may take weeks or months for your leg to heal. You can help it heal with some care at home. You heal best when you take good care of yourself. Eat a variety of healthy foods, and don't smoke. Follow-up care is a key part of your treatment and safety. Be sure to make and go to all appointments, and call your doctor if you are having problems. It's also a good idea to know your test results and keep a list of the medicines you take. How can you care for yourself at home? · Put ice or a cold pack on your lower leg for 10 to 20 minutes at a time. Try to do this every 1 to 2 hours for the next 3 days (when you are awake). Put a thin cloth between the ice and your cast or splint. Keep your cast or splint dry. · Follow the cast care instructions your doctor gives you. If you have a splint, do not take it off unless your doctor tells you to. · Be safe with medicines. Take pain medicines exactly as directed. ¨ If the doctor gave you a prescription medicine for pain, take it as prescribed. ¨ If you are not taking a prescription pain medicine, ask your doctor if you can take an over-the-counter medicine. · Do not put weight on your leg unless your doctor tells you to. Use crutches to walk. · Prop up your leg on pillows when you sit or lie down in the first few days after the injury. Keep your leg higher than the level of your heart. This will help reduce swelling. · Follow instructions for exercises to keep your leg strong. · Wiggle your toes often to reduce swelling and stiffness. When should you call for help? Call 911 anytime you think you may need emergency care. For example, call if:  · You have sudden chest pain and shortness of breath, or you cough up blood. Call your doctor now or seek immediate medical care if:  · You have increased or severe pain. · Your foot is cool or pale or changes color. · You have tingling, weakness, or numbness in your toes.   · Your cast or splint feels too tight.  · You cannot move your toes. · You have signs of a blood clot, such as:  ¨ Pain in your calf, back of the knee, thigh, or groin. ¨ Redness and swelling in your leg or groin. · The skin under your cast or splint is burning or stinging. Watch closely for changes in your health, and be sure to contact your doctor if:  · You do not get better as expected. Where can you learn more? Go to http://emanuel-williams.info/. Enter L198 in the search box to learn more about \"Broken Lower Leg: Care Instructions. \"  Current as of: May 27, 2016  Content Version: 11.2  © 5412-0385 [employer]. Care instructions adapted under license by [employer] (which disclaims liability or warranty for this information). If you have questions about a medical condition or this instruction, always ask your healthcare professional. [employer] any warranty or liability for your use of this information. Bones of the Leg: Anatomy Sketch    Current as of: January 5, 2017  Content Version: 11.2  © 8847-9606 [employer]. Care instructions adapted under license by [employer] (which disclaims liability or warranty for this information). If you have questions about a medical condition or this instruction, always ask your healthcare professional. [employer] any warranty or liability for your use of this information. High Blood Pressure: Care Instructions  Your Care Instructions  If your blood pressure is usually above 140/90, you have high blood pressure, or hypertension. That means the top number is 140 or higher or the bottom number is 90 or higher, or both. Despite what a lot of people think, high blood pressure usually doesn't cause headaches or make you feel dizzy or lightheaded. It usually has no symptoms. But it does increase your risk for heart attack, stroke, and kidney or eye damage.  The higher your blood pressure, the more your risk increases. Your doctor will give you a goal for your blood pressure. Your goal will be based on your health and your age. An example of a goal is to keep your blood pressure below 140/90. Lifestyle changes, such as eating healthy and being active, are always important to help lower blood pressure. You might also take medicine to reach your blood pressure goal.  Follow-up care is a key part of your treatment and safety. Be sure to make and go to all appointments, and call your doctor if you are having problems. It's also a good idea to know your test results and keep a list of the medicines you take. How can you care for yourself at home? Medical treatment  · If you stop taking your medicine, your blood pressure will go back up. You may take one or more types of medicine to lower your blood pressure. Be safe with medicines. Take your medicine exactly as prescribed. Call your doctor if you think you are having a problem with your medicine. · Talk to your doctor before you start taking aspirin every day. Aspirin can help certain people lower their risk of a heart attack or stroke. But taking aspirin isn't right for everyone, because it can cause serious bleeding. · See your doctor regularly. You may need to see the doctor more often at first or until your blood pressure comes down. · If you are taking blood pressure medicine, talk to your doctor before you take decongestants or anti-inflammatory medicine, such as ibuprofen. Some of these medicines can raise blood pressure. · Learn how to check your blood pressure at home. Lifestyle changes  · Stay at a healthy weight. This is especially important if you put on weight around the waist. Losing even 10 pounds can help you lower your blood pressure. · If your doctor recommends it, get more exercise. Walking is a good choice. Bit by bit, increase the amount you walk every day. Try for at least 30 minutes on most days of the week.  You also may want to swim, bike, or do other activities. · Avoid or limit alcohol. Talk to your doctor about whether you can drink any alcohol. · Try to limit how much sodium you eat to less than 2,300 milligrams (mg) a day. Your doctor may ask you to try to eat less than 1,500 mg a day. · Eat plenty of fruits (such as bananas and oranges), vegetables, legumes, whole grains, and low-fat dairy products. · Lower the amount of saturated fat in your diet. Saturated fat is found in animal products such as milk, cheese, and meat. Limiting these foods may help you lose weight and also lower your risk for heart disease. · Do not smoke. Smoking increases your risk for heart attack and stroke. If you need help quitting, talk to your doctor about stop-smoking programs and medicines. These can increase your chances of quitting for good. When should you call for help? Call 911 anytime you think you may need emergency care. This may mean having symptoms that suggest that your blood pressure is causing a serious heart or blood vessel problem. Your blood pressure may be over 180/110. For example, call 911 if:  · You have symptoms of a heart attack. These may include:  ¨ Chest pain or pressure, or a strange feeling in the chest.  ¨ Sweating. ¨ Shortness of breath. ¨ Nausea or vomiting. ¨ Pain, pressure, or a strange feeling in the back, neck, jaw, or upper belly or in one or both shoulders or arms. ¨ Lightheadedness or sudden weakness. ¨ A fast or irregular heartbeat. · You have symptoms of a stroke. These may include:  ¨ Sudden numbness, tingling, weakness, or loss of movement in your face, arm, or leg, especially on only one side of your body. ¨ Sudden vision changes. ¨ Sudden trouble speaking. ¨ Sudden confusion or trouble understanding simple statements. ¨ Sudden problems with walking or balance. ¨ A sudden, severe headache that is different from past headaches. · You have severe back or belly pain.   Do not wait until your blood pressure comes down on its own. Get help right away. Call your doctor now or seek immediate care if:  · Your blood pressure is much higher than normal (such as 180/110 or higher), but you don't have symptoms. · You think high blood pressure is causing symptoms, such as:  ¨ Severe headache. ¨ Blurry vision. Watch closely for changes in your health, and be sure to contact your doctor if:  · Your blood pressure measures 140/90 or higher at least 2 times. That means the top number is 140 or higher or the bottom number is 90 or higher, or both. · You think you may be having side effects from your blood pressure medicine. · Your blood pressure is usually normal, but it goes above normal at least 2 times. Where can you learn more? Go to http://emanuel-williams.info/. Enter B205 in the search box to learn more about \"High Blood Pressure: Care Instructions. \"  Current as of: August 8, 2016  Content Version: 11.2  © 5387-2034 [employer]. Care instructions adapted under license by [employer] (which disclaims liability or warranty for this information). If you have questions about a medical condition or this instruction, always ask your healthcare professional. [place] [address] any warranty or liability for your use of this information.

## 2017-05-27 ENCOUNTER — HOME CARE VISIT (OUTPATIENT)
Dept: SCHEDULING | Facility: HOME HEALTH | Age: 72
End: 2017-05-27
Payer: COMMERCIAL

## 2017-05-27 PROCEDURE — G0151 HHCP-SERV OF PT,EA 15 MIN: HCPCS

## 2017-05-27 PROCEDURE — G0299 HHS/HOSPICE OF RN EA 15 MIN: HCPCS

## 2017-05-29 ENCOUNTER — HOME CARE VISIT (OUTPATIENT)
Dept: HOME HEALTH SERVICES | Facility: HOME HEALTH | Age: 72
End: 2017-05-29
Payer: COMMERCIAL

## 2017-05-29 VITALS
OXYGEN SATURATION: 97 % | SYSTOLIC BLOOD PRESSURE: 140 MMHG | DIASTOLIC BLOOD PRESSURE: 76 MMHG | HEART RATE: 88 BPM | TEMPERATURE: 96.8 F

## 2017-05-30 ENCOUNTER — HOME CARE VISIT (OUTPATIENT)
Dept: SCHEDULING | Facility: HOME HEALTH | Age: 72
End: 2017-05-30
Payer: COMMERCIAL

## 2017-05-30 VITALS
TEMPERATURE: 97 F | OXYGEN SATURATION: 96 % | SYSTOLIC BLOOD PRESSURE: 130 MMHG | DIASTOLIC BLOOD PRESSURE: 82 MMHG | HEART RATE: 81 BPM | RESPIRATION RATE: 18 BRPM

## 2017-05-30 VITALS
SYSTOLIC BLOOD PRESSURE: 145 MMHG | TEMPERATURE: 97.8 F | OXYGEN SATURATION: 90 % | DIASTOLIC BLOOD PRESSURE: 70 MMHG | RESPIRATION RATE: 20 BRPM | HEART RATE: 78 BPM

## 2017-05-30 VITALS
SYSTOLIC BLOOD PRESSURE: 128 MMHG | HEART RATE: 89 BPM | TEMPERATURE: 97.3 F | RESPIRATION RATE: 16 BRPM | OXYGEN SATURATION: 97 % | DIASTOLIC BLOOD PRESSURE: 86 MMHG

## 2017-05-30 VITALS — SYSTOLIC BLOOD PRESSURE: 112 MMHG | DIASTOLIC BLOOD PRESSURE: 60 MMHG | OXYGEN SATURATION: 95 % | HEART RATE: 63 BPM

## 2017-05-30 PROCEDURE — G0299 HHS/HOSPICE OF RN EA 15 MIN: HCPCS

## 2017-05-30 PROCEDURE — G0157 HHC PT ASSISTANT EA 15: HCPCS

## 2017-05-31 ENCOUNTER — HOME CARE VISIT (OUTPATIENT)
Dept: SCHEDULING | Facility: HOME HEALTH | Age: 72
End: 2017-05-31
Payer: COMMERCIAL

## 2017-05-31 ENCOUNTER — HOME CARE VISIT (OUTPATIENT)
Dept: HOME HEALTH SERVICES | Facility: HOME HEALTH | Age: 72
End: 2017-05-31
Payer: COMMERCIAL

## 2017-05-31 VITALS — SYSTOLIC BLOOD PRESSURE: 128 MMHG | DIASTOLIC BLOOD PRESSURE: 63 MMHG

## 2017-05-31 PROCEDURE — G0152 HHCP-SERV OF OT,EA 15 MIN: HCPCS

## 2017-05-31 PROCEDURE — G0157 HHC PT ASSISTANT EA 15: HCPCS

## 2017-06-01 VITALS — DIASTOLIC BLOOD PRESSURE: 66 MMHG | SYSTOLIC BLOOD PRESSURE: 125 MMHG

## 2017-06-02 ENCOUNTER — HOME CARE VISIT (OUTPATIENT)
Dept: SCHEDULING | Facility: HOME HEALTH | Age: 72
End: 2017-06-02
Payer: COMMERCIAL

## 2017-06-02 VITALS — SYSTOLIC BLOOD PRESSURE: 121 MMHG | HEART RATE: 93 BPM | DIASTOLIC BLOOD PRESSURE: 70 MMHG | OXYGEN SATURATION: 95 %

## 2017-06-02 PROCEDURE — G0157 HHC PT ASSISTANT EA 15: HCPCS

## 2017-06-05 ENCOUNTER — HOME CARE VISIT (OUTPATIENT)
Dept: HOME HEALTH SERVICES | Facility: HOME HEALTH | Age: 72
End: 2017-06-05
Payer: COMMERCIAL

## 2017-06-05 ENCOUNTER — HOME CARE VISIT (OUTPATIENT)
Dept: SCHEDULING | Facility: HOME HEALTH | Age: 72
End: 2017-06-05
Payer: COMMERCIAL

## 2017-06-05 VITALS — SYSTOLIC BLOOD PRESSURE: 141 MMHG | HEART RATE: 87 BPM | DIASTOLIC BLOOD PRESSURE: 68 MMHG | OXYGEN SATURATION: 97 %

## 2017-06-05 PROCEDURE — G0151 HHCP-SERV OF PT,EA 15 MIN: HCPCS

## 2017-06-05 PROCEDURE — G0299 HHS/HOSPICE OF RN EA 15 MIN: HCPCS

## 2017-06-05 PROCEDURE — G0152 HHCP-SERV OF OT,EA 15 MIN: HCPCS

## 2017-06-06 ENCOUNTER — HOME CARE VISIT (OUTPATIENT)
Dept: SCHEDULING | Facility: HOME HEALTH | Age: 72
End: 2017-06-06
Payer: COMMERCIAL

## 2017-06-06 VITALS — HEART RATE: 88 BPM | SYSTOLIC BLOOD PRESSURE: 123 MMHG | DIASTOLIC BLOOD PRESSURE: 64 MMHG | OXYGEN SATURATION: 97 %

## 2017-06-06 VITALS
DIASTOLIC BLOOD PRESSURE: 77 MMHG | OXYGEN SATURATION: 98 % | TEMPERATURE: 97 F | RESPIRATION RATE: 20 BRPM | HEART RATE: 74 BPM | SYSTOLIC BLOOD PRESSURE: 136 MMHG

## 2017-06-06 VITALS — HEART RATE: 89 BPM | SYSTOLIC BLOOD PRESSURE: 123 MMHG | DIASTOLIC BLOOD PRESSURE: 68 MMHG | OXYGEN SATURATION: 96 %

## 2017-06-06 PROCEDURE — G0157 HHC PT ASSISTANT EA 15: HCPCS

## 2017-06-07 ENCOUNTER — HOME CARE VISIT (OUTPATIENT)
Dept: SCHEDULING | Facility: HOME HEALTH | Age: 72
End: 2017-06-07
Payer: COMMERCIAL

## 2017-06-07 ENCOUNTER — HOME CARE VISIT (OUTPATIENT)
Dept: HOME HEALTH SERVICES | Facility: HOME HEALTH | Age: 72
End: 2017-06-07
Payer: COMMERCIAL

## 2017-06-07 VITALS
HEART RATE: 79 BPM | TEMPERATURE: 97.8 F | OXYGEN SATURATION: 98 % | SYSTOLIC BLOOD PRESSURE: 113 MMHG | RESPIRATION RATE: 20 BRPM | DIASTOLIC BLOOD PRESSURE: 73 MMHG

## 2017-06-07 PROCEDURE — G0152 HHCP-SERV OF OT,EA 15 MIN: HCPCS

## 2017-06-07 PROCEDURE — G0299 HHS/HOSPICE OF RN EA 15 MIN: HCPCS

## 2017-06-08 ENCOUNTER — HOME CARE VISIT (OUTPATIENT)
Dept: SCHEDULING | Facility: HOME HEALTH | Age: 72
End: 2017-06-08
Payer: COMMERCIAL

## 2017-06-08 VITALS — HEART RATE: 81 BPM | SYSTOLIC BLOOD PRESSURE: 124 MMHG | DIASTOLIC BLOOD PRESSURE: 65 MMHG | OXYGEN SATURATION: 98 %

## 2017-06-08 VITALS — SYSTOLIC BLOOD PRESSURE: 127 MMHG | OXYGEN SATURATION: 97 % | HEART RATE: 87 BPM | DIASTOLIC BLOOD PRESSURE: 62 MMHG

## 2017-06-08 PROCEDURE — G0152 HHCP-SERV OF OT,EA 15 MIN: HCPCS

## 2017-06-08 PROCEDURE — G0157 HHC PT ASSISTANT EA 15: HCPCS

## 2017-06-09 ENCOUNTER — HOME CARE VISIT (OUTPATIENT)
Dept: SCHEDULING | Facility: HOME HEALTH | Age: 72
End: 2017-06-09
Payer: COMMERCIAL

## 2017-06-09 VITALS — OXYGEN SATURATION: 98 % | DIASTOLIC BLOOD PRESSURE: 63 MMHG | HEART RATE: 81 BPM | SYSTOLIC BLOOD PRESSURE: 124 MMHG

## 2017-06-09 PROCEDURE — G0156 HHCP-SVS OF AIDE,EA 15 MIN: HCPCS

## 2017-06-12 ENCOUNTER — HOME CARE VISIT (OUTPATIENT)
Dept: SCHEDULING | Facility: HOME HEALTH | Age: 72
End: 2017-06-12
Payer: COMMERCIAL

## 2017-06-12 VITALS
RESPIRATION RATE: 19 BRPM | SYSTOLIC BLOOD PRESSURE: 132 MMHG | OXYGEN SATURATION: 98 % | HEART RATE: 90 BPM | DIASTOLIC BLOOD PRESSURE: 78 MMHG

## 2017-06-12 VITALS
RESPIRATION RATE: 20 BRPM | SYSTOLIC BLOOD PRESSURE: 118 MMHG | TEMPERATURE: 98 F | DIASTOLIC BLOOD PRESSURE: 73 MMHG | OXYGEN SATURATION: 98 % | HEART RATE: 74 BPM

## 2017-06-12 PROCEDURE — G0157 HHC PT ASSISTANT EA 15: HCPCS

## 2017-06-12 PROCEDURE — G0299 HHS/HOSPICE OF RN EA 15 MIN: HCPCS

## 2017-06-12 PROCEDURE — G0156 HHCP-SVS OF AIDE,EA 15 MIN: HCPCS

## 2017-06-12 PROCEDURE — G0152 HHCP-SERV OF OT,EA 15 MIN: HCPCS

## 2017-06-13 ENCOUNTER — HOME CARE VISIT (OUTPATIENT)
Dept: SCHEDULING | Facility: HOME HEALTH | Age: 72
End: 2017-06-13
Payer: COMMERCIAL

## 2017-06-13 VITALS — DIASTOLIC BLOOD PRESSURE: 68 MMHG | OXYGEN SATURATION: 98 % | SYSTOLIC BLOOD PRESSURE: 121 MMHG | HEART RATE: 82 BPM

## 2017-06-13 VITALS — HEART RATE: 96 BPM | OXYGEN SATURATION: 98 % | SYSTOLIC BLOOD PRESSURE: 165 MMHG | DIASTOLIC BLOOD PRESSURE: 96 MMHG

## 2017-06-13 PROCEDURE — G0157 HHC PT ASSISTANT EA 15: HCPCS

## 2017-06-14 ENCOUNTER — HOME CARE VISIT (OUTPATIENT)
Dept: HOME HEALTH SERVICES | Facility: HOME HEALTH | Age: 72
End: 2017-06-14
Payer: COMMERCIAL

## 2017-06-14 ENCOUNTER — HOME CARE VISIT (OUTPATIENT)
Dept: SCHEDULING | Facility: HOME HEALTH | Age: 72
End: 2017-06-14
Payer: COMMERCIAL

## 2017-06-14 PROCEDURE — G0152 HHCP-SERV OF OT,EA 15 MIN: HCPCS

## 2017-06-15 ENCOUNTER — HOME CARE VISIT (OUTPATIENT)
Dept: SCHEDULING | Facility: HOME HEALTH | Age: 72
End: 2017-06-15
Payer: COMMERCIAL

## 2017-06-15 PROCEDURE — G0299 HHS/HOSPICE OF RN EA 15 MIN: HCPCS

## 2017-06-15 PROCEDURE — G0157 HHC PT ASSISTANT EA 15: HCPCS

## 2017-06-15 PROCEDURE — G0152 HHCP-SERV OF OT,EA 15 MIN: HCPCS

## 2017-06-16 ENCOUNTER — HOME CARE VISIT (OUTPATIENT)
Dept: HOME HEALTH SERVICES | Facility: HOME HEALTH | Age: 72
End: 2017-06-16
Payer: COMMERCIAL

## 2017-06-16 VITALS
HEART RATE: 97 BPM | DIASTOLIC BLOOD PRESSURE: 80 MMHG | TEMPERATURE: 97.8 F | SYSTOLIC BLOOD PRESSURE: 149 MMHG | RESPIRATION RATE: 20 BRPM

## 2017-06-16 VITALS
HEART RATE: 105 BPM | SYSTOLIC BLOOD PRESSURE: 148 MMHG | RESPIRATION RATE: 18 BRPM | OXYGEN SATURATION: 98 % | DIASTOLIC BLOOD PRESSURE: 77 MMHG

## 2017-06-19 ENCOUNTER — HOME CARE VISIT (OUTPATIENT)
Dept: SCHEDULING | Facility: HOME HEALTH | Age: 72
End: 2017-06-19
Payer: COMMERCIAL

## 2017-06-19 ENCOUNTER — HOME CARE VISIT (OUTPATIENT)
Dept: HOME HEALTH SERVICES | Facility: HOME HEALTH | Age: 72
End: 2017-06-19
Payer: COMMERCIAL

## 2017-06-19 PROCEDURE — G0157 HHC PT ASSISTANT EA 15: HCPCS

## 2017-06-19 PROCEDURE — G0152 HHCP-SERV OF OT,EA 15 MIN: HCPCS

## 2017-06-20 VITALS — SYSTOLIC BLOOD PRESSURE: 124 MMHG | DIASTOLIC BLOOD PRESSURE: 63 MMHG | OXYGEN SATURATION: 97 % | HEART RATE: 100 BPM

## 2017-06-20 VITALS — OXYGEN SATURATION: 97 % | SYSTOLIC BLOOD PRESSURE: 127 MMHG | HEART RATE: 102 BPM | DIASTOLIC BLOOD PRESSURE: 89 MMHG

## 2017-06-21 ENCOUNTER — HOME CARE VISIT (OUTPATIENT)
Dept: HOME HEALTH SERVICES | Facility: HOME HEALTH | Age: 72
End: 2017-06-21
Payer: COMMERCIAL

## 2017-06-21 VITALS
DIASTOLIC BLOOD PRESSURE: 75 MMHG | OXYGEN SATURATION: 98 % | RESPIRATION RATE: 20 BRPM | HEART RATE: 78 BPM | TEMPERATURE: 98.1 F | SYSTOLIC BLOOD PRESSURE: 110 MMHG

## 2017-06-21 PROCEDURE — G0299 HHS/HOSPICE OF RN EA 15 MIN: HCPCS

## 2017-06-22 ENCOUNTER — HOME CARE VISIT (OUTPATIENT)
Dept: SCHEDULING | Facility: HOME HEALTH | Age: 72
End: 2017-06-22
Payer: COMMERCIAL

## 2017-06-22 VITALS — DIASTOLIC BLOOD PRESSURE: 73 MMHG | HEART RATE: 79 BPM | OXYGEN SATURATION: 96 % | SYSTOLIC BLOOD PRESSURE: 112 MMHG

## 2017-06-22 PROCEDURE — G0152 HHCP-SERV OF OT,EA 15 MIN: HCPCS

## 2017-06-22 PROCEDURE — G0156 HHCP-SVS OF AIDE,EA 15 MIN: HCPCS

## 2017-06-28 ENCOUNTER — HOME CARE VISIT (OUTPATIENT)
Dept: SCHEDULING | Facility: HOME HEALTH | Age: 72
End: 2017-06-28
Payer: COMMERCIAL

## 2017-06-28 PROCEDURE — G0156 HHCP-SVS OF AIDE,EA 15 MIN: HCPCS

## 2017-07-02 ENCOUNTER — HOSPITAL ENCOUNTER (EMERGENCY)
Age: 72
Discharge: HOME | End: 2017-07-02
Attending: EMERGENCY MEDICINE
Payer: COMMERCIAL

## 2017-07-02 VITALS
SYSTOLIC BLOOD PRESSURE: 155 MMHG | HEART RATE: 99 BPM | BODY MASS INDEX: 22.58 KG/M2 | OXYGEN SATURATION: 99 % | HEIGHT: 60 IN | WEIGHT: 115 LBS | TEMPERATURE: 96.8 F | DIASTOLIC BLOOD PRESSURE: 77 MMHG | RESPIRATION RATE: 18 BRPM

## 2017-07-02 DIAGNOSIS — R04.0 EPISTAXIS: Primary | ICD-10-CM

## 2017-07-02 LAB
ABO + RH BLD: NORMAL
ANION GAP BLD CALC-SCNC: 10 MMOL/L (ref 3–18)
ANION GAP BLD CALC-SCNC: 17 MMOL/L (ref 10–20)
BASOPHILS # BLD AUTO: 0 K/UL (ref 0–0.1)
BASOPHILS # BLD: 0 % (ref 0–3)
BLOOD GROUP ANTIBODIES SERPL: NORMAL
BUN BLD-MCNC: 26 MG/DL (ref 7–18)
BUN SERPL-MCNC: 25 MG/DL (ref 7–18)
BUN/CREAT SERPL: 57 (ref 12–20)
CA-I BLD-MCNC: 1.22 MMOL/L (ref 1.12–1.32)
CALCIUM SERPL-MCNC: 8.6 MG/DL (ref 8.5–10.1)
CHLORIDE BLD-SCNC: 104 MMOL/L (ref 100–108)
CHLORIDE SERPL-SCNC: 105 MMOL/L (ref 100–108)
CO2 BLD-SCNC: 26 MMOL/L (ref 19–24)
CO2 SERPL-SCNC: 24 MMOL/L (ref 21–32)
CREAT SERPL-MCNC: 0.44 MG/DL (ref 0.6–1.3)
CREAT UR-MCNC: 0.5 MG/DL (ref 0.6–1.3)
DIFFERENTIAL METHOD BLD: ABNORMAL
EOSINOPHIL # BLD: 0 K/UL (ref 0–0.4)
EOSINOPHIL NFR BLD: 0 % (ref 0–5)
ERYTHROCYTE [DISTWIDTH] IN BLOOD BY AUTOMATED COUNT: 20.4 % (ref 11.6–14.5)
GLUCOSE BLD STRIP.AUTO-MCNC: 130 MG/DL (ref 74–106)
GLUCOSE SERPL-MCNC: 118 MG/DL (ref 74–99)
HCT VFR BLD AUTO: 33.3 % (ref 35–45)
HCT VFR BLD CALC: 35 % (ref 36–49)
HGB BLD-MCNC: 11.9 G/DL (ref 12–16)
HGB BLD-MCNC: 9.8 G/DL (ref 12–16)
INR PPP: 1 (ref 0.8–1.2)
LYMPHOCYTES # BLD AUTO: 10 % (ref 20–51)
LYMPHOCYTES # BLD: 1.4 K/UL (ref 0.8–3.5)
MCH RBC QN AUTO: 27.5 PG (ref 24–34)
MCHC RBC AUTO-ENTMCNC: 29.4 G/DL (ref 31–37)
MCV RBC AUTO: 93.5 FL (ref 74–97)
METAMYELOCYTES NFR BLD MANUAL: 1 %
MONOCYTES # BLD: 0.9 K/UL (ref 0–1)
MONOCYTES NFR BLD AUTO: 6 % (ref 2–9)
NEUTS BAND NFR BLD MANUAL: 3 % (ref 0–5)
NEUTS SEG # BLD: 11.5 K/UL (ref 1.8–8)
NEUTS SEG NFR BLD AUTO: 80 % (ref 42–75)
PLATELET # BLD AUTO: 441 K/UL (ref 135–420)
PLATELET COMMENTS,PCOM: ABNORMAL
PMV BLD AUTO: 10.7 FL (ref 9.2–11.8)
POTASSIUM BLD-SCNC: 4.3 MMOL/L (ref 3.5–5.5)
POTASSIUM SERPL-SCNC: 4.6 MMOL/L (ref 3.5–5.5)
PROTHROMBIN TIME: 13 SEC (ref 11.5–15.2)
RBC # BLD AUTO: 3.56 M/UL (ref 4.2–5.3)
RBC MORPH BLD: ABNORMAL
RBC MORPH BLD: ABNORMAL
SODIUM BLD-SCNC: 142 MMOL/L (ref 136–145)
SODIUM SERPL-SCNC: 139 MMOL/L (ref 136–145)
SPECIMEN EXP DATE BLD: NORMAL
WBC # BLD AUTO: 14.4 K/UL (ref 4.6–13.2)

## 2017-07-02 PROCEDURE — 86900 BLOOD TYPING SEROLOGIC ABO: CPT

## 2017-07-02 PROCEDURE — 74011000250 HC RX REV CODE- 250: Performed by: EMERGENCY MEDICINE

## 2017-07-02 PROCEDURE — 99284 EMERGENCY DEPT VISIT MOD MDM: CPT

## 2017-07-02 PROCEDURE — 74011000250 HC RX REV CODE- 250: Performed by: PHYSICIAN ASSISTANT

## 2017-07-02 PROCEDURE — 80047 BASIC METABLC PNL IONIZED CA: CPT

## 2017-07-02 PROCEDURE — 75810000284 HC CNTRL NASAL HEMORHRAGE SIMPLE

## 2017-07-02 PROCEDURE — 77030011649 HC PK NSL RHNO S&N -B

## 2017-07-02 PROCEDURE — 85025 COMPLETE CBC W/AUTO DIFF WBC: CPT | Performed by: EMERGENCY MEDICINE

## 2017-07-02 PROCEDURE — 85610 PROTHROMBIN TIME: CPT | Performed by: EMERGENCY MEDICINE

## 2017-07-02 PROCEDURE — 74011250637 HC RX REV CODE- 250/637: Performed by: EMERGENCY MEDICINE

## 2017-07-02 PROCEDURE — 74011250636 HC RX REV CODE- 250/636: Performed by: PHYSICIAN ASSISTANT

## 2017-07-02 PROCEDURE — 80048 BASIC METABOLIC PNL TOTAL CA: CPT | Performed by: EMERGENCY MEDICINE

## 2017-07-02 RX ORDER — OXYMETAZOLINE HCL 0.05 %
2 SPRAY, NON-AEROSOL (ML) NASAL
Status: COMPLETED | OUTPATIENT
Start: 2017-07-02 | End: 2017-07-02

## 2017-07-02 RX ORDER — SILVER NITRATE 38.21; 12.74 MG/1; MG/1
2 STICK TOPICAL ONCE
Status: COMPLETED | OUTPATIENT
Start: 2017-07-02 | End: 2017-07-02

## 2017-07-02 RX ORDER — LIDOCAINE HYDROCHLORIDE 20 MG/ML
JELLY TOPICAL ONCE
Status: COMPLETED | OUTPATIENT
Start: 2017-07-02 | End: 2017-07-02

## 2017-07-02 RX ADMIN — LIDOCAINE HYDROCHLORIDE: 20 JELLY TOPICAL at 20:55

## 2017-07-02 RX ADMIN — SILVER NITRATE APPLICATORS 2 APPLICATOR: 25; 75 STICK TOPICAL at 20:49

## 2017-07-02 RX ADMIN — OXYMETAZOLINE HYDROCHLORIDE 2 SPRAY: 5 SPRAY NASAL at 18:26

## 2017-07-02 RX ADMIN — SODIUM CHLORIDE 500 ML: 900 INJECTION, SOLUTION INTRAVENOUS at 18:36

## 2017-07-02 NOTE — ED PROVIDER NOTES
HPI 66 YOF here for about a 9-10 hour off/on nosebleed all day. She woke up with a nosebleed this am and has been holding pressure off/on all day. Most frequent episode has been about 2 hours. Patient has had nosebleeds before that last a hour or two, then stops spontaneously after pressure. Spouse says her mentation is normal, but he thinks she is looking a little more pale than she was earlier today. She has not had any syncope, headache, or chest pain, SOB, or dizziness. 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      Social History Main Topics    Smoking status: Former Smoker     Types: Cigarettes     Quit date: 4/28/2013    Smokeless tobacco: Not on file    Alcohol use 1.0 oz/week     2 Shots of liquor per week    Drug use: No    Sexual activity: Yes     Partners: Male     Other Topics Concern    Not on file     Social History Narrative         ALLERGIES: Sulfa (sulfonamide antibiotics) and Sulfamethoxazole-trimethoprim    Review of Systems   Constitutional: Negative. HENT: Positive for nosebleeds. Negative for facial swelling, sore throat, trouble swallowing and voice change. Eyes: Negative. Respiratory: Negative. Cardiovascular: Negative. Gastrointestinal: Negative for abdominal pain, nausea and vomiting. Musculoskeletal: Negative for back pain, neck pain and neck stiffness. Skin: Negative. Neurological: Negative. All other systems reviewed and are negative. Vitals:    07/02/17 1826   BP: (!) 150/102   Pulse: (!) 104   Temp: 96.8 °F (36 °C)   SpO2: 92%            Physical Exam   Constitutional: She is oriented to person, place, and time. She appears well-developed and well-nourished. No distress. HENT:   Head: Normocephalic and atraumatic. Right Ear: External ear normal.   Left Ear: External ear normal.   Mouth/Throat: Oropharynx is clear and moist.   Wet blood noted in right nare, no hematoma. Eyes: Conjunctivae and EOM are normal. Pupils are equal, round, and reactive to light. Neck: Normal range of motion. Neck supple. Cardiovascular: Regular rhythm, normal heart sounds and intact distal pulses. Tachycardia. Pulmonary/Chest: Effort normal and breath sounds normal.   Abdominal: Soft. Bowel sounds are normal. There is no tenderness. Musculoskeletal: Normal range of motion. Except LLE has a splint on it, ongoing fracture, followed by ortho. Neurological: She is alert and oriented to person, place, and time. Skin: Skin is warm and dry. No rash noted. She is not diaphoretic. No erythema. There is pallor. Psychiatric: She has a normal mood and affect. Her behavior is normal.   Nursing note and vitals reviewed. MDM  Number of Diagnoses or Management Options  Epistaxis:   Diagnosis management comments: Pt had wet blood noted, she gave verbal consent for an initial afrin to her right nare.   I put some on a 4x4 for now and lightly placed it 3/4 way up the nasal cavity on the right, no bleeding coming down her face. She tolerated this well. Recheck: 1918pm: patient stable, she says she is doing well, no bleeding noticeable on the gauze or coming from her nose, seems to be controlled at this time. Recheck: 2006pm: patient stable, I removed the gauze from the right nare, inspected, no more wet blood, now right lateral wall where I noticed wet blood is dried now, pt says she feels better, plan is to discharge soon to home, she has no chest pain or neuro complaints. She is no longer mildly tachycardic and bp is stable. Labs reviewed with her, no need for any blood today, and she is no longer pale. Recheck: 2030pm, pt feels great ready for home. Amount and/or Complexity of Data Reviewed  Clinical lab tests: ordered and reviewed      ED Course       Epistaxis Management  Date/Time: 7/2/2017 9:08 PM  Performed by: Yancy Rosa by: Kofi Stephen     Consent:     Consent obtained:  Verbal    Consent given by:  Patient and spouse    Risks discussed:  Bleeding and pain    Alternatives discussed:  No treatment  Anesthesia (see MAR for exact dosages): Anesthesia method:  Topical application    Topical anesthetic:  Lidocaine gel  Procedure details:     Treatment site:  R anterior    Treatment method:  Silver nitrate (then rhinorocket, as silver nitrate worked, then pt messed with her nose. )    Treatment complexity:  Limited    Treatment episode: recurring    Post-procedure details:     Assessment:  No improvement    Patient tolerance of procedure: Tolerated with difficulty  Comments:      Bleeding stopped with rocket, pt satisfied. No complications.            Labs Reviewed   CBC WITH AUTOMATED DIFF - Abnormal; Notable for the following:        Result Value    WBC 14.4 (*)     RBC 3.56 (*)     HGB 9.8 (*)     HCT 33.3 (*)     MCHC 29.4 (*)     RDW 20.4 (*)     PLATELET 751 (*)     All other components within normal limits   METABOLIC PANEL, BASIC - Abnormal; Notable for the following:     Glucose 118 (*) BUN 25 (*)     Creatinine 0.44 (*)     BUN/Creatinine ratio 57 (*)     All other components within normal limits   POC CHEM8 - Abnormal; Notable for the following:     CO2, POC 26 (*)     Glucose,  (*)     BUN, POC 26 (*)     Creatinine, POC 0.5 (*)     Hematocrit, POC 35 (*)     Hemoglobin, POC 11.9 (*)     All other components within normal limits   PROTHROMBIN TIME + INR   POC CHEM8   TYPE & SCREEN            ICD-10-CM ICD-9-CM   1. Epistaxis R04.0 784.7     Plan: discharge to home. See pcp in 2 days for general recheck, bp recheck, return here for any bleeding or concerns.

## 2017-07-02 NOTE — ED NOTES
Pt pale  Per spouse pt is paler than usual  States he noticed a distinct change in color prior to arrival

## 2017-07-02 NOTE — ED TRIAGE NOTES
Pt arrives with spouse  Woke up with epistaxis at apx 0900, continued bleeding with intermittent pauses  On plavix  + wet blood in nose, no bleeding outside of nose  States running down throat

## 2017-07-03 NOTE — ED NOTES
Pt discharged to home via w/c and in company of spouse  Discharge instructions provided via discussion and handout. Teaching to patient and spouse. Verbalized understanding. No questions voiced. Discharged with 0 RX.

## 2017-07-03 NOTE — ANCILLARY DISCHARGE INSTRUCTIONS
[facility]  Discharge Phone Call       After-Care Discharge Phone Call Questions: NO ANSWER    Were you able to get your prescriptions filled? Comment:      [] Yes  []No    Comment if answer is \"No\"   Are you taking your medication(s) as your doctor ordered? Do you understand the purpose of your medications? Comment:    [] Yes  []No    Comment if answer is \"No\"   Are you taking any other medications that are not on the list?  Comment:      [] Yes  []No    Comment if answer is \"Yes\"   Do you have any questions about your medications? Are you aware of potential side effects? Comment:    [] Yes  []No    Comment if answer is \"Yes\"   Did you make your follow-up appointments (if the hospital did not do this before  discharge)? Comment:    [] Yes  []No    Comment if answer is \"No\"   Is there any reason you might not be able to keep your follow-up appointments? Comment:     [] Yes  []No    Comment if answer is \"Yes\"   Do you have any questions about your care plan? Are you aware of what health problems to be alert for? Comment:    [] Yes  []No    Comment if answer is \"Yes\"   Do you have a good understanding of how you should manage your health? Comment:    [] Yes  []No    Comment if answer is \"Yes\"   Do you know which symptoms to watch for that would mean you would need to call your doctor right away? Comment:      [] Yes  []No    Comment if answer is \"No\"   Do you have any questions about the follow up process or any instructions that we have provided? Comment:    [] Yes  []No    Comment if answer is \"Yes\"   Did staff take your preferences into account?         [] Yes  []No    Comment if answer is \"Yes\"

## 2017-07-04 ENCOUNTER — HOSPITAL ENCOUNTER (EMERGENCY)
Age: 72
Discharge: HOME | End: 2017-07-04
Attending: EMERGENCY MEDICINE
Payer: COMMERCIAL

## 2017-07-04 ENCOUNTER — HOME CARE VISIT (OUTPATIENT)
Dept: HOME HEALTH SERVICES | Facility: HOME HEALTH | Age: 72
End: 2017-07-04
Payer: COMMERCIAL

## 2017-07-04 VITALS
BODY MASS INDEX: 22.46 KG/M2 | OXYGEN SATURATION: 98 % | TEMPERATURE: 99.1 F | HEART RATE: 107 BPM | WEIGHT: 115 LBS | SYSTOLIC BLOOD PRESSURE: 139 MMHG | RESPIRATION RATE: 16 BRPM | DIASTOLIC BLOOD PRESSURE: 100 MMHG

## 2017-07-04 VITALS
SYSTOLIC BLOOD PRESSURE: 149 MMHG | HEART RATE: 101 BPM | DIASTOLIC BLOOD PRESSURE: 77 MMHG | TEMPERATURE: 96.8 F | RESPIRATION RATE: 20 BRPM | OXYGEN SATURATION: 98 %

## 2017-07-04 DIAGNOSIS — Z48.00 ENCOUNTER FOR REMOVAL OF NASAL PACKING: Primary | ICD-10-CM

## 2017-07-04 PROCEDURE — G0299 HHS/HOSPICE OF RN EA 15 MIN: HCPCS

## 2017-07-04 PROCEDURE — 99282 EMERGENCY DEPT VISIT SF MDM: CPT

## 2017-07-04 NOTE — DISCHARGE INSTRUCTIONS
Nosebleeds: Care Instructions  Your Care Instructions    Nosebleeds are common, especially if you have colds or allergies. Many things can cause a nosebleed. Some nosebleeds stop on their own with pressure. Others need packing. Some get cauterized (sealed). If you have gauze or other packing materials in your nose, you will need to follow up with your doctor to have the packing removed. You may need more treatment if you get nosebleeds a lot. The doctor has checked you carefully, but problems can develop later. If you notice any problems or new symptoms, get medical treatment right away. Follow-up care is a key part of your treatment and safety. Be sure to make and go to all appointments, and call your doctor if you are having problems. It's also a good idea to know your test results and keep a list of the medicines you take. How can you care for yourself at home? · If you get another nosebleed:  ¨ Sit up and tilt your head slightly forward. This keeps blood from going down your throat. ¨ Use your thumb and index finger to pinch your nose shut for 10 minutes. Use a clock. Do not check to see if the bleeding has stopped before the 10 minutes are up. If the bleeding has not stopped, pinch your nose shut for another 10 minutes. ¨ When the bleeding has stopped, try not to pick, rub, or blow your nose for 12 hours. Avoiding these things helps keep your nose from bleeding again. · If your doctor prescribed antibiotics, take them as directed. Do not stop taking them just because you feel better. You need to take the full course of antibiotics. To prevent nosebleeds  · Do not blow your nose too hard. · Try not to lift or strain after a nosebleed. · Raise your head on a pillow while you sleep. · Put a thin layer of a saline- or water-based nasal gel, such as NasoGel, inside your nose. Put it on the septum, which divides your nostrils. This will prevent dryness that can cause nosebleeds.   · Use a vaporizer or humidifier to add moisture to your bedroom. Follow the directions for cleaning the machine. · Do not use aspirin, ibuprofen (Advil, Motrin), or naproxen (Aleve) for 36 to 48 hours after a nosebleed unless your doctor tells you to. You can use acetaminophen (Tylenol) for pain relief. · Talk to your doctor about stopping any other medicines you are taking. Some medicines may make you more likely to get a nosebleed. · Do not use cold medicines or nasal sprays without first talking to your doctor. They can make your nose dry. When should you call for help? Call 911 anytime you think you may need emergency care. For example, call if:  · You passed out (lost consciousness). Call your doctor now or seek immediate medical care if:  · You get another nosebleed and your nose is still bleeding after you have applied pressure 3 times for 10 minutes each time (30 minutes total). · There is a lot of blood running down the back of your throat even after you pinch your nose and tilt your head forward. · You have a fever. · You have sinus pain. Watch closely for changes in your health, and be sure to contact your doctor if:  · You get nosebleeds often, even if they stop. · You do not get better as expected. Where can you learn more? Go to http://emanuel-williams.info/. Enter S156 in the search box to learn more about \"Nosebleeds: Care Instructions. \"  Current as of: March 20, 2017  Content Version: 11.3  © 0620-4426 [employer]. Care instructions adapted under license by [employer] (which disclaims liability or warranty for this information). If you have questions about a medical condition or this instruction, always ask your healthcare professional. [address] any warranty or liability for your use of this information.

## 2017-07-04 NOTE — ED PROVIDER NOTES
HPI Comments: 2:30 PM Sahara Maher is a 66 y.o. female who presents to the ED for removal of nasal rocket. Pt state that she had the nasal rocket placed 2 days ago at this facility. Pt notes that she was instructed to have the nasal rocket removed in 2 days. Pt denies feeling any nasal drainage or spittng up blood. Pt has no other sx or complaints. 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 Social History Main Topics    Smoking status: Former Smoker     Types: Cigarettes     Quit date: 4/28/2013    Smokeless tobacco: Never Used    Alcohol use 1.0 oz/week     2 Shots of liquor per week    Drug use: No    Sexual activity: Yes     Partners: Male     Other Topics Concern    Not on file     Social History Narrative         ALLERGIES: Sulfa (sulfonamide antibiotics) and Sulfamethoxazole-trimethoprim    Review of Systems   Constitutional: Negative for chills, fatigue, fever and unexpected weight change. HENT: Negative for congestion and rhinorrhea. Removal of nasal rocket    Respiratory: Negative for chest tightness and shortness of breath. Cardiovascular: Negative for chest pain, palpitations and leg swelling. Gastrointestinal: Negative for abdominal pain, nausea and vomiting. Genitourinary: Negative for dysuria. Musculoskeletal: Negative for back pain. Skin: Negative for rash. Neurological: Negative for dizziness and weakness. Psychiatric/Behavioral: The patient is not nervous/anxious. All other systems reviewed and are negative. Vitals:    07/04/17 1428   BP: (!) 139/100   Pulse: (!) 107   Resp: 16   Temp: 99.1 °F (37.3 °C)   SpO2: 98%   Weight: 52.2 kg (115 lb)            Physical Exam   Constitutional: She is oriented to person, place, and time. She appears well-developed and well-nourished. No distress. HENT:   Head: Normocephalic and atraumatic. Right Ear: External ear normal.   Left Ear: External ear normal.   Nose: Nose normal.   Mouth/Throat: Oropharynx is clear and moist.   Eyes: Conjunctivae and EOM are normal. Pupils are equal, round, and reactive to light. No scleral icterus. R nares with short packing noted, clotted blood at the nares, packing removed    Neck: Normal range of motion. Neck supple. No JVD present. No tracheal deviation present. No thyromegaly present. Cardiovascular: Regular rhythm, normal heart sounds and intact distal pulses. Exam reveals no gallop and no friction rub. No murmur heard. Tachy    Pulmonary/Chest: Effort normal and breath sounds normal. She exhibits no tenderness. Abdominal: Soft. Bowel sounds are normal. She exhibits no distension. There is no tenderness. There is no rebound and no guarding. Musculoskeletal: Normal range of motion. She exhibits no edema or tenderness. L LE with long leg splint    Lymphadenopathy:     She has no cervical adenopathy. Neurological: She is alert and oriented to person, place, and time. No cranial nerve deficit.  Coordination normal.   No sensory loss, Gait normal, Motor 5/5   Skin: Skin is warm and dry. No erythema. bruising noted    Psychiatric: She has a normal mood and affect. Her behavior is normal. Judgment and thought content normal.   Nursing note and vitals reviewed. MDM  Number of Diagnoses or Management Options  Diagnosis management comments: Pt is a 75yo female with a hx of chronic illness on plavix presents for packing removal after being treated for an anterior nose bleed 3 days ago. Pt is due for the packing removal.  The packing was removed and after 20 minutes of observation has not had any bleeding. Will proceed with ENT care and pt is to return if at all worsened or concerned. Varsha Velasquez,  2:49 PM      ED Course       Procedures        Vitals:  Patient Vitals for the past 12 hrs:   Temp Pulse Resp BP SpO2   07/04/17 1428 99.1 °F (37.3 °C) (!) 107 16 (!) 139/100 98 %         Disposition:  Diagnosis:   1. Encounter for removal of nasal packing        Disposition:    Follow-up Information     Follow up With Details Comments Contact Kika Thurman MD Go in 1 day for re-evaluation and further treatment [address] Internal Medicine [facility]      Christiano Yuen MD Schedule an appointment as soon as possible for a visit in 1 day for re-evaluation and further treatment [address] EMERGENCY DEPT  As needed, If symptoms worsen [address]  [phone]           Patient's Medications   Start Taking    No medications on file   Continue Taking    ALBUTEROL-IPRATROPIUM (DUO-NEB) 2.5 MG-0.5 MG/3 ML NEBU    3 mL by Nebulization route every six (6) hours as needed. AMLODIPINE (NORVASC) 2.5 MG TABLET    Take 1 tablet by mouth daily. ASPIRIN (ASPIRIN) 325 MG TABLET    Take 1 Tab by mouth daily.     BIMATOPROST (LUMIGAN) 0.01 % OPHTHALMIC DROPS    Administer 1 Drop to left eye every evening. BRIMONIDINE-TIMOLOL (COMBIGAN) 0.2-0.5 % DROP OPHTHALMIC SOLUTION    Administer 1 Drop to left eye every twelve (12) hours. CLONAZEPAM (KLONOPIN) 1 MG TABLET    Take 0.25 mg by mouth nightly. CLOPIDOGREL (PLAVIX) 75 MG TABLET    Take 1 Tab by mouth daily. DOCUSATE SODIUM (COLACE) 100 MG CAPSULE    Take 100 mg by mouth two (2) times daily as needed for Constipation. FLUOXETINE (PROZAC) 10 MG CAPSULE    Take 10 mg by mouth daily. FLUOXETINE (PROZAC) 20 MG CAPSULE    Take 20 mg by mouth daily. FOLIC ACID PO    Take 1 Tab by mouth daily. 1 mg tablet    LEVOTHYROXINE (SYNTHROID) 50 MCG TABLET    Take 1 Tab by mouth Daily (before breakfast). MELOXICAM (MOBIC) 7.5 MG TABLET    Take  by mouth daily. METHOTREXATE (RHEUMATREX) 2.5 MG TABLET    Take 15 mg by mouth every Wednesday. MIRTAZAPINE (REMERON) 30 MG TABLET    Take 1 Tab by mouth nightly. ONDANSETRON (ZOFRAN ODT) 4 MG DISINTEGRATING TABLET    Take 1 Tab by mouth every eight (8) hours as needed for Nausea. OXYCODONE-ACETAMINOPHEN (PERCOCET) 5-325 MG PER TABLET    Take 1 Tab by mouth every four (4) hours as needed for Pain. PANTOPRAZOLE (PROTONIX) 40 MG TABLET    Take 1 Tab by mouth Daily (before breakfast). POLYETHYLENE GLYCOL (MIRALAX) 17 GRAM/DOSE POWDER    Take 17 g by mouth as needed (for constipation). PREDNISONE (DELTASONE) 10 MG TABLET    Take 3 Tabs by mouth daily. ROPINIROLE (REQUIP) 2 MG TABLET    Take 1 Tab by mouth nightly. SIMVASTATIN (ZOCOR) 20 MG TABLET    Take 1 Tab by mouth nightly. TRAMADOL (ULTRAM) 50 MG TABLET    Take 1 Tab by mouth every six (6) hours as needed for Pain. Max Daily Amount: 200 mg.    These Medications have changed    No medications on file   Stop Taking    No medications on file       Scribe Attestation:   Zeferino BRITTON, joaquín scribing for and in the presence of John Rubio MD on this day 07/04/17 at 2:30 PM   keila Boneibjames    Provider Attestation:  I personally performed the services described in the documentation, reviewed the documentation, as recorded by the scribe in my presence, and it accurately and completely records my words and actions.   Chelsey Paulson MD. 2:30 PM      Signed by: Belkys Triplett, 2:30 PM

## 2017-07-07 ENCOUNTER — HOME CARE VISIT (OUTPATIENT)
Dept: SCHEDULING | Facility: HOME HEALTH | Age: 72
End: 2017-07-07
Payer: COMMERCIAL

## 2017-07-07 VITALS
RESPIRATION RATE: 20 BRPM | OXYGEN SATURATION: 97 % | HEART RATE: 81 BPM | TEMPERATURE: 96.7 F | DIASTOLIC BLOOD PRESSURE: 81 MMHG | SYSTOLIC BLOOD PRESSURE: 146 MMHG

## 2017-07-07 PROCEDURE — G0299 HHS/HOSPICE OF RN EA 15 MIN: HCPCS

## 2017-07-20 ENCOUNTER — HOME HEALTH ADMISSION (OUTPATIENT)
Dept: HOME HEALTH SERVICES | Facility: HOME HEALTH | Age: 72
End: 2017-07-20
Payer: COMMERCIAL

## 2017-07-21 ENCOUNTER — HOME CARE VISIT (OUTPATIENT)
Dept: SCHEDULING | Facility: HOME HEALTH | Age: 72
End: 2017-07-21
Payer: COMMERCIAL

## 2017-07-21 PROCEDURE — 400013 HH SOC

## 2017-07-21 PROCEDURE — G0151 HHCP-SERV OF PT,EA 15 MIN: HCPCS

## 2017-07-23 VITALS
SYSTOLIC BLOOD PRESSURE: 160 MMHG | OXYGEN SATURATION: 98 % | HEART RATE: 105 BPM | TEMPERATURE: 97.5 F | DIASTOLIC BLOOD PRESSURE: 78 MMHG

## 2017-07-24 ENCOUNTER — HOME CARE VISIT (OUTPATIENT)
Dept: HOME HEALTH SERVICES | Facility: HOME HEALTH | Age: 72
End: 2017-07-24
Payer: COMMERCIAL

## 2017-07-25 ENCOUNTER — HOME CARE VISIT (OUTPATIENT)
Dept: SCHEDULING | Facility: HOME HEALTH | Age: 72
End: 2017-07-25
Payer: COMMERCIAL

## 2017-07-25 PROCEDURE — G0157 HHC PT ASSISTANT EA 15: HCPCS

## 2017-07-26 ENCOUNTER — HOME CARE VISIT (OUTPATIENT)
Dept: SCHEDULING | Facility: HOME HEALTH | Age: 72
End: 2017-07-26
Payer: COMMERCIAL

## 2017-07-26 VITALS — OXYGEN SATURATION: 96 % | SYSTOLIC BLOOD PRESSURE: 138 MMHG | HEART RATE: 104 BPM | DIASTOLIC BLOOD PRESSURE: 65 MMHG

## 2017-07-26 VITALS — SYSTOLIC BLOOD PRESSURE: 142 MMHG | DIASTOLIC BLOOD PRESSURE: 90 MMHG | HEART RATE: 87 BPM | OXYGEN SATURATION: 98 %

## 2017-07-26 PROCEDURE — G0157 HHC PT ASSISTANT EA 15: HCPCS

## 2017-07-28 ENCOUNTER — HOME CARE VISIT (OUTPATIENT)
Dept: SCHEDULING | Facility: HOME HEALTH | Age: 72
End: 2017-07-28
Payer: COMMERCIAL

## 2017-07-28 VITALS — DIASTOLIC BLOOD PRESSURE: 78 MMHG | OXYGEN SATURATION: 98 % | HEART RATE: 68 BPM | SYSTOLIC BLOOD PRESSURE: 142 MMHG

## 2017-07-28 PROCEDURE — G0157 HHC PT ASSISTANT EA 15: HCPCS

## 2017-07-31 ENCOUNTER — HOME CARE VISIT (OUTPATIENT)
Dept: SCHEDULING | Facility: HOME HEALTH | Age: 72
End: 2017-07-31
Payer: COMMERCIAL

## 2017-07-31 VITALS — DIASTOLIC BLOOD PRESSURE: 72 MMHG | OXYGEN SATURATION: 98 % | SYSTOLIC BLOOD PRESSURE: 138 MMHG | HEART RATE: 68 BPM

## 2017-07-31 PROCEDURE — G0157 HHC PT ASSISTANT EA 15: HCPCS

## 2017-08-02 ENCOUNTER — HOME CARE VISIT (OUTPATIENT)
Dept: SCHEDULING | Facility: HOME HEALTH | Age: 72
End: 2017-08-02
Payer: COMMERCIAL

## 2017-08-02 VITALS — OXYGEN SATURATION: 95 % | DIASTOLIC BLOOD PRESSURE: 90 MMHG | SYSTOLIC BLOOD PRESSURE: 170 MMHG | HEART RATE: 48 BPM

## 2017-08-02 PROCEDURE — G0151 HHCP-SERV OF PT,EA 15 MIN: HCPCS

## 2017-08-04 ENCOUNTER — HOME CARE VISIT (OUTPATIENT)
Dept: SCHEDULING | Facility: HOME HEALTH | Age: 72
End: 2017-08-04
Payer: COMMERCIAL

## 2017-08-04 ENCOUNTER — HOME CARE VISIT (OUTPATIENT)
Dept: HOME HEALTH SERVICES | Facility: HOME HEALTH | Age: 72
End: 2017-08-04
Payer: COMMERCIAL

## 2017-08-04 VITALS — OXYGEN SATURATION: 98 % | SYSTOLIC BLOOD PRESSURE: 138 MMHG | HEART RATE: 89 BPM | DIASTOLIC BLOOD PRESSURE: 70 MMHG

## 2017-08-04 PROCEDURE — G0157 HHC PT ASSISTANT EA 15: HCPCS

## 2017-08-07 ENCOUNTER — HOME CARE VISIT (OUTPATIENT)
Dept: SCHEDULING | Facility: HOME HEALTH | Age: 72
End: 2017-08-07
Payer: COMMERCIAL

## 2017-08-07 VITALS — SYSTOLIC BLOOD PRESSURE: 128 MMHG | DIASTOLIC BLOOD PRESSURE: 64 MMHG | HEART RATE: 71 BPM | OXYGEN SATURATION: 97 %

## 2017-08-07 PROCEDURE — G0157 HHC PT ASSISTANT EA 15: HCPCS

## 2017-08-08 ENCOUNTER — HOME CARE VISIT (OUTPATIENT)
Dept: SCHEDULING | Facility: HOME HEALTH | Age: 72
End: 2017-08-08
Payer: COMMERCIAL

## 2017-08-08 VITALS
TEMPERATURE: 97.2 F | OXYGEN SATURATION: 99 % | DIASTOLIC BLOOD PRESSURE: 98 MMHG | HEART RATE: 99 BPM | SYSTOLIC BLOOD PRESSURE: 174 MMHG

## 2017-08-08 PROCEDURE — G0152 HHCP-SERV OF OT,EA 15 MIN: HCPCS

## 2017-08-09 ENCOUNTER — HOME CARE VISIT (OUTPATIENT)
Dept: SCHEDULING | Facility: HOME HEALTH | Age: 72
End: 2017-08-09
Payer: COMMERCIAL

## 2017-08-09 VITALS — DIASTOLIC BLOOD PRESSURE: 86 MMHG | OXYGEN SATURATION: 98 % | HEART RATE: 94 BPM | SYSTOLIC BLOOD PRESSURE: 148 MMHG

## 2017-08-09 PROCEDURE — G0157 HHC PT ASSISTANT EA 15: HCPCS

## 2017-08-10 ENCOUNTER — HOME CARE VISIT (OUTPATIENT)
Dept: SCHEDULING | Facility: HOME HEALTH | Age: 72
End: 2017-08-10
Payer: COMMERCIAL

## 2017-08-10 VITALS — SYSTOLIC BLOOD PRESSURE: 143 MMHG | DIASTOLIC BLOOD PRESSURE: 88 MMHG | HEART RATE: 80 BPM | OXYGEN SATURATION: 97 %

## 2017-08-10 PROCEDURE — G0157 HHC PT ASSISTANT EA 15: HCPCS

## 2017-08-11 ENCOUNTER — HOME CARE VISIT (OUTPATIENT)
Dept: SCHEDULING | Facility: HOME HEALTH | Age: 72
End: 2017-08-11
Payer: COMMERCIAL

## 2017-08-11 PROCEDURE — G0152 HHCP-SERV OF OT,EA 15 MIN: HCPCS

## 2017-08-13 VITALS
DIASTOLIC BLOOD PRESSURE: 90 MMHG | OXYGEN SATURATION: 94 % | SYSTOLIC BLOOD PRESSURE: 158 MMHG | HEART RATE: 85 BPM | TEMPERATURE: 97.5 F

## 2017-08-14 ENCOUNTER — HOME CARE VISIT (OUTPATIENT)
Dept: SCHEDULING | Facility: HOME HEALTH | Age: 72
End: 2017-08-14
Payer: COMMERCIAL

## 2017-08-14 VITALS — OXYGEN SATURATION: 97 % | SYSTOLIC BLOOD PRESSURE: 148 MMHG | HEART RATE: 91 BPM | DIASTOLIC BLOOD PRESSURE: 78 MMHG

## 2017-08-14 VITALS
TEMPERATURE: 98.2 F | HEART RATE: 100 BPM | DIASTOLIC BLOOD PRESSURE: 88 MMHG | OXYGEN SATURATION: 99 % | SYSTOLIC BLOOD PRESSURE: 148 MMHG

## 2017-08-14 PROCEDURE — G0157 HHC PT ASSISTANT EA 15: HCPCS

## 2017-08-14 PROCEDURE — G0152 HHCP-SERV OF OT,EA 15 MIN: HCPCS

## 2017-08-15 ENCOUNTER — HOME CARE VISIT (OUTPATIENT)
Dept: HOME HEALTH SERVICES | Facility: HOME HEALTH | Age: 72
End: 2017-08-15
Payer: COMMERCIAL

## 2017-08-16 ENCOUNTER — HOME CARE VISIT (OUTPATIENT)
Dept: HOME HEALTH SERVICES | Facility: HOME HEALTH | Age: 72
End: 2017-08-16
Payer: COMMERCIAL

## 2017-08-17 ENCOUNTER — HOME CARE VISIT (OUTPATIENT)
Dept: SCHEDULING | Facility: HOME HEALTH | Age: 72
End: 2017-08-17
Payer: COMMERCIAL

## 2017-08-17 VITALS — SYSTOLIC BLOOD PRESSURE: 142 MMHG | DIASTOLIC BLOOD PRESSURE: 78 MMHG | HEART RATE: 96 BPM | OXYGEN SATURATION: 99 %

## 2017-08-17 PROCEDURE — G0157 HHC PT ASSISTANT EA 15: HCPCS

## 2017-08-18 ENCOUNTER — HOME CARE VISIT (OUTPATIENT)
Dept: SCHEDULING | Facility: HOME HEALTH | Age: 72
End: 2017-08-18
Payer: COMMERCIAL

## 2017-08-18 VITALS — OXYGEN SATURATION: 96 % | SYSTOLIC BLOOD PRESSURE: 138 MMHG | HEART RATE: 88 BPM | DIASTOLIC BLOOD PRESSURE: 70 MMHG

## 2017-08-18 PROCEDURE — G0157 HHC PT ASSISTANT EA 15: HCPCS

## 2017-08-18 PROCEDURE — G0152 HHCP-SERV OF OT,EA 15 MIN: HCPCS

## 2017-08-19 VITALS
OXYGEN SATURATION: 99 % | SYSTOLIC BLOOD PRESSURE: 166 MMHG | TEMPERATURE: 97.5 F | HEART RATE: 100 BPM | DIASTOLIC BLOOD PRESSURE: 100 MMHG

## 2017-08-21 ENCOUNTER — HOME CARE VISIT (OUTPATIENT)
Dept: SCHEDULING | Facility: HOME HEALTH | Age: 72
End: 2017-08-21
Payer: COMMERCIAL

## 2017-08-21 VITALS — OXYGEN SATURATION: 90 % | SYSTOLIC BLOOD PRESSURE: 157 MMHG | HEART RATE: 69 BPM | DIASTOLIC BLOOD PRESSURE: 88 MMHG

## 2017-08-21 PROCEDURE — G0151 HHCP-SERV OF PT,EA 15 MIN: HCPCS

## 2017-08-24 ENCOUNTER — HOME CARE VISIT (OUTPATIENT)
Dept: SCHEDULING | Facility: HOME HEALTH | Age: 72
End: 2017-08-24
Payer: COMMERCIAL

## 2017-08-24 VITALS
HEART RATE: 85 BPM | DIASTOLIC BLOOD PRESSURE: 88 MMHG | SYSTOLIC BLOOD PRESSURE: 160 MMHG | OXYGEN SATURATION: 97 % | TEMPERATURE: 97.6 F

## 2017-08-24 VITALS — SYSTOLIC BLOOD PRESSURE: 160 MMHG | OXYGEN SATURATION: 97 % | HEART RATE: 85 BPM | DIASTOLIC BLOOD PRESSURE: 88 MMHG

## 2017-08-24 PROCEDURE — G0152 HHCP-SERV OF OT,EA 15 MIN: HCPCS

## 2017-08-24 PROCEDURE — G0157 HHC PT ASSISTANT EA 15: HCPCS

## 2017-08-28 ENCOUNTER — HOME CARE VISIT (OUTPATIENT)
Dept: SCHEDULING | Facility: HOME HEALTH | Age: 72
End: 2017-08-28
Payer: COMMERCIAL

## 2017-08-29 ENCOUNTER — HOME CARE VISIT (OUTPATIENT)
Dept: SCHEDULING | Facility: HOME HEALTH | Age: 72
End: 2017-08-29
Payer: COMMERCIAL

## 2017-08-29 VITALS — DIASTOLIC BLOOD PRESSURE: 68 MMHG | OXYGEN SATURATION: 95 % | SYSTOLIC BLOOD PRESSURE: 135 MMHG | HEART RATE: 84 BPM

## 2017-08-29 PROCEDURE — G0157 HHC PT ASSISTANT EA 15: HCPCS

## 2017-08-30 ENCOUNTER — APPOINTMENT (OUTPATIENT)
Dept: CT IMAGING | Age: 72
DRG: 072 | End: 2017-08-30
Attending: EMERGENCY MEDICINE
Payer: COMMERCIAL

## 2017-08-30 ENCOUNTER — HOME CARE VISIT (OUTPATIENT)
Dept: HOME HEALTH SERVICES | Facility: HOME HEALTH | Age: 72
End: 2017-08-30
Payer: COMMERCIAL

## 2017-08-30 ENCOUNTER — HOSPITAL ENCOUNTER (INPATIENT)
Age: 72
LOS: 2 days | Discharge: HOME HEALTH | DRG: 072 | End: 2017-09-01
Attending: EMERGENCY MEDICINE | Admitting: FAMILY MEDICINE
Payer: COMMERCIAL

## 2017-08-30 ENCOUNTER — HOME CARE VISIT (OUTPATIENT)
Dept: SCHEDULING | Facility: HOME HEALTH | Age: 72
End: 2017-08-30
Payer: COMMERCIAL

## 2017-08-30 VITALS
DIASTOLIC BLOOD PRESSURE: 84 MMHG | SYSTOLIC BLOOD PRESSURE: 160 MMHG | HEART RATE: 84 BPM | OXYGEN SATURATION: 96 % | TEMPERATURE: 97.4 F

## 2017-08-30 DIAGNOSIS — R40.4 TRANSIENT ALTERATION OF AWARENESS: Primary | ICD-10-CM

## 2017-08-30 PROBLEM — G93.40 ENCEPHALOPATHY: Status: ACTIVE | Noted: 2017-08-30

## 2017-08-30 PROBLEM — I63.9 CVA (CEREBRAL VASCULAR ACCIDENT) (HCC): Chronic | Status: ACTIVE | Noted: 2017-08-30

## 2017-08-30 PROBLEM — E86.0 DEHYDRATION: Status: ACTIVE | Noted: 2017-08-30

## 2017-08-30 PROBLEM — E03.9 HYPOTHYROID: Status: ACTIVE | Noted: 2017-08-30

## 2017-08-30 PROBLEM — R55 SYNCOPE AND COLLAPSE: Status: ACTIVE | Noted: 2017-08-30

## 2017-08-30 PROBLEM — D75.89 MACROCYTOSIS: Status: ACTIVE | Noted: 2017-08-30

## 2017-08-30 LAB
AMMONIA PLAS-SCNC: 17 UMOL/L (ref 11–32)
ANION GAP SERPL CALC-SCNC: 5 MMOL/L (ref 3–18)
APAP SERPL-MCNC: <2 UG/ML (ref 10–30)
APPEARANCE UR: CLEAR
APTT PPP: 20.4 SEC (ref 23–36.4)
BACTERIA URNS QL MICRO: NEGATIVE /HPF
BASOPHILS # BLD: 0 K/UL (ref 0–0.06)
BASOPHILS NFR BLD: 0 % (ref 0–2)
BILIRUB UR QL: NEGATIVE
BUN SERPL-MCNC: 21 MG/DL (ref 7–18)
BUN/CREAT SERPL: 40 (ref 12–20)
CALCIUM SERPL-MCNC: 8.9 MG/DL (ref 8.5–10.1)
CHLORIDE SERPL-SCNC: 107 MMOL/L (ref 100–108)
CO2 SERPL-SCNC: 27 MMOL/L (ref 21–32)
COLOR UR: YELLOW
CREAT SERPL-MCNC: 0.53 MG/DL (ref 0.6–1.3)
D DIMER PPP FEU-MCNC: 0.89 UG/ML(FEU)
DIFFERENTIAL METHOD BLD: ABNORMAL
EOSINOPHIL # BLD: 0 K/UL (ref 0–0.4)
EOSINOPHIL NFR BLD: 0 % (ref 0–5)
EPITH CASTS URNS QL MICRO: NORMAL /LPF (ref 0–5)
ERYTHROCYTE [DISTWIDTH] IN BLOOD BY AUTOMATED COUNT: 24.8 % (ref 11.6–14.5)
ETHANOL SERPL-MCNC: <3 MG/DL (ref 0–3)
FOLATE SERPL-MCNC: >20 NG/ML (ref 3.1–17.5)
GLUCOSE BLD STRIP.AUTO-MCNC: 155 MG/DL (ref 70–110)
GLUCOSE SERPL-MCNC: 161 MG/DL (ref 74–99)
GLUCOSE UR STRIP.AUTO-MCNC: NEGATIVE MG/DL
HCT VFR BLD AUTO: 40.3 % (ref 35–45)
HGB BLD-MCNC: 12.1 G/DL (ref 12–16)
HGB UR QL STRIP: ABNORMAL
INR PPP: 0.9 (ref 0.8–1.2)
KETONES UR QL STRIP.AUTO: NEGATIVE MG/DL
LACTATE BLD-SCNC: 1.7 MMOL/L (ref 0.4–2)
LEUKOCYTE ESTERASE UR QL STRIP.AUTO: NEGATIVE
LYMPHOCYTES # BLD: 1 K/UL (ref 0.9–3.6)
LYMPHOCYTES NFR BLD: 8 % (ref 21–52)
MAGNESIUM SERPL-MCNC: 2.4 MG/DL (ref 1.6–2.6)
MCH RBC QN AUTO: 31.3 PG (ref 24–34)
MCHC RBC AUTO-ENTMCNC: 30 G/DL (ref 31–37)
MCV RBC AUTO: 104.4 FL (ref 74–97)
MONOCYTES # BLD: 0.3 K/UL (ref 0.05–1.2)
MONOCYTES NFR BLD: 3 % (ref 3–10)
NEUTS SEG # BLD: 10.2 K/UL (ref 1.8–8)
NEUTS SEG NFR BLD: 89 % (ref 40–73)
NITRITE UR QL STRIP.AUTO: NEGATIVE
PH UR STRIP: 7.5 [PH] (ref 5–8)
PLATELET # BLD AUTO: 414 K/UL (ref 135–420)
PMV BLD AUTO: 10.8 FL (ref 9.2–11.8)
POTASSIUM SERPL-SCNC: 4.6 MMOL/L (ref 3.5–5.5)
PROT UR STRIP-MCNC: NEGATIVE MG/DL
PROTHROMBIN TIME: 12 SEC (ref 11.5–15.2)
RBC # BLD AUTO: 3.86 M/UL (ref 4.2–5.3)
RBC #/AREA URNS HPF: NORMAL /HPF (ref 0–5)
SODIUM SERPL-SCNC: 139 MMOL/L (ref 136–145)
SP GR UR REFRACTOMETRY: >1.03 (ref 1–1.03)
T4 FREE SERPL-MCNC: 1.2 NG/DL (ref 0.7–1.5)
TROPONIN I SERPL-MCNC: 0.04 NG/ML (ref 0–0.04)
TSH SERPL DL<=0.05 MIU/L-ACNC: 1.02 UIU/ML (ref 0.36–3.74)
UROBILINOGEN UR QL STRIP.AUTO: 1 EU/DL (ref 0.2–1)
VIT B12 SERPL-MCNC: 498 PG/ML (ref 211–911)
WBC # BLD AUTO: 11.4 K/UL (ref 4.6–13.2)
WBC URNS QL MICRO: NORMAL /HPF (ref 0–4)

## 2017-08-30 PROCEDURE — 85730 THROMBOPLASTIN TIME PARTIAL: CPT | Performed by: EMERGENCY MEDICINE

## 2017-08-30 PROCEDURE — 85379 FIBRIN DEGRADATION QUANT: CPT | Performed by: EMERGENCY MEDICINE

## 2017-08-30 PROCEDURE — 96374 THER/PROPH/DIAG INJ IV PUSH: CPT

## 2017-08-30 PROCEDURE — 80307 DRUG TEST PRSMV CHEM ANLYZR: CPT | Performed by: EMERGENCY MEDICINE

## 2017-08-30 PROCEDURE — 77030020263 HC SOL INJ SOD CL0.9% LFCR 1000ML

## 2017-08-30 PROCEDURE — 36415 COLL VENOUS BLD VENIPUNCTURE: CPT | Performed by: FAMILY MEDICINE

## 2017-08-30 PROCEDURE — 81001 URINALYSIS AUTO W/SCOPE: CPT | Performed by: EMERGENCY MEDICINE

## 2017-08-30 PROCEDURE — 82962 GLUCOSE BLOOD TEST: CPT

## 2017-08-30 PROCEDURE — 82607 VITAMIN B-12: CPT | Performed by: FAMILY MEDICINE

## 2017-08-30 PROCEDURE — 95816 EEG AWAKE AND DROWSY: CPT | Performed by: FAMILY MEDICINE

## 2017-08-30 PROCEDURE — 99285 EMERGENCY DEPT VISIT HI MDM: CPT

## 2017-08-30 PROCEDURE — 74011250637 HC RX REV CODE- 250/637: Performed by: FAMILY MEDICINE

## 2017-08-30 PROCEDURE — 96361 HYDRATE IV INFUSION ADD-ON: CPT

## 2017-08-30 PROCEDURE — 65660000000 HC RM CCU STEPDOWN

## 2017-08-30 PROCEDURE — 94761 N-INVAS EAR/PLS OXIMETRY MLT: CPT

## 2017-08-30 PROCEDURE — 74011250636 HC RX REV CODE- 250/636

## 2017-08-30 PROCEDURE — 74011250636 HC RX REV CODE- 250/636: Performed by: FAMILY MEDICINE

## 2017-08-30 PROCEDURE — 70450 CT HEAD/BRAIN W/O DYE: CPT

## 2017-08-30 PROCEDURE — 74011000258 HC RX REV CODE- 258: Performed by: EMERGENCY MEDICINE

## 2017-08-30 PROCEDURE — 82140 ASSAY OF AMMONIA: CPT | Performed by: FAMILY MEDICINE

## 2017-08-30 PROCEDURE — 74011636320 HC RX REV CODE- 636/320: Performed by: EMERGENCY MEDICINE

## 2017-08-30 PROCEDURE — 83605 ASSAY OF LACTIC ACID: CPT

## 2017-08-30 PROCEDURE — G0152 HHCP-SERV OF OT,EA 15 MIN: HCPCS

## 2017-08-30 PROCEDURE — 93005 ELECTROCARDIOGRAM TRACING: CPT

## 2017-08-30 PROCEDURE — 84484 ASSAY OF TROPONIN QUANT: CPT | Performed by: EMERGENCY MEDICINE

## 2017-08-30 PROCEDURE — 87040 BLOOD CULTURE FOR BACTERIA: CPT | Performed by: EMERGENCY MEDICINE

## 2017-08-30 PROCEDURE — 84443 ASSAY THYROID STIM HORMONE: CPT | Performed by: FAMILY MEDICINE

## 2017-08-30 PROCEDURE — 85025 COMPLETE CBC W/AUTO DIFF WBC: CPT | Performed by: EMERGENCY MEDICINE

## 2017-08-30 PROCEDURE — 71275 CT ANGIOGRAPHY CHEST: CPT

## 2017-08-30 PROCEDURE — 83735 ASSAY OF MAGNESIUM: CPT | Performed by: FAMILY MEDICINE

## 2017-08-30 PROCEDURE — 80048 BASIC METABOLIC PNL TOTAL CA: CPT | Performed by: EMERGENCY MEDICINE

## 2017-08-30 PROCEDURE — 84439 ASSAY OF FREE THYROXINE: CPT | Performed by: FAMILY MEDICINE

## 2017-08-30 PROCEDURE — 82746 ASSAY OF FOLIC ACID SERUM: CPT | Performed by: FAMILY MEDICINE

## 2017-08-30 PROCEDURE — 85610 PROTHROMBIN TIME: CPT | Performed by: EMERGENCY MEDICINE

## 2017-08-30 RX ORDER — BRIMONIDINE TARTRATE 2 MG/ML
1 SOLUTION/ DROPS OPHTHALMIC 2 TIMES DAILY
Status: DISCONTINUED | OUTPATIENT
Start: 2017-08-31 | End: 2017-09-01 | Stop reason: HOSPADM

## 2017-08-30 RX ORDER — NALOXONE HYDROCHLORIDE 1 MG/ML
2 INJECTION INTRAMUSCULAR; INTRAVENOUS; SUBCUTANEOUS
Status: COMPLETED | OUTPATIENT
Start: 2017-08-30 | End: 2017-08-30

## 2017-08-30 RX ORDER — FOLIC ACID 1 MG/1
1 TABLET ORAL
COMMUNITY

## 2017-08-30 RX ORDER — HEPARIN SODIUM 5000 [USP'U]/ML
5000 INJECTION, SOLUTION INTRAVENOUS; SUBCUTANEOUS EVERY 8 HOURS
Status: DISCONTINUED | OUTPATIENT
Start: 2017-08-30 | End: 2017-09-01 | Stop reason: HOSPADM

## 2017-08-30 RX ORDER — LANOLIN ALCOHOL/MO/W.PET/CERES
325 CREAM (GRAM) TOPICAL
Status: DISCONTINUED | OUTPATIENT
Start: 2017-08-31 | End: 2017-09-01 | Stop reason: HOSPADM

## 2017-08-30 RX ORDER — FLUOXETINE HYDROCHLORIDE 20 MG/1
40 CAPSULE ORAL DAILY
Status: DISCONTINUED | OUTPATIENT
Start: 2017-08-31 | End: 2017-09-01 | Stop reason: HOSPADM

## 2017-08-30 RX ORDER — SIMVASTATIN 20 MG/1
20 TABLET, FILM COATED ORAL
COMMUNITY

## 2017-08-30 RX ORDER — OXYBUTYNIN CHLORIDE 5 MG/1
5 TABLET, EXTENDED RELEASE ORAL
Status: DISCONTINUED | OUTPATIENT
Start: 2017-08-30 | End: 2017-09-01 | Stop reason: HOSPADM

## 2017-08-30 RX ORDER — TIMOLOL MALEATE 5 MG/ML
1 SOLUTION/ DROPS OPHTHALMIC 2 TIMES DAILY
Status: DISCONTINUED | OUTPATIENT
Start: 2017-08-31 | End: 2017-09-01 | Stop reason: HOSPADM

## 2017-08-30 RX ORDER — ROPINIROLE 1 MG/1
1 TABLET, FILM COATED ORAL
Status: DISCONTINUED | OUTPATIENT
Start: 2017-08-30 | End: 2017-09-01 | Stop reason: HOSPADM

## 2017-08-30 RX ORDER — AMLODIPINE BESYLATE 2.5 MG/1
2.5 TABLET ORAL DAILY
COMMUNITY

## 2017-08-30 RX ORDER — PREDNISONE 10 MG/1
30 TABLET ORAL DAILY
COMMUNITY
End: 2018-08-22

## 2017-08-30 RX ORDER — MIRTAZAPINE 30 MG/1
15 TABLET, FILM COATED ORAL
COMMUNITY
End: 2018-08-22

## 2017-08-30 RX ORDER — TRAMADOL HYDROCHLORIDE 50 MG/1
50 TABLET ORAL
COMMUNITY
End: 2017-11-28

## 2017-08-30 RX ORDER — HYDROCODONE BITARTRATE AND ACETAMINOPHEN 5; 325 MG/1; MG/1
1 TABLET ORAL
COMMUNITY
End: 2017-11-28

## 2017-08-30 RX ORDER — TRAMADOL HYDROCHLORIDE 50 MG/1
50 TABLET ORAL
Status: DISCONTINUED | OUTPATIENT
Start: 2017-08-30 | End: 2017-08-31

## 2017-08-30 RX ORDER — IBANDRONATE SODIUM 150 MG/1
150 TABLET, FILM COATED ORAL
COMMUNITY
End: 2018-08-22

## 2017-08-30 RX ORDER — CLONAZEPAM 0.5 MG/1
0.25 TABLET ORAL
COMMUNITY

## 2017-08-30 RX ORDER — METHOTREXATE 2.5 MG/1
2.5 TABLET ORAL
COMMUNITY

## 2017-08-30 RX ORDER — SODIUM CHLORIDE 0.9 % (FLUSH) 0.9 %
5-10 SYRINGE (ML) INJECTION EVERY 8 HOURS
Status: DISCONTINUED | OUTPATIENT
Start: 2017-08-30 | End: 2017-09-01 | Stop reason: HOSPADM

## 2017-08-30 RX ORDER — ASPIRIN 325 MG
325 TABLET ORAL
Status: DISCONTINUED | OUTPATIENT
Start: 2017-08-30 | End: 2017-09-01 | Stop reason: HOSPADM

## 2017-08-30 RX ORDER — FLUOXETINE HYDROCHLORIDE 40 MG/1
40 CAPSULE ORAL DAILY
COMMUNITY

## 2017-08-30 RX ORDER — SIMVASTATIN 5 MG/1
20 TABLET, FILM COATED ORAL
Status: DISCONTINUED | OUTPATIENT
Start: 2017-08-30 | End: 2017-09-01 | Stop reason: HOSPADM

## 2017-08-30 RX ORDER — ASPIRIN 325 MG
325 TABLET ORAL
COMMUNITY

## 2017-08-30 RX ORDER — LEVOTHYROXINE SODIUM 50 UG/1
50 TABLET ORAL
Status: DISCONTINUED | OUTPATIENT
Start: 2017-08-31 | End: 2017-08-31 | Stop reason: DRUGHIGH

## 2017-08-30 RX ORDER — BRIMONIDINE TARTRATE, TIMOLOL MALEATE 2; 5 MG/ML; MG/ML
1 SOLUTION/ DROPS OPHTHALMIC 2 TIMES DAILY
COMMUNITY

## 2017-08-30 RX ORDER — ROPINIROLE 1 MG/1
1 TABLET, FILM COATED ORAL
COMMUNITY

## 2017-08-30 RX ORDER — MIRTAZAPINE 15 MG/1
30 TABLET, FILM COATED ORAL
Status: DISCONTINUED | OUTPATIENT
Start: 2017-08-30 | End: 2017-09-01 | Stop reason: HOSPADM

## 2017-08-30 RX ORDER — CLOPIDOGREL BISULFATE 75 MG/1
75 TABLET ORAL DAILY
Status: DISCONTINUED | OUTPATIENT
Start: 2017-08-31 | End: 2017-09-01 | Stop reason: HOSPADM

## 2017-08-30 RX ORDER — FOLIC ACID 1 MG/1
1 TABLET ORAL
Status: DISCONTINUED | OUTPATIENT
Start: 2017-08-30 | End: 2017-09-01 | Stop reason: HOSPADM

## 2017-08-30 RX ORDER — SODIUM CHLORIDE 9 MG/ML
100 INJECTION, SOLUTION INTRAVENOUS ONCE
Status: COMPLETED | OUTPATIENT
Start: 2017-08-30 | End: 2017-08-30

## 2017-08-30 RX ORDER — NALOXONE HYDROCHLORIDE 1 MG/ML
INJECTION INTRAMUSCULAR; INTRAVENOUS; SUBCUTANEOUS
Status: COMPLETED
Start: 2017-08-30 | End: 2017-08-30

## 2017-08-30 RX ORDER — SODIUM CHLORIDE 9 MG/ML
100 INJECTION, SOLUTION INTRAVENOUS CONTINUOUS
Status: DISCONTINUED | OUTPATIENT
Start: 2017-08-30 | End: 2017-08-31

## 2017-08-30 RX ORDER — ASCORBIC ACID 250 MG
250 TABLET ORAL DAILY
COMMUNITY

## 2017-08-30 RX ORDER — ASCORBIC ACID 250 MG
250 TABLET ORAL DAILY
Status: DISCONTINUED | OUTPATIENT
Start: 2017-08-31 | End: 2017-09-01 | Stop reason: HOSPADM

## 2017-08-30 RX ORDER — OXYBUTYNIN CHLORIDE 5 MG/1
5 TABLET, EXTENDED RELEASE ORAL
COMMUNITY

## 2017-08-30 RX ORDER — SODIUM CHLORIDE 0.9 % (FLUSH) 0.9 %
5-10 SYRINGE (ML) INJECTION AS NEEDED
Status: DISCONTINUED | OUTPATIENT
Start: 2017-08-30 | End: 2017-09-01 | Stop reason: HOSPADM

## 2017-08-30 RX ORDER — DOCUSATE SODIUM 50 MG/5ML
50 LIQUID ORAL 2 TIMES DAILY
Status: DISCONTINUED | OUTPATIENT
Start: 2017-08-31 | End: 2017-09-01 | Stop reason: HOSPADM

## 2017-08-30 RX ORDER — LANOLIN ALCOHOL/MO/W.PET/CERES
65 CREAM (GRAM) TOPICAL
COMMUNITY
End: 2018-08-22

## 2017-08-30 RX ADMIN — OXYBUTYNIN CHLORIDE 5 MG: 5 TABLET, FILM COATED, EXTENDED RELEASE ORAL at 23:13

## 2017-08-30 RX ADMIN — IOPAMIDOL 75 ML: 755 INJECTION, SOLUTION INTRAVENOUS at 15:51

## 2017-08-30 RX ADMIN — FOLIC ACID 1 MG: 1 TABLET ORAL at 23:13

## 2017-08-30 RX ADMIN — NALOXONE HYDROCHLORIDE 2 MG: 1 INJECTION INTRAMUSCULAR; INTRAVENOUS; SUBCUTANEOUS at 13:15

## 2017-08-30 RX ADMIN — SIMVASTATIN 20 MG: 5 TABLET, FILM COATED ORAL at 23:13

## 2017-08-30 RX ADMIN — SODIUM CHLORIDE 100 ML: 900 INJECTION, SOLUTION INTRAVENOUS at 15:51

## 2017-08-30 RX ADMIN — NALOXONE HYDROCHLORIDE 2 MG: 1 INJECTION PARENTERAL at 13:15

## 2017-08-30 RX ADMIN — Medication 10 ML: at 23:23

## 2017-08-30 RX ADMIN — ASPIRIN 325 MG ORAL TABLET 325 MG: 325 PILL ORAL at 23:13

## 2017-08-30 RX ADMIN — SODIUM CHLORIDE 100 ML/HR: 900 INJECTION, SOLUTION INTRAVENOUS at 23:28

## 2017-08-30 RX ADMIN — MIRTAZAPINE 30 MG: 15 TABLET, FILM COATED ORAL at 23:13

## 2017-08-30 RX ADMIN — ROPINIROLE HYDROCHLORIDE 1 MG: 1 TABLET, FILM COATED ORAL at 23:13

## 2017-08-30 RX ADMIN — HEPARIN SODIUM 5000 UNITS: 5000 INJECTION, SOLUTION INTRAVENOUS; SUBCUTANEOUS at 23:14

## 2017-08-30 NOTE — IP AVS SNAPSHOT
[address] [address] 
[phone] Patient: Ale Carnes MRN: [id number] EJ Current Discharge Medication List  
  
CONTINUE these medications which have NOT CHANGED Dose & Instructions Dispensing Information Comments Morning Noon Evening Bedtime  
 aspirin 325 mg tablet Commonly known as:  ASPIRIN Dose:  325 mg Take 325 mg by mouth nightly. Refills:  0  
     
   
   
   
  
 BONIVA 150 mg tablet Generic drug:  ibandronate Dose:  150 mg Take 150 mg by mouth every thirty (30) days. Refills:  0  
     
   
   
   
  
 clonazePAM 0.5 mg tablet Commonly known as:  Suann Shawn Your next dose is: Tonight Dose:  0.25 mg Take 0.25 mg by mouth nightly. Refills:  0  
     
   
   
   
  
  
 clopidogrel 75 mg Tab Commonly known as:  PLAVIX Your next dose is:  Tomorrow Dose:  75 mg Take 1 Tab by mouth daily. Quantity:  30 Tab Refills:  5 COLACE 50 mg capsule Generic drug:  docusate sodium Your next dose is: Today Dose:  50 mg Take 50 mg by mouth two (2) times a day. Refills:  0  
     
   
   
  
   
  
 COMBIGAN 0.2-0.5 % Drop ophthalmic solution Generic drug:  brimonidine-timolol Your next dose is: Today Dose:  1 Drop Administer 1 Drop to left eye two (2) times a day. Refills:  0 DITROPAN XL 5 mg CR tablet Generic drug:  oxybutynin chloride XL Your next dose is: Tonight Dose:  5 mg Take 5 mg by mouth nightly. Refills:  0  
     
   
   
  
   
  
 folic acid 1 mg tablet Commonly known as:  Google Your next dose is: Tonight Dose:  1 mg Take 1 mg by mouth nightly. Refills:  0 Iron 325 mg (65 mg iron) tablet Generic drug:  ferrous sulfate Your next dose is:  Tomorrow  Dose:  65 mg  
 Take 65 mg by mouth Daily (before breakfast). Refills:  0  
     
  
   
   
   
  
 levothyroxine 50 mcg tablet Commonly known as:  synthroid Your next dose is:  Tomorrow Dose:  50 mcg Take 1 Tab by mouth Daily (before breakfast). Quantity:  30 Tab Refills:  5 LUMIGAN 0.01 % ophthalmic drops Generic drug:  bimatoprost  
Your next dose is: Today Dose:  1 Drop Administer 1 Drop to left eye every evening. Refills:  0  
     
   
   
  
   
  
 methotrexate 2.5 mg tablet Commonly known as:  Veda Pennant Your next dose is:  Next Delos Gurney Dose:  2.5 mg Take 2.5 mg by mouth every Wednesday. Indications: 3 in the morning 3 at night Refills:  0  
     
   
   
   
  
 mirtazapine 30 mg tablet Commonly known as:  Caridad Reil Your next dose is: Tonight Take  by mouth nightly. Refills:  0 NORCO 5-325 mg per tablet Generic drug:  HYDROcodone-acetaminophen Dose:  1 Tab Take 1 Tab by mouth every four (4) hours as needed for Pain. Refills:  0 NORVASC 2.5 mg tablet Generic drug:  amLODIPine Your next dose is:  Tomorrow Dose:  2.5 mg Take 2.5 mg by mouth daily. Refills:  0  
     
  
   
   
   
  
 predniSONE 10 mg tablet Commonly known as:  Carron Cozier Your next dose is:  Tomorrow Dose:  30 mg Take 30 mg by mouth daily. Refills:  0 PROzac 40 mg capsule Generic drug:  FLUoxetine Your next dose is:  Tomorrow Dose:  40 mg Take 40 mg by mouth daily. Refills:  0  
     
  
   
   
   
  
 REQUIP 1 mg tablet Generic drug:  rOPINIRole Your next dose is: Tonight Dose:  1 mg Take 1 mg by mouth nightly. Indications: Patient can have 1 in the morning and 1 at night or 2 at night for a total of 2mg Refills:  0  
     
   
   
   
  
  
 traMADol 50 mg tablet Commonly known as:  Herminia Edouard  
   
 Dose:  50 mg Take 50 mg by mouth every six (6) hours as needed for Pain. Refills:  0  
     
   
   
   
  
 VITAMIN C 250 mg tablet Generic drug:  ascorbic acid (vitamin C) Your next dose is:  Tomorrow Dose:  250 mg Take 250 mg by mouth daily. Refills:  0 ZOCOR 20 mg tablet Generic drug:  simvastatin Your next dose is:  Tomorrow Dose:  20 mg Take 20 mg by mouth nightly. Refills:  0

## 2017-08-30 NOTE — IP AVS SNAPSHOT
[address] [address] 
[phone] Patient: Fortunato August MRN: [id number] IUE:[birth year]42[birth year] You are allergic to the following Allergen Reactions Sulfa (Sulfonamide Antibiotics) Hives Sulfamethoxazole-Trimethoprim Rash Recent Documentation Height Weight BMI OB Status Smoking Status 1.524 m 50 kg 21.53 kg/m2 Hysterectomy Former Smoker Unresulted Labs Order Current Status CULTURE, BLOOD Preliminary result CULTURE, BLOOD Preliminary result Emergency Contacts Name Discharge Info Relation Home Work Mobile [address] [facility] CAREGIVER [3] Spouse [3] [id number] Katelyn Carroll DISCHARGE CAREGIVER [3] Sister [23] [phone] About your hospitalization You were admitted on:  August 30, 2017 You last received care in the:  [facility] [address] You were discharged on:  September 1, 2017 Unit phone number:  [phone] Why you were hospitalized Your primary diagnosis was:  Encephalopathy Your diagnoses also included:  Syncope And Collapse, Dehydration, Macrocytosis, Hypothyroid, Cva (Cerebral Vascular Accident) (Hcc) Providers Seen During Your Hospitalizations Provider Role Specialty Primary office phone Tera Valdez MD Attending Provider Emergency Medicine [phone] Sandeep Moore MD Attending Provider [facility] [phone] Geovanni Wood MD Attending Provider Internal Medicine [phone] Your Primary Care Physician (PCP) Primary Care Physician Office Phone Office Fax Kacy Wiseman [phone] Follow-up Information Follow up With Details Comments Contact Info Uriel Maria MD   [address] Internal Medicine [facility] [id number] 
[phone] Schedule an appointment as soon as possible for a visit in 1 week call for appoinment to be seen in one week Current Discharge Medication List  
  
CONTINUE these medications which have NOT CHANGED Dose & Instructions Dispensing Information Comments Morning Noon Evening Bedtime  
 aspirin 325 mg tablet Commonly known as:  ASPIRIN Dose:  325 mg Take 325 mg by mouth nightly. Refills:  0  
     
   
   
   
  
 BONIVA 150 mg tablet Generic drug:  ibandronate Dose:  150 mg Take 150 mg by mouth every thirty (30) days. Refills:  0  
     
   
   
   
  
 clonazePAM 0.5 mg tablet Commonly known as:  Tiney Player Your next dose is: Tonight Dose:  0.25 mg Take 0.25 mg by mouth nightly. Refills:  0  
     
   
   
   
  
  
 clopidogrel 75 mg Tab Commonly known as:  PLAVIX Your next dose is:  Tomorrow Dose:  75 mg Take 1 Tab by mouth daily. Quantity:  30 Tab Refills:  5 COLACE 50 mg capsule Generic drug:  docusate sodium Your next dose is: Today Dose:  50 mg Take 50 mg by mouth two (2) times a day. Refills:  0  
     
   
   
  
   
  
 COMBIGAN 0.2-0.5 % Drop ophthalmic solution Generic drug:  brimonidine-timolol Your next dose is: Today Dose:  1 Drop Administer 1 Drop to left eye two (2) times a day. Refills:  0 DITROPAN XL 5 mg CR tablet Generic drug:  oxybutynin chloride XL Your next dose is: Tonight Dose:  5 mg Take 5 mg by mouth nightly. Refills:  0  
     
   
   
  
   
  
 folic acid 1 mg tablet Commonly known as:  Google Your next dose is: Tonight Dose:  1 mg Take 1 mg by mouth nightly. Refills:  0 Iron 325 mg (65 mg iron) tablet Generic drug:  ferrous sulfate Your next dose is:  Tomorrow Dose:  65 mg Take 65 mg by mouth Daily (before breakfast). Refills:  0 levothyroxine 50 mcg tablet Commonly known as:  synthroid Your next dose is:  Tomorrow Dose:  50 mcg Take 1 Tab by mouth Daily (before breakfast). Quantity:  30 Tab Refills:  5 LUMIGAN 0.01 % ophthalmic drops Generic drug:  bimatoprost  
Your next dose is: Today Dose:  1 Drop Administer 1 Drop to left eye every evening. Refills:  0  
     
   
   
  
   
  
 methotrexate 2.5 mg tablet Commonly known as:  Dominga Ryan Your next dose is:  Next Cecille Breezy Dose:  2.5 mg Take 2.5 mg by mouth every Wednesday. Indications: 3 in the morning 3 at night Refills:  0  
     
   
   
   
  
 mirtazapine 30 mg tablet Commonly known as:  Claria Chick Your next dose is: Tonight Take  by mouth nightly. Refills:  0 NORCO 5-325 mg per tablet Generic drug:  HYDROcodone-acetaminophen Dose:  1 Tab Take 1 Tab by mouth every four (4) hours as needed for Pain. Refills:  0 NORVASC 2.5 mg tablet Generic drug:  amLODIPine Your next dose is:  Tomorrow Dose:  2.5 mg Take 2.5 mg by mouth daily. Refills:  0  
     
  
   
   
   
  
 predniSONE 10 mg tablet Commonly known as:  Tedra Pac Your next dose is:  Tomorrow Dose:  30 mg Take 30 mg by mouth daily. Refills:  0 PROzac 40 mg capsule Generic drug:  FLUoxetine Your next dose is:  Tomorrow Dose:  40 mg Take 40 mg by mouth daily. Refills:  0  
     
  
   
   
   
  
 REQUIP 1 mg tablet Generic drug:  rOPINIRole Your next dose is: Tonight Dose:  1 mg Take 1 mg by mouth nightly. Indications: Patient can have 1 in the morning and 1 at night or 2 at night for a total of 2mg Refills:  0  
     
   
   
   
  
  
 traMADol 50 mg tablet Commonly known as:  ULTRAM  
   
 Dose:  50 mg  
 Take 50 mg by mouth every six (6) hours as needed for Pain. Refills:  0  
     
   
   
   
  
 VITAMIN C 250 mg tablet Generic drug:  ascorbic acid (vitamin C) Your next dose is:  Tomorrow Dose:  250 mg Take 250 mg by mouth daily. Refills:  0 ZOCOR 20 mg tablet Generic drug:  simvastatin Your next dose is:  Tomorrow Dose:  20 mg Take 20 mg by mouth nightly. Refills:  0 Discharge Instructions Patient armband removed and shredded DISCHARGE SUMMARY from Nurse The following personal items are in your possession at time of discharge: 
 
  
Visual Aid: Glasses, With patient PATIENT INSTRUCTIONS: 
 
What to do at Home: 
Recommended activity: Activity as tolerated, If you experience any of the following symptoms fainting, dizziness, increased weakness, fever 101, chest pain,  please follow up with your primary care doctor, [email or web address], call 911. *  Please give a list of your current medications to your Primary Care Provider. *  Please update this list whenever your medications are discontinued, doses are 
    changed, or new medications (including over-the-counter products) are added. *  Please carry medication information at all times in case of emergency situations. These are general instructions for a healthy lifestyle: No smoking/ No tobacco products/ Avoid exposure to second hand smoke Surgeon General's Warning:  Quitting smoking now greatly reduces serious risk to your health. Obesity, smoking, and sedentary lifestyle greatly increases your risk for illness A healthy diet, regular physical exercise & weight monitoring are important for maintaining a healthy lifestyle You may be retaining fluid if you have a history of heart failure or if you experience any of the following symptoms:  Weight gain of 3 pounds or more overnight or 5 pounds in a week, increased swelling in our hands or feet or shortness of breath while lying flat in bed. Please call your doctor as soon as you notice any of these symptoms; do not wait until your next office visit. Recognize signs and symptoms of STROKE: 
 
F-face looks uneven A-arms unable to move or move unevenly S-speech slurred or non-existent T-time-call 911 as soon as signs and symptoms begin-DO NOT go Back to bed or wait to see if you get better-TIME IS BRAIN. Warning Signs of HEART ATTACK Call 911 if you have these symptoms: 
? Chest discomfort. Most heart attacks involve discomfort in the center of the chest that lasts more than a few minutes, or that goes away and comes back. It can feel like uncomfortable pressure, squeezing, fullness, or pain. ? Discomfort in other areas of the upper body. Symptoms can include pain or discomfort in one or both arms, the back, neck, jaw, or stomach. ? Shortness of breath with or without chest discomfort. ? Other signs may include breaking out in a cold sweat, nausea, or lightheadedness. Don't wait more than five minutes to call [address]! Fast action can save your life. Calling 911 is almost always the fastest way to get lifesaving treatment. Emergency Medical Services staff can begin treatment when they arrive  up to an hour sooner than if someone gets to the hospital by car. The discharge information has been reviewed with the patient. The patient and spouse verbalized understanding. Discharge medications reviewed with the patient and spouse and appropriate educational materials and side effects teaching were provided. Discharge Instructions Attachments/References FAINTING (ENGLISH) Discharge Orders None Introducing \[facility]\"" & HEALTH SERVICES! Dear Brianna Fajardo: 
Thank you for requesting a [employer] account.   Our records indicate that you already have an active Bl[employer] account. You can access your account anytime at [email or web address] Did you know that you can access your hospital and ER discharge instructions at any time in Bl[employer]room? You can also review all of your test results from your hospital stay or ER visit. Additional Information If you have questions, please visit the Frequently Asked Questions section of the Bl[employer] website at [email or web address]/. Remember, Bl[employer] is NOT to be used for urgent needs. For medical emergencies, dial 911. Now available from your iPhone and Android! General Information Please provide this summary of care documentation to your next provider. Patient Signature:  ____________________________________________________________ Date:  ____________________________________________________________  
  
Paola Cordero Provider Signature:  ____________________________________________________________ Date:  ____________________________________________________________ More Information Fainting: Care Instructions Your Care Instructions When you faint, or pass out, you lose consciousness for a short time. A brief drop in blood flow to the brain often causes it. When you fall or lie down, more blood flows to your brain and you regain consciousness. Emotional stress, pain, or overheatingespecially if you have been standingcan make you faint. In these cases, fainting is usually not serious. But fainting can be a sign of a more serious problem. Your doctor may want you to have more tests to rule out other causes. The treatment you need depends on the reason why you fainted. The doctor has checked you carefully, but problems can develop later. If you notice any problems or new symptoms, get medical treatment right away. Follow-up care is a key part of your treatment and safety.  Be sure to make and go to all appointments, and call your doctor if you are having problems. It's also a good idea to know your test results and keep a list of the medicines you take. How can you care for yourself at home? · Drink plenty of fluids to prevent dehydration. If you have kidney, heart, or liver disease and have to limit fluids, talk with your doctor before you increase your fluid intake. When should you call for help? Call 911 anytime you think you may need emergency care. For example, call if: 
· You have symptoms of a heart problem. These may include: ¨ Chest pain or pressure. ¨ Severe trouble breathing. ¨ A fast or irregular heartbeat. ¨ Lightheadedness or sudden weakness. ¨ Coughing up pink, foamy mucus. ¨ Passing out. After you call 911, the  may tell you to chew 1 adult-strength or 2 to 4 low-dose aspirin. Wait for an ambulance. Do not try to drive yourself. · You have symptoms of a stroke. These may include: 
¨ Sudden numbness, tingling, weakness, or loss of movement in your face, arm, or leg, especially on only one side of your body. ¨ Sudden vision changes. ¨ Sudden trouble speaking. ¨ Sudden confusion or trouble understanding simple statements. ¨ Sudden problems with walking or balance. ¨ A sudden, severe headache that is different from past headaches. · You passed out (lost consciousness) again. Watch closely for changes in your health, and be sure to contact your doctor if: 
· You do not get better as expected. Where can you learn more? Go to http://emanuel-williams.info/. Enter F302 in the search box to learn more about \"Fainting: Care Instructions. \" Current as of: March 20, 2017 Content Version: 11.3 © 9876-6831 [employer]. Care instructions adapted under license by [employer] (which disclaims liability or warranty for this information).  If you have questions about a medical condition or this instruction, always ask your healthcare professional. [address] [address] any warranty or liability for your use of this information.

## 2017-08-30 NOTE — ED NOTES
Patient now awake and at baseline with her dementia per the , denies pain, does not remember anything that happened today

## 2017-08-30 NOTE — Clinical Note
Patient Class[de-identified] Observation [943] Type of Bed: Telemetry [19] Reason for Observation: syncope Admitting Diagnosis: Syncope and collapse [780. 2. ICD-9-CM] Admitting Physician: Naren Bae Attending Physician: Naren Bae

## 2017-08-30 NOTE — ED PROVIDER NOTES
HPI Comments: 12:54 PM Elva Dunaway is a 74 y.o. female with history of COPD, CAD, HTN and bipolar disorder who presents to the ED with  who c/o patient having AMS. Per pt's , pt has a history of stroke and dementia. Pt has no complaints at this time. PCP: Kenzie Cotto MD      The history is provided by the patient. Pt presents with c/o AMS. Had home physical therapy this morning and was at her baseline for this; therapist left at @noon.  reports placing pt in bed. Looked as if her right leg was weeping -- thought it might be infected. To him it appeared as if she was 'going to sleep'. Pt now unresponsive so he carried her to his car and drove her here. Gave her Norco this morning. No recent observed ETOH. Former smoker. No illicits. 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      Social History Main Topics    Smoking status: Former Smoker     Types: Cigarettes     Quit date: 4/28/2013    Smokeless tobacco: Never Used   Delona Face Alcohol use 1.0 oz/week     2 Shots of liquor per week    Drug use: No    Sexual activity: Yes     Partners: Male     Other Topics Concern    Not on file     Social History Narrative         ALLERGIES: Sulfa (sulfonamide antibiotics) and Sulfamethoxazole-trimethoprim    Review of Systems   Unable to perform ROS: Mental status change       Vitals:    08/30/17 1650 08/30/17 1730 08/30/17 1800 08/30/17 1810   BP: (!) 151/101 159/89 (!) 149/122 172/79   Pulse: 89 87 89 89   Resp: 20      Temp:       SpO2: 97% 99% 93% (!) 88%   Weight:       Height:                Physical Exam   Constitutional: She appears well-developed. No distress. HENT:   Head: Normocephalic and atraumatic. Eyes: Conjunctivae are normal. Pupils are equal, round, and reactive to light. Neck: Neck supple. No JVD present. No tracheal deviation present. No thyromegaly present. Cardiovascular: Normal rate, regular rhythm and normal heart sounds. Exam reveals no gallop and no friction rub. No murmur heard. Pulmonary/Chest: Effort normal and breath sounds normal. No stridor. No respiratory distress. She has no wheezes. She has no rales. She exhibits no tenderness. Abdominal: Soft. Bowel sounds are normal. She exhibits no distension and no mass. There is no tenderness. There is no rebound and no guarding. Musculoskeletal: Normal range of motion. She exhibits no edema or tenderness. Lymphadenopathy:     She has no cervical adenopathy. Neurological: She is alert. No cranial nerve deficit. Not responsive. Not talking, not opening eyes, not following commands. Skin: Skin is warm and dry. Rash noted. No erythema. No pallor. Multiple bruises. Skin tears to right lower leg, approx 3x3cm. Psychiatric: She has a normal mood and affect. Her behavior is normal. Thought content normal.   Nursing note and vitals reviewed.        MDM  Number of Diagnoses or Management Options  Transient alteration of awareness:   Diagnosis management comments: Differential: CVA; ICH; PE; PNA; UTI; narcotic overdose; ETOH intoxication; metabolic encephalopathy; seizure      Spontaneously, pt became conscious at her mentation baseline. Didn't recall any of the events that brought her here. Pt apparently had similar episode previously and accepts admission w/neuorology recommendations for EEG/seizure/future imaging. Recommendation from neurology is for admission. Paged PCP for admission. No PE.            Amount and/or Complexity of Data Reviewed  Clinical lab tests: reviewed and ordered  Tests in the radiology section of CPT®: ordered and reviewed      ED Course       Procedures    Vitals:  Patient Vitals for the past 12 hrs:   Temp Pulse Resp BP SpO2   08/30/17 1810 - 89 - 172/79 (!) 88 %   08/30/17 1800 - 89 - (!) 149/122 93 %   08/30/17 1730 - 87 - 159/89 99 %   08/30/17 1650 - 89 20 (!) 151/101 97 %   08/30/17 1640 - 88 22 (!) 127/100 99 %   08/30/17 1630 - 90 19 (!) 150/91 98 %   08/30/17 1620 - 78 26 (!) 126/95 99 %   08/30/17 1526 - 80 22 (!) 136/98 (!) 85 %   08/30/17 1450 - 87 21 (!) 143/121 97 %   08/30/17 1440 - 89 19 180/80 97 %   08/30/17 1430 - - 12 (!) 170/93 99 %   08/30/17 1415 - 90 23 176/81 98 %   08/30/17 1400 - 91 21 163/81 97 %   08/30/17 1345 - 99 23 (!) 192/121 98 %   08/30/17 1330 - - - 172/85 -   08/30/17 1302 98.7 °F (37.1 °C) 92 27 - 99 %   08/30/17 1300 - 92 25 166/82 100 %        Medications ordered:   Medications   naloxone (NARCAN) injection 2 mg (2 mg IntraVENous Given 8/30/17 1315)   iopamidol (ISOVUE-370) 76 % injection 75 mL (75 mL IntraVENous Given 8/30/17 1551)   0.9% sodium chloride infusion 100 mL (100 mL IntraVENous New Bag 8/30/17 1551)         Lab findings:  Recent Results (from the past 12 hour(s))   GLUCOSE, POC    Collection Time: 08/30/17 12:51 PM   Result Value Ref Range    Glucose (POC) 155 (H) 70 - 110 mg/dL   POC LACTIC ACID    Collection Time: 08/30/17  1:01 PM   Result Value Ref Range    Lactic Acid (POC) 1.7 0.4 - 2.0 mmol/L   CBC WITH AUTOMATED DIFF    Collection Time: 08/30/17  1:15 PM   Result Value Ref Range    WBC 11.4 4.6 - 13.2 K/uL    RBC 3.86 (L) 4.20 - 5.30 M/uL    HGB 12.1 12.0 - 16.0 g/dL    HCT 40.3 35.0 - 45.0 %    .4 (H) 74.0 - 97.0 FL    MCH 31.3 24.0 - 34.0 PG    MCHC 30.0 (L) 31.0 - 37.0 g/dL    RDW 24.8 (H) 11.6 - 14.5 %    PLATELET 737 778 - 168 K/uL    MPV 10.8 9.2 - 11.8 FL    NEUTROPHILS 89 (H) 40 - 73 %    LYMPHOCYTES 8 (L) 21 - 52 %    MONOCYTES 3 3 - 10 %    EOSINOPHILS 0 0 - 5 %    BASOPHILS 0 0 - 2 %    ABS. NEUTROPHILS 10.2 (H) 1.8 - 8.0 K/UL    ABS. LYMPHOCYTES 1.0 0.9 - 3.6 K/UL    ABS. MONOCYTES 0.3 0.05 - 1.2 K/UL    ABS. EOSINOPHILS 0.0 0.0 - 0.4 K/UL    ABS.  BASOPHILS 0.0 0.0 - 0.06 K/UL    DF AUTOMATED     METABOLIC PANEL, BASIC    Collection Time: 08/30/17  1:15 PM   Result Value Ref Range    Sodium 139 136 - 145 mmol/L    Potassium 4.6 3.5 - 5.5 mmol/L    Chloride 107 100 - 108 mmol/L    CO2 27 21 - 32 mmol/L    Anion gap 5 3.0 - 18 mmol/L    Glucose 161 (H) 74 - 99 mg/dL    BUN 21 (H) 7.0 - 18 MG/DL    Creatinine 0.53 (L) 0.6 - 1.3 MG/DL    BUN/Creatinine ratio 40 (H) 12 - 20      GFR est AA >60 >60 ml/min/1.73m2    GFR est non-AA >60 >60 ml/min/1.73m2    Calcium 8.9 8.5 - 10.1 MG/DL   TROPONIN I    Collection Time: 08/30/17  1:15 PM   Result Value Ref Range    Troponin-I, Qt. 0.04 0.0 - 0.045 NG/ML   PTT    Collection Time: 08/30/17  1:15 PM   Result Value Ref Range    aPTT 20.4 (L) 23.0 - 36.4 SEC   PROTHROMBIN TIME + INR    Collection Time: 08/30/17  1:15 PM   Result Value Ref Range    Prothrombin time 12.0 11.5 - 15.2 sec    INR 0.9 0.8 - 1.2     D DIMER    Collection Time: 08/30/17  1:15 PM   Result Value Ref Range    D DIMER 0.89 (H) <0.46 ug/ml(FEU)   CULTURE, BLOOD    Collection Time: 08/30/17  1:15 PM   Result Value Ref Range    Special Requests: PERIPHERAL      Culture result: PENDING    CULTURE, BLOOD    Collection Time: 08/30/17  1:15 PM   Result Value Ref Range    Special Requests: PERIPHERAL      Culture result: PENDING    ETHYL ALCOHOL    Collection Time: 08/30/17  1:15 PM   Result Value Ref Range    ALCOHOL(ETHYL),SERUM <3 0 - 3 MG/DL   ACETAMINOPHEN    Collection Time: 08/30/17  1:15 PM   Result Value Ref Range    Acetaminophen level <2 (L) 10 - 30 ug/mL   EKG, 12 LEAD, INITIAL    Collection Time: 08/30/17  1:45 PM   Result Value Ref Range    Ventricular Rate 91 BPM    Atrial Rate 91 BPM    P-R Interval 132 ms    QRS Duration 80 ms    Q-T Interval 392 ms    QTC Calculation (Bezet) 482 ms    Calculated P Axis 65 degrees    Calculated R Axis 8 degrees    Calculated T Axis 77 degrees    Diagnosis       Sinus rhythm with premature atrial complexes  Possible Left atrial enlargement  ST abnormality, possible digitalis effect  Abnormal ECG  When compared with ECG of 26-MAR-2017 05:17,  Nonspecific T wave abnormality, improved in Lateral leads         EKG interpretation by ED Physician:       X-Ray, CT or other radiology findings or impressions:  CTA CHEST W OR W WO CONT   Final Result      CT HEAD WO CONT   Final Result          Orders:  Orders Placed This Encounter    CULTURE, BLOOD     Standing Status:   Standing     Number of Occurrences:   1    CULTURE, BLOOD     Standing Status:   Standing     Number of Occurrences:   1    CT HEAD WO CONT     Standing Status:   Standing     Number of Occurrences:   1     Order Specific Question:   Transport     Answer:   Stretcher [5]     Order Specific Question:   Is Patient Allergic to Contrast Dye? Answer:   No    CTA CHEST W OR W WO CONT     Standing Status:   Standing     Number of Occurrences:   1     Order Specific Question:   Transport     Answer:   Stretcher [5]     Order Specific Question:   Is Patient Allergic to Contrast Dye? Answer:   No     Order Specific Question:   Does patient have history of Renal Disease?      Answer:   No    CBC WITH AUTOMATED DIFF     Standing Status:   Standing     Number of Occurrences:   1    METABOLIC PANEL, BASIC     Standing Status:   Standing     Number of Occurrences:   1    Troponin I     Standing Status:   Standing     Number of Occurrences:   1    URINALYSIS W/ RFLX MICROSCOPIC     Standing Status:   Standing     Number of Occurrences:   1    PTT     Standing Status:   Standing     Number of Occurrences:   1    PROTHROMBIN TIME + INR     Standing Status:   Standing     Number of Occurrences:   1    D DIMER     Standing Status:   Standing     Number of Occurrences:   1    ETHYL ALCOHOL     Standing Status:   Standing     Number of Occurrences:   1    ACETAMINOPHEN     Standing Status:   Standing     Number of Occurrences:   1    POC LACTIC ACID     Standing Status:   Standing     Number of Occurrences:   1    IP CONSULT TO TELE-NEUROLOGY     Standing Status:   Standing     Number of Occurrences:   1     Order Specific Question:   Reason for Consult: Answer:   AMS     Order Specific Question:   Did you call or speak to the consulting provider? Answer:   No     Order Specific Question:   Consult To     Answer:   Mariann     Order Specific Question:   Schedule When? Answer:   TODAY    Consult PCP     Standing Status:   Standing     Number of Occurrences:   1     Order Specific Question:   Reason for Consult: Answer:   AMS     Order Specific Question:   Did you call or speak to the consulting provider? Answer:   No     Order Specific Question:   Consult To     Answer:   Mariann     Order Specific Question:   Schedule When?      Answer:   TODAY    GLUCOSE, POC     Standing Status:   Standing     Number of Occurrences:   1    POC LACTIC ACID     Standing Status:   Standing     Number of Occurrences:   1    EKG, 12 LEAD, INITIAL     Standing Status:   Standing     Number of Occurrences:   1     Order Specific Question:   Reason for Exam:     Answer:   Pain    EKG, 12 LEAD, INITIAL     Standing Status:   Standing     Number of Occurrences:   1     Order Specific Question:   Reason for Exam:     Answer:   arrhythmia eval    naloxone (NARCAN) 1 mg/mL injection     Zaira Bassed   : cabinet override    naloxone [facility]) injection 2 mg    iopamidol (ISOVUE-370) 76 % injection 75 mL    0.9% sodium chloride infusion 100 mL       Reevaluation, Progress notes, Consult notes, or additional Procedure notes:     Recent Results (from the past 12 hour(s))   GLUCOSE, POC    Collection Time: 08/30/17 12:51 PM   Result Value Ref Range    Glucose (POC) 155 (H) 70 - 110 mg/dL   POC LACTIC ACID    Collection Time: 08/30/17  1:01 PM   Result Value Ref Range    Lactic Acid (POC) 1.7 0.4 - 2.0 mmol/L   CBC WITH AUTOMATED DIFF    Collection Time: 08/30/17  1:15 PM   Result Value Ref Range    WBC 11.4 4.6 - 13.2 K/uL    RBC 3.86 (L) 4.20 - 5.30 M/uL    HGB 12.1 12.0 - 16.0 g/dL    HCT 40.3 35.0 - 45.0 %    .4 (H) 74.0 - 97.0 FL    MCH 31.3 24.0 - 34.0 PG    MCHC 30.0 (L) 31.0 - 37.0 g/dL    RDW 24.8 (H) 11.6 - 14.5 %    PLATELET 206 089 - 395 K/uL    MPV 10.8 9.2 - 11.8 FL    NEUTROPHILS 89 (H) 40 - 73 %    LYMPHOCYTES 8 (L) 21 - 52 %    MONOCYTES 3 3 - 10 %    EOSINOPHILS 0 0 - 5 %    BASOPHILS 0 0 - 2 %    ABS. NEUTROPHILS 10.2 (H) 1.8 - 8.0 K/UL    ABS. LYMPHOCYTES 1.0 0.9 - 3.6 K/UL    ABS. MONOCYTES 0.3 0.05 - 1.2 K/UL    ABS. EOSINOPHILS 0.0 0.0 - 0.4 K/UL    ABS.  BASOPHILS 0.0 0.0 - 0.06 K/UL    DF AUTOMATED     METABOLIC PANEL, BASIC    Collection Time: 08/30/17  1:15 PM   Result Value Ref Range    Sodium 139 136 - 145 mmol/L    Potassium 4.6 3.5 - 5.5 mmol/L    Chloride 107 100 - 108 mmol/L    CO2 27 21 - 32 mmol/L    Anion gap 5 3.0 - 18 mmol/L    Glucose 161 (H) 74 - 99 mg/dL    BUN 21 (H) 7.0 - 18 MG/DL    Creatinine 0.53 (L) 0.6 - 1.3 MG/DL    BUN/Creatinine ratio 40 (H) 12 - 20      GFR est AA >60 >60 ml/min/1.73m2    GFR est non-AA >60 >60 ml/min/1.73m2    Calcium 8.9 8.5 - 10.1 MG/DL   TROPONIN I    Collection Time: 08/30/17  1:15 PM   Result Value Ref Range Troponin-I, Qt. 0.04 0.0 - 0.045 NG/ML   PTT    Collection Time: 08/30/17  1:15 PM   Result Value Ref Range    aPTT 20.4 (L) 23.0 - 36.4 SEC   PROTHROMBIN TIME + INR    Collection Time: 08/30/17  1:15 PM   Result Value Ref Range    Prothrombin time 12.0 11.5 - 15.2 sec    INR 0.9 0.8 - 1.2     D DIMER    Collection Time: 08/30/17  1:15 PM   Result Value Ref Range    D DIMER 0.89 (H) <0.46 ug/ml(FEU)   CULTURE, BLOOD    Collection Time: 08/30/17  1:15 PM   Result Value Ref Range    Special Requests: PERIPHERAL      Culture result: PENDING    CULTURE, BLOOD    Collection Time: 08/30/17  1:15 PM   Result Value Ref Range    Special Requests: PERIPHERAL      Culture result: PENDING    ETHYL ALCOHOL    Collection Time: 08/30/17  1:15 PM   Result Value Ref Range    ALCOHOL(ETHYL),SERUM <3 0 - 3 MG/DL   ACETAMINOPHEN    Collection Time: 08/30/17  1:15 PM   Result Value Ref Range    Acetaminophen level <2 (L) 10 - 30 ug/mL   EKG, 12 LEAD, INITIAL    Collection Time: 08/30/17  1:45 PM   Result Value Ref Range    Ventricular Rate 91 BPM    Atrial Rate 91 BPM    P-R Interval 132 ms    QRS Duration 80 ms    Q-T Interval 392 ms    QTC Calculation (Bezet) 482 ms    Calculated P Axis 65 degrees    Calculated R Axis 8 degrees    Calculated T Axis 77 degrees    Diagnosis       Sinus rhythm with premature atrial complexes  Possible Left atrial enlargement  ST abnormality, possible digitalis effect  Abnormal ECG  When compared with ECG of 26-MAR-2017 05:17,  Nonspecific T wave abnormality, improved in Lateral leads       6:52 PM    Disposition:  Diagnosis:   1. Transient alteration of awareness        Disposition:     Follow-up Information     None           Patient's Medications   Start Taking    No medications on file   Continue Taking    ALBUTEROL-IPRATROPIUM (DUO-NEB) 2.5 MG-0.5 MG/3 ML NEBU    3 mL by Nebulization route every six (6) hours as needed. AMLODIPINE (NORVASC) 2.5 MG TABLET    Take 1 tablet by mouth daily. ASCORBIC ACID, VITAMIN C, (VITAMIN C) 500 MG TABLET    Take 250 mg by mouth daily. ASPIRIN (ASPIRIN) 325 MG TABLET    Take 1 Tab by mouth daily. BIMATOPROST (LUMIGAN) 0.01 % OPHTHALMIC DROPS    Administer 1 Drop to left eye every evening. BRIMONIDINE (ALPHAGAN) 0.15 % OPHTHALMIC SOLUTION    Administer 1 Drop to left eye two (2) times a day. BRIMONIDINE-TIMOLOL (COMBIGAN) 0.2-0.5 % DROP OPHTHALMIC SOLUTION    Administer 1 Drop to left eye every twelve (12) hours. CLONAZEPAM (KLONOPIN) 1 MG TABLET    Take 0.25 mg by mouth nightly. CLOPIDOGREL (PLAVIX) 75 MG TABLET    Take 1 Tab by mouth daily. DOCUSATE SODIUM (COLACE) 100 MG CAPSULE    Take 100 mg by mouth two (2) times daily as needed for Constipation. FERROUS SULFATE 325 MG (65 MG IRON) TABLET    Take 65 mg by mouth daily. FLUOXETINE (PROZAC) 10 MG CAPSULE    Take 10 mg by mouth daily. FLUOXETINE (PROZAC) 20 MG CAPSULE    Take 20 mg by mouth daily. FOLIC ACID PO    Take 1 Tab by mouth daily. 1 mg tablet    HYDROCODONE-ACETAMINOPHEN (NORCO) 5-325 MG PER TABLET    Take 1 Tab by mouth every four (4) hours as needed for Pain. IBANDRONATE (BONIVA) 150 MG TABLET    Take 150 mg by mouth every thirty (30) days. LEVOTHYROXINE (SYNTHROID) 50 MCG TABLET    Take 1 Tab by mouth Daily (before breakfast). MELOXICAM (MOBIC) 7.5 MG TABLET    Take  by mouth daily. METHOTREXATE (RHEUMATREX) 2.5 MG TABLET    Take 15 mg by mouth every Wednesday. MIRTAZAPINE (REMERON) 30 MG TABLET    Take 1 Tab by mouth nightly. ONDANSETRON (ZOFRAN ODT) 4 MG DISINTEGRATING TABLET    Take 1 Tab by mouth every eight (8) hours as needed for Nausea. OXYBUTYNIN (DITROPAN) 5 MG TABLET    Take 5 mg by mouth nightly. OXYCODONE-ACETAMINOPHEN (PERCOCET) 5-325 MG PER TABLET    Take 1 Tab by mouth every four (4) hours as needed for Pain. PANTOPRAZOLE (PROTONIX) 40 MG TABLET    Take 1 Tab by mouth Daily (before breakfast).     POLYETHYLENE GLYCOL (MIRALAX) 17 GRAM/DOSE POWDER    Take 17 g by mouth as needed (for constipation). PREDNISONE (DELTASONE) 10 MG TABLET    Take 3 Tabs by mouth daily. ROPINIROLE (REQUIP) 2 MG TABLET    Take 1 Tab by mouth nightly. SIMVASTATIN (ZOCOR) 20 MG TABLET    Take 1 Tab by mouth nightly. TIMOLOL (TIMOPTIC) 0.5 % OPHTHALMIC SOLUTION    Administer 1 Drop to left eye two (2) times a day. TRAMADOL (ULTRAM) 50 MG TABLET    Take 1 Tab by mouth every six (6) hours as needed for Pain. Max Daily Amount: 200 mg. These Medications have changed    No medications on file   Stop Taking    No medications on file         Scribe Attestation           Jhonatan Stanton acting as a scribe for and in the presence of Jose Mejia MD              August 30, 2017 at 1:02 PM                            Provider Attestation:           I personally performed the services described in the documentation, reviewed the documentation, as recorded by the scribe in my presence, and it accurately and completely records my words and actions.  August 30, 2017 at 1:02 PM - Jose Mejia MD

## 2017-08-30 NOTE — ED TRIAGE NOTES
Per , patient had OT today (has a broken L leg), was at there baseline, he found her unresponsive about 30 PTA
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
## 2017-08-31 ENCOUNTER — APPOINTMENT (OUTPATIENT)
Dept: MRI IMAGING | Age: 72
DRG: 072 | End: 2017-08-31
Attending: FAMILY MEDICINE
Payer: COMMERCIAL

## 2017-08-31 LAB
ALBUMIN SERPL-MCNC: 3 G/DL (ref 3.4–5)
ALBUMIN/GLOB SERPL: 1.3 {RATIO} (ref 0.8–1.7)
ALP SERPL-CCNC: 53 U/L (ref 45–117)
ALT SERPL-CCNC: 26 U/L (ref 13–56)
ANION GAP SERPL CALC-SCNC: 10 MMOL/L (ref 3–18)
AST SERPL-CCNC: 21 U/L (ref 15–37)
ATRIAL RATE: 91 BPM
BASOPHILS # BLD: 0 K/UL (ref 0–0.06)
BASOPHILS NFR BLD: 0 % (ref 0–2)
BILIRUB SERPL-MCNC: 0.3 MG/DL (ref 0.2–1)
BUN SERPL-MCNC: 27 MG/DL (ref 7–18)
BUN/CREAT SERPL: 43 (ref 12–20)
CALCIUM SERPL-MCNC: 8.7 MG/DL (ref 8.5–10.1)
CALCULATED P AXIS, ECG09: 65 DEGREES
CALCULATED R AXIS, ECG10: 8 DEGREES
CALCULATED T AXIS, ECG11: 77 DEGREES
CHLORIDE SERPL-SCNC: 113 MMOL/L (ref 100–108)
CO2 SERPL-SCNC: 24 MMOL/L (ref 21–32)
CREAT SERPL-MCNC: 0.63 MG/DL (ref 0.6–1.3)
DIAGNOSIS, 93000: NORMAL
DIFFERENTIAL METHOD BLD: ABNORMAL
EOSINOPHIL # BLD: 0.1 K/UL (ref 0–0.4)
EOSINOPHIL NFR BLD: 1 % (ref 0–5)
ERYTHROCYTE [DISTWIDTH] IN BLOOD BY AUTOMATED COUNT: 25.6 % (ref 11.6–14.5)
GLOBULIN SER CALC-MCNC: 2.3 G/DL (ref 2–4)
GLUCOSE SERPL-MCNC: 112 MG/DL (ref 74–99)
HCT VFR BLD AUTO: 34.3 % (ref 35–45)
HGB BLD-MCNC: 10.3 G/DL (ref 12–16)
LYMPHOCYTES # BLD: 3.2 K/UL (ref 0.9–3.6)
LYMPHOCYTES NFR BLD: 25 % (ref 21–52)
MCH RBC QN AUTO: 31.5 PG (ref 24–34)
MCHC RBC AUTO-ENTMCNC: 30 G/DL (ref 31–37)
MCV RBC AUTO: 104.9 FL (ref 74–97)
MONOCYTES # BLD: 0.9 K/UL (ref 0.05–1.2)
MONOCYTES NFR BLD: 7 % (ref 3–10)
NEUTS SEG # BLD: 8.7 K/UL (ref 1.8–8)
NEUTS SEG NFR BLD: 67 % (ref 40–73)
P-R INTERVAL, ECG05: 132 MS
PLATELET # BLD AUTO: 417 K/UL (ref 135–420)
PMV BLD AUTO: 11 FL (ref 9.2–11.8)
POTASSIUM SERPL-SCNC: 3.9 MMOL/L (ref 3.5–5.5)
PROT SERPL-MCNC: 5.3 G/DL (ref 6.4–8.2)
Q-T INTERVAL, ECG07: 392 MS
QRS DURATION, ECG06: 80 MS
QTC CALCULATION (BEZET), ECG08: 482 MS
RBC # BLD AUTO: 3.27 M/UL (ref 4.2–5.3)
RBC MORPH BLD: ABNORMAL
RBC MORPH BLD: ABNORMAL
SODIUM SERPL-SCNC: 147 MMOL/L (ref 136–145)
VENTRICULAR RATE, ECG03: 91 BPM
WBC # BLD AUTO: 12.9 K/UL (ref 4.6–13.2)

## 2017-08-31 PROCEDURE — 70551 MRI BRAIN STEM W/O DYE: CPT

## 2017-08-31 PROCEDURE — 97530 THERAPEUTIC ACTIVITIES: CPT

## 2017-08-31 PROCEDURE — 74011250637 HC RX REV CODE- 250/637: Performed by: FAMILY MEDICINE

## 2017-08-31 PROCEDURE — 92610 EVALUATE SWALLOWING FUNCTION: CPT

## 2017-08-31 PROCEDURE — 74011636637 HC RX REV CODE- 636/637: Performed by: FAMILY MEDICINE

## 2017-08-31 PROCEDURE — 74011250636 HC RX REV CODE- 250/636: Performed by: FAMILY MEDICINE

## 2017-08-31 PROCEDURE — 85025 COMPLETE CBC W/AUTO DIFF WBC: CPT | Performed by: FAMILY MEDICINE

## 2017-08-31 PROCEDURE — 77030033263 HC DRSG MEPILEX 16-48IN BORD MOLN -B

## 2017-08-31 PROCEDURE — 93306 TTE W/DOPPLER COMPLETE: CPT

## 2017-08-31 PROCEDURE — 80053 COMPREHEN METABOLIC PANEL: CPT | Performed by: FAMILY MEDICINE

## 2017-08-31 PROCEDURE — 74011000250 HC RX REV CODE- 250: Performed by: HOSPITALIST

## 2017-08-31 PROCEDURE — 93970 EXTREMITY STUDY: CPT

## 2017-08-31 PROCEDURE — 65660000000 HC RM CCU STEPDOWN

## 2017-08-31 PROCEDURE — 77030020263 HC SOL INJ SOD CL0.9% LFCR 1000ML

## 2017-08-31 PROCEDURE — 36415 COLL VENOUS BLD VENIPUNCTURE: CPT | Performed by: FAMILY MEDICINE

## 2017-08-31 PROCEDURE — 77030011256 HC DRSG MEPILEX <16IN NO BORD MOLN -A

## 2017-08-31 PROCEDURE — 97162 PT EVAL MOD COMPLEX 30 MIN: CPT

## 2017-08-31 PROCEDURE — 74011000250 HC RX REV CODE- 250: Performed by: FAMILY MEDICINE

## 2017-08-31 RX ORDER — ASPIRIN 325 MG
325 TABLET ORAL DAILY
Status: DISCONTINUED | OUTPATIENT
Start: 2017-08-31 | End: 2017-08-31 | Stop reason: SDUPTHER

## 2017-08-31 RX ORDER — CLONAZEPAM 0.5 MG/1
0.5 TABLET ORAL
Status: DISCONTINUED | OUTPATIENT
Start: 2017-08-31 | End: 2017-09-01 | Stop reason: HOSPADM

## 2017-08-31 RX ORDER — LEVOTHYROXINE SODIUM 50 UG/1
50 TABLET ORAL
Status: DISCONTINUED | OUTPATIENT
Start: 2017-09-01 | End: 2017-09-01 | Stop reason: HOSPADM

## 2017-08-31 RX ORDER — TRAMADOL HYDROCHLORIDE 50 MG/1
50-100 TABLET ORAL
Status: DISCONTINUED | OUTPATIENT
Start: 2017-08-31 | End: 2017-09-01 | Stop reason: HOSPADM

## 2017-08-31 RX ORDER — AMLODIPINE BESYLATE 5 MG/1
2.5 TABLET ORAL DAILY
Status: DISCONTINUED | OUTPATIENT
Start: 2017-09-01 | End: 2017-09-01 | Stop reason: HOSPADM

## 2017-08-31 RX ADMIN — OXYBUTYNIN CHLORIDE 5 MG: 5 TABLET, FILM COATED, EXTENDED RELEASE ORAL at 20:45

## 2017-08-31 RX ADMIN — BRIMONIDINE TARTRATE 1 DROP: 2 SOLUTION OPHTHALMIC at 18:40

## 2017-08-31 RX ADMIN — Medication 10 ML: at 06:09

## 2017-08-31 RX ADMIN — FERROUS SULFATE TAB 325 MG (65 MG ELEMENTAL FE) 325 MG: 325 (65 FE) TAB at 10:18

## 2017-08-31 RX ADMIN — ROPINIROLE HYDROCHLORIDE 1 MG: 1 TABLET, FILM COATED ORAL at 20:44

## 2017-08-31 RX ADMIN — HEPARIN SODIUM 5000 UNITS: 5000 INJECTION, SOLUTION INTRAVENOUS; SUBCUTANEOUS at 20:52

## 2017-08-31 RX ADMIN — DOCUSATE SODIUM 50 MG: 50 LIQUID ORAL at 20:47

## 2017-08-31 RX ADMIN — ASPIRIN 325 MG ORAL TABLET 325 MG: 325 PILL ORAL at 20:43

## 2017-08-31 RX ADMIN — HEPARIN SODIUM 5000 UNITS: 5000 INJECTION, SOLUTION INTRAVENOUS; SUBCUTANEOUS at 06:09

## 2017-08-31 RX ADMIN — CLOPIDOGREL BISULFATE 75 MG: 75 TABLET ORAL at 10:18

## 2017-08-31 RX ADMIN — PREDNISONE 30 MG: 20 TABLET ORAL at 10:18

## 2017-08-31 RX ADMIN — Medication 10 ML: at 20:44

## 2017-08-31 RX ADMIN — DOCUSATE SODIUM 50 MG: 50 LIQUID ORAL at 10:18

## 2017-08-31 RX ADMIN — Medication 10 ML: at 14:00

## 2017-08-31 RX ADMIN — TIMOLOL MALEATE 1 DROP: 5 SOLUTION OPHTHALMIC at 18:40

## 2017-08-31 RX ADMIN — BIMATOPROST 1 DROP: 0.1 SOLUTION/ DROPS OPHTHALMIC at 21:07

## 2017-08-31 RX ADMIN — SIMVASTATIN 20 MG: 5 TABLET, FILM COATED ORAL at 20:45

## 2017-08-31 RX ADMIN — TRAMADOL HYDROCHLORIDE 50 MG: 50 TABLET, FILM COATED ORAL at 21:24

## 2017-08-31 RX ADMIN — Medication 250 MG: at 10:19

## 2017-08-31 RX ADMIN — FLUOXETINE HYDROCHLORIDE 40 MG: 20 CAPSULE ORAL at 10:18

## 2017-08-31 RX ADMIN — MIRTAZAPINE 30 MG: 15 TABLET, FILM COATED ORAL at 20:51

## 2017-08-31 RX ADMIN — FOLIC ACID 1 MG: 1 TABLET ORAL at 20:51

## 2017-08-31 NOTE — PROGRESS NOTES
OT order received and chart reviewed. Pt has duplex orders for BLEs. Will follow up to mobilize pt pending results. Thank you.     Nirmala Garrido MS OTR/L  Office Ext: [phone]  Pager: Josh Stover 87 OTR/L  Office Ext: [phone]  Pager: [phone]
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
## 2017-08-31 NOTE — PROGRESS NOTES
1306: PT attempt patient seated up in bed eating lunch. Will follow up.   0736: PT orders received and chart reviewed. Duplex orders for BLE pending. Will hold PT evaluation pending imaging results to maximize patient safety and participation.      Thank you,     Breanne Grimaldo, PT, DPT

## 2017-08-31 NOTE — PROGRESS NOTES
conducted an initial consultation and Spiritual Assessment for [facility], who is a 74 y.o.,female. Patients Primary Language is: [place]. According to the patients EMR [employer] Affiliation is: [place]. The reason the Patient came to the hospital is:   Patient Active Problem List    Diagnosis Date Noted    Syncope and collapse 08/30/2017    Encephalopathy 08/30/2017    Dehydration 08/30/2017    Macrocytosis 08/30/2017    Hypothyroid 08/30/2017    CVA (cerebral vascular accident) ([facility] [address] 08/30/2017    COPD (chronic obstructive pulmonary disease) ([facility] [address] 05/24/2017    Cerebral vascular disease 05/24/2017    Giant cell arteritis ([facility] [address] 05/24/2017    HTN (hypertension) 05/24/2017    Acquired hypothyroidism 05/24/2017    Fracture tibia/fibula 05/23/2017    Cervical spine fracture ([facility] [address] 12/11/2015    Cerebral aneurysm 36/80/5791    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 Patient is very [address] and seems to be rather weak as well. Provided information about Spiritual Care Services. Offered prayer and assurance of continued prayers on patients behalf. The following outcomes were achieved:  Patient shared limited information about her medical narrative and spiritual journey/beliefs. Patient processed feeling about current hospitalization. Patient expressed gratitude for pastoral care visit. Assessment:  Patient does not have any Voodoo/cultural needs that will affect patients preferences in health care. There are no further spiritual or Voodoo issues which require Spiritual Care Services interventions at this time. Plan:  Chaplains will continue to follow and will provide pastoral care on an as needed/requested basis    . Geovanni Martin   Spiritual Care   [phone]

## 2017-08-31 NOTE — ACP (ADVANCE CARE PLANNING)
Patient has designated ___her spouse_____________________ to participate in his/her discharge plan and to receive any needed information.      Name: Therese Aguiar  Address: [address] [place] [address] Dr. Mikie Hopper [id number]  Phone number:  [phone]
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
## 2017-08-31 NOTE — PHYSICIAN ADVISORY
Letter of Status Determination:    Recommend Changing patient status from   INPATIENT to OBSERVATION      Pt Name:  Magui Pantoja   MR#  [id number]   [facility]#   [id number]   Room and Hospital  3025/01  [facility]   Hospitalization date  8/30/2017 12:46 PM   Current Attending Physician  Jinny Pablo MD   Principal diagnosis  Encephalopathy multi-infarct chronic  R/O seizure with EEG pending. Clinicals  74 y.o. y.o  female hospitalized with slow to rouse at home.   Documented chronic multi-infarct syndrome with dementia, ASCVD, hypertension     [place] MCG criteria   Does  NOT apply    STATUS DETERMINATION  On the basis of review of available clinical data, documentation in the chart  It is our recommendation that the patient's status is changed from INPATIENT to OBSERVATION         The final decision of the patient's hospitalization status depends on the attending physician's judgment      Additional comments     Insurance  Payor: Bradley Nicole / Plan: [email or web address] HMO / Product Type: HMO /             Dr. Lawanda Rhodes MD, MPH, [facility]  Physician Advisor, [email or web address]  [facility], [address]  [phone]  [address] [place] [address] Phone: [phone]    Subscriber: Zachary Veronica Subscriber#: [id number]    Group#: [id number] Precert#:

## 2017-08-31 NOTE — PROGRESS NOTES
OT order received and chart reviewed. 1330: working with PT.  4683: transport present; bringing pt for EEG. Will follow up.     Yusef Gonzalez MS OTR/L  Office Ext: [phone]  Pager: [phone]

## 2017-08-31 NOTE — PROGRESS NOTES
[place]   Discharge Planning/ Assessment    Reasons for Intervention: Chart reviewed. Met with pt/spouse, verified all demographics. [facility] ins. [facility] Dr. Jada Fay is her PCP. NOK: Meche Hooker, spouse, with whom she lives with & designates can participate in her discharge process. Has the following DME: hospital bed, walker, cane, shower bench & BSC. Currently active with [facility] & was slated for discharge soon. PLAN: home with spouse & home health when medically stable, will need orders, thanks. Will cont to follow for further needs. Bibi ParsonsRN,ext. [phone].      High Risk Criteria  [x] Yes  []No   Physician Referral  [] Yes  [x]No        Date    Nursing Referral  [] Yes  [x]No        Date    Patient/Family Request  [] Yes  [x]No        Date       Resources:    Medicare  [] Yes  [x]No   Medicaid  [] Yes  [x]No   No Resources  [] Yes  [x]No   Private Insurance  [x] Yes  []No    Name/Phone Number    Other  [] Yes  [x]No        (i.e. Workman's Comp)         Prior Services:    Prior Services  [] Yes  [x]No   Home Health  [] Yes  [x]No   [address]  [] Yes  [x]No        Number of [address]  [] Yes  [x]No       Meals on Wheels  [] Yes  [x]No   Office on Aging  [] Yes  [x]No   Transportation Services  [] Yes  [x]No   Nursing Home  [] Yes  [x]No        Nursing Home Name    [address]  [] Yes  [x]No        [address] Name    Other       Information Source:      Information obtained from  [x] Patient  [] Parent   [] [address]  [] Child  [x] Spouse   [] Significant Other/Partner   [] Friend      [] EMS    [] Nursing Home Chart          [] Other:   Chart Review  [x] Yes  []No     Family/Support System:    Patient lives with  [] Alone    [x] Spouse   [] Significant Other  [] Children  [] Caretaker   [] Parent  [] Sibling     [] Other       Other Support System:    Is the patient responsible for care of others  [] Yes  [x]No Information of person caring for patient on  discharge    Managers financial affairs independently  [] Yes  [x]No   If no, explain:      Status Prior to Admission:    Mental Status  [x] Awake  [x] Alert  [x] Oriented  [x] Quiet/Calm [] Lethargic/Sedated   [] Disoriented  [] Restless/Anxious  [] Combative   Personal Care  [x] Dependent  [] [address]  [] Requires Assistance   Meal Preparation Ability  [] Independent   [] Standby Assistance   [] Minimal Assistance   [] Moderate Assistance  [] Maximum Assistance     [x] Total Assistance   Chores  [] Independent with Chores   [] N/A Nursing Home Resident   [x] Requires Assistance   Bowel/Bladder  [] Continent  [] Catheter  [x] Incontinent  [] Ostomy Self-Care    [] Urine Diversion Self-Care  [] Maximum Assistance     [] Total Assistance   Number of Persons needed for assistance    DME at home  [] Sherrell Morgan  [x] Carson Morgan   [x] Commode    [] Bathroom/Grab Bars  [x] Hospital Bed  [] Nebulizer  [] Oxygen           [] Raised Toilet Seat  [x] Shower Chair  [] Side Rails for Bed   [] Tub Transfer Bench   [x] Leory Elks  [] Rona Quale, Standard      [] Other:   Vendor      Treatment Presently Receiving:    Current Treatments  [] Chemotherapy  [] Dialysis  [] Insulin  [] IVAB [x] IVF   [] O2  [] PCA   [x] PT   [] RT   [] Tube Feedings   [] Wound Care     Psychosocial Evaluation:    Verbalized Knowledge of Disease Process  [] Patient  []Family   Coping with Disease Process  [] Patient  []Family   Requires Further Counseling Coping with Disease Process  [] Patient  []Family     Identified Projected Needs:    Home Health Aid  [] Yes  [x]No   Transportation  [] Yes  [x]No   Education  [] Yes  [x]No        Specific Education     Financial Counseling  [] Yes  [x]No   Inability to Care for Self/Will Require 24 hour care  [] Yes  [x]No   Pain Management  [] Yes  [x]No   Home Infusion Therapy  [] Yes  [x]No   Oxygen Therapy  [] Yes  [x]No   DME  [] Yes  [x]No [address] Placement  [] Yes  [x]No   Rehab  [] Yes  [x]No   Physical Therapy  [x] Yes  []No   Needs Anticipated At This Time  [x] Yes  []No     Intra-Hospital Referral:    [facility]  [] Yes  [x]No     [] Yes  [x]No   Patient Representative  [] Yes  [x]No   Staff for Teaching Needs  [] Yes  [x]No   Specialty Teaching Needs     Diabetic Educator  [] Yes  [x]No   Referral for Diabetic Educator Needed  [] Yes  [x]No  If Yes, place order for Nutritionist or Diabetic Consult     Tentative Discharge Plan:    Home with No Services  [] Yes  [x]No   Home with Home Health Follow-up  [x] Yes  []No        If Yes, specify type    Home Care Program  [] Yes  [x]No        If Yes, specify type    Meals on Wheels  [] Yes  [x]No   Office of Aging  [] Yes  [x]No   NHP  [] Yes  [x]No   Return to the Nursing Home  [] Yes  [x]No   Rehab Therapy  [] Yes  [x]No   Acute Rehab  [] Yes  [x]No   Subacute Rehab  [] Yes  [x]No   Private Care  [] Yes  [x]No   Substance Abuse Referral  [] Yes  [x]No   Transportation  [] Yes  [x]No   Chore Service  [] Yes  [x]No   Inpatient Hospice  [] Yes  [x]No   OP RT  [] Yes  [x] No   OP Hemo  [] Yes  [x] No   OP PT  [] Yes  [x]No   Support Group  [] Yes  [x]No   Reach to Recovery  [] Yes  [x]No   OP Oncology Clinic  [] Yes  [x]No   Clinic Appointment  [] Yes  [x]No   DME  [] Yes  [x]No   Comments    Name of D/C Planner or  Given to Patient or Family Brittny Pay   Phone Number Pager: [phone]        [address]  [phone]   Date 08-   Time    If you are discharged home, whom do you designate to participate in your discharge plan and receive any information needed?      Enter name of Hussain Brown        Phone # of designee [phone]        Address of [facility] [place] [address]        Updated         Patient refused to designate any           individual

## 2017-08-31 NOTE — ROUTINE PROCESS
0730  Bedside and Verbal shift change report given to Dimple Banks (oncoming nurse) by Rober Rojas (offgoing nurse). Report included the following information SBAR, Kardex, Intake/Output and MAR.     0900  Shift assessment complete and due meds given. Pt has no complaints at this time. 1300  Pt back on unit from testing    1410  Notified of high pt bps. Dr. Julieta White paged. Orders for home BP meds placed. 1500  Reassessment complete. No change in pt condition    1855  Pt and  requesting another home med for restless leg syndrome. Dr. Julieta White paged.

## 2017-08-31 NOTE — PROCEDURES
[facility]  *** FINAL REPORT ***    Name: Jo Damon  MRN: [id number]    Inpatient  : 28 Aug 1945  HIS Order #: [id number]  [address] [facility] Visit #: [id number]  Date: 31 Aug 2017    TYPE OF TEST: Peripheral Venous Testing    REASON FOR TEST  Elevated D-Dimer    Right Leg:-  Deep venous thrombosis:           No  Superficial venous thrombosis:    No  Deep venous insufficiency:        No  Superficial venous insufficiency: Not examined    Left Leg:-  Deep venous thrombosis:           No  Superficial venous thrombosis:    No  Deep venous insufficiency:        No  Superficial venous insufficiency: Not examined      INTERPRETATION/FINDINGS  Duplex images were obtained using 2-D gray scale, color flow, and  spectral Doppler analysis. Right leg :  1. Deep vein(s) visualized include the common femoral, deep femoral,  proximal femoral, mid femoral, distal femoral, popliteal(above knee),  popliteal(fossa), popliteal(below knee), posterior tibial and peroneal   veins. 2. No evidence of deep venous thrombosis detected in the veins  visualized. 3. No evidence of superficial thrombosis detected. 4. No evidence of reflux detected in the deep veins visualized. Left leg :  1. Deep vein(s) visualized include the common femoral, deep femoral,  proximal femoral, mid femoral, distal femoral, popliteal(above knee),  popliteal(fossa), popliteal(below knee), posterior tibial and peroneal   veins. 2. No evidence of deep venous thrombosis detected in the veins  visualized. 3. No evidence of superficial thrombosis detected. 4. No evidence of reflux detected in the deep veins visualized. ADDITIONAL COMMENTS    I have personally reviewed the data relevant to the interpretation of  this  study. TECHNOLOGIST: Iban Samuel. Ella Preciado  Signed: 2017 11:51 AM    PHYSICIAN: Ne Anaya.  Jhony Vaughn MD  Signed: 2017 11:46 PM

## 2017-08-31 NOTE — PROGRESS NOTES
Medicine Progress Note    Patient: Ramey Duverney   Age:  74 y.o.  DOA: 8/30/2017   Admit Dx / CC: Syncope and collapse  Encephalopathy  LOS:  LOS: 1 day     Assessment/Plan   Principal Problem:    Encephalopathy (8/30/2017)    Active Problems:    Syncope and collapse (8/30/2017)      Dehydration (8/30/2017)      Macrocytosis (8/30/2017)      Hypothyroid (8/30/2017)      CVA (cerebral vascular accident) ([facility] (8/30/2017)        Additional Plan notes       Encephalopathy - Etiology unknown r/o seizures. EEG ordered. CT head reviewed and no acute abnormal finding. MRI brain negative. Continue Neuro and vital check per neurology. Daily aspirin     Syncope vs Seizure - Cardiac monitoring for arrhythmia . EEG pending     Dehydration - hydrate patient . IV  CC/HR     Macrocytosis - TSH b12 ammonia folate ok     Elevated DDIMER - CTA chest was negative for PE. Negative PVL for DVT      S/p Left femur fracture - continue PT/OT     DISPO     Anticipated Date of Discharge: 1 day  Anticipated Disposition (home, SNF) : home    Subjective:   Patient seen and examined. NAD    Objective:     Visit Vitals    /85 (BP 1 Location: Left arm, BP Patient Position: Supine)    Pulse 88    Temp 97.9 °F (36.6 °C)    Resp 20    Ht 5' (1.524 m)    Wt 50.8 kg (112 lb)    SpO2 99%    BMI 21.87 kg/m2       Physical Exam:  General appearance: alert, cooperative,  Head: Normocephalic, without obvious abnormality, atraumatic  Neck: supple, trachea midline  Lungs: clear to auscultation bilaterally  Heart: regular rate and rhythm, S1, S2 normal, no murmur, click, rub or gallop  Abdomen: soft, non-tender.  Bowel sounds normal. No masses,  no organomegaly  Extremities: extremities normal, atraumatic, no cyanosis or edema  Skin: Skin color, texture, turgor normal. No rashes or lesions  Neurologic: Grossly normal    Intake and Output:  Current Shift:     Last three shifts:       Lab/Data Reviewed:  CMP:   Lab Results   Component Value Date/Time     (H) 08/31/2017 05:10 AM    K 3.9 08/31/2017 05:10 AM     (H) 08/31/2017 05:10 AM    CO2 24 08/31/2017 05:10 AM    AGAP 10 08/31/2017 05:10 AM     (H) 08/31/2017 05:10 AM    BUN 27 (H) 08/31/2017 05:10 AM    CREA 0.63 08/31/2017 05:10 AM    GFRAA >60 08/31/2017 05:10 AM    GFRNA >60 08/31/2017 05:10 AM    CA 8.7 08/31/2017 05:10 AM    MG 2.4 08/30/2017 10:10 PM    ALB 3.0 (L) 08/31/2017 05:10 AM    TP 5.3 (L) 08/31/2017 05:10 AM    GLOB 2.3 08/31/2017 05:10 AM    AGRAT 1.3 08/31/2017 05:10 AM    SGOT 21 08/31/2017 05:10 AM    ALT 26 08/31/2017 05:10 AM     CBC:   Lab Results   Component Value Date/Time    WBC 12.9 08/31/2017 05:10 AM    HGB 10.3 (L) 08/31/2017 05:10 AM    HCT 34.3 (L) 08/31/2017 05:10 AM     08/31/2017 05:10 AM     All Cardiac Markers in the last 24 hours: No results found for: CPK, CKMMB, CKMB, RCK3, CKMBT, CKNDX, CKND1, GONZALO, TROPT, TROIQ, AMBER, TROPT, TNIPOC, BNP, BNPP    Medications Reviewed:  Current Facility-Administered Medications   Medication Dose Route Frequency    [START ON 9/1/2017] levothyroxine (SYNTHROID) tablet 50 mcg  50 mcg Oral ACB    sodium chloride (NS) flush 5-10 mL  5-10 mL IntraVENous Q8H    sodium chloride (NS) flush 5-10 mL  5-10 mL IntraVENous PRN    heparin (porcine) injection 5,000 Units  5,000 Units SubCUTAneous Q8H    ascorbic acid (vitamin C) (VITAMIN C) tablet 250 mg  250 mg Oral DAILY    aspirin (ASPIRIN) tablet 325 mg  325 mg Oral QHS    bimatoprost (LUMIGAN) 0.01 % ophthalmic drops 1 Drop  1 Drop Left Eye QPM    clopidogrel (PLAVIX) tablet 75 mg  75 mg Oral DAILY    docusate (COLACE) 50 mg/5 mL oral liquid 50 mg  50 mg Oral BID    ferrous sulfate tablet 325 mg  325 mg Oral ACB    FLUoxetine (PROzac) capsule 40 mg  40 mg Oral DAILY    folic acid (FOLVITE) tablet 1 mg  1 mg Oral QHS    mirtazapine (REMERON) tablet 30 mg  30 mg Oral QHS    oxybutynin chloride XL (DITROPAN XL) tablet 5 mg  5 mg Oral QHS    predniSONE (DELTASONE) tablet 30 mg  30 mg Oral DAILY    rOPINIRole (REQUIP) tablet 1 mg  1 mg Oral QHS    simvastatin (ZOCOR) tablet 20 mg  20 mg Oral QHS    traMADol (ULTRAM) tablet 50 mg  50 mg Oral Q6H PRN    brimonidine (ALPHAGAN) 0.2 % ophthalmic solution 1 Drop  1 Drop Left Eye BID    timolol (TIMOPTIC) 0.5 % ophthalmic solution 1 Drop  1 Drop Left Eye BID       Nichole Wood MD    August 31, 2017

## 2017-08-31 NOTE — PROGRESS NOTES
Problem: Dysphagia (Adult)  Goal: *Acute Goals and Plan of Care (Insert Text)  Dysphagia Present: No    Recommendations:  Diet: mech-soft  Meds: one at a time  Aspiration Precautions  Other: eval only     Patient will:  1. Participate in training and education related to continued aspiration risk, diet recs and compensatory strategies (goal met). Outcome: Resolved/Met Date Met:  08/31/17  SPEECH LANGUAGE PATHOLOGY BEDSIDE SWALLOW EVALUATION AND DISCHARGE     Patient: Bela Fernandez (74 y.o. female)  Date: 8/31/2017  Primary Diagnosis: Syncope and collapse  Encephalopathy        Precautions: aspiration         ASSESSMENT :  Clinical beside swallow eval completed per MD orders. Pt A&Ox3. Functional communication. Intelligibility >90%. Pt familiar to SLP from hospitalization in April of 2017. At that time, pt's safest least restrictive diet was mech-soft. OM examination revealed oral motor structures functional for mastication and deglutition. Presented with thin liquid, puree, and solid trials. Exhibited mildly delayed bolus cohesion, manipulation and A-P transit. Further exhibited adequate swallow timing/reflex and hyolaryngeal excursion. Pt able to manipulate and clear with 0 clinical s/s aspiration and/or oropharyngeal dysphagia. Pt safe for mech-soft solid, thin liquid diet. 0 formal ST needs for dysphagia indicated at this time. SLP educated pt on role of speech therapist in current setting with re: speech/swallow; verbalized comprehension. SLP available for re-evaluation if indicated by MD.      Thank you for this referral.   Radha Berkowitz, SLP       PLAN :  Recommendations and Planned Interventions:  No formal ST needs ID'd for dysphagia. Eval only. Discharge Recommendations: None       SUBJECTIVE:   Patient stated Jermaine Lobato.       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    Menopause      Psychiatric disorder bipolar    Respiratory abnormalities      Restless leg syndrome      Thyroid nodule 4/23/2014     Abnormal biopsy       Past Surgical History:   Procedure Laterality Date    HX CORONARY STENT PLACEMENT         Prior Level of Function/Home Situation: lives with spouse     Diet prior to admission: soft  Current Diet:  soft   Cognitive and Communication Status:  Neurologic State: Alert  Orientation Level: Oriented to person, Oriented to place, Oriented to situation  Cognition: Follows commands     Perseveration: No perseveration noted  Safety/Judgement: Fall prevention  Oral Assessment:  Oral Assessment  Labial: No impairment  Dentition: Intact  Oral Hygiene: Good  Lingual: Decreased rate;Decreased strength  Velum: No impairment  Mandible: No impairment  P.O. Trials:  Patient Position: 90  Vocal quality prior to P.O.: No impairment  Consistency Presented: Thin liquid;Pudding; Solid  How Presented: Self-fed/presented;SLP-fed/presented;Straw;Successive swallows     Bolus Acceptance: No impairment  Bolus Formation/Control: Impaired  Type of Impairment: Delayed;Mastication  Propulsion: Delayed (# of seconds)  Oral Residue: Less than 10% of bolus; Lingual  Initiation of Swallow: No impairment  Laryngeal Elevation: Functional  Aspiration Signs/Symptoms: None  Pharyngeal Phase Characteristics: No impairment, issues, or problems   Effective Modifications: Small sips and bites  Cues for Modifications: Minimal        Oral Phase Severity: Minimal  Pharyngeal Phase Severity : No impairment     GCODESwallowing:  Swallow Current Status CI= 1-19%   Swallow Goal Status CI= 1-19%   Swallow D/C Status CI= 1-19%     The severity rating is based on the following outcomes:  MARIAN Noms Swallow Level 6              Clinical Judgement        PAIN:  Start of Eval: 0  End of Eval: 0      After evaluation:   [ ]            Patient left in no apparent distress sitting up in chair  [X]            Patient left in no apparent distress in bed  [X]            Call bell left within reach  [X]            Nursing notified  [ ]            Family present  [ ]            Caregiver present  [ ]            Bed alarm activated         COMMUNICATION/EDUCATION:   [X]            Aspiration precautions; swallow safety; compensatory techniques. [X]            Patient/family have participated as able in goal setting and plan of care. [X]            Patient/family agree to work toward stated goals and plan of care. [ ]            Patient understands intent and goals of therapy; neutral about participation. [ ]            Patient unable to participate in goal setting/plan of care; educ ongoing with interdisciplinary staff  [ ]             Posted safety precautions in patient's room.      Thank you for this referral.  JANELL Hensley  Time Calculation: 25 mins

## 2017-08-31 NOTE — PROGRESS NOTES
Problem: Falls - Risk of  Goal: *Absence of Falls  Document Diomedes Fall Risk and appropriate interventions in the flowsheet.    Outcome: Progressing Towards Goal  Fall Risk Interventions:  Mobility Interventions: Assess mobility with egress test, Bed/chair exit alarm, OT consult for ADLs, Patient to call before getting OOB, PT Consult for mobility concerns, Strengthening exercises (ROM-active/passive)     Mentation Interventions: Adequate sleep, hydration, pain control, Door open when patient unattended, Increase mobility, Reorient patient           Elimination Interventions: Bed/chair exit alarm, Call light in reach     History of Falls Interventions: Consult care management for discharge planning, Investigate reason for fall, Evaluate medications/consider consulting pharmacy

## 2017-09-01 VITALS
RESPIRATION RATE: 20 BRPM | HEIGHT: 60 IN | WEIGHT: 110.23 LBS | TEMPERATURE: 98.6 F | SYSTOLIC BLOOD PRESSURE: 154 MMHG | OXYGEN SATURATION: 96 % | DIASTOLIC BLOOD PRESSURE: 76 MMHG | HEART RATE: 88 BPM | BODY MASS INDEX: 21.64 KG/M2

## 2017-09-01 PROCEDURE — 77030011256 HC DRSG MEPILEX <16IN NO BORD MOLN -A

## 2017-09-01 PROCEDURE — 74011636637 HC RX REV CODE- 636/637: Performed by: FAMILY MEDICINE

## 2017-09-01 PROCEDURE — 74011250637 HC RX REV CODE- 250/637: Performed by: FAMILY MEDICINE

## 2017-09-01 PROCEDURE — 74011250636 HC RX REV CODE- 250/636: Performed by: FAMILY MEDICINE

## 2017-09-01 PROCEDURE — 97535 SELF CARE MNGMENT TRAINING: CPT

## 2017-09-01 PROCEDURE — 97166 OT EVAL MOD COMPLEX 45 MIN: CPT

## 2017-09-01 PROCEDURE — 74011250637 HC RX REV CODE- 250/637: Performed by: HOSPITALIST

## 2017-09-01 RX ADMIN — HEPARIN SODIUM 5000 UNITS: 5000 INJECTION, SOLUTION INTRAVENOUS; SUBCUTANEOUS at 06:13

## 2017-09-01 RX ADMIN — LEVOTHYROXINE SODIUM 50 MCG: 50 TABLET ORAL at 06:13

## 2017-09-01 RX ADMIN — CLONAZEPAM 0.5 MG: 0.5 TABLET ORAL at 01:29

## 2017-09-01 RX ADMIN — DOCUSATE SODIUM 50 MG: 50 LIQUID ORAL at 10:10

## 2017-09-01 RX ADMIN — Medication 10 ML: at 06:13

## 2017-09-01 RX ADMIN — BRIMONIDINE TARTRATE 1 DROP: 2 SOLUTION OPHTHALMIC at 10:18

## 2017-09-01 RX ADMIN — Medication 250 MG: at 10:12

## 2017-09-01 RX ADMIN — AMLODIPINE BESYLATE 2.5 MG: 5 TABLET ORAL at 10:11

## 2017-09-01 RX ADMIN — CLOPIDOGREL BISULFATE 75 MG: 75 TABLET ORAL at 10:12

## 2017-09-01 RX ADMIN — TIMOLOL MALEATE 1 DROP: 5 SOLUTION OPHTHALMIC at 10:18

## 2017-09-01 RX ADMIN — FERROUS SULFATE TAB 325 MG (65 MG ELEMENTAL FE) 325 MG: 325 (65 FE) TAB at 10:10

## 2017-09-01 RX ADMIN — FLUOXETINE HYDROCHLORIDE 40 MG: 20 CAPSULE ORAL at 10:10

## 2017-09-01 RX ADMIN — PREDNISONE 30 MG: 20 TABLET ORAL at 10:11

## 2017-09-01 NOTE — DISCHARGE INSTRUCTIONS
Patient armband removed and shredded      DISCHARGE SUMMARY from Nurse    The following personal items are in your possession at time of discharge:       Visual Aid: Glasses, With patient                            PATIENT INSTRUCTIONS:    What to do at Home:  Recommended activity: Activity as tolerated,     If you experience any of the following symptoms fainting, dizziness, increased weakness, fever 101, chest pain,  please follow up with your primary care doctor, nearest E.R, call 911. *  Please give a list of your current medications to your Primary Care Provider. *  Please update this list whenever your medications are discontinued, doses are      changed, or new medications (including over-the-counter products) are added. *  Please carry medication information at all times in case of emergency situations. These are general instructions for a healthy lifestyle:    No smoking/ No tobacco products/ Avoid exposure to second hand smoke    Surgeon General's Warning:  Quitting smoking now greatly reduces serious risk to your health. Obesity, smoking, and sedentary lifestyle greatly increases your risk for illness    A healthy diet, regular physical exercise & weight monitoring are important for maintaining a healthy lifestyle    You may be retaining fluid if you have a history of heart failure or if you experience any of the following symptoms:  Weight gain of 3 pounds or more overnight or 5 pounds in a week, increased swelling in our hands or feet or shortness of breath while lying flat in bed. Please call your doctor as soon as you notice any of these symptoms; do not wait until your next office visit. Recognize signs and symptoms of STROKE:    F-face looks uneven    A-arms unable to move or move unevenly    S-speech slurred or non-existent    T-time-call 911 as soon as signs and symptoms begin-DO NOT go       Back to bed or wait to see if you get better-TIME IS BRAIN.     Warning Signs of HEART ATTACK     Call 911 if you have these symptoms:   Chest discomfort. Most heart attacks involve discomfort in the center of the chest that lasts more than a few minutes, or that goes away and comes back. It can feel like uncomfortable pressure, squeezing, fullness, or pain.  Discomfort in other areas of the upper body. Symptoms can include pain or discomfort in one or both arms, the back, neck, jaw, or stomach.  Shortness of breath with or without chest discomfort.  Other signs may include breaking out in a cold sweat, nausea, or lightheadedness. Don't wait more than five minutes to call 911 - MINUTES MATTER! Fast action can save your life. Calling 911 is almost always the fastest way to get lifesaving treatment. Emergency Medical Services staff can begin treatment when they arrive -- up to an hour sooner than if someone gets to the hospital by car. The discharge information has been reviewed with the patient. The patient and spouse verbalized understanding. Discharge medications reviewed with the patient and spouse and appropriate educational materials and side effects teaching were provided.

## 2017-09-01 NOTE — PROGRESS NOTES
1340:  Pt eating lunch, refusing to participate in PT stating she is going home. Will follow up as schedule permits. 1418:  Refusing to participate.

## 2017-09-01 NOTE — PROGRESS NOTES
Problem: Self Care Deficits Care Plan (Adult)  Goal: *Acute Goals and Plan of Care (Insert Text)  Occupational Therapy Goals  Initiated 9/1/2017 within 7 day(s). 1. Patient will perform grooming tasks while standing with minimal assistance for balance. 2. Patient will perform lower body dressing with supervision for dynamic sitting balance. 3. Patient will perform functional task in standing for 5 minutes with minimal assistance for balance to increase activity tolerance for ADLs. 4. Patient will perform toilet transfers with supervision/set-up. 5. Patient will perform all aspects of toileting with supervision/set-up. 6. Patient will participate in upper extremity therapeutic exercise/activities with supervision/set-up for 8 minutes to increase BUE strength for ADLs. 7. Patient will utilize energy conservation techniques during functional activities with minimal verbal cues. Outcome: Progressing Towards Goal  OCCUPATIONAL THERAPY EVALUATION     Patient: Siena Dupont (74 y.o. female)  Date: 9/1/2017  Primary Diagnosis: Syncope and collapse  Encephalopathy        Precautions: Fall, Skin      ASSESSMENT :  Based on the objective data described below, the patient presents with impairments with regard to bed mobility in prep for ADLs, activity tolerance and overall independence in ADLs. Pt alert to self and place, following all commands. SBA/additional time to maneuver to EOB. Max/total A to don cam boot to LLE. Decreased BUE ROM/strength. At EOB, pt engaged in oral care with min/mod A secondary to visual impairment. Mod A to apply toothpaste to toothbrush due to vision; able to complete remaining oral care task with supervision/SBA. Pt stood with mod A/additional time, reporting pain in LLE in standing. No c/o dizziness during sitting or standing tasks. Guided back to supine, rolling with supervision to reposition pads. Pt left supine with HOB elevated, needs within reach, bed alarm on.  Recommend continued therapy during acute care stay. Patient will benefit from skilled intervention to address the above impairments. Patients rehabilitation potential is considered to be Good  Factors which may influence rehabilitation potential include:   [ ]             None noted  [ ]             Mental ability/status  [X]             Medical condition  [ ]             Home/family situation and support systems  [ ]             Safety awareness  [ ]             Pain tolerance/management  [ ]             Other:      Recommendations for nursing: up with assist           PLAN :  Recommendations and Planned Interventions:  [X]               Self Care Training                  [X]        Therapeutic Activities  [X]               Functional Mobility Training    [ ]        Cognitive Retraining  [X]               Therapeutic Exercises           [X]        Endurance Activities  [X]               Balance Training                   [ ]        Neuromuscular Re-Education  [ ]               Visual/Perceptual Training     [X]   Home Safety Training  [X]               Patient Education                 [X]        Family Training/Education  [ ]               Other (comment):     Frequency/Duration: Patient will be followed by occupational therapy 3-5 times a week to address goals. Discharge Recommendations: Home Health (with assistance) vs. SNF  Further Equipment Recommendations for Discharge: Anticipate none       SUBJECTIVE:   Patient stated Thompson Esquiveli you so much. 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 The severity rating is based on the Other Functional Assessment, MMT, ROM     Eval Complexity: History: MEDIUM Complexity : Expanded review of history including physical, cognitive and psychosocial  history ; Examination: MEDIUM Complexity : 3-5 performance deficits relating to physical, cognitive , or psychosocial skils that result in activity limitations and / or participation restrictions; Decision Making:MEDIUM Complexity : Patient may present with comorbidities that affect occupational performnce. Miniml to moderate modification of tasks or assistance (eg, physical or verbal ) with assesment(s) is necessary to enable patient to complete evaluation      Prior Level of Function/Home Situation: Pt required assistance with basic ADLs and utilizes RW/wheelchair PTA. Home Situation  Home Environment: Private residence  Patient Expects to be Discharged to[de-identified] Private residence  Current DME Used/Available at Home: Sameera Scarce, straight, Commode, bedside, Hospital bed, Shower chair, Wheelchair, Manny [place]  [X]  Right hand dominant          [ ]  Left hand dominant     Cognitive/Behavioral Status:  Neurologic State: Alert  Orientation Level: Oriented to person;Oriented to time;Disoriented to place; Disoriented to situation  Cognition: Follows commands  Safety/Judgement: Fall prevention; Insight into deficits      Skin: Bruising t/o BUEs/BLEs  Edema: None noted (BUEs)     Vision/Perceptual:    Acuity: Impaired near vision; Impaired far vision Coordination:  Coordination: Within functional limits (BUEs)  Fine Motor Skills-Upper: Right Intact; Left Intact    Gross Motor Skills-Upper: Right Intact; Left Intact      Balance:   Sitting: Impaired  Static: Fair+  Dynamic: Fair     Standing: Impaired  Static: Fair  Dynamic: fair     Strength:  Strength: Generally decreased, functional (BUEs: 3+/5)     Tone & Sensation:  Tone: Normal (BUEs)     Range of Motion:  AROM: Generally decreased, functional (BUEs: 3/4 shoulder flex; full elbow flex)     Functional Mobility and Transfers for ADLs:  Bed Mobility:  Supine to Sit: Stand-by asssistance; Additional time  Sit to Supine: Minimum assistance (for BLE management)     Transfers:  Sit to Stand: Minimum assistance; Additional time;Assist x1              Toilet Transfer :  (not assessed)     ADL Assessment:  Feeding: Minimum assistance (secondary to decreased vision)  Oral Facial Hygiene/Grooming: Minimum assistance (decreased vision)  Bathing: Moderate assistance  Upper Body Dressing: Minimum assistance  Lower Body Dressing: Moderate assistance  Toileting: Maximum assistance (briefs)     ADL Intervention:  Grooming  Grooming Assistance: Minimum assistance; Moderate assistance  Washing Face: Supervision/set-up  Brushing Teeth: Moderate assistance (secondary to impaired vision)  Cues: Verbal cues provided     At EOB, pt engaged in oral care with min/mod A secondary to visual impairment. Mod A to apply toothpaste to toothbrush due to vision; able to complete remaining oral care task with supervision/SBA. Cognitive Retraining  Safety/Judgement: Fall prevention; Insight into deficits     Pain:  Pre treatment pain level: 0/10  Post treatment pain level: 0/10  Pain Scale 1: Numeric (0 - 10)  Pain Intensity 1: 0     Activity Tolerance: Fair  Please refer to the flowsheet for vital signs taken during this treatment.   After treatment:   [ ] Patient left in no apparent distress sitting up in chair  [X] Patient left in no apparent distress in bed  [X] Call bell left within reach  [X] Nursing notified  [ ] Caregiver present  [X] Bed alarm activated      COMMUNICATION/EDUCATION: Pt educated on role of OT, POC, and home safety. She verbalized understanding; needs reinforcement. [X] Home safety education was provided and the patient/caregiver indicated understanding. [X] Patient/family have participated as able in goal setting and plan of care. [X] Patient/family agree to work toward stated goals and plan of care. [ ] Patient understands intent and goals of therapy, but is neutral about his/her participation. [ ] Patient is unable to participate in goal setting and plan of care.      Thank you for this referral.  Tayler Chavez MS OTR/L  Time Calculation: 20 mins

## 2017-09-01 NOTE — ROUTINE PROCESS
Care Management Interventions  PCP Verified by CM: Yes  Mode of Transport at Discharge:  Other (see comment) ( will drive pt home)  Transition of Care Consult (CM Consult): [address]: Yes  Discharge Durable Medical Equipment: No  Physical Therapy Consult: Yes  Occupational Therapy Consult: Yes  Speech Therapy Consult: Yes  Current Support Network: Own Home, Lives with Spouse  Confirm Follow Up Transport: Family  Plan discussed with Pt/Family/Caregiver: Yes  Freedom of Choice Offered: Yes  Discharge Location  Discharge Placement: Home with home health

## 2017-09-01 NOTE — PROGRESS NOTES
1940 Bedside and Verbal shift change report given to Vale Lawson RN (oncoming nurse) by Jaqueline Mayer RN (off going nurse). Report included the following information SBAR, Kardex, MAR and Recent Results. 2030Shift assessment,patient laying in bed restless, wants patient to have  clonopin ,0.5 which is part of her home medication     0129 Patient found in bed very agitated,restless  and confused has taken off her gown and all her leads ,including her telemetry box,PRN clonopin given,patient reorioriented    0410 Shift reassessment done, no changes in previous assessment     0515 patient still confused,has pulled out all her leads and iv fluids,assissted to use the bedpan ,however patient is very calm,peripheral IV attempted,failed     0740 Bedside and Verbal shift change report given to [address] [place] [address] (oncoming nurse) by Vale Lawson RN (off going nurse).  Report included the following information SBAR, Kardex, MAR and Recent Results.

## 2017-09-01 NOTE — PROGRESS NOTES
Nutrition initial assessment/Plan of care      RECOMMENDATIONS:     1. Consider liberalizing to [address]  2. Ensure TID- strawberry  3. Monitor weight, labs and PO intake  4. RD to follow     GOALS:     1. PO intake meets >75% of protein/calorie needs by 9/6  2. Weight Maintenance (+/- 1-2 lb by 9/6)    ASSESSMENT:     Weight status is classified as normal per BMI of 21.5. PO intake is fair. Added Ensure TID for additional calories/protein. Labs noted. Nutrition recommendations listed. RD to follow. Nutrition Diagnoses:   Inadequate oral intake related to fair appetite as evidenced by less than 50% intake of lunch meal.    Nutrition Risk:  [] High  [x] Moderate []  Low    SUBJECTIVE/OBJECTIVE:      Admitted with AMS. Patient with history of COPD, CAD, HTN, bipolar disorder, stroke and dementia. Reviewed SLP recommendations for [facility] soft diet. States UBW of 110 lb and likes to drink strawberry Ensure at home. Observed less than 50% intake of lunch meal. Will liberalize diet to help with intake. Will monitor. Information Obtained from:    [x] Chart Review   [x] Patient   [] Family/Caregiver   [] Nurse/Physician   [] Interdisciplinary Meeting/Rounds    Diet:Cardiac diet  Medications: [x] Reviewed    Allergies: [x] Reviewed   Encounter Diagnoses     ICD-10-CM ICD-9-CM   1.  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    Menopause     Psychiatric disorder bipolar    Respiratory abnormalities     Restless leg syndrome     Thyroid nodule 4/23/2014    Abnormal biopsy        Labs:  Lab Results   Component Value Date/Time    Sodium 147 08/31/2017 05:10 AM    Potassium 3.9 08/31/2017 05:10 AM    Chloride 113 08/31/2017 05:10 AM    CO2 24 08/31/2017 05:10 AM    Anion gap 10 08/31/2017 05:10 AM    Glucose 112 08/31/2017 05:10 AM    BUN 27 08/31/2017 05:10 AM    Creatinine 0.63 08/31/2017 05:10 AM    Calcium 8.7 08/31/2017 05:10 AM    Magnesium 2.4 08/30/2017 10:10 PM    Albumin 3.0 08/31/2017 05:10 AM     Anthropometrics: BMI (calculated): 21.5  Last 3 Recorded Weights in this Encounter    08/30/17 1302 08/31/17 0900 09/01/17 0539   Weight: 54.4 kg (120 lb) 50.8 kg (112 lb) 50 kg (110 lb 3.7 oz)    Ht Readings from Last 1 Encounters:   08/31/17 5' (1.524 m)     Patient Vitals for the past 100 hrs:   % Diet Eaten   09/01/17 0830 20 %   08/31/17 1820 25 %     IBW: 100 lb %IBW: 110% UBW: 110 lb %UBW: 100%   [] Weight Loss [] Weight Gain [x] Weight Stable    Estimated Nutrition Needs: [x] MSJ  [] Other:  Calories: 1230 Kcal Based on:   [x] Actual BW    Protein:   50-60 g Based on:   [x] Actual BW    Fluid:       1500 ml Based on:   [x] Actual BW      [x] No Cultural, Bahai or ethnic dietary need identified.     [] Cultural, Bahai and ethnic food preferences identified and addressed     Wt Status:  [x] Normal (18.6 - 24.9) [] Underweight (<18.5) [] Overweight (25 - 29.9) [] Mild Obesity (30 - 34.9)  [] Moderate Obesity (35 - 39.9) [] Morbid Obesity (40+)     Nutrition Problems Identified:   [x] Suboptimal PO intake   [] Food Allergies  [x] Difficulty chewing/poor dentition  [] Constipation/Diarrhea   [] Nausea/Vomiting   [] None  [] Other:     Plan:   [] Therapeutic Diet  [x]  Obtained/adjusted food preferences/tolerances and/or snacks options   [x]  Supplements added   [x] Occupational therapy following for feeding techniques  []  HS snack added   [x]  Modify diet texture   []  Modify diet for food allergies   []  Assist with menu selection   [x]  Monitor PO intake on meal rounds   [x]  Continue inpatient monitoring and intervention   []  Participated in discharge planning/Interdisciplinary rounds/Team meetings   []  Other:     Education Needs:   [] Not appropriate for teaching at this time due to:   [x] Identified and addressed    Nutrition Monitoring and Evaluation:  [x] Continue ongoing monitoring and intervention  [] Other    Callie Lu

## 2017-09-01 NOTE — MANAGEMENT PLAN
Discharge Planning    Spoke with patient about SNF instead of home based on her PT/OT. She stated she would like to discharge home. Spoke with patient about home health. She chooses to resume services with [address]. She gave verbal [address]. Called [facility] and notified Dolores of discharge and resumption of care.  Pt stated  will drive home      Carlos Ulrich RN BSN  Outcomes Manager  Pager # [phone]

## 2017-09-01 NOTE — PROCEDURES
[address]    Name:  Jonathan Jimenez  MR#:  [id number]  :  1945  Account #:  [de-identified]  Date of Adm:  2017  Date of Service:  2017      EEG #:     CLINICAL INFORMATION: Question of seizures. DESCRIPTION: This EEG is performed in the awake state only. The  waking background reveals 9 cycles per second alpha rhythm  posteriorly with a normal posterior to anterior gradient. Photic  stimulation did not alter the patient's background. Hyperventilation was  omitted. Sleep was not obtained. Throughout the recording, there were  no focal lateralizing or epileptiform abnormalities. IMPRESSION: This is a normal awake EEG.         Nelson Hernandez MD    [facility]  D:  2017   14:42  T:  2017   15:40  Job #:  [id number]

## 2017-09-01 NOTE — PROGRESS NOTES
07:25- Report received from previous nurse-included that patient had pulled both IV's out and RN attempted to reinsert IV but unsuccessful.   08:20- Assessment completed- see flow sheet. Patient resting quietly and having a SPA bath. Continue to monitor  10:00- Unable to reinsert IV-tried by many staff members. 11:55-Dr Granado aware  Continue every hour caring rounds.  aware of patient being discharged-agreed to pick her up 2:00-2:30pm  14:53-  has not arrived yet. 15:30 Discharge instructions reviewed and signed with   15:50- Patient discharged via wheelchair out main entrance.

## 2017-09-02 ENCOUNTER — HOME CARE VISIT (OUTPATIENT)
Dept: SCHEDULING | Facility: HOME HEALTH | Age: 72
End: 2017-09-02
Payer: COMMERCIAL

## 2017-09-02 PROCEDURE — G0493 RN CARE EA 15 MIN HH/HOSPICE: HCPCS

## 2017-09-04 ENCOUNTER — HOME CARE VISIT (OUTPATIENT)
Dept: HOME HEALTH SERVICES | Facility: HOME HEALTH | Age: 72
End: 2017-09-04
Payer: COMMERCIAL

## 2017-09-05 ENCOUNTER — HOME CARE VISIT (OUTPATIENT)
Dept: SCHEDULING | Facility: HOME HEALTH | Age: 72
End: 2017-09-05
Payer: COMMERCIAL

## 2017-09-05 VITALS — DIASTOLIC BLOOD PRESSURE: 92 MMHG | SYSTOLIC BLOOD PRESSURE: 158 MMHG

## 2017-09-05 LAB
BACTERIA SPEC CULT: NORMAL
BACTERIA SPEC CULT: NORMAL
SERVICE CMNT-IMP: NORMAL
SERVICE CMNT-IMP: NORMAL

## 2017-09-05 PROCEDURE — G0299 HHS/HOSPICE OF RN EA 15 MIN: HCPCS

## 2017-09-05 PROCEDURE — G0151 HHCP-SERV OF PT,EA 15 MIN: HCPCS

## 2017-09-05 NOTE — DISCHARGE SUMMARY
Discharge Summary    Patient: Bela Fernandez               Sex: female          DOA: 8/30/2017         YOB: 1945      Age:  74 y.o.        LOS:  LOS: 2 days                Admit Date: 8/30/2017    Discharge Date: 9/1/2017    Discharge Medications:     Discharge Medication List as of 9/1/2017  2:31 PM      CONTINUE these medications which have NOT CHANGED    Details   FLUoxetine (PROZAC) 40 mg capsule Take 40 mg by mouth daily. , Historical Med      amLODIPine (NORVASC) 2.5 mg tablet Take 2.5 mg by mouth daily. , Historical Med      rOPINIRole (REQUIP) 1 mg tablet Take 1 mg by mouth nightly. Indications: Patient can have 1 in the morning and 1 at night or 2 at night for a total of 2mg, Historical Med      aspirin (ASPIRIN) 325 mg tablet Take 325 mg by mouth nightly., Historical Med      simvastatin (ZOCOR) 20 mg tablet Take 20 mg by mouth nightly., Historical Med      oxybutynin chloride XL (DITROPAN XL) 5 mg CR tablet Take 5 mg by mouth nightly., Historical Med      folic acid (FOLVITE) 1 mg tablet Take 1 mg by mouth nightly., Historical Med      methotrexate (RHEUMATREX) 2.5 mg tablet Take 2.5 mg by mouth every Wednesday. Indications: 3 in the morning 3 at night, Historical Med      bimatoprost (LUMIGAN) 0.01 % ophthalmic drops Administer 1 Drop to left eye every evening., Historical Med      brimonidine-timolol (COMBIGAN) 0.2-0.5 % drop ophthalmic solution Administer 1 Drop to left eye two (2) times a day., Historical Med      ibandronate (BONIVA) 150 mg tablet Take 150 mg by mouth every thirty (30) days. , Historical Med      traMADol (ULTRAM) 50 mg tablet Take 50 mg by mouth every six (6) hours as needed for Pain., Historical Med      HYDROcodone-acetaminophen (NORCO) 5-325 mg per tablet Take 1 Tab by mouth every four (4) hours as needed for Pain., Historical Med      ferrous sulfate (IRON) 325 mg (65 mg iron) tablet Take 65 mg by mouth Daily (before breakfast). , Historical Med      ascorbic acid, vitamin C, (VITAMIN C) 250 mg tablet Take 250 mg by mouth daily. , Historical Med      docusate sodium (COLACE) 50 mg capsule Take 50 mg by mouth two (2) times a day., Historical Med      clonazePAM (KLONOPIN) 0.5 mg tablet Take 0.25 mg by mouth nightly., Historical Med      mirtazapine (REMERON) 30 mg tablet Take  by mouth nightly., Historical Med      predniSONE (DELTASONE) 10 mg tablet Take 30 mg by mouth daily. , Historical Med      clopidogrel (PLAVIX) 75 mg tablet Take 1 Tab by mouth daily. , Print, Disp-30 Tab, R-5      levothyroxine (SYNTHROID) 50 mcg tablet Take 1 Tab by mouth Daily (before breakfast). , Print, Disp-30 Tab, R-5             Follow-up: PCP  Discharge Condition: Stable    Activity: PT/OT per Home Health    Diet: Resume previous diet    Labs:  Labs: Results:       Chemistry No results for input(s): GLU, NA, K, CL, CO2, BUN, CREA, CA, AGAP, BUCR, TBIL, GPT, AP, TP, ALB, GLOB, AGRAT in the last 72 hours. CBC w/Diff No results for input(s): WBC, RBC, HGB, HCT, PLT, GRANS, LYMPH, EOS, HGBEXT, HCTEXT, PLTEXT in the last 72 hours. Cardiac Enzymes No results for input(s): CPK, CKND1, GONZALO in the last 72 hours. No lab exists for component: CKRMB, TROIP   Coagulation No results for input(s): PTP, INR, APTT in the last 72 hours. No lab exists for component: INREXT    Lipid Panel Lab Results   Component Value Date/Time    Cholesterol, total 126 02/17/2014 01:55 AM    HDL Cholesterol 35 02/17/2014 01:55 AM    LDL, calculated 54.6 02/17/2014 01:55 AM    VLDL, calculated 36.4 02/17/2014 01:55 AM    Triglyceride 182 02/17/2014 01:55 AM    CHOL/HDL Ratio 3.6 02/17/2014 01:55 AM      BNP No results for input(s): BNPP in the last 72 hours. Liver Enzymes No results for input(s): TP, ALB, TBIL, AP, SGOT, GPT in the last 72 hours. No lab exists for component: DBIL   Thyroid Studies Lab Results   Component Value Date/Time    TSH 1.02 08/30/2017 10:10 PM          Imaging:      MRI    1.  No significant interval change is seen. No acute infarct, mass effect, or  herniation.     2.  Stable encephalomalacic changes involving bilateral occipital lobes and  bilateral cerebellar hemispheres most likely representing chronic infarcts. Stable evidence of hemosiderin staining within the cerebellum and left occipital  lobe.       3. Bilateral chronic basal ganglia and deep hemispheric white matter infarcts  again seen.     4. Stable, moderate to severe nonspecific white matter disease likely  representing chronic small vessel changes.     5. Stable abnormal signal involving left ICA and right vertebral artery flow  voids correlating with known severe stenosis.     6. Stable upper cervical spine degenerative changes with anterior listhesis. Consider follow-up flexion and extension views if not already performed  elsewhere or symptomatic. Echo  Left ventricle: Systolic function was at the lower limits of normal. Ejection   fraction was estimated in the range of 50  % to 55 %. There was severe hypokinesis/akinesis of the apical wall(s). Wall   thickness was mildly increased. Features  were consistent with a pseudonormal left ventricular filling pattern, with   concomitant abnormal relaxation and  increased filling pressure (grade 2 diastolic dysfunction). Consults: None    Treatment Team: Treatment Team: Consulting Provider: Rosemary Jewell MD; Occupational Therapist: Liv Pappas OT; Care Manager: Lauren Blanchard RN    Significant Diagnostic Studies: labs: No results found for this or any previous visit (from the past 25 hour(s)).       Discharge diagnoses:    Problem List as of 9/1/2017  Date Reviewed: 3/27/2017          Codes Class Noted - Resolved    Syncope and collapse ICD-10-CM: R55  ICD-9-CM: 780.2  8/30/2017 - Present        * (Principal)Encephalopathy ICD-10-CM: G93.40  ICD-9-CM: 348.30  8/30/2017 - Present        Dehydration ICD-10-CM: E86.0  ICD-9-CM: 276.51  8/30/2017 - Present        Macrocytosis ICD-10-CM: D75.89  ICD-9-CM: 289.89  8/30/2017 - Present        Hypothyroid ICD-10-CM: E03.9  ICD-9-CM: 244.9  8/30/2017 - Present        CVA (cerebral vascular accident) ([facility] (Chronic) ICD-10-CM: I63.9  ICD-9-CM: 434.91  8/30/2017 - Present        COPD (chronic obstructive pulmonary disease) ([facility] ICD-10-CM: J44.9  ICD-9-CM: 496  5/24/2017 - Present        Cerebral vascular disease ICD-10-CM: I67.9  ICD-9-CM: 437.9  5/24/2017 - Present        Giant cell arteritis ([facility] ICD-10-CM: M31.6  ICD-9-CM: 446.5  5/24/2017 - Present        HTN (hypertension) ICD-10-CM: I10  ICD-9-CM: 401.9  5/24/2017 - Present        Acquired hypothyroidism ICD-10-CM: E03.9  ICD-9-CM: 244.9  5/24/2017 - Present        Fracture tibia/fibula ICD-10-CM: S82.209A, S82.409A  ICD-9-CM: 823.82  5/23/2017 - Present        Cervical spine fracture (HCC) ICD-10-CM: S12. 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 ICD-10-CM: I65.22  ICD-9-CM: 433.10  12/11/2015 - Present        RLS (restless legs syndrome) ICD-10-CM: G25.81  ICD-9-CM: 333.94  11/15/2014 - Present        Hypercholesteremia ICD-10-CM: E78.00  ICD-9-CM: 272.0  4/23/2014 - Present        Thyroid nodule ICD-10-CM: E04.1  ICD-9-CM: 241.0  4/23/2014 - Present    Overview Addendum 4/23/2014  7:52 PM by Gita Colby MD     Abnormal biopsy               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 ICD-10-CM: I63.9  ICD-9-CM: 433.21  2/17/2014 - Present        RESOLVED: Fracture, fibula, with tibia ICD-10-CM: S82.209A, S82.409A  ICD-9-CM: 823.82  5/23/2017 - 5/24/2017        RESOLVED: Altered mental state ICD-10-CM: R41.82  ICD-9-CM: 780.97  4/8/2017 - 5/24/2017        RESOLVED: Herpes labialis ICD-10-CM: B00.1  ICD-9-CM: 054.9  4/8/2017 - 5/24/2017        RESOLVED: Cellulitis of arm, right ICD-10-CM: L03.113  ICD-9-CM: 682.3  4/8/2017 - 5/24/2017        RESOLVED: Olecranon bursitis of right elbow ICD-10-CM: M70.21  ICD-9-CM: 726.33  3/27/2017 - 4/8/2017        RESOLVED: Acute bronchitis ICD-10-CM: J20.9  ICD-9-CM: 466.0  3/27/2017 - 4/8/2017        RESOLVED: Hyperkalemia ICD-10-CM: E87.5  ICD-9-CM: 276.7  3/27/2017 - 4/8/2017        RESOLVED: Dehydration ICD-10-CM: E86.0  ICD-9-CM: 276.51  3/26/2017 - 5/24/2017        RESOLVED: COPD (chronic obstructive pulmonary disease) ([facility] ICD-10-CM: J44.9  ICD-9-CM: 496  3/26/2017 - 5/24/2017        RESOLVED: SIRS (systemic inflammatory response syndrome) ([facility] ICD-10-CM: R65.10  ICD-9-CM: 995.90  3/26/2017 - 3/27/2017        RESOLVED: Hemoptysis ICD-10-CM: R04.2  ICD-9-CM: 786.30  3/26/2017 - 4/8/2017        RESOLVED: Right arm cellulitis ICD-10-CM: C27.847  ICD-9-CM: 682.3  3/26/2017 - 4/8/2017        RESOLVED: Acute encephalopathy ICD-10-CM: G93.40  ICD-9-CM: 348.30  3/26/2017 - 5/24/2017        RESOLVED: Trauma, blunt ICD-10-CM: T14.90  ICD-9-CM: 959.9  12/11/2015 - 4/8/2017        RESOLVED: Platelet dysfunction due to drugs ICD-10-CM: D69.59  ICD-9-CM: 287.49  12/11/2015 - 4/8/2017        RESOLVED: Weakness ICD-10-CM: R53.1  ICD-9-CM: 780.79  11/17/2014 - 4/8/2017        RESOLVED: Stroke ([facility] ICD-10-CM: I63.9  ICD-9-CM: 434.91  11/15/2014 - 5/24/2017    Overview Signed 11/17/2014  7:56 AM by Juan Uribe MD     Multiple old             RESOLVED: Giant cell arteritis ([facility] ICD-10-CM: M31.6  ICD-9-CM: 446.5  11/15/2014 - 5/24/2017        RESOLVED: Chronic pain ICD-10-CM: G89.29  ICD-9-CM: 338.29  11/15/2014 - 11/17/2014        RESOLVED: Hypothyroid ICD-10-CM: E03.9  ICD-9-CM: 244.9  11/15/2014 - 5/24/2017        RESOLVED: Drug-induced hepatic toxicity ICD-10-CM: T50.901A, K71.6  ICD-9-CM: 573.3, E980.5  4/24/2014 - 5/24/2017        RESOLVED: CVA (cerebrovascular accident) [facility]) ICD-10-CM: I63.9  ICD-9-CM: 434.91  4/23/2014 - 5/24/2017    Overview Signed 4/23/2014  7:49 PM by Farida Rai MD     Acute Rt.  Cerebellar infarcts             RESOLVED: HTN (hypertension) ICD-10-CM: I10  ICD-9-CM: 401.9  4/23/2014 - 5/24/2017        RESOLVED: Postmenopausal bleeding ICD-10-CM: N95.0  ICD-9-CM: 627.1  3/14/2014 - 4/23/2014        RESOLVED: CVA (cerebral infarction) ICD-10-CM: I63.9  ICD-9-CM: 434.91  3/8/2014 - 4/23/2014    Overview Signed 3/8/2014  1:00 PM by Farida Rai MD     Acute left              RESOLVED: CVA (cerebrovascular accident) [facility]) ICD-10-CM: I63.9  ICD-9-CM: 434.91  3/8/2014 - 4/23/2014    Overview Signed 3/8/2014  1:01 PM by Farida Rai MD     Old bilateral occipital infarcts             RESOLVED: Headache ICD-10-CM: R51  ICD-9-CM: 784.0  3/8/2014 - 4/23/2014        RESOLVED: HTN (hypertension) ICD-10-CM: I10  ICD-9-CM: 401.9  3/8/2014 - 4/23/2014        RESOLVED: Dyslipidemia ICD-10-CM: E78.5  ICD-9-CM: 272.4  3/8/2014 - 4/23/2014        RESOLVED: CVA (cerebral infarction) ICD-10-CM: I63.9  ICD-9-CM: 434.91  2/16/2014 - 3/8/2014    Overview Signed 2/16/2014 10:52 AM by Farida Rai MD     Multiple cerebellar infarcts             RESOLVED: Bacteremia ICD-10-CM: R78.81  ICD-9-CM: 790.7  2/16/2014 - 3/8/2014        RESOLVED: Nausea and vomiting ICD-10-CM: R11.2  ICD-9-CM: 787.01  2/15/2014 - 2/18/2014        RESOLVED: Ataxia ICD-10-CM: R27.0  ICD-9-CM: 781.3  2/15/2014 - 3/8/2014        RESOLVED: Dehydration ICD-10-CM: E86.0  ICD-9-CM: 276.51  2/15/2014 - 3/8/2014        RESOLVED: Lethargy ICD-10-CM: R53.83  ICD-9-CM: 780.79  2/15/2014 - 2/18/2014        RESOLVED: Basilar artery ischemia ICD-10-CM: G45.0  ICD-9-CM: 435.0  2/15/2014 - 3/8/2014        RESOLVED: Thyroid nodule-lt lobe-1.7cm ICD-10-CM: E04.1  ICD-9-CM: 241.0  2/5/2014 - 2/15/2014        RESOLVED: COPD (chronic obstructive pulmonary disease) ([facility] ICD-10-CM: J44.9  ICD-9-CM: 496  2/5/2014 - 2/15/2014        RESOLVED: PVD (peripheral vascular disease) ([facility] ICD-10-CM: I73.9  ICD-9-CM: 443.9  2/5/2014 - 2/15/2014        RESOLVED: HTN (hypertension) ICD-10-CM: I10  ICD-9-CM: 401.9  2/5/2014 - 2/15/2014        RESOLVED: Disorder of arteries and arterioles ([facility] ICD-10-CM: I77.9  ICD-9-CM: 447.9  1/28/2010 - 5/24/2017            Hospital Course:   Major issues addressed during hospitalization outlined  below. Sun Perkins is a 74 y.o. female with pmhx of stroke , vascular dementia , recent left femur fracture , RLS,HTN, copd, hypothyroid who presents with AMS . Patient  report patient was unresponsive around noon. Prior she had finished her home OT therapy and took a nap. She was difficult to arouse . He reports that patient has had a similar event and her PCP was going to work her up for seizures. Patient has no hx seizures. In ED she was seen by tele neurology and he recommended admission for encephalopathy to r/o seizures. Patient admitted for syncope work up that included Echo and MRI results as above. No findings of acute stroke found, telemetry wholly uneventful. EEG to r/o seizure was negative as well. Work up was completed and patient was discharged home in stable condition. Her AMS present on admission did improve some by day of dc. TSH B12 Folate and ammonia WNL.   PCP f/u in 1 week    America Stevenson MD  September 5, 2017        Total time spent 40 minutes

## 2017-09-06 VITALS
OXYGEN SATURATION: 98 % | HEART RATE: 97 BPM | RESPIRATION RATE: 16 BRPM | TEMPERATURE: 97 F | DIASTOLIC BLOOD PRESSURE: 76 MMHG | SYSTOLIC BLOOD PRESSURE: 138 MMHG

## 2017-09-07 ENCOUNTER — HOME CARE VISIT (OUTPATIENT)
Dept: SCHEDULING | Facility: HOME HEALTH | Age: 72
End: 2017-09-07
Payer: COMMERCIAL

## 2017-09-07 VITALS — DIASTOLIC BLOOD PRESSURE: 88 MMHG | SYSTOLIC BLOOD PRESSURE: 152 MMHG

## 2017-09-07 PROCEDURE — G0151 HHCP-SERV OF PT,EA 15 MIN: HCPCS

## 2017-09-08 ENCOUNTER — IMPORTED ENCOUNTER (OUTPATIENT)
Dept: URBAN - METROPOLITAN AREA CLINIC 1 | Facility: CLINIC | Age: 72
End: 2017-09-08

## 2017-09-08 PROBLEM — H47.013: Noted: 2017-09-08

## 2017-09-08 PROCEDURE — 92014 COMPRE OPH EXAM EST PT 1/>: CPT

## 2017-09-08 NOTE — PATIENT DISCUSSION
1.  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 Cont Combigan BID OS Cont Lumigan QHS OS. Steroids followed by Dr. Galan Fearing currently on Prednisone 30mg PO QD. Monocular safety precautions. Patient should continue to f/u as scheduled with Dr. Galan Fearing. 2.  Cataract OD: Observe for now without intervention. The patient was advised to contact us if any change or worsening of vision3. OHTN OU (0.8/0.75)- IOP improved on current meds. Cont Combigan OS only BID Continue with Latanoprost OS QHS. 4.  MARYJO w/ PEK OU- (H/o Plugs LLs OU) Cont ATs TID OU Routinely. 5.  PVD OD - stable RD precautions. 6.  Pseudophakia OS- H/o YAG Cap OSLetter to PCP/ Dr. Galan Fearing Return for an appointment in March 10 with Dr. Catarina Osorio.

## 2017-09-10 VITALS
HEART RATE: 68 BPM | RESPIRATION RATE: 18 BRPM | TEMPERATURE: 98.3 F | SYSTOLIC BLOOD PRESSURE: 160 MMHG | DIASTOLIC BLOOD PRESSURE: 88 MMHG

## 2017-09-12 ENCOUNTER — HOME CARE VISIT (OUTPATIENT)
Dept: SCHEDULING | Facility: HOME HEALTH | Age: 72
End: 2017-09-12
Payer: COMMERCIAL

## 2017-09-12 VITALS — DIASTOLIC BLOOD PRESSURE: 92 MMHG | HEART RATE: 96 BPM | SYSTOLIC BLOOD PRESSURE: 145 MMHG | OXYGEN SATURATION: 97 %

## 2017-09-12 VITALS
OXYGEN SATURATION: 99 % | DIASTOLIC BLOOD PRESSURE: 90 MMHG | TEMPERATURE: 96.9 F | RESPIRATION RATE: 20 BRPM | HEART RATE: 92 BPM | SYSTOLIC BLOOD PRESSURE: 168 MMHG

## 2017-09-12 PROCEDURE — G0496 LPN CARE TRAIN/EDU IN HH: HCPCS

## 2017-09-12 PROCEDURE — G0157 HHC PT ASSISTANT EA 15: HCPCS

## 2017-09-13 ENCOUNTER — HOME CARE VISIT (OUTPATIENT)
Dept: SCHEDULING | Facility: HOME HEALTH | Age: 72
End: 2017-09-13
Payer: COMMERCIAL

## 2017-09-13 VITALS
SYSTOLIC BLOOD PRESSURE: 160 MMHG | HEART RATE: 85 BPM | OXYGEN SATURATION: 96 % | DIASTOLIC BLOOD PRESSURE: 88 MMHG | TEMPERATURE: 97.7 F

## 2017-09-13 PROCEDURE — G0152 HHCP-SERV OF OT,EA 15 MIN: HCPCS

## 2017-09-13 NOTE — ANCILLARY DISCHARGE INSTRUCTIONS
[facility]  Discharge Phone Call       After-Care Discharge Phone Call Questions: no answer     Were you able to get your prescriptions filled? Comment:      [] Yes  []No    Comment if answer is \"No\"   Are you taking your medication(s) as your doctor ordered? Do you understand the purpose of your medications? Comment:    [] Yes  []No    Comment if answer is \"No\"   Are you taking any other medications that are not on the list?  Comment:      [] Yes  []No    Comment if answer is \"Yes\"   Do you have any questions about your medications? Are you aware of potential side effects? Comment:    [] Yes  []No    Comment if answer is \"Yes\"   Did you make your follow-up appointments (if the hospital did not do this before  discharge)? Comment:    [] Yes  []No    Comment if answer is \"No\"   Is there any reason you might not be able to keep your follow-up appointments? Comment:     [] Yes  []No    Comment if answer is \"Yes\"   Do you have any questions about your care plan? Are you aware of what health problems to be alert for? Comment:    [] Yes  []No    Comment if answer is \"Yes\"   Do you have a good understanding of how you should manage your health? Comment:    [] Yes  []No    Comment if answer is \"Yes\"   Do you know which symptoms to watch for that would mean you would need to call your doctor right away? Comment:      [] Yes  []No    Comment if answer is \"No\"   Do you have any questions about the follow up process or any instructions that we have provided? Comment:    [] Yes  []No    Comment if answer is \"Yes\"   Did staff take your preferences into account?         [] Yes  []No    Comment if answer is \"Yes\"

## 2017-09-14 ENCOUNTER — HOME CARE VISIT (OUTPATIENT)
Dept: SCHEDULING | Facility: HOME HEALTH | Age: 72
End: 2017-09-14
Payer: COMMERCIAL

## 2017-09-14 VITALS — OXYGEN SATURATION: 98 % | HEART RATE: 85 BPM | DIASTOLIC BLOOD PRESSURE: 83 MMHG | SYSTOLIC BLOOD PRESSURE: 152 MMHG

## 2017-09-14 PROCEDURE — G0157 HHC PT ASSISTANT EA 15: HCPCS

## 2017-09-15 ENCOUNTER — HOME CARE VISIT (OUTPATIENT)
Dept: HOME HEALTH SERVICES | Facility: HOME HEALTH | Age: 72
End: 2017-09-15
Payer: COMMERCIAL

## 2017-09-15 ENCOUNTER — HOME CARE VISIT (OUTPATIENT)
Dept: SCHEDULING | Facility: HOME HEALTH | Age: 72
End: 2017-09-15
Payer: COMMERCIAL

## 2017-09-15 VITALS — HEART RATE: 85 BPM | DIASTOLIC BLOOD PRESSURE: 85 MMHG | OXYGEN SATURATION: 97 % | SYSTOLIC BLOOD PRESSURE: 142 MMHG

## 2017-09-15 PROCEDURE — G0157 HHC PT ASSISTANT EA 15: HCPCS

## 2017-09-18 ENCOUNTER — HOME CARE VISIT (OUTPATIENT)
Dept: SCHEDULING | Facility: HOME HEALTH | Age: 72
End: 2017-09-18
Payer: COMMERCIAL

## 2017-09-18 VITALS
TEMPERATURE: 97.6 F | SYSTOLIC BLOOD PRESSURE: 130 MMHG | HEART RATE: 84 BPM | DIASTOLIC BLOOD PRESSURE: 88 MMHG | OXYGEN SATURATION: 98 % | RESPIRATION RATE: 18 BRPM

## 2017-09-18 PROCEDURE — G0299 HHS/HOSPICE OF RN EA 15 MIN: HCPCS

## 2017-10-11 ENCOUNTER — APPOINTMENT (OUTPATIENT)
Dept: GENERAL RADIOLOGY | Age: 72
End: 2017-10-11
Attending: EMERGENCY MEDICINE
Payer: COMMERCIAL

## 2017-10-11 ENCOUNTER — APPOINTMENT (OUTPATIENT)
Dept: CT IMAGING | Age: 72
End: 2017-10-11
Attending: EMERGENCY MEDICINE
Payer: COMMERCIAL

## 2017-10-11 ENCOUNTER — HOSPITAL ENCOUNTER (EMERGENCY)
Age: 72
Discharge: HOME | End: 2017-10-11
Attending: EMERGENCY MEDICINE | Admitting: EMERGENCY MEDICINE
Payer: COMMERCIAL

## 2017-10-11 VITALS
OXYGEN SATURATION: 99 % | RESPIRATION RATE: 16 BRPM | BODY MASS INDEX: 23.56 KG/M2 | DIASTOLIC BLOOD PRESSURE: 68 MMHG | HEART RATE: 77 BPM | WEIGHT: 120 LBS | TEMPERATURE: 97.9 F | SYSTOLIC BLOOD PRESSURE: 160 MMHG | HEIGHT: 60 IN

## 2017-10-11 DIAGNOSIS — M54.6 ACUTE LEFT-SIDED THORACIC BACK PAIN: ICD-10-CM

## 2017-10-11 DIAGNOSIS — S51.011A ELBOW LACERATION, RIGHT, INITIAL ENCOUNTER: ICD-10-CM

## 2017-10-11 DIAGNOSIS — S70.02XA CONTUSION OF LEFT HIP, INITIAL ENCOUNTER: ICD-10-CM

## 2017-10-11 DIAGNOSIS — S09.90XA INJURY OF HEAD, INITIAL ENCOUNTER: ICD-10-CM
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
## 2017-10-11 DIAGNOSIS — I67.9 INTRACRANIAL VASCULAR STENOSIS: Primary | ICD-10-CM

## 2017-10-11 LAB
ALBUMIN SERPL-MCNC: 3.4 G/DL (ref 3.4–5)
ALBUMIN/GLOB SERPL: 1.2 {RATIO} (ref 0.8–1.7)
ALP SERPL-CCNC: 57 U/L (ref 45–117)
ALT SERPL-CCNC: 29 U/L (ref 13–56)
ANION GAP SERPL CALC-SCNC: 6 MMOL/L (ref 3–18)
APPEARANCE UR: CLEAR
AST SERPL-CCNC: 21 U/L (ref 15–37)
BACTERIA URNS QL MICRO: NEGATIVE /HPF
BILIRUB SERPL-MCNC: 0.2 MG/DL (ref 0.2–1)
BILIRUB UR QL: NEGATIVE
BUN SERPL-MCNC: 15 MG/DL (ref 7–18)
BUN/CREAT SERPL: 28 (ref 12–20)
CALCIUM SERPL-MCNC: 9.1 MG/DL (ref 8.5–10.1)
CHLORIDE SERPL-SCNC: 109 MMOL/L (ref 100–108)
CK MB CFR SERPL CALC: ABNORMAL % (ref 0–4)
CK MB SERPL-MCNC: <1 NG/ML (ref 5–25)
CK SERPL-CCNC: 22 U/L (ref 26–192)
CO2 SERPL-SCNC: 27 MMOL/L (ref 21–32)
COLOR UR: YELLOW
CREAT SERPL-MCNC: 0.53 MG/DL (ref 0.6–1.3)
EPITH CASTS URNS QL MICRO: NORMAL /LPF (ref 0–5)
ERYTHROCYTE [DISTWIDTH] IN BLOOD BY AUTOMATED COUNT: 18.2 % (ref 11.6–14.5)
GLOBULIN SER CALC-MCNC: 2.8 G/DL (ref 2–4)
GLUCOSE SERPL-MCNC: 137 MG/DL (ref 74–99)
GLUCOSE UR STRIP.AUTO-MCNC: NEGATIVE MG/DL
HCT VFR BLD AUTO: 37.1 % (ref 35–45)
HGB BLD-MCNC: 11.2 G/DL (ref 12–16)
HGB UR QL STRIP: NEGATIVE
KETONES UR QL STRIP.AUTO: NEGATIVE MG/DL
LEUKOCYTE ESTERASE UR QL STRIP.AUTO: ABNORMAL
MCH RBC QN AUTO: 32.3 PG (ref 24–34)
MCHC RBC AUTO-ENTMCNC: 30.2 G/DL (ref 31–37)
MCV RBC AUTO: 106.9 FL (ref 74–97)
NITRITE UR QL STRIP.AUTO: NEGATIVE
PH UR STRIP: 7 [PH] (ref 5–8)
PLATELET # BLD AUTO: 424 K/UL (ref 135–420)
PMV BLD AUTO: 10.7 FL (ref 9.2–11.8)
POTASSIUM SERPL-SCNC: 4.3 MMOL/L (ref 3.5–5.5)
PROT SERPL-MCNC: 6.2 G/DL (ref 6.4–8.2)
PROT UR STRIP-MCNC: NEGATIVE MG/DL
RBC # BLD AUTO: 3.47 M/UL (ref 4.2–5.3)
RBC #/AREA URNS HPF: 0 /HPF (ref 0–5)
SODIUM SERPL-SCNC: 142 MMOL/L (ref 136–145)
SP GR UR REFRACTOMETRY: 1.01 (ref 1–1.03)
TROPONIN I SERPL-MCNC: 0.02 NG/ML (ref 0–0.04)
UROBILINOGEN UR QL STRIP.AUTO: 0.2 EU/DL (ref 0.2–1)
WBC # BLD AUTO: 11.2 K/UL (ref 4.6–13.2)
WBC URNS QL MICRO: NORMAL /HPF (ref 0–4)

## 2017-10-11 PROCEDURE — 77030022017 HC DRSG HEMO QCLOT ZMED -A

## 2017-10-11 PROCEDURE — 90715 TDAP VACCINE 7 YRS/> IM: CPT | Performed by: EMERGENCY MEDICINE

## 2017-10-11 PROCEDURE — 70450 CT HEAD/BRAIN W/O DYE: CPT

## 2017-10-11 PROCEDURE — 74011250637 HC RX REV CODE- 250/637: Performed by: EMERGENCY MEDICINE

## 2017-10-11 PROCEDURE — 85027 COMPLETE CBC AUTOMATED: CPT | Performed by: EMERGENCY MEDICINE

## 2017-10-11 PROCEDURE — 99284 EMERGENCY DEPT VISIT MOD MDM: CPT

## 2017-10-11 PROCEDURE — 77030018836 HC SOL IRR NACL ICUM -A

## 2017-10-11 PROCEDURE — 80053 COMPREHEN METABOLIC PANEL: CPT | Performed by: EMERGENCY MEDICINE

## 2017-10-11 PROCEDURE — 90471 IMMUNIZATION ADMIN: CPT

## 2017-10-11 PROCEDURE — 73502 X-RAY EXAM HIP UNI 2-3 VIEWS: CPT

## 2017-10-11 PROCEDURE — 82550 ASSAY OF CK (CPK): CPT | Performed by: EMERGENCY MEDICINE

## 2017-10-11 PROCEDURE — 74011250636 HC RX REV CODE- 250/636: Performed by: EMERGENCY MEDICINE

## 2017-10-11 PROCEDURE — 81001 URINALYSIS AUTO W/SCOPE: CPT | Performed by: EMERGENCY MEDICINE

## 2017-10-11 PROCEDURE — 77030011943

## 2017-10-11 PROCEDURE — 77030036549 HC CLLR CERV EMT SEMI-RID PREFB S2SG -B

## 2017-10-11 PROCEDURE — 72125 CT NECK SPINE W/O DYE: CPT

## 2017-10-11 PROCEDURE — 71010 XR CHEST SNGL V: CPT

## 2017-10-11 PROCEDURE — 51701 INSERT BLADDER CATHETER: CPT

## 2017-10-11 PROCEDURE — 75810000293 HC SIMP/SUPERF WND  RPR

## 2017-10-11 RX ORDER — SODIUM CHLORIDE 9 MG/ML
250 INJECTION, SOLUTION INTRAVENOUS ONCE
Status: DISCONTINUED | OUTPATIENT
Start: 2017-10-11 | End: 2017-10-11

## 2017-10-11 RX ORDER — ACETAMINOPHEN 500 MG
1000 TABLET ORAL
Status: COMPLETED | OUTPATIENT
Start: 2017-10-11 | End: 2017-10-11

## 2017-10-11 RX ADMIN — ACETAMINOPHEN 1000 MG: 500 TABLET ORAL at 19:24

## 2017-10-11 RX ADMIN — TETANUS TOXOID, REDUCED DIPHTHERIA TOXOID AND ACELLULAR PERTUSSIS VACCINE, ADSORBED 0.5 ML: 5; 2.5; 8; 8; 2.5 SUSPENSION INTRAMUSCULAR at 19:26

## 2017-10-11 NOTE — ED NOTES
Patient provided meal tray at this time. Patient ambulated with  at bedside and Dr. Jessa Abdullahi at bedside.  and patient both stating patient is at baseline level and want patient to go home rather than admission. Patient c/o pain and cleared from C-collar per Dr. Jessa Abdullahi.  Tylenol will now be given

## 2017-10-11 NOTE — ED NOTES
Vitals:  Patient Vitals for the past 12 hrs:   Temp Pulse Resp BP SpO2   10/11/17 1940 - - - (!) 167/98 (!) 72 %   10/11/17 1912 - - - - 100 %   10/11/17 1910 - - - (!) 173/119 -   10/11/17 1634 97.9 °F (36.6 °C) 77 16 (!) 187/104 98 %         Medications ordered:   Medications   diph,Pertuss(AC),Tet Vac-PF (BOOSTRIX) suspension 0.5 mL (0.5 mL IntraMUSCular Given 10/11/17 1926)   acetaminophen (TYLENOL) tablet 1,000 mg (1,000 mg Oral Given 10/11/17 1924)         Lab findings:  Recent Results (from the past 12 hour(s))   CBC W/O DIFF    Collection Time: 10/11/17  5:30 PM   Result Value Ref Range    WBC 11.2 4.6 - 13.2 K/uL    RBC 3.47 (L) 4.20 - 5.30 M/uL    HGB 11.2 (L) 12.0 - 16.0 g/dL    HCT 37.1 35.0 - 45.0 %    .9 (H) 74.0 - 97.0 FL    MCH 32.3 24.0 - 34.0 PG    MCHC 30.2 (L) 31.0 - 37.0 g/dL    RDW 18.2 (H) 11.6 - 14.5 %    PLATELET 304 (H) 583 - 420 K/uL    MPV 10.7 9.2 - 06.5 FL   METABOLIC PANEL, COMPREHENSIVE    Collection Time: 10/11/17  5:30 PM   Result Value Ref Range    Sodium 142 136 - 145 mmol/L    Potassium 4.3 3.5 - 5.5 mmol/L    Chloride 109 (H) 100 - 108 mmol/L    CO2 27 21 - 32 mmol/L    Anion gap 6 3.0 - 18 mmol/L    Glucose 137 (H) 74 - 99 mg/dL    BUN 15 7.0 - 18 MG/DL    Creatinine 0.53 (L) 0.6 - 1.3 MG/DL    BUN/Creatinine ratio 28 (H) 12 - 20      GFR est AA >60 >60 ml/min/1.73m2    GFR est non-AA >60 >60 ml/min/1.73m2    Calcium 9.1 8.5 - 10.1 MG/DL    Bilirubin, total 0.2 0.2 - 1.0 MG/DL    ALT (SGPT) 29 13 - 56 U/L    AST (SGOT) 21 15 - 37 U/L    Alk.  phosphatase 57 45 - 117 U/L    Protein, total 6.2 (L) 6.4 - 8.2 g/dL    Albumin 3.4 3.4 - 5.0 g/dL    Globulin 2.8 2.0 - 4.0 g/dL    A-G Ratio 1.2 0.8 - 1.7     CARDIAC PANEL,(CK, CKMB & TROPONIN)    Collection Time: 10/11/17  5:30 PM   Result Value Ref Range    CK 22 (L) 26 - 192 U/L    CK - MB <1.0 <3.6 ng/ml    CK-MB Index  0.0 - 4.0 %     CALCULATION NOT PERFORMED WHEN RESULT IS BELOW LINEAR LIMIT    Troponin-I, Qt. 0.02 0.0 - 0.045 NG/ML   URINALYSIS W/ RFLX MICROSCOPIC    Collection Time: 10/11/17  7:04 PM   Result Value Ref Range    Color YELLOW      Appearance CLEAR      Specific gravity 1.010 1.005 - 1.030      pH (UA) 7.0 5.0 - 8.0      Protein NEGATIVE  NEG mg/dL    Glucose NEGATIVE  NEG mg/dL    Ketone NEGATIVE  NEG mg/dL    Bilirubin NEGATIVE  NEG      Blood NEGATIVE  NEG      Urobilinogen 0.2 0.2 - 1.0 EU/dL    Nitrites NEGATIVE  NEG      Leukocyte Esterase TRACE (A) NEG     URINE MICROSCOPIC ONLY    Collection Time: 10/11/17  7:04 PM   Result Value Ref Range    WBC 0 to 3 0 - 4 /hpf    RBC 0 0 - 5 /hpf    Epithelial cells FEW 0 - 5 /lpf    Bacteria NEGATIVE  NEG /hpf       EKG interpretation by ED Physician:      X-Ray, CT or other radiology findings or impressions:  CT HEAD WO CONT   Final Result      CT SPINE CERV WO CONT   Final Result      XR HIP LT W OR WO PELV 2-3 VWS    (Results Pending)   XR CHEST SNGL V    (Results Pending)     cxr with old fx's  Progress notes, Consult notes or additional Procedure notes:   T/o from dr mota to f/u on cxr reading for possible rib fx    I have discussed with patient and/or family/sig other the results, interpretation of any imaging if performed, suspected diagnosis and treatment plan to include instructions regarding the diagnoses listed to which understanding was expressed with all questions answered      Reevaluation of patient:   Stable for dc3    Disposition:  Diagnosis:   1. Fall, initial encounter    2. Injury of head, initial encounter    3. Contusion of left hip, initial encounter    4. Acute left-sided thoracic back pain    5.  Elbow laceration, right, initial encounter        Disposition: home      Follow-up Information     Follow up With Details Comments Contact Info    Scott Livingston MD Call in 1 day Follow Up From Emergency Department [facility]   [address] [facility] Internal Medicine PD  [address] [facility] EMERGENCY DEPT In 2 days  [address] [place] [id number]  [phone]            Patient's Medications   Start Taking    No medications on file   Continue Taking    AMLODIPINE (NORVASC) 2.5 MG TABLET    Take 2.5 mg by mouth daily. ASCORBIC ACID, VITAMIN C, (VITAMIN C) 250 MG TABLET    Take 250 mg by mouth daily. ASPIRIN (ASPIRIN) 325 MG TABLET    Take 325 mg by mouth nightly. BIMATOPROST (LUMIGAN) 0.01 % OPHTHALMIC DROPS    Administer 1 Drop to left eye every evening. BRIMONIDINE-TIMOLOL (COMBIGAN) 0.2-0.5 % DROP OPHTHALMIC SOLUTION    Administer 1 Drop to left eye two (2) times a day. CLONAZEPAM (KLONOPIN) 0.5 MG TABLET    Take 0.25 mg by mouth nightly. CLOPIDOGREL (PLAVIX) 75 MG TABLET    Take 1 Tab by mouth daily. DOCUSATE SODIUM (COLACE) 50 MG CAPSULE    Take 50 mg by mouth two (2) times a day. FERROUS SULFATE (IRON) 325 MG (65 MG IRON) TABLET    Take 65 mg by mouth Daily (before breakfast). FLUOXETINE (PROZAC) 40 MG CAPSULE    Take 40 mg by mouth daily. FOLIC ACID (FOLVITE) 1 MG TABLET    Take 1 mg by mouth nightly. HYDROCODONE-ACETAMINOPHEN (NORCO) 5-325 MG PER TABLET    Take 1 Tab by mouth every four (4) hours as needed for Pain. IBANDRONATE (BONIVA) 150 MG TABLET    Take 150 mg by mouth every thirty (30) days. LEVOTHYROXINE (SYNTHROID) 50 MCG TABLET    Take 1 Tab by mouth Daily (before breakfast). METHOTREXATE (RHEUMATREX) 2.5 MG TABLET    Take 2.5 mg by mouth every Wednesday. Indications: 3 in the morning 3 at night    MIRTAZAPINE (REMERON) 30 MG TABLET    Take  by mouth nightly. OXYBUTYNIN CHLORIDE XL (DITROPAN XL) 5 MG CR TABLET    Take 5 mg by mouth nightly. PREDNISONE (DELTASONE) 10 MG TABLET    Take 30 mg by mouth daily. ROPINIROLE (REQUIP) 1 MG TABLET    Take 1 mg by mouth nightly. Indications: Patient can have 1 in the morning and 1 at night or 2 at night for a total of 2mg    SIMVASTATIN (ZOCOR) 20 MG TABLET    Take 20 mg by mouth nightly.     TRAMADOL (ULTRAM) 50 MG TABLET Take 50 mg by mouth every six (6) hours as needed for Pain.    These Medications have changed    No medications on file   Stop Taking    No medications on file

## 2017-10-11 NOTE — ED NOTES
Patient returned from CT on back board and pediatric c-collar placed on patient due to possible fracture in neck per CT tech

## 2017-10-11 NOTE — ED NOTES
Dr. Gisella Black called to bedside to evaluate right elbow for stitches.  Patient in bloody clothes and cleaned at this time

## 2017-10-11 NOTE — ED TRIAGE NOTES
Patient arrived by EMS with chief c/o fall from stairs from wheelchair approximately 4 steps. PAtient denies LOC.  Patient has multiple abrasions to left wrist, right arm, right and left leg

## 2017-10-11 NOTE — ED PROVIDER NOTES
HPI Comments: River Chou is a 74 y.o. female who presents to the ED per EMS s/p mechanical fall just PTA. Pt fell while in her wheelchair from 4 steps onto the pavement. 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 Pt fell onto her face and claims she does not remember certain parts of the fall. Associated sx include constant neck pain, constant L hip pain. Pt denies HA, LOC, new extremity weakness or numbness. Pt is on blood thinners. No hx of A-fib. PMHx of HTN, giant cell arteritis, COPD, hypothyroidism, C-spine fx, LVA and RVA occlusion with infarct, CAD. PSHx of stent placement. The history is provided by the patient and the EMS personnel. 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      Social History Main Topics    Smoking status: Former Smoker     Types: Cigarettes     Quit date: 4/28/2013    Smokeless tobacco: Never Used    Alcohol use 1.0 oz/week     2 Shots of liquor per week    Drug use: No    Sexual activity: Yes Partners: Male     Other Topics Concern    Not on file     Social History Narrative         ALLERGIES: Sulfa (sulfonamide antibiotics) and Sulfamethoxazole-trimethoprim    Review of Systems   Constitutional: Negative for activity change, fatigue and fever. HENT: Negative for congestion and rhinorrhea. Eyes: Negative for visual disturbance. Respiratory: Negative for shortness of breath. Cardiovascular: Negative for chest pain and palpitations. Gastrointestinal: Negative for abdominal pain, diarrhea, nausea and vomiting. Genitourinary: Negative for dysuria and hematuria. Musculoskeletal: Positive for neck pain. Negative for back pain. L hip pain     Skin: Positive for wound (Multiple abrasions on body). Negative for rash. Neurological: Negative for dizziness, syncope, weakness, light-headedness, numbness and headaches. Psychiatric/Behavioral: Negative for agitation. All other systems reviewed and are negative. Vitals:    10/11/17 1910 10/11/17 1912 10/11/17 1940 10/11/17 2010   BP: (!) 173/119  (!) 167/98 160/68   Pulse:       Resp:       Temp:       SpO2:  100% (!) 72% 99%   Weight:       Height:                Physical Exam   Constitutional: She is oriented to person, place, and time. She appears well-developed and well-nourished. No distress. HENT:   Head: Normocephalic and atraumatic. Right Ear: External ear normal.   Left Ear: External ear normal.   Nose: Nose normal.   Mouth/Throat: Oropharynx is clear and moist.   No sign of head injury   Eyes: Conjunctivae and EOM are normal. Pupils are equal, round, and reactive to light. No scleral icterus. Neck: Normal range of motion. Neck supple. No JVD present. No tracheal deviation present. No thyromegaly present. Mild lower C-Spine tenderness   Cardiovascular: Normal rate and regular rhythm. Exam reveals no friction rub. No murmur heard.   Pulses:       Dorsalis pedis pulses are 2+ on the right side, and 2+ on the left side.   Pulmonary/Chest: Effort normal and breath sounds normal. No stridor. She exhibits no tenderness. Abdominal: Soft. Bowel sounds are normal. She exhibits no distension and no pulsatile midline mass. There is no tenderness. There is no rebound and no guarding. Musculoskeletal: Normal range of motion. She exhibits no edema or tenderness. LLE not shortened can be straightened out  L hip significantly tender to palpation  No bilateral knee bony tenderness   Lymphadenopathy:     She has no cervical adenopathy. Neurological: She is alert and oriented to person, place, and time. No cranial nerve deficit. Coordination normal.   Skin: Skin is warm and dry. No head bruise or ecchymosis  L distal forearm bruise  No abd bruising  RLE multiple lateral abrasions, skin tears that appear chronic  Multiple extremity bruises that appear at different stages  L knee 2x2cm superficial degloving  R knee 2x2cm superficial degloving   Psychiatric: She has a normal mood and affect. Her behavior is normal. Judgment and thought content normal.   Nursing note and vitals reviewed. MDM  Number of Diagnoses or Management Options  Diagnosis management comments: Pt with fall, no LOC, head injury, C-spine pain. L hip tenderness no malalignment of hip on exam. Multiple bruising, hx of blood thinners and CVA. Pt will need CT scan of brain, C-spine, L hip X-ray, tetanus immunization, and re-evaluation. ED Course       Wound Repair  Date/Time: 10/11/2017 6:45 PM  Location details: right elbow  Wound length:2.5 cm or less    Anesthesia:  Local Anesthetic: lidocaine 1% without epinephrine  Foreign bodies: no foreign bodies  Irrigation solution: saline  Debridement: minimal  Wound skin closure material used: 3 nylon. Number of sutures: 6  Technique: interrupted  Approximation: close  Dressing: antibiotic ointment (Kerlex)  My total time at bedside, performing this procedure was 1-15 minutes.   Comments: Sutures need to come out in 8 days because of poor healing and fragile tissue. Vitals:  No data found. Medications ordered:   Medications   diph,Pertuss(AC),Tet Vac-PF (BOOSTRIX) suspension 0.5 mL (0.5 mL IntraMUSCular Given 10/11/17 1926)   acetaminophen (TYLENOL) tablet 1,000 mg (1,000 mg Oral Given 10/11/17 1924)         Lab findings:  No results found for this or any previous visit (from the past 12 hour(s)). EKG interpretation by ED Physician:    X-Ray, CT or other radiology findings or impressions:  CT HEAD WO CONT    As read by RAD:  IMPRESSION:     No acute intracranial abnormalities. CT SPINE CERV WO CONT    As read by RAD:IMPRESSION:  There are old fractures of C2 and the spinous process of C7 similar  to prior exam.     Anterolisthesis at C2-C3, C3-C4 and C4-C5 similar in appearance to the prior  exam.     No new fractures or listhesis seen. No prevertebral soft tissue swelling.     If patient has myelopathic symptoms then advise MRI     XR HIP LT W OR WO PELV 2-3 VWS    As read by Marilu King MD, evidence of arterial stents placed in past, otherwise unremarkable. Progress notes, Consult notes or additional Procedure notes:       18:25 RAD called and stated C-spine fx is old from similar prior images in the past that have been used for comparison. 18:29 Consult:  Discussed care with Dr. Philomena Lemus, pt's PCP. Standard discussion; including history of patients chief complaint, available diagnostic results, and treatment course. I explained that there is some CT scan finding but that it is chronic. Philomena Lemus prefers pt not be admitted because there is a risk of decompensation for her staying in hospital. He would rather she go home if there is no acute injury. Will talk to pt's  and re-evaluate to see if discharge is possible. 19:09  at bedside both he and pt want to go home. Pt does a lot better at home and decompensates in hospital. Philomena Lemus echoed same concerns.  CT brain and spine show no new fx or injuries. I have sutured elbow. Pt has little difficulty on her own and can sustain her weight. 1920:  Pt weight bearing, no left hip tenderness now  Ambulated with assistance and is at baseline per   Comfortable going home  No midline spinal tenderness, only left trapezius and back tenderness. This is progressive since present in Ed. Pt states she still wants to go home and she will allow us to get xray.  agrees and wants her to go home as well. Disposition:  Diagnosis:   1. Fall, initial encounter    2. Injury of head, initial encounter    3. Contusion of left hip, initial encounter    4. Acute left-sided thoracic back pain    5. Elbow laceration, right, initial encounter        Disposition: Discharge    Follow-up Information     Follow up With Details Comments Contact Info    Nicole Lambert MD Call in 1 day Follow Up From Emergency Department [facility] [address]   [address] [facility] Internal Medicine PD  [phone]      [facility] EMERGENCY DEPT In 2 days  [address]  [phone]           Discharge Medication List as of 10/11/2017  7:32 PM      CONTINUE these medications which have NOT CHANGED    Details   FLUoxetine (PROZAC) 40 mg capsule Take 40 mg by mouth daily. , Historical Med      amLODIPine (NORVASC) 2.5 mg tablet Take 2.5 mg by mouth daily. , Historical Med      rOPINIRole (REQUIP) 1 mg tablet Take 1 mg by mouth nightly.  Indications: Patient can have 1 in the morning and 1 at night or 2 at night for a total of 2mg, Historical Med      aspirin (ASPIRIN) 325 mg tablet Take 325 mg by mouth nightly., Historical Med      simvastatin (ZOCOR) 20 mg tablet Take 20 mg by mouth nightly., Historical Med      oxybutynin chloride XL (DITROPAN XL) 5 mg CR tablet Take 5 mg by mouth nightly., Historical Med      folic acid (FOLVITE) 1 mg tablet Take 1 mg by mouth nightly., Historical Med      methotrexate (RHEUMATREX) 2.5 mg tablet Take 2.5 mg by mouth every Wednesday. Indications: 3 in the morning 3 at night, Historical Med      bimatoprost (LUMIGAN) 0.01 % ophthalmic drops Administer 1 Drop to left eye every evening., Historical Med      brimonidine-timolol (COMBIGAN) 0.2-0.5 % drop ophthalmic solution Administer 1 Drop to left eye two (2) times a day., Historical Med      ibandronate (BONIVA) 150 mg tablet Take 150 mg by mouth every thirty (30) days. , Historical Med      traMADol (ULTRAM) 50 mg tablet Take 50 mg by mouth every six (6) hours as needed for Pain., Historical Med      HYDROcodone-acetaminophen (NORCO) 5-325 mg per tablet Take 1 Tab by mouth every four (4) hours as needed for Pain., Historical Med      ferrous sulfate (IRON) 325 mg (65 mg iron) tablet Take 65 mg by mouth Daily (before breakfast). , Historical Med      ascorbic acid, vitamin C, (VITAMIN C) 250 mg tablet Take 250 mg by mouth daily. , Historical Med      docusate sodium (COLACE) 50 mg capsule Take 50 mg by mouth two (2) times a day., Historical Med      clonazePAM (KLONOPIN) 0.5 mg tablet Take 0.25 mg by mouth nightly., Historical Med      mirtazapine (REMERON) 30 mg tablet Take  by mouth nightly., Historical Med      predniSONE (DELTASONE) 10 mg tablet Take 30 mg by mouth daily. , Historical Med      clopidogrel (PLAVIX) 75 mg tablet Take 1 Tab by mouth daily. , Print, Disp-30 Tab, R-5      levothyroxine (SYNTHROID) 50 mcg tablet Take 1 Tab by mouth Daily (before breakfast). , Print, Disp-30 Tab, R-5               Scribe Attestation      Jaclyn Chapmanjames acting as a scribe for and in the presence of Teresa Mars MD      October 12, 2017 at 10:24 AM       Provider Attestation:      I personally performed the services described in the documentation, reviewed the documentation, as recorded by the scribe in my presence, and it accurately and completely records my words and actions.  October 12, 2017 at 10:24 AM - Teresa Mars MD

## 2017-10-11 NOTE — ED NOTES
Patient c/o neck pain. Attempted c-collar placement per verbal order from Dr. Noreen Cabot at bedside. Patients neck too short for c-collar placement. . Dr. Noreen Cabot and nurse made make shift c-collar with towels to hold patients neck still with tape

## 2017-10-12 NOTE — DISCHARGE INSTRUCTIONS
Cuts Left Open: Care Instructions  Your Care Instructions    A cut can happen anywhere on your body. Sometimes a cut can injure the tendons, blood vessels, or nerves. A cut may be left open instead of being closed with stitches, staples, or adhesive. A cut may be left open when it is likely to become infected, because closing it can make infection even more likely. You will probably have a bandage. The doctor may want the cut to stay open the whole time it heals. This happens with some cuts when too much time has gone by since the cut happened. Or the doctor may tell you to come back to have the cut closed in 4 to 5 days, when there is less chance of infection. If the cut stays open while healing, your scar may be larger than if the cut was closed. But you can get treatment later to make the scar smaller. The doctor has checked you carefully, but problems can develop later. If you notice any problems or new symptoms, get medical treatment right away. Follow-up care is a key part of your treatment and safety. Be sure to make and go to all appointments, and call your doctor if you are having problems. It's also a good idea to know your test results and keep a list of the medicines you take. How can you care for yourself at home? · Keep the cut dry for the first 24 to 48 hours. After this, you can shower if your doctor okays it. Pat the cut dry. · Don't soak the cut, such as in a bathtub. Your doctor will tell you when it's safe to get the cut wet. · If your doctor told you how to care for your cut, follow your doctor's instructions. If you did not get instructions, follow this general advice:  ¨ After the first 24 to 48 hours, wash the cut with clean water 2 times a day. Don't use hydrogen peroxide or alcohol, which can slow healing. ¨ You may cover the cut with a thin layer of petroleum jelly, such as Vaseline, and a nonstick bandage.   ¨ Apply more petroleum jelly and replace the bandage as needed. · Prop up the injured area on a pillow anytime you sit or lie down during the next 3 days. Try to keep it above the level of your heart. This will help reduce swelling. · Avoid any activity that could cause your cut to get worse. · Take pain medicines exactly as directed. ¨ If the doctor gave you a prescription medicine for pain, take it as prescribed. ¨ If you are not taking a prescription pain medicine, ask your doctor if you can take an over-the-counter medicine. When should you call for help? Call your doctor now or seek immediate medical care if:  · You have new pain, or your pain gets worse. · The cut starts to bleed, and blood soaks through the bandage. Oozing small amounts of blood is normal.  · The skin near the cut is cold or pale or changes color. · You have tingling, weakness, or numbness near the cut. · You have trouble moving the area near the cut. · You have symptoms of infection, such as:  ¨ Increased pain, swelling, warmth, or redness around the cut. ¨ Red streaks leading from the cut. ¨ Pus draining from the cut. ¨ A fever. Watch closely for changes in your health, and be sure to contact your doctor if:  · The cut is not closing (getting smaller). · You do not get better as expected. Where can you learn more? Go to http://emanuel-williams.info/. Enter [id number] in the search box to learn more about \"Cuts Left Open: Care Instructions. \"  Current as of: March 20, 2017  Content Version: 11.3  © 5661-4216 [email or web address]. Care instructions adapted under license by [employer] (which disclaims liability or warranty for this information). If you have questions about a medical condition or this instruction, always ask your healthcare professional. [place] [address] any warranty or liability for your use of this information. Learning About a Closed Head Injury  What is a closed head injury?     A closed head injury happens when your head gets hit hard. The strong force of the blow causes your brain to shake in your skull. This movement can cause the brain to bruise, swell, or tear. Sometimes nerves or blood vessels also get damaged. This can cause bleeding in or around the brain. A concussion is a type of closed head injury. What are the symptoms? If you have a mild concussion, you may have a mild headache or feel \"not quite right. \" These symptoms are common. They usually go away over a few days to 4 weeks. But sometimes after a concussion, you feel like you can't function as well as before the injury. And you have new symptoms. This is called postconcussive syndrome. You may:  · Find it harder to solve problems, think, concentrate, or remember. · Have headaches. · Have changes in your sleep patterns, such as not being able to sleep or sleeping all the time. · Have changes in your personality. · Not be interested in your usual activities. · Feel angry or anxious without a clear reason. · Lose your sense of taste or smell. · Be dizzy, lightheaded, or unsteady. It may be hard to stand or walk. How is a closed head injury treated? Any person who may have a concussion needs to see a doctor. Some people have to stay in the hospital to be watched. Others can go home safely. If you go home, follow your doctor's instructions. He or she will tell you if you need someone to watch you closely for the next 24 hours or longer. Rest is the best treatment. Get plenty of sleep at night. And try to rest during the day. · Avoid activities that are physically or mentally demanding. These include housework, exercise, and schoolwork. And don't play video games, send text messages, or use the computer. You may need to change your school or work schedule to be able to avoid these activities. · Ask your doctor when it's okay to drive, ride a bike, or operate machinery.   · Take an over-the-counter pain medicine, such as acetaminophen (Tylenol), ibuprofen (Advil, Motrin), or naproxen (Aleve). Be safe with medicines. Read and follow all instructions on the label. · Check with your doctor before you use any other medicines for pain. · Do not drink alcohol or use illegal drugs. They can slow recovery. They can also increase your risk of getting a second head injury. Follow-up care is a key part of your treatment and safety. Be sure to make and go to all appointments, and call your doctor if you are having problems. It's also a good idea to know your test results and keep a list of the medicines you take. Where can you learn more? Go to http://emanuel-williams.info/. Enter E235 in the search box to learn more about \"Learning About a Closed Head Injury. \"  Current as of: October 14, 2016  Content Version: 11.3  © 5450-3727 [employer]. Care instructions adapted under license by [employer] (which disclaims liability or warranty for this information). If you have questions about a medical condition or this instruction, always ask your healthcare professional. [place] [address] any warranty or liability for your use of this information. Preventing Falls: Care Instructions  Your Care Instructions  Getting around your home safely can be a challenge if you have injuries or health problems that make it easy for you to fall. Loose rugs and furniture in walkways are among the dangers for many older people who have problems walking or who have poor eyesight. People who have conditions such as arthritis, osteoporosis, or dementia also have to be careful not to fall. You can make your home safer with a few simple measures. Follow-up care is a key part of your treatment and safety. Be sure to make and go to all appointments, and call your doctor if you are having problems. It's also a good idea to know your test results and keep a list of the medicines you take.   How can you care for yourself at home?  Taking care of yourself  · You may get dizzy if you do not drink enough water. To prevent dehydration, drink plenty of fluids, enough so that your urine is light yellow or clear like water. Choose water and other caffeine-free clear liquids. If you have kidney, heart, or liver disease and have to limit fluids, talk with your doctor before you increase the amount of fluids you drink. · Exercise regularly to improve your strength, muscle tone, and balance. Walk if you can. Swimming may be a good choice if you cannot walk easily. · Have your vision and hearing checked each year or any time you notice a change. If you have trouble seeing and hearing, you might not be able to avoid objects and could lose your balance. · Know the side effects of the medicines you take. Ask your doctor or pharmacist whether the medicines you take can affect your balance. Sleeping pills or sedatives can affect your balance. · Limit the amount of alcohol you drink. Alcohol can impair your balance and other senses. · Ask your doctor whether calluses or corns on your feet need to be removed. If you wear loose-fitting shoes because of calluses or corns, you can lose your balance and fall. · Talk to your doctor if you have numbness in your feet. Preventing falls at home  · Remove raised doorway thresholds, throw rugs, and clutter. Repair loose carpet or raised areas in the floor. · Move furniture and electrical cords to keep them out of walking paths. · Use nonskid floor wax, and wipe up spills right away, especially on ceramic tile floors. · If you use a walker or cane, put rubber tips on it. If you use crutches, clean the bottoms of them regularly with an abrasive pad, such as steel wool. · Keep your house well lit, especially [place], and outside walkways. Use night-lights in areas such as hallways and bathrooms.  Add extra light switches or use remote switches (such as switches that go on or off when you clap your hands) to make it easier to turn lights on if you have to get up during the night. · Install sturdy handrails on stairways. · Move items in your cabinets so that the things you use a lot are on the lower shelves (about waist level). · Keep a cordless phone and a flashlight with new batteries by your bed. If possible, put a phone in each of the main rooms of your house, or carry a cell phone in case you fall and cannot reach a phone. Or, you can wear a device around your neck or wrist. You push a button that sends a signal for help. · Wear low-heeled shoes that fit well and give your feet good support. Use footwear with nonskid soles. Check the heels and soles of your shoes for wear. Repair or replace worn heels or soles. · Do not wear socks without shoes on wood floors. · Walk on the grass when the sidewalks are slippery. If you live in an area that gets snow and ice in the winter, sprinkle salt on slippery steps and sidewalks. Preventing falls in the bath  · Install grab bars and nonskid mats inside and outside your shower or tub and near the toilet and sinks. · Use shower chairs and bath benches. · Use a hand-held shower head that will allow you to sit while showering. · Get into a tub or shower by putting the weaker leg in first. Get out of a tub or shower with your strong side first.  · Repair loose toilet seats and consider installing a raised toilet seat to make getting on and off the toilet easier. · Keep your bathroom door unlocked while you are in the shower. Where can you learn more? Go to http://emanuel-williams.info/. Enter [id number] in the search box to learn more about \"Preventing Falls: Care Instructions. \"  Current as of: August 4, 2016  Content Version: 11.3  © 6696-0888 [email or web address]. Care instructions adapted under license by [employer] (which disclaims liability or warranty for this information).  If you have questions about a medical condition or this instruction, always ask your healthcare professional. [place] [address] any warranty or liability for your use of this information.

## 2017-10-16 ENCOUNTER — HOSPITAL ENCOUNTER (OUTPATIENT)
Dept: GENERAL RADIOLOGY | Age: 72
Discharge: HOME | End: 2017-10-16
Payer: COMMERCIAL
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
## 2017-10-16 PROCEDURE — 72110 X-RAY EXAM L-2 SPINE 4/>VWS: CPT

## 2017-10-19 ENCOUNTER — HOSPITAL ENCOUNTER (OUTPATIENT)
Dept: GENERAL RADIOLOGY | Age: 72
Discharge: HOME | End: 2017-10-19
Attending: INTERNAL MEDICINE
Payer: COMMERCIAL
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
## 2017-10-19 PROCEDURE — 72070 X-RAY EXAM THORAC SPINE 2VWS: CPT

## 2017-10-27 ENCOUNTER — HOSPITAL ENCOUNTER (OUTPATIENT)
Dept: CT IMAGING | Age: 72
Discharge: HOME | End: 2017-10-27
Attending: PSYCHIATRY & NEUROLOGY
Payer: COMMERCIAL

## 2017-10-27 DIAGNOSIS — I67.9 INTRACRANIAL VASCULAR STENOSIS: ICD-10-CM

## 2017-10-27 LAB — CREAT UR-MCNC: 0.8 MG/DL (ref 0.6–1.3)

## 2017-10-27 PROCEDURE — 74011636320 HC RX REV CODE- 636/320: Performed by: PSYCHIATRY & NEUROLOGY

## 2017-10-27 PROCEDURE — 74011000258 HC RX REV CODE- 258: Performed by: PSYCHIATRY & NEUROLOGY

## 2017-10-27 PROCEDURE — 82565 ASSAY OF CREATININE: CPT

## 2017-10-27 PROCEDURE — 70498 CT ANGIOGRAPHY NECK: CPT

## 2017-10-27 RX ORDER — SODIUM CHLORIDE 9 MG/ML
100 INJECTION, SOLUTION INTRAVENOUS ONCE
Status: COMPLETED | OUTPATIENT
Start: 2017-10-27 | End: 2017-10-27

## 2017-10-27 RX ADMIN — IOPAMIDOL 55 ML: 612 INJECTION, SOLUTION INTRAVENOUS at 18:07

## 2017-10-27 RX ADMIN — SODIUM CHLORIDE 100 ML: 900 INJECTION, SOLUTION INTRAVENOUS at 18:08

## 2017-11-28 ENCOUNTER — OFFICE VISIT (OUTPATIENT)
Dept: NEUROLOGY | Age: 72
End: 2017-11-28

## 2017-11-28 VITALS
HEART RATE: 68 BPM | DIASTOLIC BLOOD PRESSURE: 68 MMHG | SYSTOLIC BLOOD PRESSURE: 122 MMHG | BODY MASS INDEX: 22.66 KG/M2 | HEIGHT: 61 IN | TEMPERATURE: 98.1 F | WEIGHT: 120 LBS

## 2017-11-28 DIAGNOSIS — I67.1 CEREBRAL ANEURYSM: ICD-10-CM

## 2017-11-28 DIAGNOSIS — I67.9 INTRACRANIAL VASCULAR STENOSIS: Primary | ICD-10-CM

## 2017-11-28 RX ORDER — LEVETIRACETAM 500 MG/1
TABLET ORAL 2 TIMES DAILY
COMMUNITY
End: 2018-08-22

## 2017-11-28 NOTE — PROGRESS NOTES
Mayra Olivera is a 74 y.o. female presenting with Follow-up      HPI: This is a 74year old right-handed female with recurrent posterior circulation strokes and left anterior circulation strokes in the setting of multifocal extracranial and intracranial stenoses with giant cell arteritis.      She continues on steroid immunosuppresion by Dr. Anuradha Moon and methotrexate. She has been started on Actemra and steroid is being weaned. They saw Dr. Anuradha Moon yesterday and had labs which are pending.      She is here today with her  to review CTA of head and neck that was obtained to assess progression / resolution of cervicocerebral steno-occlusive disease and stability of cerebral aneurysms.     Her  reports several different health problems that she has had this year. She has had a syncopal episode that she was recently admitted to [facility] with at the end of August. MRI with no interval change. EEG was normal. Her  reports that it was believed that she was having seizures and levtiracetam was started. He reports other episodes with staring and another unresponsive episode. Denies further occurrence on levetiracetam. They saw Dr. Tracey Singh and report having follow-up with him. They report she broke her leg in a fall. She has been using the wheelchair for much of the year, but she does get up and ambulate. She uses the walker some. They are hopeful that her mobility is starting to improve. She is on Boniva. She has continued on aspirin and clopidogrel. She does report easy bruising and bleeding. CTA head and neck 10/27/17:  1. Chronic bilateral cerebellar and bilateral occipital infarcts with calcification. 2. Leukoaraiosis. 3. Right vertebral artery persistent occlusion at the skull base. 4. Left vertebral artery extracranial persistent occlusion with some collateral from the left ascending cervical artery. 5. Left vertebral artery intracranial severe stenoses without change.   6. Left external carotid artery origin mild stenosis. 7. Left internal carotid artery cavernous severe stenosis, and ophthalmic / communicating segment moderate stenosis without change. 8. Left anterior cerebral artery severe stenosis has worsened. 9. Right internal carotid artery intracranial mild stenosis without change. 10. Right internal carotid artery origin mild stenosis without change. 11. Right posterior communicating artery small aneurysm is more than 10% larger. 12. Right anterior choroidal artery small aneurysm is unchanged within the resolution limits of the technique. 13. Right internal carotid artery terminus small aneurysm not well visualized. 14. Right middle cerebral artery bifurcation small aneurysm is unchanged within the resolution limits of the technique. 15. Right middle cerebral artery M2 small aneurysm is is unchanged within the resolution limits of the technique. 16. Right posterior cerebral artery moderate to severe stenoses without change. 17. Left posterior cerebral artery mild to moderate stenosis slightly without change. 18. Chronic right C2 and C3 fracture. Review of Systems   HENT: Negative. Eyes: Positive for blurred vision. Respiratory: Negative. Cardiovascular: Negative. Gastrointestinal: Negative. Genitourinary: Negative. Musculoskeletal: Positive for falls. Skin: Negative. Neurological: Positive for focal weakness, loss of consciousness and weakness. Negative for headaches. Endo/Heme/Allergies: Bruises/bleeds easily. Psychiatric/Behavioral: Negative.         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  tocilizumab (ACTEMRA) 162 mg/0.9 mL syrg by SubCUTAneous route.  FLUoxetine (PROZAC) 40 mg capsule Take 40 mg by mouth daily.  amLODIPine (NORVASC) 2.5 mg tablet Take 2.5 mg by mouth daily.  rOPINIRole (REQUIP) 1 mg tablet Take 1 mg by mouth nightly. Indications: Patient can have 1 in the morning and 1 at night or 2 at night for a total of 2mg      aspirin (ASPIRIN) 325 mg tablet Take 325 mg by mouth nightly.  simvastatin (ZOCOR) 20 mg tablet Take 20 mg by mouth nightly.  oxybutynin chloride XL (DITROPAN XL) 5 mg CR tablet Take 5 mg by mouth nightly.  folic acid (FOLVITE) 1 mg tablet Take 1 mg by mouth nightly.  methotrexate (RHEUMATREX) 2.5 mg tablet Take 2.5 mg by mouth every Wednesday. Indications: 3 in the morning 3 at night      bimatoprost (LUMIGAN) 0.01 % ophthalmic drops Administer 1 Drop to left eye every evening.  brimonidine-timolol (COMBIGAN) 0.2-0.5 % drop ophthalmic solution Administer 1 Drop to left eye two (2) times a day.  ibandronate (BONIVA) 150 mg tablet Take 150 mg by mouth every thirty (30) days.  ferrous sulfate (IRON) 325 mg (65 mg iron) tablet Take 65 mg by mouth Daily (before breakfast).  ascorbic acid, vitamin C, (VITAMIN C) 250 mg tablet Take 250 mg by mouth daily.  docusate sodium (COLACE) 50 mg capsule Take 50 mg by mouth daily.  clonazePAM (KLONOPIN) 0.5 mg tablet Take 0.25 mg by mouth nightly.  mirtazapine (REMERON) 30 mg tablet Take 15 mg by mouth nightly.  predniSONE (DELTASONE) 10 mg tablet Take 30 mg by mouth daily.  clopidogrel (PLAVIX) 75 mg tablet Take 1 Tab by mouth daily. 30 Tab 5    levothyroxine (SYNTHROID) 50 mcg tablet Take 1 Tab by mouth Daily (before breakfast). 27 Tab 5       Social History     Social History    Marital status:      Spouse name: N/A    Number of children: N/A    Years of education: N/A     Occupational History    Not on file. Social History Main Topics    Smoking status: Former Smoker     Types: Cigarettes     Quit date: 4/28/2013    Smokeless tobacco: Never Used    Alcohol use 1.0 oz/week     2 Shots of liquor per week    Drug use: No    Sexual activity: Yes     Partners: Male     Other Topics Concern    Not on file     Social History Narrative       Family History   Problem Relation Age of Onset    Breast Cancer Mother        Vitals:    11/28/17 1344 11/28/17 1400   BP: 119/70 122/68   Pulse: 70 68   Temp: 98.1 °F (36.7 °C)    TempSrc: Oral    Weight: 54.4 kg (120 lb)    Height: 5' 1\" (1.549 m)         Neurologic Exam     Mental Status   Follows 2 step commands. Attention: normal.   Level of consciousness: alert  Knowledge: poor. Normal comprehension. Cranial Nerves     CN II   Visual acuity: decreased  Right visual field deficit: upper nasal, lower nasal, upper temporal and lower temporal quadrant(s)    CN III, IV, VI   Extraocular motions are normal.     CN VII   Facial expression full, symmetric. CN VIII   Hearing: impaired    CN XII   Tongue deviation: none    Motor Exam        Raises extremities antigravity. Left upper extremity weaker than right. Gait, Coordination, and Reflexes     Gait  Gait: (not tested.  in wheelchair. )    Tremor   Resting tremor: absent       Finger tapping slower on left       Physical Exam   Constitutional: She appears well-developed and well-nourished. HENT:   Head: Normocephalic and atraumatic. Eyes: EOM are normal.   Cardiovascular: Normal rate and regular rhythm. Pulmonary/Chest: Effort normal and breath sounds normal.   Neurological: She is alert. Skin: Skin is warm and dry. Abrasion (right leg small abrasion with bleeding) and bruising noted. Thin skin   Psychiatric: She has a normal mood and affect. Her behavior is normal.   Vitals reviewed. Assessment and Plan: This is a 74year old right-handed female with recurrent posterior circulation and left anterior circulation ischemic strokes in the setting of atherosclerosis and giant cell arteritis. Discussed CTA results with the patient and her . Changes include left anterior cerebral artery severe stenosis has worsened, and right posterior communicating artery small aneurysm is more than 10% larger. Plan will be good medical management, as she has not had new ischemic or hemorrhagic events. Continue blood pressure control. Would increase HMGCoARI to high intensity as her liver function is able to tolerate. It is noted that in 2014 she was on atorvastatin 80 mg and had elevated liver enzymes. Rosuvastatin could be considered, or a PCSK9 inhibitor. She continues on dual antiplatelets. It has been over three months since her last ischemic event so would consider reducing to single antiplatelet therapy at this point to reduce risk for hemorrhage. They would like to proceed with follow-up CTA of the head and neck in one year. Continue to follow up with rheumatology for GCA. The s/s that should prompt the patient to call 911 and present to the ED were discussed. Total time 30 minutes with 20 minutes spent in counseling.

## 2017-11-28 NOTE — MR AVS SNAPSHOT
Visit Information Date & Time Provider Department Dept. Phone Encounter #  
 11/28/2017  1:30 PM Olman Cadena, Elicia[address] y [id number] Upcoming Health Maintenance Date Due FOBT Q 1 YEAR AGE 50-75 8/28/1993 ZOSTER VACCINE AGE 60> 6/28/2003 GLAUCOMA SCREENING Q2Y 8/28/2008 MEDICARE YEARLY EXAM 8/28/2008 Influenza Age 5 to Adult 8/1/2017 Pneumococcal 65+ Low/Medium Risk (2 of 2 - PPSV23) 9/16/2017 DTaP/Tdap/Td series (2 - Td) 10/11/2027 Allergies as of 11/28/2017  Review Complete On: 11/28/2017 By: Olman Cadena NP Severity Noted Reaction Type Reactions Sulfa (Sulfonamide Antibiotics)  01/26/2013   Systemic Hives Sulfamethoxazole-trimethoprim  11/15/2014    Rash Current Immunizations  Reviewed on 3/28/2017 Name Date Influenza Vaccine 9/30/2014 Influenza Vaccine (Quad) PF 12/12/2015  3:44 PM  
 Pneumococcal Vaccine (Unspecified Type) 9/16/2012 Tdap 10/11/2017  7:26 PM  
  
 Not reviewed this visit Vitals BP Pulse Temp Height(growth percentile) Weight(growth percentile) BMI  
 122/68 68 98.1 °F (36.7 °C) (Oral) 5' 1\" (1.549 m) 120 lb (54.4 kg) 22.67 kg/m2 OB Status Smoking Status Hysterectomy Former Smoker Vitals History BMI and BSA Data Body Mass Index Body Surface Area  
 22.67 kg/m 2 1.53 m 2 Preferred Pharmacy Pharmacy Name Phone [address] [phone] Your Updated Medication List  
  
   
This list is accurate as of: 11/28/17  2:59 PM.  Always use your most recent med list.  
  
  
  
  
 ACTEMRA 162 mg/0.9 mL Syrg Generic drug:  tocilizumab  
by SubCUTAneous route. aspirin 325 mg tablet Commonly known as:  ASPIRIN Take 325 mg by mouth nightly. BONIVA 150 mg tablet Generic drug:  ibandronate Take 150 mg by mouth every thirty (30) days. clonazePAM 0.5 mg tablet Commonly known as:  Imanbipin Monte Take 0.25 mg by mouth nightly. clopidogrel 75 mg Tab Commonly known as:  PLAVIX Take 1 Tab by mouth daily. COLACE 50 mg capsule Generic drug:  docusate sodium Take 50 mg by mouth daily. COMBIGAN 0.2-0.5 % Drop ophthalmic solution Generic drug:  brimonidine-timolol Administer 1 Drop to left eye two (2) times a day. DITROPAN XL 5 mg CR tablet Generic drug:  oxybutynin chloride XL Take 5 mg by mouth nightly. folic acid 1 mg tablet Commonly known as:  Google Take 1 mg by mouth nightly. Iron 325 mg (65 mg iron) tablet Generic drug:  ferrous sulfate Take 65 mg by mouth Daily (before breakfast). KEPPRA 500 mg tablet Generic drug:  levETIRAcetam  
Take  by mouth two (2) times a day. levothyroxine 50 mcg tablet Commonly known as:  synthroid Take 1 Tab by mouth Daily (before breakfast). LUMIGAN 0.01 % ophthalmic drops Generic drug:  bimatoprost  
Administer 1 Drop to left eye every evening. methotrexate 2.5 mg tablet Commonly known as:  Quinn Lynch Take 2.5 mg by mouth every Wednesday. Indications: 3 in the morning 3 at night  
  
 mirtazapine 30 mg tablet Commonly known as:  Walter Holcomb Take 15 mg by mouth nightly. NORCO 5-325 mg per tablet Generic drug:  HYDROcodone-acetaminophen Take 1 Tab by mouth every four (4) hours as needed for Pain. NORVASC 2.5 mg tablet Generic drug:  amLODIPine Take 2.5 mg by mouth daily. predniSONE 10 mg tablet Commonly known as:  Bernard Ridley Take 30 mg by mouth daily. PROzac 40 mg capsule Generic drug:  FLUoxetine Take 40 mg by mouth daily. REQUIP 1 mg tablet Generic drug:  rOPINIRole Take 1 mg by mouth nightly. Indications: Patient can have 1 in the morning and 1 at night or 2 at night for a total of 2mg  
  
 traMADol 50 mg tablet Commonly known as:  ULTRAM  
Take 50 mg by mouth every six (6) hours as needed for Pain. VITAMIN C 250 mg tablet Generic drug:  ascorbic acid (vitamin C) Take 250 mg by mouth daily. ZOCOR 20 mg tablet Generic drug:  simvastatin Take 20 mg by mouth nightly. Introducing [facility]! Dear Makayla Caraballo: 
Thank you for requesting a [employer] account. Our records indicate that you already have an active [employer] account. You can access your account anytime at [email or web address] Did you know that you can access your hospital and ER discharge instructions at any time in [employer]? You can also review all of your test results from your hospital stay or ER visit. Additional Information If you have questions, please visit the Frequently Asked Questions section of the [employer] website at [email or web address] Remember, [employer] is NOT to be used for urgent needs. For medical emergencies, dial 911. Now available from your iPhone and Android! Please provide this summary of care documentation to your next provider. Your primary care clinician is listed as CJ GLASS. If you have any questions after today's visit, please call [phone].

## 2018-01-08 NOTE — PROGRESS NOTES
Problem: Mobility Impaired (Adult and Pediatric)  Goal: *Acute Goals and Plan of Care (Insert Text)  PHYSICAL THERAPY STG GOALS :  Initiated 4/1/2017 and to be accomplished within 1-2 Weeks (Updated 4/28/17)     1. Patient will move from supine to sit and sit to supine in bed with minimal assistance/contact guard assist. (Achieved)   2. Patient will transfer from bed to chair and chair to bed with minimum assistance using 2WW. (Maintained; Max A x1)   3. Patient will perform sit to stand with minimum assistance with Fair balance and safety awareness. (Regressed; Max x1)   4. Patient will ambulate with minimum assistance for 10 feet with 2WW on level surfaces with 1 turn. (Progressed; 3ft in parallel bars Max x2)   5. Patient will ascend/descend 1 step with bilateral handrail(s) withminimal assistance to allow for safe home access/exit. (Unable)   6. Patient will improve standardized test score for [facility] Standing Balance Scale to 1+ (Achieved)    PHYSICAL THERAPY STG GOALS :  Initiated 4/1/2017 and to be accomplished within 1-2 Weeks (Updated 4/21/17)     1. Patient will move from supine to sit and sit to supine in bed with minimal assistance/contact guard assist. (Progressing; Mod A)   2. Patient will transfer from bed to chair and chair to bed with minimum assistance using 2WW. (Progressing; Max A x1, Mod A x2)   3. Patient will perform sit to stand with minimum assistance with Fair balance and safety awareness. (Progressing; Mod A x2)   4. Patient will ambulate with minimum assistance for 10 feet with 2WW on level surfaces with 1 turn. (Unable)   5. Patient will ascend/descend 1 step with bilateral handrail(s) withminimal assistance to allow for safe home access/exit. (Unable)   6. Patient will improve standardized test score for [facility] Standing Balance Scale to 1+ (Achieved)      PHYSICAL THERAPY STG GOALS :  Initiated 4/1/2017 and to be accomplished within 1-2 Weeks (Updated 4/14/17)     1. This is a recent snapshot of the patient's [facility] medical record.  For current drug dose and complete information and questions, call [phone] or In Basket pool, [facility] home infusion ([id number])  CSN Number:  [id number]       Patient will move from supine to sit and sit to supine in bed with minimal assistance/contact guard assist. (Maintained; max A to total A)   2. Patient will transfer from bed to chair and chair to bed with minimum assistance using 2WW. (No progress; total A transfer)   3. Patient will perform sit to stand with minimum assistance with Fair balance and safety awareness. (No progress; Max A x2 for attempt to stand)   4. Patient will ambulate with minimum assistance for 10 feet with 2WW on level surfaces with 1 turn. (Unable)   5. Patient will ascend/descend 1 step with bilateral handrail(s) withminimal assistance to allow for safe home access/exit. (Unable)   6. Patient will improve standardized test score for [facility] Standing Balance Scale to 1+ (No progress; pt unable to stand)    PHYSICAL THERAPY STG GOALS :  Initiated 4/1/2017 and to be accomplished within 1-2 Weeks (Updated 4/7/17)     1. Patient will move from supine to sit and sit to supine in bed with minimal assistance/contact guard assist. (No progress)   2. Patient will transfer from bed to chair and chair to bed with minimum assistance using 2WW. (No progress; total A transfer)   3. Patient will perform sit to stand with minimum assistance with Fair balance and safety awareness. (No progress; Max A x2 for attempt to stand)   4. Patient will ambulate with minimum assistance for 10 feet with 2WW on level surfaces with 1 turn. (NT, pt unable)   5. Patient will ascend/descend 1 step with bilateral handrail(s) withminimal assistance to allow for safe home access/exit.(NT, pt unable)   6. Patient will improve standardized test score for [facility] Standing Balance Scale to 1+ (No progress; pt unable to stand)     PHYSICAL THERAPY LTG GOALS :  Initiated 4/1/2017 and to be accomplished within 2-4 Weeks    1. Patient will move from supine to sit and sit to supine in bed with supervision/set-up.    2. Patient will transfer from bed to chair and chair to bed with supervision/set-up using 2WW.  3. Patient will perform sit to stand with supervision/set-up with Good balance and safety awareness. 4. Patient will ambulate with supervision/set-up for 50 feet with CGA on level surfaces and be able to maneuver through narrow spaces and obstacles without loss of balance. 5. Patient will ascend/descend 2 steps with bilateral handrail(s) with contact guard assist to allow for safe home access/exit. 6. Patient will improve standardized test score for [employer] Balance Scale to 2    Physical Therapist: Rajinder العلي PT on 4/1/2017    [facility]   PHYSICAL THERAPY DAILY TREATMENT NOTE        Patient: Ho Baer (26 y.o. female)               Date: 5/2/2017    Physician: Queenie Chavez MD  Primary Diagnosis: cellulitis          Treatment Diagnosis  Treatment Diagnosis: dysarthria  Treatment Diagnosis 2: dysarthria  Precautions: Fall, Skin  Vital Signs  Cognitive Status:  Mental Status  Neurologic State: Confused  Orientation Level: Oriented to person  Cognition: Decreased attention/concentration  Pain  Bed Mobility Training  Bed Mobility Training  Rolling: Minimum assistance; Moderate assistance  Supine to Sit: Moderate assistance  Scooting: Minimum assistance  Balance  Sitting: Impaired; With support  Sitting - Static: Fair (occasional)  Sitting - Dynamic: Fair (occasional) (-)  Standing: Impaired; With support  Standing - Static: Poor  Standing - Dynamic : Poor  Transfer Training  Transfer Training  Sit to Stand: Maximum assistance;Assist x2  Stand to Sit: Maximum assistance;Assist x2  Bed to Chair: Maximum assistance;Assist x2  Sit to Stand: Maximum assistance;Assist x2  Gait Training  Therapeutic Exercise: Pt presented supine in bed. Pt very drowsy and required much encouragement for participation. Bed mobility training rendered as noted above. Pt required additional time and max cues for rolling L<>R.  Pt with increased difficulty following verbal cues today and required tactile and manual cues for supine>sit. Pt unable to maintain unsupported sitting at EOB and required CGA due to posterior and L lateral lean. SPT from EOB>w/c with Max A x2 due to safety concerns and pt's lack of participation with sit>stand. Pt positioned in w/c. Pt remained sitting up in w/c at bedside for breakfast.      Patient/Caregiver Education:   Pt [facility] Education on safety and fall prevention was provided to reduce risk of falls. ASSESSMENT:  Patient continues to benefit from Skilled PT services to improve bed mobility, transfers, balance, gait. Progression toward goals:  [ ]      Improving appropriately and progressing toward goals  [X]      Improving slowly and progressing toward goals  [X]      Not making progress toward goals and plan of care will be adjusted      Treatment session: 30 minutes.   Therapist:   Tabatha Monson PTA,          5/2/2017

## 2018-03-23 ENCOUNTER — IMPORTED ENCOUNTER (OUTPATIENT)
Dept: URBAN - METROPOLITAN AREA CLINIC 1 | Facility: CLINIC | Age: 73
End: 2018-03-23

## 2018-03-23 PROBLEM — H25.811: Noted: 2018-03-23

## 2018-03-23 PROBLEM — H47.013: Noted: 2018-03-23

## 2018-03-23 PROBLEM — H04.123: Noted: 2018-03-23

## 2018-03-23 PROBLEM — Z96.1: Noted: 2018-03-23

## 2018-03-23 PROBLEM — H40.053: Noted: 2018-03-23

## 2018-03-23 PROCEDURE — 92012 INTRM OPH EXAM EST PATIENT: CPT

## 2018-03-23 NOTE — PATIENT DISCUSSION
1.  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 Continue current po Prednisone. Cont Combigan BID OS and Lumigan QHS OS. Steroids followed by Dr. Pearl Najera safety precautions. Patient should continue to f/u as scheduled with Dr. Esperanza Santana. 2.  Cataract OD: Observe for now without intervention. The patient was advised to contact us if any change or worsening of vision3. OHTN OU (0.8/0.75)- IOP improved on current meds. Cont Combigan OS BID and Latanoprost OS QHS. 4.  MARYJO w/ PEK OU- (H/o Plugs LLs OU) Cont ATs TID OU Routinely. 5.  PVD OD - stable RD precautions. 6.  Pseudophakia OS- H/o YAG Cap OS7. Return for an appointment for a 10 per PMG in 6 months with Dr. Rosie Graves.

## 2018-05-07 ENCOUNTER — HOSPITAL ENCOUNTER (OUTPATIENT)
Dept: GENERAL RADIOLOGY | Age: 73
Discharge: HOME | End: 2018-05-07
Payer: COMMERCIAL

## 2018-05-07 DIAGNOSIS — R52 PAIN: ICD-10-CM

## 2018-05-07 PROCEDURE — 73610 X-RAY EXAM OF ANKLE: CPT

## 2018-08-22 ENCOUNTER — APPOINTMENT (OUTPATIENT)
Dept: CT IMAGING | Age: 73
End: 2018-08-22
Attending: EMERGENCY MEDICINE
Payer: COMMERCIAL

## 2018-08-22 ENCOUNTER — HOSPITAL ENCOUNTER (EMERGENCY)
Age: 73
Discharge: HOME | End: 2018-08-22
Attending: EMERGENCY MEDICINE
Payer: COMMERCIAL

## 2018-08-22 ENCOUNTER — APPOINTMENT (OUTPATIENT)
Dept: GENERAL RADIOLOGY | Age: 73
End: 2018-08-22
Attending: EMERGENCY MEDICINE
Payer: COMMERCIAL

## 2018-08-22 VITALS
RESPIRATION RATE: 16 BRPM | OXYGEN SATURATION: 99 % | TEMPERATURE: 97.7 F | HEART RATE: 73 BPM | SYSTOLIC BLOOD PRESSURE: 150 MMHG | DIASTOLIC BLOOD PRESSURE: 114 MMHG

## 2018-08-22 DIAGNOSIS — G40.909 SEIZURE DISORDER (HCC): Primary | ICD-10-CM

## 2018-08-22 LAB
ALBUMIN SERPL-MCNC: 3.9 G/DL (ref 3.4–5)
ALBUMIN/GLOB SERPL: 1.5 {RATIO} (ref 0.8–1.7)
ALP SERPL-CCNC: 57 U/L (ref 45–117)
ALT SERPL-CCNC: 53 U/L (ref 13–56)
AMMONIA PLAS-SCNC: 19 UMOL/L (ref 11–32)
ANION GAP BLD CALC-SCNC: 11 MMOL/L (ref 10–20)
ANION GAP SERPL CALC-SCNC: 6 MMOL/L (ref 3–18)
AST SERPL-CCNC: 43 U/L (ref 15–37)
BASOPHILS # BLD: 0 K/UL (ref 0–0.1)
BASOPHILS NFR BLD: 0 % (ref 0–2)
BILIRUB SERPL-MCNC: 0.4 MG/DL (ref 0.2–1)
BUN BLD-MCNC: 26 MG/DL (ref 7–18)
BUN SERPL-MCNC: 20 MG/DL (ref 7–18)
BUN/CREAT SERPL: 31 (ref 12–20)
CA-I BLD-MCNC: 1.18 MMOL/L (ref 1.12–1.32)
CALCIUM SERPL-MCNC: 9.3 MG/DL (ref 8.5–10.1)
CHLORIDE BLD-SCNC: 107 MMOL/L (ref 100–108)
CHLORIDE SERPL-SCNC: 111 MMOL/L (ref 100–108)
CK MB CFR SERPL CALC: 3.3 % (ref 0–4)
CK MB SERPL-MCNC: 1.3 NG/ML (ref 5–25)
CK SERPL-CCNC: 39 U/L (ref 26–192)
CO2 BLD-SCNC: 29 MMOL/L (ref 19–24)
CO2 SERPL-SCNC: 28 MMOL/L (ref 21–32)
CREAT SERPL-MCNC: 0.65 MG/DL (ref 0.6–1.3)
CREAT UR-MCNC: 0.6 MG/DL (ref 0.6–1.3)
DIFFERENTIAL METHOD BLD: ABNORMAL
EOSINOPHIL # BLD: 0 K/UL (ref 0–0.4)
EOSINOPHIL NFR BLD: 0 % (ref 0–5)
ERYTHROCYTE [DISTWIDTH] IN BLOOD BY AUTOMATED COUNT: 13.4 % (ref 11.6–14.5)
ETHANOL SERPL-MCNC: <3 MG/DL (ref 0–3)
GLOBULIN SER CALC-MCNC: 2.6 G/DL (ref 2–4)
GLUCOSE BLD STRIP.AUTO-MCNC: 125 MG/DL (ref 74–106)
GLUCOSE SERPL-MCNC: 122 MG/DL (ref 74–99)
HCT VFR BLD AUTO: 44.8 % (ref 35–45)
HCT VFR BLD CALC: 47 % (ref 36–49)
HGB BLD-MCNC: 14.4 G/DL (ref 12–16)
HGB BLD-MCNC: 16 G/DL (ref 12–16)
LACTATE BLD-SCNC: 1.1 MMOL/L (ref 0.4–2)
LYMPHOCYTES # BLD: 1.5 K/UL (ref 0.9–3.6)
LYMPHOCYTES NFR BLD: 18 % (ref 21–52)
MAGNESIUM SERPL-MCNC: 2.2 MG/DL (ref 1.6–2.6)
MCH RBC QN AUTO: 35.6 PG (ref 24–34)
MCHC RBC AUTO-ENTMCNC: 32.1 G/DL (ref 31–37)
MCV RBC AUTO: 110.9 FL (ref 74–97)
MONOCYTES # BLD: 0.6 K/UL (ref 0.05–1.2)
MONOCYTES NFR BLD: 8 % (ref 3–10)
NEUTS SEG # BLD: 6.2 K/UL (ref 1.8–8)
NEUTS SEG NFR BLD: 74 % (ref 40–73)
PLATELET # BLD AUTO: 300 K/UL (ref 135–420)
PMV BLD AUTO: 11.5 FL (ref 9.2–11.8)
POTASSIUM BLD-SCNC: 4.8 MMOL/L (ref 3.5–5.5)
POTASSIUM SERPL-SCNC: 4.9 MMOL/L (ref 3.5–5.5)
PROT SERPL-MCNC: 6.5 G/DL (ref 6.4–8.2)
RBC # BLD AUTO: 4.04 M/UL (ref 4.2–5.3)
SODIUM BLD-SCNC: 142 MMOL/L (ref 136–145)
SODIUM SERPL-SCNC: 145 MMOL/L (ref 136–145)
TROPONIN I SERPL-MCNC: <0.02 NG/ML (ref 0–0.04)
WBC # BLD AUTO: 8.4 K/UL (ref 4.6–13.2)

## 2018-08-22 PROCEDURE — 85025 COMPLETE CBC W/AUTO DIFF WBC: CPT | Performed by: EMERGENCY MEDICINE

## 2018-08-22 PROCEDURE — 71045 X-RAY EXAM CHEST 1 VIEW: CPT

## 2018-08-22 PROCEDURE — 96374 THER/PROPH/DIAG INJ IV PUSH: CPT

## 2018-08-22 PROCEDURE — 80053 COMPREHEN METABOLIC PANEL: CPT | Performed by: EMERGENCY MEDICINE

## 2018-08-22 PROCEDURE — 70450 CT HEAD/BRAIN W/O DYE: CPT

## 2018-08-22 PROCEDURE — 83605 ASSAY OF LACTIC ACID: CPT

## 2018-08-22 PROCEDURE — 93005 ELECTROCARDIOGRAM TRACING: CPT

## 2018-08-22 PROCEDURE — 74011250637 HC RX REV CODE- 250/637: Performed by: EMERGENCY MEDICINE

## 2018-08-22 PROCEDURE — 99285 EMERGENCY DEPT VISIT HI MDM: CPT

## 2018-08-22 PROCEDURE — 82140 ASSAY OF AMMONIA: CPT | Performed by: EMERGENCY MEDICINE

## 2018-08-22 PROCEDURE — 80047 BASIC METABLC PNL IONIZED CA: CPT

## 2018-08-22 PROCEDURE — 77030011943

## 2018-08-22 PROCEDURE — 80307 DRUG TEST PRSMV CHEM ANLYZR: CPT | Performed by: EMERGENCY MEDICINE

## 2018-08-22 PROCEDURE — 74011250636 HC RX REV CODE- 250/636: Performed by: EMERGENCY MEDICINE

## 2018-08-22 PROCEDURE — 82550 ASSAY OF CK (CPK): CPT | Performed by: EMERGENCY MEDICINE

## 2018-08-22 PROCEDURE — 83735 ASSAY OF MAGNESIUM: CPT | Performed by: EMERGENCY MEDICINE

## 2018-08-22 RX ORDER — PREDNISONE 1 MG/1
1 TABLET ORAL
COMMUNITY

## 2018-08-22 RX ORDER — HYDROCODONE BITARTRATE AND ACETAMINOPHEN 5; 325 MG/1; MG/1
1 TABLET ORAL
Status: COMPLETED | OUTPATIENT
Start: 2018-08-22 | End: 2018-08-22

## 2018-08-22 RX ORDER — NALOXONE HYDROCHLORIDE 1 MG/ML
1 INJECTION INTRAMUSCULAR; INTRAVENOUS; SUBCUTANEOUS
Status: COMPLETED | OUTPATIENT
Start: 2018-08-22 | End: 2018-08-22

## 2018-08-22 RX ADMIN — NALOXONE HYDROCHLORIDE 1 MG: 1 INJECTION PARENTERAL at 16:51

## 2018-08-22 RX ADMIN — HYDROCODONE BITARTRATE AND ACETAMINOPHEN 1 TABLET: 5; 325 TABLET ORAL at 19:14

## 2018-08-22 NOTE — ED NOTES
I have reviewed discharge instructions with the patient. The patient verbalized understanding. All questions answered. Patient requested to keep armband intact. Informed patient of risks if armband was stolen or lost. Assisted patient to wheelchair and to waiting room.  to drive patient home.

## 2018-08-22 NOTE — ED PROVIDER NOTES
[facility] EMERGENCY DEPT      4:40 PM    Date: 8/22/2018  Patient Name: Dian Berry    History of Presenting Illness     Chief Complaint   Patient presents with    Altered mental status       History Provided By: EMS. History limited due to patient's altered mental status. Chief Complaint: Altered mental status  Duration: 1 Hours  Timing:  Constant  Location: Generalized  Severity: Moderate    74 y.o. female with noted past medical history of HTN, COPD, hypothyroidism, hyperlipidemia, bipolar 1 disorder,  who presents to the emergency department with AMS onset 1 hours ago. EMS report that the patient was last known normal 4 hours ago. Per EMS, patient responds well to pain but not other stimuli. Medics note that the patient has a history of seizures. No other complaints. No other complaints. Nursing notes regarding the HPI and triage nursing notes were reviewed. Prior medical records were reviewed. Current Outpatient Prescriptions   Medication Sig Dispense Refill    predniSONE (DELTASONE) 1 mg tablet Take 1 mg by mouth daily (with breakfast).  FLUoxetine (PROZAC) 40 mg capsule Take 40 mg by mouth daily.  amLODIPine (NORVASC) 2.5 mg tablet Take 2.5 mg by mouth daily.  rOPINIRole (REQUIP) 1 mg tablet Take 1 mg by mouth nightly. Indications: Patient can have 1 in the morning and 1 at night or 2 at night for a total of 2mg      aspirin (ASPIRIN) 325 mg tablet Take 325 mg by mouth nightly.  simvastatin (ZOCOR) 20 mg tablet Take 20 mg by mouth nightly.  oxybutynin chloride XL (DITROPAN XL) 5 mg CR tablet Take 5 mg by mouth nightly.  folic acid (FOLVITE) 1 mg tablet Take 1 mg by mouth nightly.  methotrexate (RHEUMATREX) 2.5 mg tablet Take 2.5 mg by mouth every Wednesday. Indications: 3 in the morning 3 at night      bimatoprost (LUMIGAN) 0.01 % ophthalmic drops Administer 1 Drop to left eye every evening.       brimonidine-timolol (COMBIGAN) 0.2-0.5 % drop ophthalmic solution Administer 1 Drop to left eye two (2) times a day.  ascorbic acid, vitamin C, (VITAMIN C) 250 mg tablet Take 250 mg by mouth daily.  clonazePAM (KLONOPIN) 0.5 mg tablet Take 0.25 mg by mouth nightly.  clopidogrel (PLAVIX) 75 mg tablet Take 1 Tab by mouth daily. 30 Tab 5    levothyroxine (SYNTHROID) 50 mcg tablet Take 1 Tab by mouth Daily (before breakfast). 30 Tab 5    tocilizumab (ACTEMRA) 162 mg/0.9 mL syrg by SubCUTAneous route.  docusate sodium (COLACE) 50 mg capsule Take 50 mg by mouth daily. Past History     Past Medical History:  Past Medical History:   Diagnosis Date    Bipolar 1 disorder ([facility] [address]     Cerebral aneurysm 12/11/2015    RPCoA, RAChA,, RMCA bifurcation, and RM2      Cervical spine fracture ([facility] [address] 08/20/2014    C2 and C3    COPD     Coronary artery disease     Giant cell arteritis ([facility] [address] 11/15/2014    [place]     Hyperlipidemia     Hypertension     Hypothyroidism     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    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    Menopause     Psychiatric disorder bipolar    Respiratory abnormalities     Restless leg syndrome     Thyroid nodule 4/23/2014    Abnormal biopsy         Past Surgical History:  Past Surgical History:   Procedure Laterality Date    HX CORONARY STENT PLACEMENT         Family History:  Family History   Problem Relation Age of Onset    Breast Cancer Mother        Social History:  Social History   Substance Use Topics    Smoking status: Former Smoker     Types: Cigarettes     Quit date: 4/28/2013    Smokeless tobacco: Never Used    Alcohol use 1.0 oz/week     2 Shots of liquor per week       Allergies:   Allergies   Allergen Reactions    Sulfa (Sulfonamide Antibiotics) Hives    Sulfamethoxazole-Trimethoprim Rash       Patient's primary care provider (as noted in EPIC):  Floyd Hussein MD    REVIEW OF SYSTEMS:  Limited secondary to altered mental status. If ROS is provided, it came from collateral sources such as family, friends, or nursing faclity where appropriate. Visit Vitals    BP (!) 148/99    Pulse 74    Temp 97.7 °F (36.5 °C)    Resp 22    SpO2 100%       PHYSICAL EXAM:    CONSTITUTIONAL:  Well developed;  well nourished. Limited secondary to altered mental status. HEAD:  Normocephalic, atraumatic. EYES:  Non-icteric sclera. Normal conjunctiva. Limited secondary to altered mental status. ENTM:  Nose:  no rhinorrhea. Throat:  no erythema or exudate, mucous membranes moist.  NECK:  No JVD. Limited secondary to altered mental status. RESPIRATORY:  Chest clear, equal breath sounds, good air movement. CARDIOVASCULAR:  Regular rate and rhythm. No murmurs, rubs, or gallops. GI:  Normal bowel sounds, abdomen soft. Limited secondary to altered mental status. BACK:  Non-tender. UPPER EXT:  Normal inspection. LOWER EXT:  No edema, no calf tenderness. Distal pulses intact. NEURO:  Limited secondary to altered mental status. Patient does respond to noxious sternal rub and then will follow basic commands, including moving all 4 extremities on command. SKIN:  No rashes;  Normal for age. PSYCH:  Limited secondary to altered mental status. DIFFERENTIAL DIAGNOSES/ MEDICAL DECISION MAKING:  Hypoglycemia, acute alcohol, drug, or multipharmacy intoxication, sepsis from numerous possible sources including urosepsis, pneumonia, meningitis, significant CVA, TIA, intracerebral hemorrhage, subdural hemorrhage, seizure, significant trauma, electrolyte or hormonal imbalance, other etiologies, versus a combination of the above.     Diagnostic Study Results     Abnormal lab results from this emergency department encounter:  Labs Reviewed   CBC WITH AUTOMATED DIFF - Abnormal; Notable for the following:        Result Value    RBC 4.04 (*)     .9 (*)     MCH 35.6 (*)     NEUTROPHILS 74 (*)     LYMPHOCYTES 18 (*)     All other components within normal limits   METABOLIC PANEL, COMPREHENSIVE - Abnormal; Notable for the following:     Chloride 111 (*)     Glucose 122 (*)     BUN 20 (*)     BUN/Creatinine ratio 31 (*)     AST (SGOT) 43 (*)     All other components within normal limits   POC CHEM8 - Abnormal; Notable for the following:     CO2, POC 29 (*)     Glucose,  (*)     BUN, POC 26 (*)     All other components within normal limits   MAGNESIUM   CARDIAC PANEL,(CK, CKMB & TROPONIN)   ETHYL ALCOHOL   AMMONIA   URINALYSIS W/ RFLX MICROSCOPIC   POC LACTIC ACID       Lab values for this patient within approximately the last 12 hours:  Recent Results (from the past 12 hour(s))   CBC WITH AUTOMATED DIFF    Collection Time: 08/22/18  4:33 PM   Result Value Ref Range    WBC 8.4 4.6 - 13.2 K/uL    RBC 4.04 (L) 4.20 - 5.30 M/uL    HGB 14.4 12.0 - 16.0 g/dL    HCT 44.8 35.0 - 45.0 %    .9 (H) 74.0 - 97.0 FL    MCH 35.6 (H) 24.0 - 34.0 PG    MCHC 32.1 31.0 - 37.0 g/dL    RDW 13.4 11.6 - 14.5 %    PLATELET 419 649 - 046 K/uL    MPV 11.5 9.2 - 11.8 FL    NEUTROPHILS 74 (H) 40 - 73 %    LYMPHOCYTES 18 (L) 21 - 52 %    MONOCYTES 8 3 - 10 %    EOSINOPHILS 0 0 - 5 %    BASOPHILS 0 0 - 2 %    ABS. NEUTROPHILS 6.2 1.8 - 8.0 K/UL    ABS. LYMPHOCYTES 1.5 0.9 - 3.6 K/UL    ABS. MONOCYTES 0.6 0.05 - 1.2 K/UL    ABS. EOSINOPHILS 0.0 0.0 - 0.4 K/UL    ABS.  BASOPHILS 0.0 0.0 - 0.1 K/UL    DF AUTOMATED     METABOLIC PANEL, COMPREHENSIVE    Collection Time: 08/22/18  4:33 PM   Result Value Ref Range    Sodium 145 136 - 145 mmol/L    Potassium 4.9 3.5 - 5.5 mmol/L    Chloride 111 (H) 100 - 108 mmol/L    CO2 28 21 - 32 mmol/L    Anion gap 6 3.0 - 18 mmol/L    Glucose 122 (H) 74 - 99 mg/dL    BUN 20 (H) 7.0 - 18 MG/DL    Creatinine 0.65 0.6 - 1.3 MG/DL    BUN/Creatinine ratio 31 (H) 12 - 20      GFR est AA >60 >60 ml/min/1.73m2    GFR est non-AA >60 >60 ml/min/1.73m2    Calcium 9.3 8.5 - 10.1 MG/DL    Bilirubin, total 0.4 0.2 - 1.0 MG/DL    ALT (SGPT) 53 13 - 56 U/L    AST (SGOT) 43 (H) 15 - 37 U/L    Alk.  phosphatase 57 45 - 117 U/L    Protein, total 6.5 6.4 - 8.2 g/dL    Albumin 3.9 3.4 - 5.0 g/dL    Globulin 2.6 2.0 - 4.0 g/dL    A-G Ratio 1.5 0.8 - 1.7     MAGNESIUM    Collection Time: 08/22/18  4:33 PM   Result Value Ref Range    Magnesium 2.2 1.6 - 2.6 mg/dL   CARDIAC PANEL,(CK, CKMB & TROPONIN)    Collection Time: 08/22/18  4:33 PM   Result Value Ref Range    CK 39 26 - 192 U/L    CK - MB 1.3 <3.6 ng/ml    CK-MB Index 3.3 0.0 - 4.0 %    Troponin-I, Qt. <0.02 0.0 - 0.045 NG/ML   ETHYL ALCOHOL    Collection Time: 08/22/18  4:33 PM   Result Value Ref Range    ALCOHOL(ETHYL),SERUM <3 0 - 3 MG/DL   AMMONIA    Collection Time: 08/22/18  4:33 PM   Result Value Ref Range    Ammonia 19 11 - 32 UMOL/L   POC CHEM8    Collection Time: 08/22/18  4:34 PM   Result Value Ref Range    CO2, POC 29 (H) 19 - 24 MMOL/L    Glucose,  (H) 74 - 106 MG/DL    BUN, POC 26 (H) 7 - 18 MG/DL    Creatinine, POC 0.6 0.6 - 1.3 MG/DL    GFRAA, POC >60 >60 ml/min/1.73m2    GFRNA, POC >60 >60 ml/min/1.73m2    Sodium,  136 - 145 MMOL/L    Potassium, POC 4.8 3.5 - 5.5 MMOL/L    Calcium, ionized (POC) 1.18 1.12 - 1.32 mmol/L    Chloride,  100 - 108 MMOL/L    Anion gap, POC 11 10 - 20      Hematocrit, POC 47 36 - 49 %    Hemoglobin, POC 16.0 12 - 16 G/DL   EKG, 12 LEAD, SUBSEQUENT    Collection Time: 08/22/18  5:01 PM   Result Value Ref Range    Ventricular Rate 72 BPM    Atrial Rate 72 BPM    P-R Interval 148 ms    QRS Duration 76 ms    Q-T Interval 406 ms    QTC Calculation (Bezet) 444 ms    Calculated P Axis 70 degrees    Calculated R Axis 40 degrees    Calculated T Axis 68 degrees    Diagnosis       Normal sinus rhythm with sinus arrhythmia  Anterior infarct , age undetermined  Abnormal ECG  When compared with ECG of 30-AUG-2017 13:45,  premature atrial complexes are no longer present     POC LACTIC ACID    Collection Time: 08/22/18  5:06 PM   Result Value Ref Range Lactic Acid (POC) 1.1 0.4 - 2.0 mmol/L       Radiologist and cardiologist interpretations if available at time of this note:  Ct Head Wo Cont    Result Date: 8/22/2018  EXAM: CT head, CODE S INDICATION: Left-sided deficit. Last known normal state at 1300 today COMPARISON: Noncontrast CT brain 10/11/2017 TECHNIQUE: Axial CT imaging of the head was performed without intravenous contrast. Coronal and sagittal reconstructions were performed. One or more dose reduction techniques were used on this CT: automated exposure control, adjustment of the mAs and/or kVp according to patient's size, and iterative reconstruction techniques. The specific techniques utilized on this CT exam have been documented in the patient's electronic medical record. _______________ FINDINGS: BRAIN AND POSTERIOR FOSSA: The ventricles are mildly prominent. They continues to be asymmetric with left lateral ventricle larger than the right. This is stable. There is periventricular hypodensity which is also stable. Areas of cortical hypodensity in the occipital lobes bilaterally as well as in the inferior cerebellar hemispheres bilaterally are stable with areas of calcification which are also stable which probably represent dystrophic calcifications in areas of old infarct. Areas of hypodensity in the left internal capsule and basal ganglia are probably stable consistent with old lacunar infarcts. There is no intracranial hemorrhage, mass effect, or midline shift. No evidence of acute cortical infarct. EXTRA-AXIAL SPACES AND MENINGES: There are no abnormal extra-axial fluid collections. CALVARIUM: Intact. SINUSES: Clear. OTHER: None. _______________     IMPRESSION: 1. No evidence of acute infarct, hemorrhage or mass. 2. Stable cerebral atrophy with microvascular ischemic changes. 3. Probable chronic bilateral occipital lobe and bilateral cerebellar hemisphere infarcts with dystrophic calcifications.  Findings were called to Dr. Shawn Chapman on 8/22/2018 at 1655. CRITICAL RESULT      Medication(s) ordered for patient during this emergency visit encounter:  Medications   naloxone (NARCAN) injection 1 mg (1 mg IntraVENous Given 8/22/18 1651)       Medical Decision Making     I am the first provider for this patient. I reviewed the vital signs, available nursing notes, past medical history, past surgical history, family history and social history. Vital Signs:  Reviewed the patient's vital signs. EKG: Interpreted by the EP. Time Interpreted: 17:01   Rate: 72 bpm   Rhythm: Normal sinus rhythm with sinus arrhythmia   Interpretation: No STEMI    ED COURSE:    Critical Care Note:  Altered Mental Status    Critical care minutes: 35 MINUTES. Given patients presentation to ed with noted change in mental status, numerous serial neurological examinations were performed, as well as evaluation of vital signs. Given the patients underlying condition medical intervention(s) were needed, requiring numerous reevaluations of patient's vital signs and response to different emergency department therapies, total bedside time evaluating and/or treating the patient, not including procedures, is noted below. 6:42 PM  On serial reexams, patient's MS improved and remained at baseline per  at bedside. 6:51 PM  I spoke to  and he states that this is similar to prior seizures with post-ictal phase. Coding Diagnoses     Clinical Impression:   1. Seizure disorder ([facility] [address]        Disposition     Disposition:  Home. Louann Galicia M.D.   [facility] Board Certified Emergency Physician    Provider Attestation:  If a scribe was utilized in generation of this patient record, I personally performed the services described in the documentation, reviewed the documentation, as recorded by the scribe in my presence, and it accurately records the patient's history of presenting illness, review of systems, patient physical examination, and procedures performed by me as the attending physician. Stephanie Peña M.D. PIPPA Board Certified Emergency Physician  8/22/2018.  4:41 PM    Scribe Attestation     Indra Reid acting as a scribe for and in the presence of Yareli Martin MD      August 22, 2018 at 5:10 PM       Provider Attestation:      I personally performed the services described in the documentation, reviewed the documentation, as recorded by the scribe in my presence, and it accurately and completely records my words and actions.  August 22, 2018 at 5:10 PM - Yareli Martin MD

## 2018-08-22 NOTE — DISCHARGE INSTRUCTIONS
Epilepsy: Care Instructions  Your Care Instructions    Epilepsy is a common condition that causes repeated seizures. The seizures are caused by bursts of electrical activity in the brain that aren't normal. Seizures may cause problems with muscle control, movement, speech, vision, or awareness. They can be scary. Epilepsy affects each person differently. Some people have only a few seizures. Others get them more often. If you know what triggers a seizure, you may be able to avoid having one. You can take medicines to control and reduce seizures. You and your doctor will need to find the right combination, schedule, and dose of medicine. This may take time and careful changes. Seizures may get worse and happen more often over time. Follow-up care is a key part of your treatment and safety. Be sure to make and go to all appointments, and call your doctor if you are having problems. It's also a good idea to know your test results and keep a list of the medicines you take. How can you care for yourself at home? · Be safe with medicines. Take your medicines exactly as prescribed. Call your doctor if you think you are having a problem with your medicine. · Make a treatment plan with your doctor. Be sure to follow your plan. · Try to identify and avoid things that may make you more likely to have a seizure. These may include:  ¨ Not getting enough sleep. ¨ Using drugs or alcohol. ¨ Being emotionally stressed. ¨ Skipping meals. · Keep a record of any seizures you have. Note the date, time of day, and any details about the seizure that you can remember. Your doctor can use this information to plan or adjust your medicine or other treatment. · Be sure that any doctor treating you for another condition knows that you have epilepsy. Each doctor should know what medicines you are taking, if any. · Wear a medical ID bracelet. You can buy this at most [employer].  If you have a seizure that leaves you unconscious or unable to speak for yourself, this bracelet will let those who are treating you know that you have epilepsy. · Talk to your doctor about whether it is safe for you to do certain activities, such as drive or swim. When should you call for help? Call 911 anytime you think you may need emergency care. For example, call if:    · A seizure does not stop as it normally does.     · You have new symptoms such as:  ¨ Numbness, tingling, or weakness on one side of your body or face. ¨ Vision changes. ¨ Trouble speaking or thinking clearly.    Call your doctor now or seek immediate medical care if:    · You have a fever.     · You have a severe headache.    Watch closely for changes in your health, and be sure to contact your doctor if:    · The normal pattern or features of your seizures change. Where can you learn more? Go to http://emanuel-williams.info/. Janelle Lawrence in the search box to learn more about \"Epilepsy: Care Instructions. \"  Current as of: October 9, 2017  Content Version: 11.7  © 1146-0389 [employer]. Care instructions adapted under license by [employer] (which disclaims liability or warranty for this information). If you have questions about a medical condition or this instruction, always ask your healthcare professional. [address] any warranty or liability for your use of this information.

## 2018-08-22 NOTE — ED NOTES
Patients  at bedside. Patient now awake and responding normally to questions and commands. Dr. Kristi Neely made aware.

## 2018-08-22 NOTE — ED NOTES
Patient and  pressing call button repeatedly asking if she was able to leave, for pain medication, and to talk to doctor. Dr. Sena Christiansen made aware.

## 2018-08-22 NOTE — ED TRIAGE NOTES
Arrived via EMS. Code S level 1 called prior to patient arrival. Rescue states she was found unresponsive but arouses to pain. Hx of stroke and seizures. Last known normal around 1300 today. Dr. Diane Trevino assessing patient currently.  Verbal order to cancel Code S.

## 2018-08-23 LAB
ATRIAL RATE: 72 BPM
CALCULATED P AXIS, ECG09: 70 DEGREES
CALCULATED R AXIS, ECG10: 40 DEGREES
CALCULATED T AXIS, ECG11: 68 DEGREES
DIAGNOSIS, 93000: NORMAL
P-R INTERVAL, ECG05: 148 MS
Q-T INTERVAL, ECG07: 406 MS
QRS DURATION, ECG06: 76 MS
QTC CALCULATION (BEZET), ECG08: 444 MS
VENTRICULAR RATE, ECG03: 72 BPM

## 2018-09-24 ENCOUNTER — IMPORTED ENCOUNTER (OUTPATIENT)
Dept: URBAN - METROPOLITAN AREA CLINIC 1 | Facility: CLINIC | Age: 73
End: 2018-09-24

## 2018-09-24 PROBLEM — H40.053: Noted: 2018-09-24

## 2018-09-24 PROBLEM — H25.811: Noted: 2018-09-24

## 2018-09-24 PROBLEM — H04.123: Noted: 2018-09-24

## 2018-09-24 PROBLEM — S05.02XA: Noted: 2018-09-24

## 2018-09-24 PROBLEM — Z96.1: Noted: 2018-09-24

## 2018-09-24 PROCEDURE — 99213 OFFICE O/P EST LOW 20 MIN: CPT

## 2018-09-24 NOTE — PATIENT DISCUSSION
1.  OHTN OU (0.8/0.7)- Stable IOP OU on current regiment. Observe for changes/progression. Patient to continue with Combigan BID OS and Lumigan QHS OS. Observe. Compliance w/ drops urged. 2.  Corneal Abrasion OS x 3(Active) : Start Ofloxacin OS BID x 3 days then D/C. Start Tears OS TID routinely. 3.  Dry Eyes OU- Stable. The continuation of artificial tears OU BID were recommended routinely. Plugs intact LLs OU. 4.  Cataract OD: Observe for now without intervention. The patient was advised to contact us if any change or worsening of vision5. Pseudophakia OS - Doing well. 6.  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 Continue current po Prednisone. Cont Combigan BID OS and Lumigan QHS OS. Steroids followed by Dr. Monica Merritt safety precautions. Patient should continue to f/u as scheduled7. Return for an appointment for 30 in 6 months with Dr. Anderson Fernandez.

## 2018-09-24 NOTE — PATIENT DISCUSSION
Cataract OD: Observe for now without intervention.  The patient was advised to contact us if any change or worsening of vision

## 2018-10-20 NOTE — PROGRESS NOTES
conducted a Follow up consultation and Spiritual Assessment for [facility], who is a 66 y.o.,female. The  provided the following Interventions:  Continued the relationship of care and support. Listened empathically. Offered prayer and assurance of continued prayer on patients behalf. Chart reviewed. The following outcomes were achieved:  Patient expressed gratitude for pastoral care visit. Assessment:  There are no further spiritual or Orthodox issues which require Spiritual Care Services interventions at this time. Plan:  Chaplains will continue to follow and will provide pastoral care on an as needed/requested basis.  recommends bedside caregivers page  on duty if patient shows signs of acute spiritual or emotional distress.      [address] [facility]   Staff [address] [facility]   [phone] Patient sitting upright on stretcher, no acute distress noted. Respirations even and unlabored. Apologized for time delay.

## 2019-01-30 ENCOUNTER — HOSPITAL ENCOUNTER (OUTPATIENT)
Dept: CT IMAGING | Age: 74
Discharge: HOME | End: 2019-01-30
Attending: INTERNAL MEDICINE
Payer: COMMERCIAL

## 2019-01-30 DIAGNOSIS — J32.9 SINUSITIS: ICD-10-CM

## 2019-01-30 PROCEDURE — 70486 CT MAXILLOFACIAL W/O DYE: CPT

## 2019-03-26 ENCOUNTER — IMPORTED ENCOUNTER (OUTPATIENT)
Dept: URBAN - METROPOLITAN AREA CLINIC 1 | Facility: CLINIC | Age: 74
End: 2019-03-26

## 2019-03-26 PROBLEM — H47.013: Noted: 2019-03-26

## 2019-03-26 PROCEDURE — 92014 COMPRE OPH EXAM EST PT 1/>: CPT

## 2019-03-26 NOTE — PATIENT DISCUSSION
1.  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 Continue currently taking 7mg po Qdaily of Prednisone. Cont Combigan BID OS and Lumigan QHS OS. Steroids followed by Dr. Jann Angel safety precautions. Patient should continue to f/u as scheduled with Dr. Yessica Andujar. 2.  Cataract OD: Observe for now without intervention. The patient was advised to contact us if any change or worsening of vision3. OHTN OU (0.8/0.75)- IOP stable on current meds. Cont Combigan OS BID and Latanoprost OS QHS. 4.  MARYJO w/ PEK OU- (H/o Plugs LLs OU) Cont ATs TID OU Routinely. 5.  PVD OD - stable RD precautions. 6.  Pseudophakia OS- H/o YAG Cap OSLetter to PCP Return for an appointment in 6 mo 10 with Dr. Gail Harris.

## 2019-04-25 ENCOUNTER — HOSPITAL ENCOUNTER (EMERGENCY)
Age: 74
Discharge: HOME | End: 2019-04-26
Attending: EMERGENCY MEDICINE
Payer: COMMERCIAL

## 2019-04-25 ENCOUNTER — APPOINTMENT (OUTPATIENT)
Dept: GENERAL RADIOLOGY | Age: 74
End: 2019-04-25
Attending: PHYSICIAN ASSISTANT
Payer: COMMERCIAL

## 2019-04-25 ENCOUNTER — APPOINTMENT (OUTPATIENT)
Dept: CT IMAGING | Age: 74
End: 2019-04-25
Attending: PHYSICIAN ASSISTANT
Payer: COMMERCIAL

## 2019-04-25 VITALS
RESPIRATION RATE: 17 BRPM | HEART RATE: 71 BPM | WEIGHT: 125 LBS | OXYGEN SATURATION: 96 % | SYSTOLIC BLOOD PRESSURE: 174 MMHG | TEMPERATURE: 98.4 F | DIASTOLIC BLOOD PRESSURE: 71 MMHG | BODY MASS INDEX: 23.62 KG/M2

## 2019-04-25 DIAGNOSIS — M25.512 ACUTE PAIN OF LEFT SHOULDER: ICD-10-CM
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
## 2019-04-25 DIAGNOSIS — S01.01XA LACERATION OF SCALP WITHOUT FOREIGN BODY, INITIAL ENCOUNTER: ICD-10-CM

## 2019-04-25 DIAGNOSIS — S60.311A ABRASION OF RIGHT THUMB, INITIAL ENCOUNTER: ICD-10-CM

## 2019-04-25 PROCEDURE — 74011000250 HC RX REV CODE- 250: Performed by: PHYSICIAN ASSISTANT

## 2019-04-25 PROCEDURE — 77030018836 HC SOL IRR NACL ICUM -A

## 2019-04-25 PROCEDURE — 77030039266 HC ADH SKN EXOFIN S2SG -A

## 2019-04-25 PROCEDURE — 75810000293 HC SIMP/SUPERF WND  RPR

## 2019-04-25 PROCEDURE — 70450 CT HEAD/BRAIN W/O DYE: CPT

## 2019-04-25 PROCEDURE — 73030 X-RAY EXAM OF SHOULDER: CPT

## 2019-04-25 PROCEDURE — 99283 EMERGENCY DEPT VISIT LOW MDM: CPT

## 2019-04-25 RX ORDER — BISMUTH SUBSALICYLATE 262 MG
1 TABLET,CHEWABLE ORAL DAILY
COMMUNITY

## 2019-04-25 RX ORDER — LEVETIRACETAM 500 MG/1
TABLET ORAL 2 TIMES DAILY
COMMUNITY

## 2019-04-25 RX ORDER — BACITRACIN 500 UNIT/G
1 PACKET (EA) TOPICAL
Status: COMPLETED | OUTPATIENT
Start: 2019-04-25 | End: 2019-04-25

## 2019-04-25 RX ORDER — LATANOPROST 50 UG/ML
1 SOLUTION/ DROPS OPHTHALMIC
COMMUNITY

## 2019-04-25 RX ORDER — TIMOLOL MALEATE 5 MG/ML
1 SOLUTION/ DROPS OPHTHALMIC 2 TIMES DAILY
COMMUNITY

## 2019-04-25 RX ORDER — TRAMADOL HYDROCHLORIDE 50 MG/1
50 TABLET ORAL
COMMUNITY
End: 2019-06-25 | Stop reason: SINTOL

## 2019-04-25 RX ADMIN — BACITRACIN 1 PACKET: 500 OINTMENT TOPICAL at 21:57

## 2019-04-26 NOTE — DISCHARGE INSTRUCTIONS
Patient Education        Preventing Falls: Care Instructions  Your Care Instructions    Getting around your home safely can be a challenge if you have injuries or health problems that make it easy for you to fall. Loose rugs and furniture in walkways are among the dangers for many older people who have problems walking or who have poor eyesight. People who have conditions such as arthritis, osteoporosis, or dementia also have to be careful not to fall. You can make your home safer with a few simple measures. Follow-up care is a key part of your treatment and safety. Be sure to make and go to all appointments, and call your doctor if you are having problems. It's also a good idea to know your test results and keep a list of the medicines you take. How can you care for yourself at home? Taking care of yourself  · You may get dizzy if you do not drink enough water. To prevent dehydration, drink plenty of fluids, enough so that your urine is light yellow or clear like water. Choose water and other caffeine-free clear liquids. If you have kidney, heart, or liver disease and have to limit fluids, talk with your doctor before you increase the amount of fluids you drink. · Exercise regularly to improve your strength, muscle tone, and balance. Walk if you can. Swimming may be a good choice if you cannot walk easily. · Have your vision and hearing checked each year or any time you notice a change. If you have trouble seeing and hearing, you might not be able to avoid objects and could lose your balance. · Know the side effects of the medicines you take. Ask your doctor or pharmacist whether the medicines you take can affect your balance. Sleeping pills or sedatives can affect your balance. · Limit the amount of alcohol you drink. Alcohol can impair your balance and other senses. · Ask your doctor whether calluses or corns on your feet need to be removed.  If you wear loose-fitting shoes because of calluses or corns, you can lose your balance and fall. · Talk to your doctor if you have numbness in your feet. Preventing falls at home  · Remove raised doorway thresholds, throw rugs, and clutter. Repair loose carpet or raised areas in the floor. · Move furniture and electrical cords to keep them out of walking paths. · Use nonskid floor wax, and wipe up spills right away, especially on ceramic tile floors. · If you use a walker or cane, put rubber tips on it. If you use crutches, clean the bottoms of them regularly with an abrasive pad, such as steel wool. · Keep your house well lit, especially [place], and outside walkways. Use night-lights in areas such as hallways and bathrooms. Add extra light switches or use remote switches (such as switches that go on or off when you clap your hands) to make it easier to turn lights on if you have to get up during the night. · Install sturdy handrails on stairways. · Move items in your cabinets so that the things you use a lot are on the lower shelves (about waist level). · Keep a cordless phone and a flashlight with new batteries by your bed. If possible, put a phone in each of the main rooms of your house, or carry a cell phone in case you fall and cannot reach a phone. Or, you can wear a device around your neck or wrist. You push a button that sends a signal for help. · Wear low-heeled shoes that fit well and give your feet good support. Use footwear with nonskid soles. Check the heels and soles of your shoes for wear. Repair or replace worn heels or soles. · Do not wear socks without shoes on wood floors. · Walk on the grass when the sidewalks are slippery. If you live in an area that gets snow and ice in the winter, sprinkle salt on slippery steps and sidewalks. Preventing falls in the bath  · Install grab bars and nonskid mats inside and outside your shower or tub and near the toilet and sinks. · Use shower chairs and bath benches.   · Use a hand-held shower head that will allow you to sit while showering. · Get into a tub or shower by putting the weaker leg in first. Get out of a tub or shower with your strong side first.  · Repair loose toilet seats and consider installing a raised toilet seat to make getting on and off the toilet easier. · Keep your bathroom door unlocked while you are in the shower. Where can you learn more? Go to http://emanuel-williams.info/. Enter [id number] [id number] in the search box to learn more about \"Preventing Falls: Care Instructions. \"  Current as of: March 15, 2018  Content Version: 11.9  © 6386-1516 [employer]. Care instructions adapted under license by [employer] (which disclaims liability or warranty for this information). If you have questions about a medical condition or this instruction, always ask your healthcare professional. [address] any warranty or liability for your use of this information.

## 2019-04-26 NOTE — ED NOTES
I have reviewed discharge instructions with the patient. The patient verbalized understanding. Patient armband removed and shredded. No prescriptions given. Pt left the ED by wheelchair accompanied .

## 2019-04-26 NOTE — ED PROVIDER NOTES
EMERGENCY DEPARTMENT HISTORY AND PHYSICAL EXAM 
 
Date: 4/25/2019 Patient Name: Veronika Espinoza History of Presenting Illness Chief Complaint Patient presents with  Laceration  Fall History Provided By: Patient Chief Complaint: fall Duration: 1 Hours Timing:  Acute Location: head, left shoulder Quality: Aching Severity: 9 out of 10 Modifying Factors: none Associated Symptoms: denies any other associated signs or symptoms HPI: Veronika Espinoza is a 74 y.o. female with a PMH of Hypertension, COPD, stroke, restless leg syndrome, hypothyroidism, hyperlipidemia, bipolar, giant cell arteritis, coronary artery disease, cerebral aneurysm who presents to the ER with her  for evaluation of a fall.  states the patient was in her wheelchair attempting to go into the front door from outside when her wheelchair fell backwards. Patient struck her head on the concrete sidewalk. She did not have any loss of consciousness. She does take Plavix for blood thinners. Patient is complaining of a small cut to her thumb and a cut to the back of her head. She is also having some left shoulder and clavicle pain. She denies any dizziness, nausea, vomiting, chest pain, shortness of breath and has no other symptoms or complaints. PCP: Beto Mack MD 
 
Current Outpatient Medications Medication Sig Dispense Refill  levETIRAcetam (KEPPRA) 500 mg tablet Take  by mouth two (2) times a day.  teriparatide (FORTEO) 20 mcg/dose - 600 mcg/2.4 mL pnij injection 20 mcg by SubCUTAneous route daily.  latanoprost (XALATAN) 0.005 % ophthalmic solution Administer 1 Drop to both eyes nightly.  timolol (TIMOPTIC) 0.5 % ophthalmic solution 1 Drop two (2) times a day.  ferrous sulfate (IRON PO) Take  by mouth.  traMADol (ULTRAM) 50 mg tablet Take 50 mg by mouth every six (6) hours as needed for Pain.  multivitamin (ONE A DAY) tablet Take 1 Tab by mouth daily.  predniSONE (DELTASONE) 1 mg tablet Take 1 mg by mouth daily (with breakfast).  tocilizumab (ACTEMRA) 162 mg/0.9 mL syrg by SubCUTAneous route.  FLUoxetine (PROZAC) 40 mg capsule Take 40 mg by mouth daily.  amLODIPine (NORVASC) 2.5 mg tablet Take 2.5 mg by mouth daily.  rOPINIRole (REQUIP) 1 mg tablet Take 1 mg by mouth nightly. Indications: Patient can have 1 in the morning and 1 at night or 2 at night for a total of 2mg  aspirin (ASPIRIN) 325 mg tablet Take 325 mg by mouth nightly.  simvastatin (ZOCOR) 20 mg tablet Take 20 mg by mouth nightly.  oxybutynin chloride XL (DITROPAN XL) 5 mg CR tablet Take 5 mg by mouth nightly.  folic acid (FOLVITE) 1 mg tablet Take 1 mg by mouth nightly.  brimonidine-timolol (COMBIGAN) 0.2-0.5 % drop ophthalmic solution Administer 1 Drop to left eye two (2) times a day.  ascorbic acid, vitamin C, (VITAMIN C) 250 mg tablet Take 250 mg by mouth daily.  docusate sodium (COLACE) 50 mg capsule Take 50 mg by mouth daily.  clonazePAM (KLONOPIN) 0.5 mg tablet Take 0.25 mg by mouth nightly.  clopidogrel (PLAVIX) 75 mg tablet Take 1 Tab by mouth daily. 30 Tab 5  levothyroxine (SYNTHROID) 50 mcg tablet Take 1 Tab by mouth Daily (before breakfast). 30 Tab 5  
 methotrexate (RHEUMATREX) 2.5 mg tablet Take 2.5 mg by mouth every Wednesday. Indications: 3 in the morning 3 at night  bimatoprost (LUMIGAN) 0.01 % ophthalmic drops Administer 1 Drop to left eye every evening. Past History Past Medical History: 
Past Medical History:  
Diagnosis Date  Bipolar 1 disorder ([facility] [address]  Cerebral aneurysm 12/11/2015 [facility],, RMCA bifurcation, and RM2  [facility] 08/20/2014 C2 and C3  
 COPD  Coronary artery disease  [facility] 11/15/2014  
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 
Past Surgical History:  
Procedure Laterality Date  [facility] Family History: 
Family History Problem Relation Age of Onset  Breast Cancer Mother Social History: 
Social History Tobacco Use  Smoking status: Former Smoker Types: Cigarettes Last attempt to quit: 2013 Years since quittin.9  Smokeless tobacco: Never Used Substance Use Topics  Alcohol use: Yes Alcohol/week: 1.0 oz Types: 2 Shots of liquor per week  Drug use: No  
 
 
Allergies: Allergies Allergen Reactions  Sulfa (Sulfonamide Antibiotics) Hives  Sulfamethoxazole-Trimethoprim Rash Review of Systems Review of Systems Constitutional: Negative for chills, fatigue and fever. HENT: Negative. Negative for sore throat. Eyes: Negative. Respiratory: Negative for cough and shortness of breath. Cardiovascular: Negative for chest pain and palpitations. Gastrointestinal: Negative for abdominal pain, nausea and vomiting. Genitourinary: Negative for dysuria. Musculoskeletal: Positive for arthralgias. Left shoulder pain Skin: Positive for wound. Laceration to scalp, right thumb; multiple contusions Neurological: Positive for headaches. Negative for dizziness, weakness and light-headedness. Psychiatric/Behavioral: Negative. All other systems reviewed and are negative. Physical Exam  
 
Vitals:  
 190 193 19 2224 BP: 182/76 182/76 174/71 Pulse:  71 Resp:  17 Temp:  98.4 °F (36.9 °C) SpO2: 95% 97% 96% Weight:  56.7 kg (125 lb) Physical Exam  
 Constitutional: She is oriented to person, place, and time. She appears well-developed and well-nourished. No distress. HENT:  
Head: Normocephalic. Head is with laceration. Head is without raccoon's eyes and without Du's sign. Right Ear: External ear and ear canal normal.  
Left Ear: External ear and ear canal normal.  
Mouth/Throat: Oropharynx is clear and moist.  
Eyes: Pupils are equal, round, and reactive to light. Conjunctivae are normal. No scleral icterus. Neck: Normal range of motion. Neck supple. No JVD present. No spinous process tenderness present. No tracheal deviation present. Cardiovascular: Normal rate, regular rhythm and normal heart sounds. No murmur heard. Pulmonary/Chest: Effort normal and breath sounds normal. No respiratory distress. She has no wheezes. She has no rales. She exhibits no tenderness. Abdominal: Soft. Bowel sounds are normal. She exhibits no distension. There is no tenderness. There is no rebound and no guarding. Musculoskeletal:  
     Left shoulder: She exhibits decreased range of motion and bony tenderness. Arms: 
     Right hand: She exhibits laceration. She exhibits normal range of motion and normal capillary refill. Normal sensation noted. Normal strength noted. Hands: 
Neurological: She is alert and oriented to person, place, and time. She has normal strength. GCS eye subscore is 4. GCS verbal subscore is 5. GCS motor subscore is 6. Left sided weakness; not new per  at bedside due to previous stroke Skin: Skin is warm and dry. She is not diaphoretic. Psychiatric: She has a normal mood and affect. Nursing note and vitals reviewed. Diagnostic Study Results Labs - No results found for this or any previous visit (from the past 12 hour(s)). Radiologic Studies -  
CT HEAD WO CONT    (Results Pending) XR SHOULDER LT AP/LAT MIN 2 V    (Results Pending) CT Results  (Last 48 hours) None CXR Results  (Last 48 hours) None Medical Decision Making I am the first provider for this patient. I reviewed the vital signs, available nursing notes, past medical history, past surgical history, family history and social history. Vital Signs-Reviewed the patient's vital signs. Records Reviewed: Nursing Notes, Old Medical Records, Previous Radiology Studies and Previous Laboratory Studies 841 PM 
55-year-old female who presents the ER with her  for evaluation of laceration to her head and thumb after having a mechanical fall.  states the patient was sitting in her wheelchair trying to go inside the house from the front porch when her wheelchair tipped backwards, causing her to hit her head. Patient did not have any loss of consciousness. She does take Plavix regularly due to her stroke history. She is also complaining of left shoulder pain. She is up-to-date on her immunizations. Patient is answering questions appropriately. We will plan on imaging of head and shoulder and appropriate wound closure. Rishi Kirby PA-C 
  
11:58 PM 
No acute process on CT of the head imaging per preliminary read. No acute findings on x-ray of left shoulder as well. Chronic left collarbone and multiple rib fractures noted but nothing new and acute. Small laceration to the posterior scalp was closed with adhesive. Discussed return precautions with patient and family member at bedside. Will have patient follow-up with primary care within the next week. All questions answered and patient in agreement with plan of care. Will plan for discharge. Rishi Kirby PA-C Disposition: 
discharged DISCHARGE NOTE:  
 
  Care plan outlined and precautions discussed. Patient has no new complaints, changes, or physical findings. Results of imaging were reviewed with the patient.  All medications were reviewed with the patient; will d/c home with n/a. All of pt's questions and concerns were addressed. Patient was instructed and agrees to follow up with pcp, as well as to return to the ED upon further deterioration. Patient is ready to go home. Follow-up Information Follow up With Specialties Details Why Contact [facility] EMERGENCY DEPT Emergency Medicine  If symptoms worsen [address] 
[phone] Nelson Reynoso MD Geriatric Medicine Call in 1 day ER follow up for fall, left shoulder pain [facility] [address] [place] [phone] 
[phone] Current Discharge Medication List  
  
CONTINUE these medications which have NOT CHANGED Details  
levETIRAcetam (KEPPRA) 500 mg tablet Take  by mouth two (2) times a day. teriparatide (FORTEO) 20 mcg/dose - 600 mcg/2.4 mL pnij injection 20 mcg by SubCUTAneous route daily. latanoprost (XALATAN) 0.005 % ophthalmic solution Administer 1 Drop to both eyes nightly. timolol (TIMOPTIC) 0.5 % ophthalmic solution 1 Drop two (2) times a day. ferrous sulfate (IRON PO) Take  by mouth. traMADol (ULTRAM) 50 mg tablet Take 50 mg by mouth every six (6) hours as needed for Pain.  
  
multivitamin (ONE A DAY) tablet Take 1 Tab by mouth daily. predniSONE (DELTASONE) 1 mg tablet Take 1 mg by mouth daily (with breakfast). tocilizumab (ACTEMRA) 162 mg/0.9 mL syrg by SubCUTAneous route. FLUoxetine (PROZAC) 40 mg capsule Take 40 mg by mouth daily. amLODIPine (NORVASC) 2.5 mg tablet Take 2.5 mg by mouth daily. rOPINIRole (REQUIP) 1 mg tablet Take 1 mg by mouth nightly. Indications: Patient can have 1 in the morning and 1 at night or 2 at night for a total of 2mg  
  
aspirin (ASPIRIN) 325 mg tablet Take 325 mg by mouth nightly. simvastatin (ZOCOR) 20 mg tablet Take 20 mg by mouth nightly. oxybutynin chloride XL (DITROPAN XL) 5 mg CR tablet Take 5 mg by mouth nightly. folic acid (FOLVITE) 1 mg tablet Take 1 mg by mouth nightly. brimonidine-timolol (COMBIGAN) 0.2-0.5 % drop ophthalmic solution Administer 1 Drop to left eye two (2) times a day. ascorbic acid, vitamin C, (VITAMIN C) 250 mg tablet Take 250 mg by mouth daily. docusate sodium (COLACE) 50 mg capsule Take 50 mg by mouth daily. clonazePAM (KLONOPIN) 0.5 mg tablet Take 0.25 mg by mouth nightly. clopidogrel (PLAVIX) 75 mg tablet Take 1 Tab by mouth daily. Qty: 30 Tab, Refills: 5  
  
levothyroxine (SYNTHROID) 50 mcg tablet Take 1 Tab by mouth Daily (before breakfast). Qty: 30 Tab, Refills: 5  
  
methotrexate (RHEUMATREX) 2.5 mg tablet Take 2.5 mg by mouth every Wednesday. Indications: 3 in the morning 3 at night  
  
bimatoprost (LUMIGAN) 0.01 % ophthalmic drops Administer 1 Drop to left eye every evening. Provider Notes (Medical Decision Making):  
 
Procedures: 
Wound Closure by Adhesive Date/Time: 4/25/2019 11:57 PM 
Performed by: Julianne Álvarez PA-C Authorized by: Julianne Álvarez PA-C Consent:  
  Consent obtained:  Verbal 
  Consent given by:  Patient Risks discussed:  Infection, need for additional repair, poor cosmetic result, pain and poor wound healing Alternatives discussed:  No treatment Anesthesia (see MAR for exact dosages): Anesthesia method:  None Laceration details: Location:  Scalp Scalp location:  Occipital 
  Length (cm):  1 Repair type:  
  Repair type:  Simple Exploration:  
  Contaminated: no   
Treatment:  
  Area cleansed with:  Saline and Hibiclens Amount of cleaning:  Standard Visualized foreign bodies/material removed: no   
Skin repair:  
  Repair method:  Tissue adhesive Approximation:  
  Approximation:  Close Post-procedure details:  
  Dressing:  Open (no dressing) Patient tolerance of procedure: Tolerated well, no immediate complications Diagnosis Clinical Impression: 1. Fall, initial encounter 2. Laceration of scalp without foreign body, initial encounter 3. Abrasion of right thumb, initial encounter 4. Acute pain of left shoulder

## 2019-04-26 NOTE — ED TRIAGE NOTES
Alert female brought by  s/p fall off wheelchair. Pt has previous stroke with left sided weakness. Small laceration with trace of blood noted on back of head and right thumb.

## 2019-05-08 NOTE — PATIENT DISCUSSION
Continue: bacitracin (bacitracin): ointment: 500 unit/gram 1 a small amount at bedtime on eyelid 09-

## 2019-06-25 ENCOUNTER — APPOINTMENT (OUTPATIENT)
Dept: GENERAL RADIOLOGY | Age: 74
End: 2019-06-25
Attending: EMERGENCY MEDICINE
Payer: COMMERCIAL

## 2019-06-25 ENCOUNTER — APPOINTMENT (OUTPATIENT)
Dept: CT IMAGING | Age: 74
End: 2019-06-25
Attending: EMERGENCY MEDICINE
Payer: COMMERCIAL

## 2019-06-25 ENCOUNTER — HOSPITAL ENCOUNTER (EMERGENCY)
Age: 74
Discharge: HOME | End: 2019-06-25
Attending: EMERGENCY MEDICINE
Payer: COMMERCIAL

## 2019-06-25 VITALS
SYSTOLIC BLOOD PRESSURE: 123 MMHG | TEMPERATURE: 98.5 F | RESPIRATION RATE: 15 BRPM | DIASTOLIC BLOOD PRESSURE: 68 MMHG | OXYGEN SATURATION: 98 % | WEIGHT: 115 LBS | HEIGHT: 60 IN | HEART RATE: 66 BPM | BODY MASS INDEX: 22.58 KG/M2

## 2019-06-25 DIAGNOSIS — R41.82 ALTERED MENTAL STATUS, UNSPECIFIED ALTERED MENTAL STATUS TYPE: Primary | ICD-10-CM

## 2019-06-25 LAB
ALBUMIN SERPL-MCNC: 3.8 G/DL (ref 3.4–5)
ALBUMIN/GLOB SERPL: 1.7 {RATIO} (ref 0.8–1.7)
ALP SERPL-CCNC: 83 U/L (ref 45–117)
ALT SERPL-CCNC: 26 U/L (ref 13–56)
AMPHET UR QL SCN: NEGATIVE
ANION GAP SERPL CALC-SCNC: 7 MMOL/L (ref 3–18)
APPEARANCE UR: CLEAR
APTT PPP: 24.3 SEC (ref 23–36.4)
AST SERPL-CCNC: 25 U/L (ref 15–37)
BACTERIA URNS QL MICRO: NEGATIVE /HPF
BARBITURATES UR QL SCN: NEGATIVE
BASOPHILS # BLD: 0 K/UL (ref 0–0.1)
BASOPHILS NFR BLD: 0 % (ref 0–2)
BENZODIAZ UR QL: NEGATIVE
BILIRUB SERPL-MCNC: 0.2 MG/DL (ref 0.2–1)
BILIRUB UR QL: NEGATIVE
BUN SERPL-MCNC: 20 MG/DL (ref 7–18)
BUN/CREAT SERPL: 28 (ref 12–20)
CALCIUM SERPL-MCNC: 9.6 MG/DL (ref 8.5–10.1)
CANNABINOIDS UR QL SCN: POSITIVE
CHLORIDE SERPL-SCNC: 109 MMOL/L (ref 100–108)
CK MB CFR SERPL CALC: NORMAL % (ref 0–4)
CK MB SERPL-MCNC: <1 NG/ML (ref 5–25)
CK SERPL-CCNC: 35 U/L (ref 26–192)
CO2 SERPL-SCNC: 27 MMOL/L (ref 21–32)
COCAINE UR QL SCN: NEGATIVE
COLOR UR: YELLOW
CREAT SERPL-MCNC: 0.72 MG/DL (ref 0.6–1.3)
DIFFERENTIAL METHOD BLD: ABNORMAL
EOSINOPHIL # BLD: 0.1 K/UL (ref 0–0.4)
EOSINOPHIL NFR BLD: 1 % (ref 0–5)
EPITH CASTS URNS QL MICRO: NORMAL /LPF (ref 0–5)
ERYTHROCYTE [DISTWIDTH] IN BLOOD BY AUTOMATED COUNT: 13.7 % (ref 11.6–14.5)
ETHANOL SERPL-MCNC: <3 MG/DL (ref 0–3)
GLOBULIN SER CALC-MCNC: 2.2 G/DL (ref 2–4)
GLUCOSE BLD STRIP.AUTO-MCNC: 121 MG/DL (ref 70–110)
GLUCOSE SERPL-MCNC: 105 MG/DL (ref 74–99)
GLUCOSE UR STRIP.AUTO-MCNC: NEGATIVE MG/DL
HCT VFR BLD AUTO: 42.7 % (ref 35–45)
HDSCOM,HDSCOM: ABNORMAL
HGB BLD-MCNC: 13.8 G/DL (ref 12–16)
HGB UR QL STRIP: NEGATIVE
INR PPP: 1.1 (ref 0.8–1.2)
KETONES UR QL STRIP.AUTO: NEGATIVE MG/DL
LACTATE BLD-SCNC: 0.76 MMOL/L (ref 0.4–2)
LEUKOCYTE ESTERASE UR QL STRIP.AUTO: ABNORMAL
LYMPHOCYTES # BLD: 1.5 K/UL (ref 0.9–3.6)
LYMPHOCYTES NFR BLD: 18 % (ref 21–52)
MCH RBC QN AUTO: 34.2 PG (ref 24–34)
MCHC RBC AUTO-ENTMCNC: 32.3 G/DL (ref 31–37)
MCV RBC AUTO: 106 FL (ref 74–97)
METHADONE UR QL: NEGATIVE
MONOCYTES # BLD: 0.7 K/UL (ref 0.05–1.2)
MONOCYTES NFR BLD: 9 % (ref 3–10)
NEUTS SEG # BLD: 6 K/UL (ref 1.8–8)
NEUTS SEG NFR BLD: 72 % (ref 40–73)
NITRITE UR QL STRIP.AUTO: NEGATIVE
OPIATES UR QL: NEGATIVE
PCP UR QL: NEGATIVE
PH UR STRIP: 6 [PH] (ref 5–8)
PLATELET # BLD AUTO: 308 K/UL (ref 135–420)
PMV BLD AUTO: 12.2 FL (ref 9.2–11.8)
POTASSIUM SERPL-SCNC: 4.4 MMOL/L (ref 3.5–5.5)
PROT SERPL-MCNC: 6 G/DL (ref 6.4–8.2)
PROT UR STRIP-MCNC: NEGATIVE MG/DL
PROTHROMBIN TIME: 13.4 SEC (ref 11.5–15.2)
RBC # BLD AUTO: 4.03 M/UL (ref 4.2–5.3)
SODIUM SERPL-SCNC: 143 MMOL/L (ref 136–145)
SP GR UR REFRACTOMETRY: 1.02 (ref 1–1.03)
TROPONIN I SERPL-MCNC: <0.02 NG/ML (ref 0–0.04)
UROBILINOGEN UR QL STRIP.AUTO: 0.2 EU/DL (ref 0.2–1)
WBC # BLD AUTO: 8.3 K/UL (ref 4.6–13.2)
WBC URNS QL MICRO: NORMAL /HPF (ref 0–4)

## 2019-06-25 PROCEDURE — 80053 COMPREHEN METABOLIC PANEL: CPT

## 2019-06-25 PROCEDURE — 80307 DRUG TEST PRSMV CHEM ANLYZR: CPT

## 2019-06-25 PROCEDURE — 70450 CT HEAD/BRAIN W/O DYE: CPT

## 2019-06-25 PROCEDURE — 81001 URINALYSIS AUTO W/SCOPE: CPT

## 2019-06-25 PROCEDURE — 83605 ASSAY OF LACTIC ACID: CPT

## 2019-06-25 PROCEDURE — 82962 GLUCOSE BLOOD TEST: CPT

## 2019-06-25 PROCEDURE — 85730 THROMBOPLASTIN TIME PARTIAL: CPT

## 2019-06-25 PROCEDURE — 85025 COMPLETE CBC W/AUTO DIFF WBC: CPT

## 2019-06-25 PROCEDURE — 99285 EMERGENCY DEPT VISIT HI MDM: CPT

## 2019-06-25 PROCEDURE — 93005 ELECTROCARDIOGRAM TRACING: CPT

## 2019-06-25 PROCEDURE — 82550 ASSAY OF CK (CPK): CPT

## 2019-06-25 PROCEDURE — 71045 X-RAY EXAM CHEST 1 VIEW: CPT

## 2019-06-25 PROCEDURE — 85610 PROTHROMBIN TIME: CPT

## 2019-06-25 NOTE — ED TRIAGE NOTES
Patient arrives via EMS with a c/c of altered mental status. Patient was found on the floor of her home by her son with an unknown downtime. Patient is disoriented x 4, withdraws from pain, eyes open to pain and pupils are brisk. Code S Level 2 upgrade at  per [facility].

## 2019-06-25 NOTE — ED PROVIDER NOTES
EMERGENCY DEPARTMENT HISTORY AND PHYSICAL EXAM    5:22 PM      Date: 6/25/2019  Patient Name: David Fernandez    History of Presenting Illness     Chief Complaint   Patient presents with    Altered mental status         History Provided By: Patient      Additional History (Context): David Fernandez is a 74 y.o. female with PMHx of HTN, COPD, CAD and cerebral aneurysm who presents to the ED with c/o AMS today. Per EMS, patient was found on the floor by her son, unsure of duration. Patient's son called EMS because he was unable to wake her up. EMS describe patient history of CVA. Her BG was 122 upon medic arrival. HPI limited due to patient AMS. PCP: Madeline Chou MD    Chief Complaint: AMS  Duration:  Today  Timing:  Not known  Location: Neuro  Quality: Not reported  Severity: Not described  Modifying Factors: Not obtained  Associated Symptoms: Not obtained    Current Outpatient Medications   Medication Sig Dispense Refill    levETIRAcetam (KEPPRA) 500 mg tablet Take  by mouth two (2) times a day.  teriparatide (FORTEO) 20 mcg/dose - 600 mcg/2.4 mL pnij injection 20 mcg by SubCUTAneous route daily.  latanoprost (XALATAN) 0.005 % ophthalmic solution Administer 1 Drop to both eyes nightly.  timolol (TIMOPTIC) 0.5 % ophthalmic solution 1 Drop two (2) times a day.  ferrous sulfate (IRON PO) Take  by mouth.  traMADol (ULTRAM) 50 mg tablet Take 50 mg by mouth every six (6) hours as needed for Pain.  multivitamin (ONE A DAY) tablet Take 1 Tab by mouth daily.  predniSONE (DELTASONE) 1 mg tablet Take 1 mg by mouth daily (with breakfast).  tocilizumab (ACTEMRA) 162 mg/0.9 mL syrg by SubCUTAneous route.  FLUoxetine (PROZAC) 40 mg capsule Take 40 mg by mouth daily.  amLODIPine (NORVASC) 2.5 mg tablet Take 2.5 mg by mouth daily.  rOPINIRole (REQUIP) 1 mg tablet Take 1 mg by mouth nightly.  Indications: Patient can have 1 in the morning and 1 at night or 2 at night for a total of 2mg      aspirin (ASPIRIN) 325 mg tablet Take 325 mg by mouth nightly.  simvastatin (ZOCOR) 20 mg tablet Take 20 mg by mouth nightly.  oxybutynin chloride XL (DITROPAN XL) 5 mg CR tablet Take 5 mg by mouth nightly.  folic acid (FOLVITE) 1 mg tablet Take 1 mg by mouth nightly.  methotrexate (RHEUMATREX) 2.5 mg tablet Take 2.5 mg by mouth every Wednesday. Indications: 3 in the morning 3 at night      bimatoprost (LUMIGAN) 0.01 % ophthalmic drops Administer 1 Drop to left eye every evening.  brimonidine-timolol (COMBIGAN) 0.2-0.5 % drop ophthalmic solution Administer 1 Drop to left eye two (2) times a day.  ascorbic acid, vitamin C, (VITAMIN C) 250 mg tablet Take 250 mg by mouth daily.  docusate sodium (COLACE) 50 mg capsule Take 50 mg by mouth daily.  clonazePAM (KLONOPIN) 0.5 mg tablet Take 0.25 mg by mouth nightly.  clopidogrel (PLAVIX) 75 mg tablet Take 1 Tab by mouth daily. 30 Tab 5    levothyroxine (SYNTHROID) 50 mcg tablet Take 1 Tab by mouth Daily (before breakfast).  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    Menopause     Psychiatric disorder bipolar    Respiratory abnormalities     Restless leg syndrome     Thyroid nodule 4/23/2014    Abnormal biopsy         Past Surgical History:  Past Surgical History:   Procedure Laterality Date    HX CORONARY STENT PLACEMENT         Family History:  Family History   Problem Relation Age of Onset    Breast Cancer Mother        Social History:  Social History     Tobacco Use    Smoking status: Former Smoker     Types: Cigarettes     Last attempt to quit: 2013     Years since quittin.1    Smokeless tobacco: Never Used   Substance Use Topics    Alcohol use: Yes     Alcohol/week: 1.0 oz     Types: 2 Shots of liquor per week    Drug use: No       Allergies: Allergies   Allergen Reactions    Sulfa (Sulfonamide Antibiotics) Hives    Sulfamethoxazole-Trimethoprim Rash         Review of Systems       Review of Systems   Unable to perform ROS: Mental status change         Physical Exam     Visit Vitals  /70   Pulse 66   Temp 98.5 °F (36.9 °C)   Resp 17   Ht 5' (1.524 m)   Wt 52.2 kg (115 lb)   SpO2 96%   BMI 22.46 kg/m²         Physical Exam   Constitutional: She appears well-developed and well-nourished. Drowsy but arousable   HENT:   Head: Normocephalic and atraumatic. Right Ear: External ear normal.   Left Ear: External ear normal.   Nose: Nose normal.   Dry MM   Eyes: Pupils are equal, round, and reactive to light. Conjunctivae and EOM are normal.   Neck: Normal range of motion. Neck supple. No tracheal deviation present. Cardiovascular: Normal rate, regular rhythm and intact distal pulses. Pulmonary/Chest: Effort normal and breath sounds normal. She exhibits no tenderness. Abdominal: Soft. Bowel sounds are normal. She exhibits no distension. There is no tenderness. There is no rebound and no guarding. Musculoskeletal: Normal range of motion. She exhibits no edema or tenderness. Neurological: No cranial nerve deficit. Coordination normal.   Drowsy but arousable. FRANCISCO   Skin: Skin is warm and dry. Nursing note and vitals reviewed.         Diagnostic Study Results     Labs -  Recent Results (from the past 12 hour(s))   CBC WITH AUTOMATED DIFF    Collection Time: 19  5:40 PM   Result Value Ref Range    WBC 8.3 4.6 - 13.2 K/uL    RBC 4.03 (L) 4.20 - 5.30 M/uL    HGB 13.8 12.0 - 16.0 g/dL HCT 42.7 35.0 - 45.0 %    .0 (H) 74.0 - 97.0 FL    MCH 34.2 (H) 24.0 - 34.0 PG    MCHC 32.3 31.0 - 37.0 g/dL    RDW 13.7 11.6 - 14.5 %    PLATELET 482 008 - 954 K/uL    MPV 12.2 (H) 9.2 - 11.8 FL    NEUTROPHILS 72 40 - 73 %    LYMPHOCYTES 18 (L) 21 - 52 %    MONOCYTES 9 3 - 10 %    EOSINOPHILS 1 0 - 5 %    BASOPHILS 0 0 - 2 %    ABS. NEUTROPHILS 6.0 1.8 - 8.0 K/UL    ABS. LYMPHOCYTES 1.5 0.9 - 3.6 K/UL    ABS. MONOCYTES 0.7 0.05 - 1.2 K/UL    ABS. EOSINOPHILS 0.1 0.0 - 0.4 K/UL    ABS. BASOPHILS 0.0 0.0 - 0.1 K/UL    DF AUTOMATED     CARDIAC PANEL,(CK, CKMB & TROPONIN)    Collection Time: 06/25/19  5:40 PM   Result Value Ref Range    CK 35 26 - 192 U/L    CK - MB <1.0 <3.6 ng/ml    CK-MB Index  0.0 - 4.0 %     CALCULATION NOT PERFORMED WHEN RESULT IS BELOW LINEAR LIMIT    Troponin-I, QT <0.02 0.0 - 0.545 NG/ML   METABOLIC PANEL, COMPREHENSIVE    Collection Time: 06/25/19  5:40 PM   Result Value Ref Range    Sodium 143 136 - 145 mmol/L    Potassium 4.4 3.5 - 5.5 mmol/L    Chloride 109 (H) 100 - 108 mmol/L    CO2 27 21 - 32 mmol/L    Anion gap 7 3.0 - 18 mmol/L    Glucose 105 (H) 74 - 99 mg/dL    BUN 20 (H) 7.0 - 18 MG/DL    Creatinine 0.72 0.6 - 1.3 MG/DL    BUN/Creatinine ratio 28 (H) 12 - 20      GFR est AA >60 >60 ml/min/1.73m2    GFR est non-AA >60 >60 ml/min/1.73m2    Calcium 9.6 8.5 - 10.1 MG/DL    Bilirubin, total 0.2 0.2 - 1.0 MG/DL    ALT (SGPT) 26 13 - 56 U/L    AST (SGOT) 25 15 - 37 U/L    Alk.  phosphatase 83 45 - 117 U/L    Protein, total 6.0 (L) 6.4 - 8.2 g/dL    Albumin 3.8 3.4 - 5.0 g/dL    Globulin 2.2 2.0 - 4.0 g/dL    A-G Ratio 1.7 0.8 - 1.7     PROTHROMBIN TIME + INR    Collection Time: 06/25/19  5:40 PM   Result Value Ref Range    Prothrombin time 13.4 11.5 - 15.2 sec    INR 1.1 0.8 - 1.2     PTT    Collection Time: 06/25/19  5:40 PM   Result Value Ref Range    aPTT 24.3 23.0 - 36.4 SEC   ETHYL ALCOHOL    Collection Time: 06/25/19  5:40 PM   Result Value Ref Range    ALCOHOL(ETHYL),SERUM <3 0 - 3 MG/DL   EKG, 12 LEAD, INITIAL    Collection Time: 06/25/19  5:44 PM   Result Value Ref Range    Ventricular Rate 64 BPM    Atrial Rate 64 BPM    P-R Interval 158 ms    QRS Duration 86 ms    Q-T Interval 448 ms    QTC Calculation (Bezet) 462 ms    Calculated P Axis 76 degrees    Calculated R Axis 42 degrees    Calculated T Axis 80 degrees    Diagnosis       Sinus rhythm with premature atrial complexes  Nonspecific T wave abnormality  Abnormal ECG  When compared with ECG of 22-AUG-2018 17:01,  premature atrial complexes are now present     GLUCOSE, POC    Collection Time: 06/25/19  5:50 PM   Result Value Ref Range    Glucose (POC) 121 (H) 70 - 110 mg/dL   POC LACTIC ACID    Collection Time: 06/25/19  6:10 PM   Result Value Ref Range    Lactic Acid (POC) 0.76 0.40 - 2.00 mmol/L   URINALYSIS W/ RFLX MICROSCOPIC    Collection Time: 06/25/19  6:24 PM   Result Value Ref Range    Color YELLOW      Appearance CLEAR      Specific gravity 1.018 1.005 - 1.030      pH (UA) 6.0 5.0 - 8.0      Protein NEGATIVE  NEG mg/dL    Glucose NEGATIVE  NEG mg/dL    Ketone NEGATIVE  NEG mg/dL    Bilirubin NEGATIVE  NEG      Blood NEGATIVE  NEG      Urobilinogen 0.2 0.2 - 1.0 EU/dL    Nitrites NEGATIVE  NEG      Leukocyte Esterase SMALL (A) NEG     URINE MICROSCOPIC ONLY    Collection Time: 06/25/19  6:24 PM   Result Value Ref Range    WBC 3 to 5 0 - 4 /hpf    Epithelial cells FEW 0 - 5 /lpf    Bacteria NEGATIVE  NEG /hpf       Radiologic Studies -   CT HEAD WO CONT   Final Result   IMPRESSION: CRITICAL RESULT:      No acute intracranial abnormalities. Atrophy with severe small vessel ischemic disease      Dr. Nolia Sandhoff informed 5:37 PM June 25, 2019. XR CHEST PORT    (Results Pending)         Medical Decision Making     It should be noted that I, Toña Roe DO will be the provider of record for this patient.     I reviewed the vital signs, available nursing notes, past medical history, past surgical history, family history and social history. Vital Signs-Reviewed the patient's vital signs. Pulse Oximetry Analysis -  97% on room air, stable. Cardiac Monitor:  Rate: 63  Rhythm:  Normal Sinus Rhythm         Records Reviewed: Nursing Notes and Old Medical Records (Time of Review: 5:22 PM)    ED Course: Progress Notes, Reevaluation, and Consults:  Consult:  Discussed care with tele-neurologist. Standard discussion; including history of patients chief complaint, available diagnostic results, and treatment course. Will evaluate patient. 5:29 PM, 6/25/2019     Consult:  Discussed care with radiology. Standard discussion; including history of patients chief complaint, available diagnostic results, and treatment course. State nothing acute on CT head. 5:37 PM, 6/25/2019     5:56 PM: Patient being evaluated by tele-neurologist.    6:45 PM: Patient back to baseline. Discussed with son in room. Provider Notes (Medical Decision Making): Patient is a 78-year-old female whose son called EMS because the patient could not be aroused. Son states that the patient is at her baseline now. States that it is not unusual if she stays in bed all day. States that she has a history of seizures and this is similar to past.  Patient takes Keppra and has not missed any doses. Son gives the patient medication and states she has not taken anything to make her drowsy. Of note, patient does take Ultram.  Son has been advised the patient is to not take Ultram with a history of seizure disorder. Son states that he is comfortable taking patient home as he is back to baseline. No evidence for infection. No acute findings on CT. Contrast troponin negative. Patient with no complaints at this time. Patient would like to go home. Stable for discharge. Follow-up with PMD.  Return for any worsening or concerning symptoms. Diagnosis     Clinical Impression:   1.  Altered mental status, unspecified altered mental status type Disposition: D/C    Follow-up Information     Follow up With Specialties Details Why Karena [address]um[address], Jan Constantino MD Geriatric Medicine Schedule an appointment as soon as possible for a visit Please do not take Ultram with a history of seizure disorder. [address]   [place] [id number]  [phone]             Patient's Medications   Start Taking    No medications on file   Continue Taking    AMLODIPINE (NORVASC) 2.5 MG TABLET    Take 2.5 mg by mouth daily. ASCORBIC ACID, VITAMIN C, (VITAMIN C) 250 MG TABLET    Take 250 mg by mouth daily. ASPIRIN (ASPIRIN) 325 MG TABLET    Take 325 mg by mouth nightly. BIMATOPROST (LUMIGAN) 0.01 % OPHTHALMIC DROPS    Administer 1 Drop to left eye every evening. BRIMONIDINE-TIMOLOL (COMBIGAN) 0.2-0.5 % DROP OPHTHALMIC SOLUTION    Administer 1 Drop to left eye two (2) times a day. CLONAZEPAM (KLONOPIN) 0.5 MG TABLET    Take 0.25 mg by mouth nightly. CLOPIDOGREL (PLAVIX) 75 MG TABLET    Take 1 Tab by mouth daily. DOCUSATE SODIUM (COLACE) 50 MG CAPSULE    Take 50 mg by mouth daily. FERROUS SULFATE (IRON PO)    Take  by mouth. FLUOXETINE (PROZAC) 40 MG CAPSULE    Take 40 mg by mouth daily. FOLIC ACID (FOLVITE) 1 MG TABLET    Take 1 mg by mouth nightly. LATANOPROST (XALATAN) 0.005 % OPHTHALMIC SOLUTION    Administer 1 Drop to both eyes nightly. LEVETIRACETAM (KEPPRA) 500 MG TABLET    Take  by mouth two (2) times a day. LEVOTHYROXINE (SYNTHROID) 50 MCG TABLET    Take 1 Tab by mouth Daily (before breakfast). METHOTREXATE (RHEUMATREX) 2.5 MG TABLET    Take 2.5 mg by mouth every Wednesday. Indications: 3 in the morning 3 at night    MULTIVITAMIN (ONE A DAY) TABLET    Take 1 Tab by mouth daily. OXYBUTYNIN CHLORIDE XL (DITROPAN XL) 5 MG CR TABLET    Take 5 mg by mouth nightly. PREDNISONE (DELTASONE) 1 MG TABLET    Take 1 mg by mouth daily (with breakfast).     ROPINIROLE (REQUIP) 1 MG TABLET Take 1 mg by mouth nightly. Indications: Patient can have 1 in the morning and 1 at night or 2 at night for a total of 2mg    SIMVASTATIN (ZOCOR) 20 MG TABLET    Take 20 mg by mouth nightly. TERIPARATIDE (FORTEO) 20 MCG/DOSE - 600 MCG/2.4 ML PNIJ INJECTION    20 mcg by SubCUTAneous route daily. TIMOLOL (TIMOPTIC) 0.5 % OPHTHALMIC SOLUTION    1 Drop two (2) times a day. TOCILIZUMAB (ACTEMRA) 162 MG/0.9 ML SYRG    by SubCUTAneous route. TRAMADOL (ULTRAM) 50 MG TABLET    Take 50 mg by mouth every six (6) hours as needed for Pain. These Medications have changed    No medications on file   Stop Taking    No medications on file     _______________________________       [address] acting as a scribe for and in the presence of Rosemary Vann DO      June 25, 2019 at [facility] PM       Provider Attestation:      I personally performed the services described in the documentation, reviewed the documentation, as recorded by the scribe in my presence, and it accurately and completely records my words and actions.  June 25, 2019 at 5:25 PM - Rosemary FLOWER DO        _______________________________

## 2019-06-25 NOTE — ED NOTES
Discharge information reviewed with patient and patient's , patient verbalized understanding. Paperwork signed, VSS and patient in no distress.

## 2019-06-25 NOTE — ED NOTES
Pt arrived to ED by ems, EMS call for AMS. Pt evaluated by MD upon arrival to ED, Code Stroke L2 called. Last known normal : unknown.

## 2019-06-25 NOTE — DISCHARGE INSTRUCTIONS
Patient Education     Altered Mental Status: Care Instructions  Your Care Instructions  Altered mental status is a change in how well your brain is working. As a result, you may be confused, be less alert than usual, or act in odd ways. This may include seeing or hearing things that aren't really there (hallucinations). A mental status change has many possible causes. For example, it may be the result of an infection, an imbalance of chemicals in the body, or a chronic disease such as diabetes or COPD. It can also be caused by things such as a head injury, taking certain medicines, or using alcohol or drugs. The doctor may do tests to look for the cause. These tests may include urine tests, blood tests, and imaging tests such as a CT scan. Sometimes a clear cause isn't found. But tests can help the doctor rule out a serious cause of your symptoms. A change in mental status can be scary. But mental status will often return to normal when the cause is treated. So it is important to get any follow-up testing or treatment the doctor has suggested. The doctor has checked you carefully, but problems can develop later. If you notice any problems or new symptoms, get medical treatment right away. Follow-up care is a key part of your treatment and safety. Be sure to make and go to all appointments, and call your doctor if you are having problems. It's also a good idea to know your test results and keep a list of the medicines you take. How can you care for yourself at home? · Be safe with medicines. Take your medicines exactly as prescribed. Call your doctor if you think you are having a problem with your medicine. · Have another adult stay with you until you are better. This can help keep you safe. Ask that person to watch for signs that your mental status is getting worse. When should you call for help? Call 911 anytime you think you may need emergency care.  For example, call if:  · You passed out (lost consciousness). Call your doctor now or seek immediate medical care if:  · Your mental status is getting worse. · You have new symptoms, such as a fever, chills, or shortness of breath. · You do not feel safe. Watch closely for changes in your health, and be sure to contact your doctor if:  · You do not get better as expected. Where can you learn more? Go to [employer]  Enter J452 in the search box to learn more about \"Altered Mental Status: Care Instructions. \"   © 1450-6618 [employer]. Care instructions adapted under license by [facility] (which disclaims liability or warranty for this information). This care instruction is for use with your licensed healthcare professional. If you have questions about a medical condition or this instruction, always ask your healthcare professional. [address] any warranty or liability for your use of this information.   Content Version: 74.3.256612; Current as of: November 20, 2015

## 2019-06-25 NOTE — ED NOTES
Spoke with provider about patient's discharge eligibility. Per provider, patient cleared to be discharged.

## 2019-06-26 LAB
ATRIAL RATE: 64 BPM
CALCULATED P AXIS, ECG09: 76 DEGREES
CALCULATED R AXIS, ECG10: 42 DEGREES
CALCULATED T AXIS, ECG11: 80 DEGREES
DIAGNOSIS, 93000: NORMAL
P-R INTERVAL, ECG05: 158 MS
Q-T INTERVAL, ECG07: 448 MS
QRS DURATION, ECG06: 86 MS
QTC CALCULATION (BEZET), ECG08: 462 MS
VENTRICULAR RATE, ECG03: 64 BPM

## 2019-07-30 ENCOUNTER — IMPORTED ENCOUNTER (OUTPATIENT)
Dept: URBAN - METROPOLITAN AREA CLINIC 1 | Facility: CLINIC | Age: 74
End: 2019-07-30

## 2019-07-30 PROBLEM — H47.013: Noted: 2019-07-30

## 2019-07-30 PROCEDURE — 92012 INTRM OPH EXAM EST PATIENT: CPT

## 2019-07-30 NOTE — PATIENT DISCUSSION
1.  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 Patient reports sudden onset of decreased vision OS. Ordered STAT repeat SED Rate HCT CRP today. Continue currently taking 8mg po Qdaily of Prednisone. Cont Combigan BID OS and Lumigan QHS OS. Steroids followed by Dr. Jann Angel safety precautions. Patient should continue to f/u as scheduled with Dr. Yessica Andujar. 2.  Cataract OD: Observe for now without intervention. The patient was advised to contact us if any change or worsening of vision3. OHTN OU (0.8/0.75)- IOP stable on current meds. Cont Combigan OS BID and Latanoprost OS QHS. 4.  MARYJO w/ PEK OU- (H/o Plugs LLs OU) Cont ATs TID OU Routinely. 5.  PVD OD - stable RD precautions. 6.  Pseudophakia OS- H/o YAG Cap OS**Will call patient with results tomorrow. Return as scheduled with Dr. Gail Harris.

## 2019-08-26 ENCOUNTER — HOSPITAL ENCOUNTER (OUTPATIENT)
Dept: CT IMAGING | Age: 74
Discharge: HOME | End: 2019-08-26
Attending: PSYCHIATRY & NEUROLOGY
Payer: COMMERCIAL

## 2019-08-26 DIAGNOSIS — R27.0 ATAXIA: ICD-10-CM

## 2019-08-26 LAB — CREAT UR-MCNC: 0.7 MG/DL (ref 0.6–1.3)

## 2019-08-26 PROCEDURE — 82565 ASSAY OF CREATININE: CPT

## 2019-08-26 PROCEDURE — 70498 CT ANGIOGRAPHY NECK: CPT

## 2019-08-26 PROCEDURE — 74011636320 HC RX REV CODE- 636/320: Performed by: PSYCHIATRY & NEUROLOGY

## 2019-08-26 PROCEDURE — 74011000258 HC RX REV CODE- 258: Performed by: PSYCHIATRY & NEUROLOGY

## 2019-08-26 RX ORDER — SODIUM CHLORIDE 9 MG/ML
100 INJECTION, SOLUTION INTRAVENOUS
Status: COMPLETED | OUTPATIENT
Start: 2019-08-26 | End: 2019-08-26

## 2019-08-26 RX ADMIN — IOPAMIDOL 67 ML: 755 INJECTION, SOLUTION INTRAVENOUS at 18:47

## 2019-08-26 RX ADMIN — SODIUM CHLORIDE 90 ML: 900 INJECTION, SOLUTION INTRAVENOUS at 18:47

## 2019-09-09 ENCOUNTER — APPOINTMENT (OUTPATIENT)
Dept: CT IMAGING | Age: 74
End: 2019-09-09
Attending: EMERGENCY MEDICINE
Payer: COMMERCIAL

## 2019-09-09 ENCOUNTER — HOSPITAL ENCOUNTER (EMERGENCY)
Age: 74
Discharge: HOME | End: 2019-09-09
Attending: EMERGENCY MEDICINE | Admitting: EMERGENCY MEDICINE
Payer: COMMERCIAL

## 2019-09-09 ENCOUNTER — APPOINTMENT (OUTPATIENT)
Dept: GENERAL RADIOLOGY | Age: 74
End: 2019-09-09
Attending: PHYSICIAN ASSISTANT
Payer: COMMERCIAL

## 2019-09-09 VITALS
WEIGHT: 110 LBS | HEART RATE: 66 BPM | TEMPERATURE: 99 F | DIASTOLIC BLOOD PRESSURE: 58 MMHG | RESPIRATION RATE: 14 BRPM | OXYGEN SATURATION: 99 % | SYSTOLIC BLOOD PRESSURE: 135 MMHG | BODY MASS INDEX: 21.48 KG/M2

## 2019-09-09 DIAGNOSIS — R91.1 PULMONARY NODULE: Primary | ICD-10-CM

## 2019-09-09 DIAGNOSIS — S22.41XA CLOSED FRACTURE OF MULTIPLE RIBS OF RIGHT SIDE, INITIAL ENCOUNTER: ICD-10-CM

## 2019-09-09 DIAGNOSIS — S42.134A CLOSED NONDISPLACED FRACTURE OF CORACOID PROCESS OF RIGHT SHOULDER, INITIAL ENCOUNTER: ICD-10-CM

## 2019-09-09 LAB
ALBUMIN SERPL-MCNC: 3.7 G/DL (ref 3.4–5)
ALBUMIN/GLOB SERPL: 1.5 {RATIO} (ref 0.8–1.7)
ALP SERPL-CCNC: 82 U/L (ref 45–117)
ALT SERPL-CCNC: 27 U/L (ref 13–56)
ANION GAP SERPL CALC-SCNC: 0 MMOL/L (ref 3–18)
AST SERPL-CCNC: 22 U/L (ref 10–38)
BASOPHILS # BLD: 0 K/UL (ref 0–0.1)
BASOPHILS NFR BLD: 0 % (ref 0–2)
BILIRUB SERPL-MCNC: 0.4 MG/DL (ref 0.2–1)
BUN SERPL-MCNC: 23 MG/DL (ref 7–18)
BUN/CREAT SERPL: 26 (ref 12–20)
CALCIUM SERPL-MCNC: 9.5 MG/DL (ref 8.5–10.1)
CHLORIDE SERPL-SCNC: 108 MMOL/L (ref 100–111)
CO2 SERPL-SCNC: 34 MMOL/L (ref 21–32)
CREAT SERPL-MCNC: 0.9 MG/DL (ref 0.6–1.3)
DIFFERENTIAL METHOD BLD: ABNORMAL
EOSINOPHIL # BLD: 0 K/UL (ref 0–0.4)
EOSINOPHIL NFR BLD: 0 % (ref 0–5)
ERYTHROCYTE [DISTWIDTH] IN BLOOD BY AUTOMATED COUNT: 13.5 % (ref 11.6–14.5)
GLOBULIN SER CALC-MCNC: 2.4 G/DL (ref 2–4)
GLUCOSE SERPL-MCNC: 133 MG/DL (ref 74–99)
HCT VFR BLD AUTO: 44.6 % (ref 35–45)
HGB BLD-MCNC: 14.6 G/DL (ref 12–16)
LYMPHOCYTES # BLD: 1.2 K/UL (ref 0.9–3.6)
LYMPHOCYTES NFR BLD: 11 % (ref 21–52)
MCH RBC QN AUTO: 34.7 PG (ref 24–34)
MCHC RBC AUTO-ENTMCNC: 32.7 G/DL (ref 31–37)
MCV RBC AUTO: 105.9 FL (ref 74–97)
MONOCYTES # BLD: 0.4 K/UL (ref 0.05–1.2)
MONOCYTES NFR BLD: 4 % (ref 3–10)
NEUTS SEG # BLD: 8.8 K/UL (ref 1.8–8)
NEUTS SEG NFR BLD: 85 % (ref 40–73)
PLATELET # BLD AUTO: 333 K/UL (ref 135–420)
PMV BLD AUTO: 12.2 FL (ref 9.2–11.8)
POTASSIUM SERPL-SCNC: 4 MMOL/L (ref 3.5–5.5)
PROT SERPL-MCNC: 6.1 G/DL (ref 6.4–8.2)
RBC # BLD AUTO: 4.21 M/UL (ref 4.2–5.3)
SODIUM SERPL-SCNC: 142 MMOL/L (ref 136–145)
TROPONIN I SERPL-MCNC: <0.02 NG/ML (ref 0–0.04)
WBC # BLD AUTO: 10.5 K/UL (ref 4.6–13.2)

## 2019-09-09 PROCEDURE — 72125 CT NECK SPINE W/O DYE: CPT

## 2019-09-09 PROCEDURE — 71260 CT THORAX DX C+: CPT

## 2019-09-09 PROCEDURE — 84484 ASSAY OF TROPONIN QUANT: CPT

## 2019-09-09 PROCEDURE — 85025 COMPLETE CBC W/AUTO DIFF WBC: CPT

## 2019-09-09 PROCEDURE — 74011636320 HC RX REV CODE- 636/320: Performed by: EMERGENCY MEDICINE

## 2019-09-09 PROCEDURE — 71046 X-RAY EXAM CHEST 2 VIEWS: CPT

## 2019-09-09 PROCEDURE — 93005 ELECTROCARDIOGRAM TRACING: CPT

## 2019-09-09 PROCEDURE — 70450 CT HEAD/BRAIN W/O DYE: CPT

## 2019-09-09 PROCEDURE — 74011250637 HC RX REV CODE- 250/637: Performed by: EMERGENCY MEDICINE

## 2019-09-09 PROCEDURE — 70486 CT MAXILLOFACIAL W/O DYE: CPT

## 2019-09-09 PROCEDURE — 99284 EMERGENCY DEPT VISIT MOD MDM: CPT

## 2019-09-09 PROCEDURE — 80053 COMPREHEN METABOLIC PANEL: CPT

## 2019-09-09 RX ORDER — TRAMADOL HYDROCHLORIDE 50 MG/1
50 TABLET ORAL
Qty: 10 TAB | Refills: 0 | Status: SHIPPED | OUTPATIENT
Start: 2019-09-09 | End: 2019-09-12

## 2019-09-09 RX ORDER — TRAMADOL HYDROCHLORIDE 50 MG/1
50 TABLET ORAL
Status: COMPLETED | OUTPATIENT
Start: 2019-09-09 | End: 2019-09-09

## 2019-09-09 RX ADMIN — IOPAMIDOL 94 ML: 612 INJECTION, SOLUTION INTRAVENOUS at 16:23

## 2019-09-09 RX ADMIN — TRAMADOL HYDROCHLORIDE 50 MG: 50 TABLET, FILM COATED ORAL at 18:06

## 2019-09-09 NOTE — ED PROVIDER NOTES
EMERGENCY DEPARTMENT HISTORY AND PHYSICAL EXAM    2:51 PM      Date: 9/9/2019  Patient Name: Joe Montes    History of Presenting Illness     Chief Complaint   Patient presents with    Chest Pain    Shortness of Breath         History Provided By: patient    Additional History (Context): Joe Montes is a 66 y.o. female presents with fall 3 days ago complaining of increased right abdominal and chest pain. She also struck the right side of her face. No LOC, the fall was mechanical, she has residual right-sided weakness due to a prior stroke. Her pain is moderate and worsened with movement. Lord Olguin PCP: Fide Montalvo MD    Chief Complaint:   Duration:    Timing:    Location:   Quality:   Severity:   Modifying Factors:   Associated Symptoms:       Current Facility-Administered Medications   Medication Dose Route Frequency Provider Last Rate Last Dose    traMADol (ULTRAM) tablet 50 mg  50 mg Oral NOW Fernanda Mccoy MD         Current Outpatient Medications   Medication Sig Dispense Refill    traMADol (ULTRAM) 50 mg tablet Take 1 Tab by mouth every four (4) hours as needed for Pain for up to 3 days. Max Daily Amount: 300 mg. 10 Tab 0    levETIRAcetam (KEPPRA) 500 mg tablet Take  by mouth two (2) times a day.  teriparatide (FORTEO) 20 mcg/dose - 600 mcg/2.4 mL pnij injection 20 mcg by SubCUTAneous route daily.  latanoprost (XALATAN) 0.005 % ophthalmic solution Administer 1 Drop to both eyes nightly.  timolol (TIMOPTIC) 0.5 % ophthalmic solution 1 Drop two (2) times a day.  ferrous sulfate (IRON PO) Take  by mouth.  multivitamin (ONE A DAY) tablet Take 1 Tab by mouth daily.  predniSONE (DELTASONE) 1 mg tablet Take 1 mg by mouth daily (with breakfast).  tocilizumab (ACTEMRA) 162 mg/0.9 mL syrg by SubCUTAneous route.  FLUoxetine (PROZAC) 40 mg capsule Take 40 mg by mouth daily.  amLODIPine (NORVASC) 2.5 mg tablet Take 2.5 mg by mouth daily.       rOPINIRole (REQUIP) 1 mg tablet Take 1 mg by mouth nightly. Indications: Patient can have 1 in the morning and 1 at night or 2 at night for a total of 2mg      aspirin (ASPIRIN) 325 mg tablet Take 325 mg by mouth nightly.  simvastatin (ZOCOR) 20 mg tablet Take 20 mg by mouth nightly.  oxybutynin chloride XL (DITROPAN XL) 5 mg CR tablet Take 5 mg by mouth nightly.  folic acid (FOLVITE) 1 mg tablet Take 1 mg by mouth nightly.  methotrexate (RHEUMATREX) 2.5 mg tablet Take 2.5 mg by mouth every Wednesday. Indications: 3 in the morning 3 at night      bimatoprost (LUMIGAN) 0.01 % ophthalmic drops Administer 1 Drop to left eye every evening.  brimonidine-timolol (COMBIGAN) 0.2-0.5 % drop ophthalmic solution Administer 1 Drop to left eye two (2) times a day.  ascorbic acid, vitamin C, (VITAMIN C) 250 mg tablet Take 250 mg by mouth daily.  docusate sodium (COLACE) 50 mg capsule Take 50 mg by mouth daily.  clonazePAM (KLONOPIN) 0.5 mg tablet Take 0.25 mg by mouth nightly.  clopidogrel (PLAVIX) 75 mg tablet Take 1 Tab by mouth daily. 30 Tab 5    levothyroxine (SYNTHROID) 50 mcg tablet Take 1 Tab by mouth Daily (before breakfast).  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    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    Menopause     Psychiatric disorder bipolar    Respiratory abnormalities     Restless leg syndrome     Thyroid nodule 4/23/2014    Abnormal biopsy         Past Surgical History:  Past Surgical History:   Procedure Laterality Date    HX CORONARY STENT PLACEMENT         Family History:  Family History   Problem Relation Age of Onset    Breast Cancer Mother        Social History:  Social History     Tobacco Use    Smoking status: Former Smoker     Types: Cigarettes     Last attempt to quit: 2013     Years since quittin.3    Smokeless tobacco: Never Used   Substance Use Topics    Alcohol use: Yes     Alcohol/week: 1.7 standard drinks     Types: 2 Shots of liquor per week    Drug use: No       Allergies: Allergies   Allergen Reactions    Sulfa (Sulfonamide Antibiotics) Hives    Sulfamethoxazole-Trimethoprim Rash         Review of Systems     Review of Systems   Constitutional: Negative for diaphoresis and fever. HENT: Negative for congestion and sore throat. Eyes: Negative for pain and itching. Respiratory: Negative for cough and shortness of breath. Cardiovascular: Positive for chest pain. Negative for palpitations. Gastrointestinal: Positive for abdominal pain. Negative for diarrhea. Endocrine: Negative for polydipsia and polyuria. Genitourinary: Negative for dysuria and hematuria. Musculoskeletal: Negative for arthralgias and myalgias. Skin: Negative for rash and wound. Neurological: Negative for seizures and syncope. Hematological: Does not bruise/bleed easily. Psychiatric/Behavioral: Negative for agitation and hallucinations. Physical Exam       Patient Vitals for the past 12 hrs:   Temp Pulse Resp BP SpO2   19 1700  66 14  99 %   19 1530  65 18 135/58 100 %   19 1515  65 19 135/60 100 %   19 1500  64 16 138/64 100 %   19 1422 99 °F (37.2 °C) 73 15 137/68 97 %       Physical Exam   Constitutional: She appears well-developed and well-nourished. HENT:   Head: Normocephalic. Ecchymosis over the right zygoma   Eyes: Pupils are equal, round, and reactive to light. Conjunctivae and EOM are normal. No scleral icterus.    Neck: Normal range of motion. Neck supple. No JVD present. Cardiovascular: Normal rate, regular rhythm and normal heart sounds. 4 intact extremity pulses   Pulmonary/Chest: Effort normal and breath sounds normal. She exhibits tenderness (Right lower ribs tender). Abdominal: Soft. She exhibits no mass. There is tenderness (Right upper quadrant tender with palpation of the ribs). Musculoskeletal: Normal range of motion. She exhibits no tenderness or deformity. No tenderness of the CTL spine   Lymphadenopathy:     She has no cervical adenopathy. Neurological: She is alert. She has normal strength. No cranial nerve deficit or sensory deficit. Coordination normal.   Skin: Skin is warm and dry. Nursing note and vitals reviewed. Diagnostic Study Results   Labs -  Recent Results (from the past 12 hour(s))   EKG, 12 LEAD, INITIAL    Collection Time: 09/09/19  2:16 PM   Result Value Ref Range    Ventricular Rate 70 BPM    Atrial Rate 70 BPM    P-R Interval 150 ms    QRS Duration 90 ms    Q-T Interval 438 ms    QTC Calculation (Bezet) 473 ms    Calculated P Axis 74 degrees    Calculated R Axis 41 degrees    Calculated T Axis 98 degrees    Diagnosis       Normal sinus rhythm  Possible Anterior infarct , age undetermined  Abnormal ECG  When compared with ECG of 25-JUN-2019 17:44,  premature atrial complexes are no longer present     CBC WITH AUTOMATED DIFF    Collection Time: 09/09/19  3:01 PM   Result Value Ref Range    WBC 10.5 4.6 - 13.2 K/uL    RBC 4.21 4.20 - 5.30 M/uL    HGB 14.6 12.0 - 16.0 g/dL    HCT 44.6 35.0 - 45.0 %    .9 (H) 74.0 - 97.0 FL    MCH 34.7 (H) 24.0 - 34.0 PG    MCHC 32.7 31.0 - 37.0 g/dL    RDW 13.5 11.6 - 14.5 %    PLATELET 916 440 - 729 K/uL    MPV 12.2 (H) 9.2 - 11.8 FL    NEUTROPHILS 85 (H) 40 - 73 %    LYMPHOCYTES 11 (L) 21 - 52 %    MONOCYTES 4 3 - 10 %    EOSINOPHILS 0 0 - 5 %    BASOPHILS 0 0 - 2 %    ABS. NEUTROPHILS 8.8 (H) 1.8 - 8.0 K/UL    ABS. LYMPHOCYTES 1.2 0.9 - 3.6 K/UL    ABS. MONOCYTES 0.4 0.05 - 1.2 K/UL    ABS. EOSINOPHILS 0.0 0.0 - 0.4 K/UL    ABS. BASOPHILS 0.0 0.0 - 0.1 K/UL    DF AUTOMATED     METABOLIC PANEL, COMPREHENSIVE    Collection Time: 09/09/19  3:01 PM   Result Value Ref Range    Sodium 142 136 - 145 mmol/L    Potassium 4.0 3.5 - 5.5 mmol/L    Chloride 108 100 - 111 mmol/L    CO2 34 (H) 21 - 32 mmol/L    Anion gap 0 (L) 3.0 - 18 mmol/L    Glucose 133 (H) 74 - 99 mg/dL    BUN 23 (H) 7.0 - 18 MG/DL    Creatinine 0.90 0.6 - 1.3 MG/DL    BUN/Creatinine ratio 26 (H) 12 - 20      GFR est AA >60 >60 ml/min/1.73m2    GFR est non-AA >60 >60 ml/min/1.73m2    Calcium 9.5 8.5 - 10.1 MG/DL    Bilirubin, total 0.4 0.2 - 1.0 MG/DL    ALT (SGPT) 27 13 - 56 U/L    AST (SGOT) 22 10 - 38 U/L    Alk. phosphatase 82 45 - 117 U/L    Protein, total 6.1 (L) 6.4 - 8.2 g/dL    Albumin 3.7 3.4 - 5.0 g/dL    Globulin 2.4 2.0 - 4.0 g/dL    A-G Ratio 1.5 0.8 - 1.7     TROPONIN I    Collection Time: 09/09/19  3:01 PM   Result Value Ref Range    Troponin-I, QT <0.02 0.0 - 0.045 NG/ML       Radiologic Studies -   CT CHEST ABD PELV W CONT   Final Result   IMPRESSION:      1. Acute fractures involving some right ribs and the right coracoid process. Several additional healed rib fractures. 2. Several vertebral compression fractures, age indeterminate. While many are   very likely chronic, acute fractures not excluded. 3. Tiny pulmonary nodule. While statistically likely benign, see recommendations   below for followup of incidentally identified pulmonary nodules, which varies   with the patient's clinical risk factors.      ========================   Fleischner Society Pulmonary Nodule Guidelines (revised 2017): Solid nodule <6 mm:   -Low risk for lung cancer: No routine followup.   -High risk for lung cancer: Optional CT in 12 months.   ========================      CT SPINE CERV WO CONT   Final Result   IMPRESSION:      1. No acute bony abnormality.       2. Chronic right C2 posterior elements and C7 spinous process fractures. Note that this cervical spine CT was performed supine. Although it is highly   sensitive for fractures, it does not evaluate for ligamentous injury or   stability. If the patient has persistent symptoms or if otherwise clinically   indicated, erect plain film evaluation or MRI should be performed. CT MAXILLOFACIAL WO CONT   Final Result   IMPRESSION:      No acute bony abnormality. CT HEAD WO CONT   Final Result   IMPRESSION:      1. No acute intracranial abnormalities. 2. Moderate diffuse cerebral and cerebellar atrophy. Moderate to marked   bilateral white matter disease, although nonspecific, likely represents chronic   small vessel ischemic changes. 3. Stable chronic bilateral occipital and posterior cerebellar cortical   infarctions with associated calcifications. XR CHEST PA LAT    (Results Pending)     Ct Head Wo Cont    Result Date: 9/9/2019  EXAM: CT head CLINICAL INDICATION/HISTORY: Headache, post trauma   > Additional: Bruising on right side of face. COMPARISON: 06/25/2019   > Reference Exam: None. TECHNIQUE: Axial CT imaging of the head was performed without intravenous contrast. Sagittal and coronal reconstructions were performed. One or more dose reduction techniques were used on this CT: automated exposure control, adjustment of the mAs and/or kVp according to patient size, and iterative reconstruction techniques. The specific techniques used on this CT exam have been documented in the patient's electronic medical record. Digital Imaging and Communications in Medicine (DICOM) format image data are available to nonaffiliated external healthcare facilities or entities on a secure, media free, reciprocally searchable basis with patient authorization for at least a 12-month period after this study. _______________ FINDINGS: BRAIN AND POSTERIOR FOSSA: Moderate diffuse cerebral and cerebellar atrophy.  There is no intracranial hemorrhage, mass effect, or midline shift. Moderate to marked low attenuation within the periventricular, subcortical and deep white matter bilaterally. Stable chronic bilateral occipital and posterior bilateral cerebellar cortical infarctions with associated calcifications. EXTRA-AXIAL SPACES AND MENINGES: There are no abnormal extra-axial fluid collections. CALVARIUM: Intact. SINUSES: Clear. OTHER: None. _______________     IMPRESSION: 1. No acute intracranial abnormalities. 2. Moderate diffuse cerebral and cerebellar atrophy. Moderate to marked bilateral white matter disease, although nonspecific, likely represents chronic small vessel ischemic changes. 3. Stable chronic bilateral occipital and posterior cerebellar cortical infarctions with associated calcifications. Ct Maxillofacial Wo Cont    Result Date: 9/9/2019  EXAM:  CT facial bones CLINICAL INDICATION/HISTORY: Abnormal xray, maxface, sinus   > Additional: Bruising right side of face after fall. COMPARISON: None. > Reference Exam: None. TECHNIQUE:  Thin section axial CT through the face. Coronal and sagittal reconstructions were generated to increase sensitivity for fracture detection. One or more dose reduction techniques were used on this CT: automated exposure control, adjustment of the mAs and/or kVp according to patient size, and iterative reconstruction techniques. The specific techniques used on this CT exam have been documented in the patient's electronic medical record. Digital Imaging and Communications in Medicine (DICOM) format image data are available to nonaffiliated external healthcare facilities or entities on a secure, media free, reciprocally searchable basis with patient authorization for at least a 12-month period after this study. __________________________________ FINDINGS: OSSEOUS STRUCTURES: No fracture or other acute osseous pathology. Small left-sided bony nasal spur.  SOFT TISSUES: Minimal stranding along the subcutaneous tissues of the right maxillary region. Moderate carotid atherosclerosis. SINUSES:  No hemorrhage. ______________________________________     IMPRESSION: No acute bony abnormality. Ct Chest Abd Pelv W Cont    Result Date: 9/9/2019  EXAM: CT of the chest, abdomen, and pelvis. INDICATION: Progressive shortness of breath and chest pain. Unwitnessed fall with bruising. Decreased level of consciousness and left-sided weakness. COMPARISON: None available TECHNIQUE: Axial CT imaging of the chest, abdomen, and pelvis was performed following nonionic intravenous contrast. Multiplanar reformats were generated. One or more dose reduction techniques were used on this CT: automated exposure control, adjustment of the mAs and/or kVp according to patient size, and iterative reconstruction techniques. The specific techniques used on this CT exam have been documented in the patient's electronic medical record. _______________ FINDINGS: CHEST: LUNGS: Mild chronic appearing interstitial lung markings. No evidence of consolidation. Very tiny (2 mm) subpleural pulmonary nodule in the right upper lobe AIRWAY: Unremarkable. PLEURA: Unremarkable, without pleural fluid. MEDIASTINUM: No significant cardiac enlargement or pericardial effusion. Moderate atherosclerotic aortic calcification. LYMPH NODES: Calcified mediastinal and right hilar lymph nodes. No enlarged noncalcified lymph nodes. =============== ABDOMEN/PELVIS: LIVER, BILIARY: Few small calcified granulomata. Hepatic steatosis. No suspicious lesion. No biliary dilation. Gallbladder is unremarkable. PANCREAS: Normal. SPLEEN: Calcified granulomas. ADRENALS: Small bilateral adrenal lesions, not definitively characterized, though in the absence of known primary malignancy are very likely benign. KIDNEYS: No hydronephrosis or suspicious renal lesion. Few small bilateral renal lesions are too small to characterize, though are statistically very likely benign. LYMPH NODES: No enlarged lymph nodes. GASTROINTESTINAL TRACT: No bowel dilation or wall thickening. Nondilated appendix. PELVIC ORGANS: Unremarkable. VASCULATURE: Extensive atherosclerotic arterial calcification. BONES: No fracture or suspicious bone lesion. Lumbar degenerative disc disease. Mild compression deformity involving several thoracic and lumbar vertebral levels, without visible acute fracture lines. Slightly displaced fracture involving the right coracoid process. Slightly displaced fractures involving the right fifth and ninth ribs laterally. Healed bilateral rib fractures. Osteopenia. OTHER: None. _______________     IMPRESSION: 1. Acute fractures involving some right ribs and the right coracoid process. Several additional healed rib fractures. 2. Several vertebral compression fractures, age indeterminate. While many are very likely chronic, acute fractures not excluded. 3. Tiny pulmonary nodule. While statistically likely benign, see recommendations below for followup of incidentally identified pulmonary nodules, which varies with the patient's clinical risk factors. ======================== Fleischner Society Pulmonary Nodule Guidelines (revised 2017): Solid nodule <6 mm: -Low risk for lung cancer: No routine followup. -High risk for lung cancer: Optional CT in 12 months. ========================    Ct Spine Cerv Wo Cont    Result Date: 9/9/2019  EXAM: CT cervical spine CLINICAL INDICATION/HISTORY: Cervical spine fracture   > Additional: Neck pain status post fall. COMPARISON: 10/11/2017   > Reference Exam: CTA head and neck 08/26/2019 TECHNIQUE: Axial CT imaging of the cervical spine was performed from the skull base to the thoracic inlet without intravenous contrast. Multiplanar reformats were generated. One or more dose reduction techniques were used on this CT: automated exposure control, adjustment of the mAs and/or kVp according to patient size, and iterative reconstruction techniques.   The specific techniques used on this CT exam have been documented in the patient's electronic medical record. Digital Imaging and Communications in Medicine (DICOM) format image data are available to nonaffiliated external healthcare facilities or entities on a secure, media free, reciprocally searchable basis with patient authorization for at least a 12-month period after this study. _______________ FINDINGS: VERTEBRAE AND DISCS: No significant change in the chronic right C2 posterior element fracture with slight distraction and no healing. Chronic fracture involving the C7 spinous process. No significant change in grade I anterolisthesis of C2 on C3, C3 on C4 and C4 on C5. Associated degenerative facet arthropathy. No evidence for acute fracture. Moderate to marked loss of disc height at C5-6 and C6-7. SPINAL CANAL AND FORAMINA: C2-3:  No bony central or left foraminal stenosis. Mild bony right foraminal stenosis. C3-4:   No bony central or left foraminal stenosis. Mild bony right foraminal stenosis. C4-5:   No bony central or foraminal stenosis. C5-6:   No bony central stenosis. Mild to moderate bilateral bony foraminal stenosis secondary to uncovertebral spurring and bilateral facet arthropathy. C6-7:   No bony central stenosis. Mild bilateral bony foraminal stenosis secondary to uncovertebral spurring and facet arthropathy. C7-T1:   No bony central or foraminal stenosis. PREVERTEBRAL SOFT TISSUES: Normal. VISIBLE PORTIONS OF POSTERIOR FOSSA/BRAIN: See accompanying CT brain report. LUNG APICES: Biapical pulmonary parenchymal scarring. See accompanying CT chest report. OTHER: None. _______________     IMPRESSION: 1. No acute bony abnormality. 2. Chronic right C2 posterior elements and C7 spinous process fractures. Note that this cervical spine CT was performed supine. Although it is highly sensitive for fractures, it does not evaluate for ligamentous injury or stability.   If the patient has persistent symptoms or if otherwise clinically indicated, erect plain film evaluation or MRI should be performed. Medications ordered:   Medications   traMADol (ULTRAM) tablet 50 mg (has no administration in time range)   iopamidol (ISOVUE 300) 61 % contrast injection 100 mL (94 mL IntraVENous Given 9/9/19 1623)         Medical Decision Making   Initial Medical Decision Making and DDx:  High index of suspicion for injury with her fall, in the context of continued pain in her abdomen and chest.  Will get CT scan head neck facial bones chest abdomen pelvis. ED Course: Progress Notes, Reevaluation, and Consults:     5:48 PM  Discussed findings on the chest CT with rib fractures pulmonary nodule and coracoid process fracture. She takes tramadol on an ongoing basis. Reviewed in  and prescription for 90 days at a time, she is coming up on the end of 1 so we will give her prescription for 10 tablets to ensure there is no delay while she deals with these acute rib fractures. Discussed need to watch for signs of pneumonia and ensure taking adequate breaths and close follow-up with primary doctor. I am the first provider for this patient. I reviewed the vital signs, available nursing notes, past medical history, past surgical history, family history and social history. Patient Vitals for the past 12 hrs:   Temp Pulse Resp BP SpO2   09/09/19 1700  66 14  99 %   09/09/19 1530  65 18 135/58 100 %   09/09/19 1515  65 19 135/60 100 %   09/09/19 1500  64 16 138/64 100 %   09/09/19 1422 99 °F (37.2 °C) 73 15 137/68 97 %       Vital Signs-Reviewed the patient's vital signs. Pulse Oximetry Analysis, Cardiac Monitor, 12 lead ekg:     Sinus rhythm at 66 on telemetry, oximetry 99% on room air. Interpreted by the EP. Records Reviewed: Nursing notes reviewed (Time of Review: 2:51 PM)    Procedures:   Critical Care Time:   Aspirin: (was aspirin given for stroke?)    Diagnosis     Clinical Impression:   1.  Pulmonary nodule 2. Closed fracture of multiple ribs of right side, initial encounter    3. Closed nondisplaced fracture of coracoid process of right shoulder, initial encounter        Disposition: Discharged      Follow-up Information     Follow up With Specialties Details Why Contact Info    Shahana Griffith MD Geriatric Medicine In 3 days  [address] [place] [address] [phone]             Patient's Medications   Start Taking    TRAMADOL (ULTRAM) 50 MG TABLET    Take 1 Tab by mouth every four (4) hours as needed for Pain for up to 3 days. Max Daily Amount: 300 mg. Continue Taking    AMLODIPINE (NORVASC) 2.5 MG TABLET    Take 2.5 mg by mouth daily. ASCORBIC ACID, VITAMIN C, (VITAMIN C) 250 MG TABLET    Take 250 mg by mouth daily. ASPIRIN (ASPIRIN) 325 MG TABLET    Take 325 mg by mouth nightly. BIMATOPROST (LUMIGAN) 0.01 % OPHTHALMIC DROPS    Administer 1 Drop to left eye every evening. BRIMONIDINE-TIMOLOL (COMBIGAN) 0.2-0.5 % DROP OPHTHALMIC SOLUTION    Administer 1 Drop to left eye two (2) times a day. CLONAZEPAM (KLONOPIN) 0.5 MG TABLET    Take 0.25 mg by mouth nightly. CLOPIDOGREL (PLAVIX) 75 MG TABLET    Take 1 Tab by mouth daily. DOCUSATE SODIUM (COLACE) 50 MG CAPSULE    Take 50 mg by mouth daily. FERROUS SULFATE (IRON PO)    Take  by mouth. FLUOXETINE (PROZAC) 40 MG CAPSULE    Take 40 mg by mouth daily. FOLIC ACID (FOLVITE) 1 MG TABLET    Take 1 mg by mouth nightly. LATANOPROST (XALATAN) 0.005 % OPHTHALMIC SOLUTION    Administer 1 Drop to both eyes nightly. LEVETIRACETAM (KEPPRA) 500 MG TABLET    Take  by mouth two (2) times a day. LEVOTHYROXINE (SYNTHROID) 50 MCG TABLET    Take 1 Tab by mouth Daily (before breakfast). METHOTREXATE (RHEUMATREX) 2.5 MG TABLET    Take 2.5 mg by mouth every Wednesday. Indications: 3 in the morning 3 at night    MULTIVITAMIN (ONE A DAY) TABLET    Take 1 Tab by mouth daily.     OXYBUTYNIN CHLORIDE XL (DITROPAN XL) 5 MG CR TABLET    Take 5 mg by mouth nightly. PREDNISONE (DELTASONE) 1 MG TABLET    Take 1 mg by mouth daily (with breakfast). ROPINIROLE (REQUIP) 1 MG TABLET    Take 1 mg by mouth nightly. Indications: Patient can have 1 in the morning and 1 at night or 2 at night for a total of 2mg    SIMVASTATIN (ZOCOR) 20 MG TABLET    Take 20 mg by mouth nightly. TERIPARATIDE (FORTEO) 20 MCG/DOSE - 600 MCG/2.4 ML PNIJ INJECTION    20 mcg by SubCUTAneous route daily. TIMOLOL (TIMOPTIC) 0.5 % OPHTHALMIC SOLUTION    1 Drop two (2) times a day. TOCILIZUMAB (ACTEMRA) 162 MG/0.9 ML SYRG    by SubCUTAneous route.    These Medications have changed    No medications on file   Stop Taking    No medications on file     _______________________________    Notes:    Corey Leonard MD using Dragon dictation      _______________________________

## 2019-09-09 NOTE — DISCHARGE INSTRUCTIONS
Patient Education        Broken Rib: Care Instructions  Your Care Instructions    A broken rib is a crack or break in one of the bones of the rib cage. Breathing can be very painful because the muscles used for breathing pull on the rib. In most cases, a broken rib will heal on its own. You can take pain medicine while the rib mends. Pain relief allows you to take deep breaths. In the past, doctors recommended taping or wrapping broken ribs. This is no longer done because taping makes it hard for you to take deep breaths. Taking deep breaths may help prevent pneumonia or a partial collapse of a lung. Your rib will heal in about 6 weeks. You heal best when you take good care of yourself. Eat a variety of healthy foods, and don't smoke. Follow-up care is a key part of your treatment and safety. Be sure to make and go to all appointments, and call your doctor if you are having problems. It's also a good idea to know your test results and keep a list of the medicines you take. How can you care for yourself at home? · Be safe with medicines. Read and follow all instructions on the label. ? If the doctor gave you a prescription medicine for pain, take it as prescribed. ? If you are not taking a prescription pain medicine, ask your doctor if you can take an over-the-counter medicine. · Even if it hurts, try to cough or take the deepest breath you can at least once every hour. This will get air deeply into your lungs. This may reduce your chance of getting pneumonia or a partial collapse of a lung. Hold a pillow against your chest to make this less painful. · Put ice or a cold pack on the area for 10 to 20 minutes at a time. Put a thin cloth between the ice and your skin. When should you call for help? Call 911 anytime you think you may need emergency care.  For example, call if:    · You have severe trouble breathing.    Call your doctor now or seek immediate medical care if:    · You have some trouble breathing.     · You have a fever.     · You have a new or worse cough.    Watch closely for changes in your health, and be sure to contact your doctor if:    · You have pain even after taking your medicine.     · You do not get better as expected. Where can you learn more? Go to http://emanuel-williams.info/. Enter M135 in the search box to learn more about \"Broken Rib: Care Instructions. \"  Current as of: September 20, 2018  Content Version: 12.1  © 8870-5387 [employer]. Care instructions adapted under license by [employer] (which disclaims liability or warranty for this information). If you have questions about a medical condition or this instruction, always ask your healthcare professional. [address] any warranty or liability for your use of this information. You have a pulmonary nodule. Discussed this with your primary care doctor to determine follow-up plan.

## 2019-09-09 NOTE — ED TRIAGE NOTES
Pt presents with  for increased SOB and cp since fall Saturday morning. Pt had unwitnessed fall in bathroom. Bruising noted to R side of face. Was not evaluated. 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 Per , this is all baseline.      Pt takes plavix and ASA

## 2019-09-10 LAB
ATRIAL RATE: 70 BPM
CALCULATED P AXIS, ECG09: 74 DEGREES
CALCULATED R AXIS, ECG10: 41 DEGREES
CALCULATED T AXIS, ECG11: 98 DEGREES
DIAGNOSIS, 93000: NORMAL
P-R INTERVAL, ECG05: 150 MS
Q-T INTERVAL, ECG07: 438 MS
QRS DURATION, ECG06: 90 MS
QTC CALCULATION (BEZET), ECG08: 473 MS
VENTRICULAR RATE, ECG03: 70 BPM

## 2019-10-08 ENCOUNTER — IMPORTED ENCOUNTER (OUTPATIENT)
Dept: URBAN - METROPOLITAN AREA CLINIC 1 | Facility: CLINIC | Age: 74
End: 2019-10-08

## 2019-10-08 PROBLEM — H47.013: Noted: 2019-10-08

## 2019-10-08 PROCEDURE — 92012 INTRM OPH EXAM EST PATIENT: CPT

## 2019-10-08 NOTE — PATIENT DISCUSSION
1.  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 VA stable OU this visit. Ordered repeat SED Rate HCT CRP today. Continue currently taking 8mg po Qdaily of Prednisone. Cont Combigan BID OS and Lumigan QHS OS. Steroids followed by Dr. Mike Curry safety precautions. Patient should continue to f/u as scheduled with Dr. Belen Castro. Order for labs given to pt to have done prior to her next exam. 2.  Cataract OD: Observe3. OHTN OU (0.8/0.75)- IOP stable on current meds. Cont Combigan OS BID and Latanoprost OS QHS. 4.  MARYJO w/ PEK OU- (H/o Plugs LLs OU) Cont ATs TID OU Routinely. 5.  PVD OD - stable RD precautions. 6.  Pseudophakia OS- H/o YAG Cap OSReturn for an appointment in 4months 10 with Dr. Darien Slaughter.

## 2019-11-07 ENCOUNTER — HOSPITAL ENCOUNTER (OUTPATIENT)
Dept: ULTRASOUND IMAGING | Age: 74
Discharge: HOME | End: 2019-11-07
Attending: INTERNAL MEDICINE
Payer: COMMERCIAL

## 2019-11-07 DIAGNOSIS — E04.1 NONTOXIC UNINODULAR GOITER: ICD-10-CM

## 2019-11-07 PROCEDURE — 76536 US EXAM OF HEAD AND NECK: CPT

## 2020-02-19 NOTE — PATIENT DISCUSSION
Stopped Today: bacitracin (bacitracin): ointment: 500 unit/gram 1 a small amount at bedtime on eyelid 09-

## 2020-02-21 ENCOUNTER — IMPORTED ENCOUNTER (OUTPATIENT)
Dept: URBAN - METROPOLITAN AREA CLINIC 1 | Facility: CLINIC | Age: 75
End: 2020-02-21

## 2020-02-21 PROBLEM — H47.013: Noted: 2020-02-21

## 2020-02-21 PROCEDURE — 92012 INTRM OPH EXAM EST PATIENT: CPT

## 2020-02-21 NOTE — PATIENT DISCUSSION
1.  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 VA stable OU this visit. SED Rate 2 and and CRP less than 1 (See Attachments). Continue currently taking 8mg po Qdaily of Prednisone. Cont Combigan BID OS and Lumigan QHS OS. Steroids followed by Dr. Dusty Frank safety precautions. Patient should continue to f/u as scheduled with Dr. Lisa Zhu. 2. Cataract OD -- Observe3. OHTN OU (0.8/0.75)- IOP stable on current meds. Cont Combigan OS BID and Latanoprost OS QHS. 4.  MARYJO w/ PEK OU- (H/o Plugs LLs OU) Cont ATs TID OU Routinely. 5.  PVD OD - stable RD precautions. 6.  Pseudophakia OS- H/o YAG Cap OS. Return for an appointment in 4 months 27 with Dr. Marline Allen.

## 2020-06-22 ENCOUNTER — IMPORTED ENCOUNTER (OUTPATIENT)
Dept: URBAN - METROPOLITAN AREA CLINIC 1 | Facility: CLINIC | Age: 75
End: 2020-06-22

## 2020-06-22 PROBLEM — H43.811: Noted: 2020-06-22

## 2020-06-22 PROBLEM — Z96.1: Noted: 2020-06-22

## 2020-06-22 PROBLEM — H40.053: Noted: 2020-06-22

## 2020-06-22 PROBLEM — H04.123: Noted: 2020-06-22

## 2020-06-22 PROBLEM — H25.043: Noted: 2020-06-22

## 2020-06-22 PROBLEM — H16.143: Noted: 2020-06-22

## 2020-06-22 PROBLEM — H47.013: Noted: 2020-06-22

## 2020-06-22 PROCEDURE — 92015 DETERMINE REFRACTIVE STATE: CPT

## 2020-06-22 PROCEDURE — 92014 COMPRE OPH EXAM EST PT 1/>: CPT

## 2020-06-22 NOTE — PATIENT DISCUSSION
OHTN-Observe for changes/progression.  Patient to continue with current treatment regimen Combigan OU BID Latanoprost Ou hs)

## 2020-06-22 NOTE — PATIENT DISCUSSION
1.   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 2. OHTN-Observe for changes/progression. Patient to continue with current treatment regimen (Combigan OU BID Latanoprost Ou hs)3. Cataract OU: Observe for now without intervention. The patient was advised to contact us if any change or worsening of vision4. MARYJO w/ PEK OU-The use/continuation of artificial tears were recommended. 5.  PVD w/o Tear OD - RD precautions. 6.  Pseudophakia OS - S/p Yag 7. Return for an appointment in 6 months for 10 with Dr. Ihsan Salomon.

## 2020-08-03 ENCOUNTER — HOSPITAL ENCOUNTER (OUTPATIENT)
Dept: LAB | Age: 75
Discharge: HOME | End: 2020-08-03

## 2020-08-03 LAB — XX-LABCORP SPECIMEN COL,LCBCF: NORMAL

## 2020-08-03 PROCEDURE — 99001 SPECIMEN HANDLING PT-LAB: CPT

## 2020-10-02 ENCOUNTER — HOSPITAL ENCOUNTER (OUTPATIENT)
Dept: GENERAL RADIOLOGY | Age: 75
Discharge: HOME | End: 2020-10-02
Payer: COMMERCIAL

## 2020-10-02 ENCOUNTER — TRANSCRIBE ORDER (OUTPATIENT)
Dept: REGISTRATION | Age: 75
End: 2020-10-02

## 2020-10-02 DIAGNOSIS — R05.9 COUGH: ICD-10-CM

## 2020-10-02 DIAGNOSIS — F17.200 SMOKER: ICD-10-CM

## 2020-10-02 DIAGNOSIS — R05.9 COUGH: Primary | ICD-10-CM

## 2020-10-02 PROCEDURE — 71046 X-RAY EXAM CHEST 2 VIEWS: CPT

## 2021-01-18 ENCOUNTER — IMPORTED ENCOUNTER (OUTPATIENT)
Dept: URBAN - METROPOLITAN AREA CLINIC 1 | Facility: CLINIC | Age: 76
End: 2021-01-18

## 2021-01-18 PROBLEM — H40.053: Noted: 2021-01-18

## 2021-01-18 PROCEDURE — 99213 OFFICE O/P EST LOW 20 MIN: CPT

## 2021-01-18 NOTE — PATIENT DISCUSSION
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 3. Cataract OU: Observe for now without intervention. The patient was advised to contact us if any change or worsening of vision4. MARYJO w/ PEK OU- Recommend ATs TID OU routinely 5. Pseudophakia OS - H/o YAG Cap OS   6. PVD w/o Tear ODReturn for an appointment in 6 months 27 with Dr. Lorena Will.

## 2021-01-18 NOTE — PATIENT DISCUSSION
1.  OHTN- IOP stable Continue Combigan BID OS and Latanoprost QHS OS 2. 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 3. Cataract OU: Observe for now without intervention. The patient was advised to contact us if any change or worsening of vision4. MARYJO w/ PEK OU- Recommend ATs TID OU routinely 5. Pseudophakia OS - H/o YAG Cap OS   6. PVD w/o Tear ODReturn for an appointment in 6 months 27 with Dr. Hesham Russell.

## 2021-03-15 ENCOUNTER — HOSPITAL ENCOUNTER (OUTPATIENT)
Dept: GENERAL RADIOLOGY | Age: 76
Discharge: HOME | End: 2021-03-15
Payer: COMMERCIAL

## 2021-03-15 ENCOUNTER — TRANSCRIBE ORDER (OUTPATIENT)
Dept: REGISTRATION | Age: 76
End: 2021-03-15

## 2021-03-15 DIAGNOSIS — R52 PAIN: Primary | ICD-10-CM

## 2021-03-15 DIAGNOSIS — R52 PAIN: ICD-10-CM

## 2021-03-15 PROCEDURE — 72110 X-RAY EXAM L-2 SPINE 4/>VWS: CPT

## 2021-07-26 ENCOUNTER — IMPORTED ENCOUNTER (OUTPATIENT)
Dept: URBAN - METROPOLITAN AREA CLINIC 1 | Facility: CLINIC | Age: 76
End: 2021-07-26

## 2021-07-26 PROBLEM — H40.053: Noted: 2021-07-26

## 2021-07-26 PROBLEM — H47.013: Noted: 2021-07-26

## 2021-07-26 PROCEDURE — 99214 OFFICE O/P EST MOD 30 MIN: CPT

## 2021-07-26 NOTE — PATIENT DISCUSSION
1.  OHTN (CD 0.80.75) -- IOP stable Continue Combigan BID OS and Latanoprost QHS OS. NLP OD. 2.  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 Recommend routine SED rates with Rheumatology. 3.  Cataract OU -- Observe for now without intervention. The patient was advised to contact us if any change or worsening of vision4. MARYJO w/ PEK OU -- Recommend ATs TID OU routinely 5. Pseudophakia OS -- H/o YAG Cap OS. 6.  PVD w/o Tear ODReturn for an appointment in 6 months 10 with Dr. Catarina Osorio.

## 2021-08-03 PROBLEM — E03.9 ACQUIRED HYPOTHYROIDISM: Status: RESOLVED | Noted: 2017-05-24 | Resolved: 2021-08-03

## 2021-08-11 ENCOUNTER — IMPORTED ENCOUNTER (OUTPATIENT)
Dept: URBAN - METROPOLITAN AREA CLINIC 1 | Facility: CLINIC | Age: 76
End: 2021-08-11

## 2021-08-11 PROBLEM — H04.123: Noted: 2021-08-11

## 2021-08-11 PROBLEM — H02.132: Noted: 2021-08-11

## 2021-08-11 PROBLEM — Z96.1: Noted: 2021-08-11

## 2021-08-11 PROBLEM — H25.11: Noted: 2021-08-11

## 2021-08-11 PROBLEM — H02.102: Noted: 2021-08-11

## 2021-08-11 PROCEDURE — 92012 INTRM OPH EXAM EST PATIENT: CPT

## 2021-08-11 NOTE — PATIENT DISCUSSION
Return for an appointment in Keep appt with [facility] in [address] [address] [facility]. Dr. Kenia Mabry.

## 2021-08-11 NOTE — PATIENT DISCUSSION
1. RLL Laceration healing well. 2.  Ectropion RLL post traumatic3. Cataract OD: Observe for now without intervention. The patient was advised to contact us if any change or worsening of vision4. Dry Eyes OU -The use/continuation of artificial tears were recommended. 5.  Pseudophakia OS 6. Return for an appointment in Keep appt with [facility] [place] [id number] [address] [facility]. Dr. Clotilde Rosenbaum.  RTN sooner for irritation

## 2022-04-03 ASSESSMENT — TONOMETRY
OS_IOP_MMHG: 17
OS_IOP_MMHG: 11
OS_IOP_MMHG: 12
OD_IOP_MMHG: 15
OD_IOP_MMHG: 14
OS_IOP_MMHG: 12
OD_IOP_MMHG: 13
OD_IOP_MMHG: 12
OD_IOP_MMHG: 14
OD_IOP_MMHG: 24
OD_IOP_MMHG: 16
OD_IOP_MMHG: 12
OS_IOP_MMHG: 16
OS_IOP_MMHG: 12
OS_IOP_MMHG: 11
OS_IOP_MMHG: 10
OD_IOP_MMHG: 16
OS_IOP_MMHG: 8
OS_IOP_MMHG: 11
OD_IOP_MMHG: 19
OS_IOP_MMHG: 21
OD_IOP_MMHG: 15
OS_IOP_MMHG: 11
OD_IOP_MMHG: 12
OD_IOP_MMHG: 22
OD_IOP_MMHG: 17
OS_IOP_MMHG: 20

## 2022-04-03 ASSESSMENT — VISUAL ACUITY
OS_CC: CF@1'
OS_CC: CF@1'
OS_CC: 20/400+1
OS_CC: 20/400
OS_CC: CF@3'
OS_CC: CF@3'
OS_CC: CF@2'
OS_CC: CF@1'
OS_CC: CF@1'

## 2022-09-19 ENCOUNTER — COMPREHENSIVE EXAM (OUTPATIENT)
Dept: URBAN - METROPOLITAN AREA CLINIC 1 | Facility: CLINIC | Age: 77
End: 2022-09-19

## 2022-09-19 DIAGNOSIS — H47.013: ICD-10-CM

## 2022-09-19 PROCEDURE — 92014 COMPRE OPH EXAM EST PT 1/>: CPT

## 2022-09-19 ASSESSMENT — TONOMETRY
OS_IOP_MMHG: 11
OD_IOP_MMHG: 17

## 2022-09-19 ASSESSMENT — VISUAL ACUITY: OS_SC: 20/300-1

## 2023-11-18 NOTE — PROGRESS NOTES
I have reviewed this patient's current medication list and recent laboratory results. At this time, I do not suggest any drug therapy adjustments or additional laboratory monitoring. Thank you,  Autumn NEGRO  Ph. M. S.  4/3/2017 Pt's BP was hypotensive while Dr. Verna Klein was at bedside, he is aware of readings and no interventions at this time.       Amrit Brown RN  11/18/23 1749

## 2025-07-18 NOTE — ROUTINE PROCESS
Subjective    Vandana Jeong is a 46 y.o. female who presents today for the following:  Chief Complaint   Patient presents with    Follow-up     Pt reports doing overall well.         History of Present Illness  The patient presents for a follow-up visit. States that she is generally doing alright.     She reports tolerating her iron supplements well, with no significant change in her fatigue levels.    She has been on Zepbound for approximately 8 weeks, which has resulted in a weight loss of 18 pounds. She is currently on a 12.5 mg dose and plans to increase it to 15 mg weekly. She expresses a desire to undergo surgery for a more permanent solution.    She experiences a sensation akin to her skin crawling when exposed to heat or sunlight, a symptom that has persisted for several years. Admits that she does not typically use sunscreen.     She has also recently been having episodes of being very hot which is often accompanied by excessive sweating, even in cold environments. Her menstrual cycles remain regular and monthly.    She had a mammogram and ultrasound last year and has had a Pap smear. She has never had a colonoscopy.        PMH/PSH/Allergies/Social History/medication list and most recent studies reviewed with patient.     reports that she has never smoked. She has never used smokeless tobacco.    reports no history of alcohol use.        Past Medical History:   Diagnosis Date    Abnormal weight gain 8/11/2022    Anemia 8/11/2022    Edema leg     juan LE    High cholesterol 8/11/2022    Hypertension     Major depressive disorder 8/11/2022    Obesities, morbid (HCC) 10/29/2009    Personal history of diseases of blood and blood-forming organs 8/11/2022    Personal history of mental disorder 8/11/2022       Allergies   Allergen Reactions    Amoxicillin Hives        Current Outpatient Medications on File Prior to Visit   Medication Sig Dispense Refill    ZEPBOUND 15 MG/0.5ML SOAJ subCUTAneous auto-injector pen  edside and Verbal shift change report given to NAVJOT Ramsey LPN (oncoming nurse) by Emmanuelle Parsons RN (offgoing nurse). Report included the following information SBAR, Kardex and MAR.